# Patient Record
Sex: FEMALE | Race: WHITE | NOT HISPANIC OR LATINO | Employment: OTHER | URBAN - METROPOLITAN AREA
[De-identification: names, ages, dates, MRNs, and addresses within clinical notes are randomized per-mention and may not be internally consistent; named-entity substitution may affect disease eponyms.]

---

## 2023-02-16 PROBLEM — H35.30 MACULAR DEGENERATION: Status: ACTIVE | Noted: 2023-02-16

## 2023-02-16 PROBLEM — M54.16 LUMBAR RADICULOPATHY: Status: ACTIVE | Noted: 2023-02-16

## 2023-02-16 PROBLEM — M47.812 CERVICAL SPONDYLOSIS WITHOUT MYELOPATHY: Status: ACTIVE | Noted: 2023-02-16

## 2023-02-16 PROBLEM — M19.072 LOCALIZED OSTEOARTHROSIS OF LEFT ANKLE AND FOOT: Status: ACTIVE | Noted: 2023-02-16

## 2023-02-16 PROBLEM — G56.00 CARPAL TUNNEL SYNDROME: Status: ACTIVE | Noted: 2023-02-16

## 2023-02-16 PROBLEM — R68.83 CHILLS (WITHOUT FEVER): Status: ACTIVE | Noted: 2023-02-16

## 2023-02-16 PROBLEM — M79.671 FOOT PAIN, BILATERAL: Status: ACTIVE | Noted: 2023-02-16

## 2023-02-16 PROBLEM — M99.01 SEGMENTAL AND SOMATIC DYSFUNCTION OF CERVICAL REGION: Status: ACTIVE | Noted: 2023-02-16

## 2023-02-16 PROBLEM — R26.89 BALANCE PROBLEM: Status: ACTIVE | Noted: 2023-02-16

## 2023-02-16 PROBLEM — Z96.1 BILATERAL ARTIFICIAL LENS IMPLANT: Status: ACTIVE | Noted: 2023-02-16

## 2023-02-16 PROBLEM — K22.4 ESOPHAGEAL DYSMOTILITY: Status: ACTIVE | Noted: 2023-02-16

## 2023-02-16 PROBLEM — M25.512 LEFT SHOULDER PAIN: Status: ACTIVE | Noted: 2023-02-16

## 2023-02-16 PROBLEM — Q44.6 POLYCYSTIC LIVER DISEASE: Status: ACTIVE | Noted: 2023-02-16

## 2023-02-16 PROBLEM — H35.362 DRUSEN OF LEFT MACULA: Status: ACTIVE | Noted: 2023-02-16

## 2023-02-16 PROBLEM — E07.9 THYROID EYE DISEASE: Status: ACTIVE | Noted: 2023-02-16

## 2023-02-16 PROBLEM — H57.89 THYROID EYE DISEASE: Status: ACTIVE | Noted: 2023-02-16

## 2023-02-16 PROBLEM — M79.644 THUMB PAIN, RIGHT: Status: ACTIVE | Noted: 2023-02-16

## 2023-02-16 PROBLEM — M50.90 CERVICAL DISC DISEASE: Status: ACTIVE | Noted: 2023-02-16

## 2023-02-16 PROBLEM — M85.9 LOW BONE DENSITY: Status: ACTIVE | Noted: 2023-02-16

## 2023-02-16 PROBLEM — H40.1190 POAG (PRIMARY OPEN-ANGLE GLAUCOMA): Status: ACTIVE | Noted: 2023-02-16

## 2023-02-16 PROBLEM — M54.50 LOWER BACK PAIN: Status: ACTIVE | Noted: 2023-02-16

## 2023-02-16 PROBLEM — R55 SYNCOPE AND COLLAPSE: Status: ACTIVE | Noted: 2023-02-16

## 2023-02-16 PROBLEM — M17.0 DEGENERATIVE ARTHRITIS OF KNEE, BILATERAL: Status: ACTIVE | Noted: 2023-02-16

## 2023-02-16 PROBLEM — K76.9 LIVER LESION: Status: ACTIVE | Noted: 2023-02-16

## 2023-02-16 PROBLEM — J38.3 VOCAL CORD DYSFUNCTION: Status: ACTIVE | Noted: 2023-02-16

## 2023-02-16 PROBLEM — H35.361 DRUSEN OF RIGHT MACULA: Status: ACTIVE | Noted: 2023-02-16

## 2023-02-16 PROBLEM — M99.02 SEGMENTAL AND SOMATIC DYSFUNCTION OF THORACIC REGION: Status: ACTIVE | Noted: 2023-02-16

## 2023-02-16 PROBLEM — R10.84 GENERALIZED ABDOMINAL PAIN: Status: ACTIVE | Noted: 2023-02-16

## 2023-02-16 PROBLEM — R94.01 ABNORMAL EEG: Status: ACTIVE | Noted: 2023-02-16

## 2023-02-16 PROBLEM — H40.9 GLAUCOMA OF BOTH EYES: Status: ACTIVE | Noted: 2023-02-16

## 2023-02-16 PROBLEM — M25.539 PAIN, WRIST JOINT: Status: ACTIVE | Noted: 2023-02-16

## 2023-02-16 PROBLEM — G89.29 CHRONIC PAIN OF BOTH KNEES: Status: ACTIVE | Noted: 2023-02-16

## 2023-02-16 PROBLEM — M99.05 SEGMENTAL AND SOMATIC DYSFUNCTION OF PELVIC REGION: Status: ACTIVE | Noted: 2023-02-16

## 2023-02-16 PROBLEM — M25.561 CHRONIC PAIN OF BOTH KNEES: Status: ACTIVE | Noted: 2023-02-16

## 2023-02-16 PROBLEM — M20.21 HALLUX RIGIDUS OF RIGHT FOOT: Status: ACTIVE | Noted: 2023-02-16

## 2023-02-16 PROBLEM — M54.12 RADICULITIS, CERVICAL: Status: ACTIVE | Noted: 2023-02-16

## 2023-02-16 PROBLEM — M54.2 NECK PAIN: Status: ACTIVE | Noted: 2023-02-16

## 2023-02-16 PROBLEM — M25.339 SCAPHOLUNATE DISSOCIATION: Status: ACTIVE | Noted: 2023-02-16

## 2023-02-16 PROBLEM — R06.02 SHORTNESS OF BREATH AT REST: Status: ACTIVE | Noted: 2023-02-16

## 2023-02-16 PROBLEM — R13.10 DYSPHAGIA: Status: ACTIVE | Noted: 2023-02-16

## 2023-02-16 PROBLEM — M19.041 ARTHRITIS OF HAND, RIGHT, DEGENERATIVE: Status: ACTIVE | Noted: 2023-02-16

## 2023-02-16 PROBLEM — K74.60 HEPATIC CIRRHOSIS (MULTI): Status: ACTIVE | Noted: 2023-02-16

## 2023-02-16 PROBLEM — H51.8 SKEW DEVIATION: Status: ACTIVE | Noted: 2023-02-16

## 2023-02-16 PROBLEM — G44.86 HEADACHE, CERVICOGENIC: Status: ACTIVE | Noted: 2023-02-16

## 2023-02-16 PROBLEM — M79.646 THUMB PAIN: Status: ACTIVE | Noted: 2023-02-16

## 2023-02-16 PROBLEM — R10.2 PELVIC PAIN IN FEMALE: Status: ACTIVE | Noted: 2023-02-16

## 2023-02-16 PROBLEM — K21.9 ACID REFLUX: Status: ACTIVE | Noted: 2023-02-16

## 2023-02-16 PROBLEM — R10.9 ABDOMINAL PAIN: Status: ACTIVE | Noted: 2023-02-16

## 2023-02-16 PROBLEM — M79.18 MYALGIA, OTHER SITE: Status: ACTIVE | Noted: 2023-02-16

## 2023-02-16 PROBLEM — D21.10 BENIGN NEOPLASM OF SOFT TISSUE OF UPPER EXTREMITY: Status: ACTIVE | Noted: 2023-02-16

## 2023-02-16 PROBLEM — R93.2 ABNORMAL LIVER ULTRASOUND: Status: ACTIVE | Noted: 2023-02-16

## 2023-02-16 PROBLEM — R49.0 DYSPHONIA: Status: ACTIVE | Noted: 2023-02-16

## 2023-02-16 PROBLEM — R29.898 WEAKNESS OF BOTH LOWER EXTREMITIES: Status: ACTIVE | Noted: 2023-02-16

## 2023-02-16 PROBLEM — D36.10 SCHWANNOMA: Status: ACTIVE | Noted: 2023-02-16

## 2023-02-16 PROBLEM — N95.2 VAGINAL ATROPHY: Status: ACTIVE | Noted: 2023-02-16

## 2023-02-16 PROBLEM — T17.928A ASPIRATION OF FOOD: Status: ACTIVE | Noted: 2023-02-16

## 2023-02-16 PROBLEM — M19.049 ARTHRITIS, DEGENERATIVE, LOCALIZED, PRIMARY, HAND: Status: ACTIVE | Noted: 2023-02-16

## 2023-02-16 PROBLEM — M25.562 CHRONIC PAIN OF BOTH KNEES: Status: ACTIVE | Noted: 2023-02-16

## 2023-02-16 PROBLEM — H53.2 DIPLOPIA: Status: ACTIVE | Noted: 2023-02-16

## 2023-02-16 PROBLEM — M75.110 INCOMPLETE ROTATOR CUFF TEAR: Status: ACTIVE | Noted: 2023-02-16

## 2023-02-16 PROBLEM — H25.811 COMBINED FORM OF AGE-RELATED CATARACT, RIGHT EYE: Status: ACTIVE | Noted: 2023-02-16

## 2023-02-16 PROBLEM — M25.569 JOINT PAIN, KNEE: Status: ACTIVE | Noted: 2023-02-16

## 2023-02-16 PROBLEM — R06.02 SHORTNESS OF BREATH ON EXERTION: Status: ACTIVE | Noted: 2023-02-16

## 2023-02-16 PROBLEM — H04.123 BILATERAL DRY EYES: Status: ACTIVE | Noted: 2023-02-16

## 2023-02-16 PROBLEM — M17.10 ARTHRITIS OF KNEE: Status: ACTIVE | Noted: 2023-02-16

## 2023-02-16 PROBLEM — G47.33 OSA ON CPAP: Status: ACTIVE | Noted: 2023-02-16
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
## 2023-02-16 PROBLEM — M25.559 HIP PAIN: Status: ACTIVE | Noted: 2023-02-16

## 2023-02-16 PROBLEM — M99.03 SEGMENTAL AND SOMATIC DYSFUNCTION OF LUMBAR REGION: Status: ACTIVE | Noted: 2023-02-16

## 2023-02-16 PROBLEM — M19.071 OSTEOARTHRITIS OF RIGHT ANKLE AND FOOT: Status: ACTIVE | Noted: 2023-02-16

## 2023-02-16 PROBLEM — K76.89 LIVER CYST: Status: ACTIVE | Noted: 2023-02-16

## 2023-02-16 PROBLEM — R49.0 MUSCLE TENSION DYSPHONIA: Status: ACTIVE | Noted: 2023-02-16

## 2023-02-16 PROBLEM — M16.9 DEGENERATIVE ARTHRITIS OF HIP: Status: ACTIVE | Noted: 2023-02-16

## 2023-02-16 PROBLEM — M79.672 FOOT PAIN, BILATERAL: Status: ACTIVE | Noted: 2023-02-16

## 2023-02-16 RX ORDER — PNV NO.95/FERROUS FUM/FOLIC AC 28MG-0.8MG
1 TABLET ORAL AS NEEDED
COMMUNITY
End: 2023-06-12 | Stop reason: ALTCHOICE

## 2023-02-16 RX ORDER — PROPRANOLOL HYDROCHLORIDE 10 MG/1
1 TABLET ORAL DAILY
COMMUNITY
Start: 2021-09-09 | End: 2024-03-04 | Stop reason: SDUPTHER

## 2023-02-16 RX ORDER — MONTELUKAST SODIUM 10 MG/1
1 TABLET ORAL DAILY
COMMUNITY
Start: 2012-05-09

## 2023-02-16 RX ORDER — LATANOPROSTENE BUNOD 0.24 MG/ML
SOLUTION/ DROPS OPHTHALMIC
COMMUNITY
Start: 2022-03-22 | End: 2023-10-18

## 2023-02-16 RX ORDER — OMEPRAZOLE 40 MG/1
1 CAPSULE, DELAYED RELEASE ORAL DAILY
COMMUNITY
End: 2023-03-29 | Stop reason: ALTCHOICE

## 2023-02-16 RX ORDER — CYANOCOBALAMIN 1000 UG/ML
INJECTION, SOLUTION INTRAMUSCULAR; SUBCUTANEOUS
COMMUNITY
End: 2023-10-18 | Stop reason: ALTCHOICE

## 2023-02-16 RX ORDER — KETOCONAZOLE 20 MG/ML
SHAMPOO, SUSPENSION TOPICAL
COMMUNITY

## 2023-02-16 RX ORDER — ATORVASTATIN CALCIUM 20 MG/1
1 TABLET, FILM COATED ORAL 3 TIMES WEEKLY
COMMUNITY
Start: 2019-01-04

## 2023-02-16 RX ORDER — LEVOTHYROXINE SODIUM 112 UG/1
1 TABLET ORAL DAILY
COMMUNITY
Start: 2013-02-28 | End: 2023-06-01 | Stop reason: SDUPTHER

## 2023-02-16 RX ORDER — LISINOPRIL 20 MG/1
1 TABLET ORAL DAILY
COMMUNITY
Start: 2013-02-28 | End: 2023-03-29 | Stop reason: ALTCHOICE

## 2023-02-16 RX ORDER — IBANDRONATE SODIUM 150 MG/1
TABLET, FILM COATED ORAL
COMMUNITY
Start: 2020-09-21 | End: 2023-03-29

## 2023-02-16 RX ORDER — AZELASTINE 1 MG/ML
2 SPRAY, METERED NASAL 2 TIMES DAILY PRN
COMMUNITY

## 2023-02-16 RX ORDER — ACYCLOVIR 400 MG/1
1 TABLET ORAL DAILY
COMMUNITY
End: 2024-01-02 | Stop reason: SDUPTHER

## 2023-02-16 RX ORDER — BUDESONIDE AND FORMOTEROL FUMARATE DIHYDRATE 160; 4.5 UG/1; UG/1
2 AEROSOL RESPIRATORY (INHALATION) DAILY
COMMUNITY
Start: 2022-04-05 | End: 2024-04-17 | Stop reason: SDUPTHER

## 2023-02-16 RX ORDER — HYDROXYCHLOROQUINE SULFATE 200 MG/1
1 TABLET, FILM COATED ORAL DAILY
COMMUNITY
Start: 2017-12-29 | End: 2024-03-19

## 2023-03-29 ENCOUNTER — OFFICE VISIT (OUTPATIENT)
Dept: PRIMARY CARE | Facility: CLINIC | Age: 83
End: 2023-03-29
Payer: MEDICARE

## 2023-03-29 VITALS
WEIGHT: 170 LBS | HEIGHT: 63 IN | RESPIRATION RATE: 16 BRPM | DIASTOLIC BLOOD PRESSURE: 56 MMHG | OXYGEN SATURATION: 96 % | SYSTOLIC BLOOD PRESSURE: 138 MMHG | BODY MASS INDEX: 30.12 KG/M2 | HEART RATE: 66 BPM

## 2023-03-29 DIAGNOSIS — G47.33 OSA ON CPAP: ICD-10-CM

## 2023-03-29 DIAGNOSIS — E78.5 HYPERLIPIDEMIA, UNSPECIFIED HYPERLIPIDEMIA TYPE: ICD-10-CM

## 2023-03-29 DIAGNOSIS — R26.89 IMBALANCE: ICD-10-CM

## 2023-03-29 DIAGNOSIS — E11.9 DIET-CONTROLLED DIABETES MELLITUS (MULTI): ICD-10-CM

## 2023-03-29 DIAGNOSIS — I10 PRIMARY HYPERTENSION: Primary | ICD-10-CM

## 2023-03-29 DIAGNOSIS — E03.9 HYPOTHYROIDISM, UNSPECIFIED TYPE: ICD-10-CM

## 2023-03-29 DIAGNOSIS — N18.32 STAGE 3B CHRONIC KIDNEY DISEASE (MULTI): ICD-10-CM

## 2023-03-29 PROBLEM — E78.49 OTHER HYPERLIPIDEMIA: Status: ACTIVE | Noted: 2023-03-29

## 2023-03-29 PROCEDURE — 3078F DIAST BP <80 MM HG: CPT | Performed by: SPECIALIST

## 2023-03-29 PROCEDURE — 99203 OFFICE O/P NEW LOW 30 MIN: CPT | Performed by: SPECIALIST

## 2023-03-29 PROCEDURE — 1036F TOBACCO NON-USER: CPT | Performed by: SPECIALIST

## 2023-03-29 PROCEDURE — 1159F MED LIST DOCD IN RCRD: CPT | Performed by: SPECIALIST

## 2023-03-29 PROCEDURE — 1160F RVW MEDS BY RX/DR IN RCRD: CPT | Performed by: SPECIALIST

## 2023-03-29 PROCEDURE — 3075F SYST BP GE 130 - 139MM HG: CPT | Performed by: SPECIALIST

## 2023-03-29 RX ORDER — HYDROGEN PEROXIDE 3 %
20 SOLUTION, NON-ORAL MISCELLANEOUS DAILY PRN
COMMUNITY
End: 2024-03-19

## 2023-03-29 RX ORDER — AMLODIPINE BESYLATE 2.5 MG/1
2.5 TABLET ORAL DAILY
Qty: 30 TABLET | Refills: 5 | Status: SHIPPED | OUTPATIENT
Start: 2023-03-29 | End: 2023-10-31

## 2023-03-29 RX ORDER — LISINOPRIL 40 MG/1
20 TABLET ORAL DAILY
COMMUNITY
End: 2023-10-18 | Stop reason: ALTCHOICE

## 2023-03-29 ASSESSMENT — ENCOUNTER SYMPTOMS
OCCASIONAL FEELINGS OF UNSTEADINESS: 1
DEPRESSION: 0
LOSS OF SENSATION IN FEET: 0

## 2023-03-29 NOTE — ASSESSMENT & PLAN NOTE
Brought home cuff, home readings are in the 153/77 range  On lisinopril 40 mg.  Used diruetic in past but was discontinued.  Discussed addition of low dose amlodipine 2.5 mg once daily and continue home bp monitoring

## 2023-03-29 NOTE — ASSESSMENT & PLAN NOTE
Discussed, knows avoid Nsaids, labs ordered cmp, consider nephrology referral, ordered urine albumin random.  GFR in 2020 was 56, was down to 42 in 2021.

## 2023-03-29 NOTE — ASSESSMENT & PLAN NOTE
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

## 2023-03-29 NOTE — PROGRESS NOTES
"Subjective   Patient ID: Louisa Rodas is a 82 y.o. female who presents for New Patient Visit.  HPI    PMHx Htn, Hyperlipidemia, Hypothyroidism, Glaucoma,MENDY on CPAP, Polycystic Liver Disease, Sjogren syndrome (Dr Sparks), OA (ren, Hyperlipidemia, Asthma (sees Dr Ramos)  Tremors    \"A lot going on\"  Concerned about bp brought home cuff and home bp readings   153/77, 165/81, 161/97 169/87 and 13/80 155/89 154/89  Last doctor increased to 40 mg Lisinopril since 3 mos approx    Her blood pressure left arm seated 142/71 using her cuff.    Concerned about her sugar level, said diagnosed with DM and was on metformin but discontinued by diet so wants blood test.    C/o of feeling off balance, has not fallen, uses cane today because of knee, did PT here in past.  Balance off x 2 months and worsened.  Sometimes feels lightheaded, had EKG with pulm.  This morning no true vertigo but walking this morning felt like she was listing to the right    Not taking ibandronate too expensive got samples of some medication but doesn't know     Takes propanolol if HR is above 68 akes 2 tablets    Review of Systems  Constitutional  No fatigue, no fevers, no chills, no unintentional weight loss,   HEENT:  No headaches, no dizziness (had in past), a little hearing loss  Cardiovascular:  No chest pain, no palpitations, no shortness of breath with exertion (one flight of stairs)   Respiratory:  No cough,  no wheezing, No shortness of breath at rest  GI  No abdominal pain, no nausea, no vomiting, Positive flatulance, no changes in bowel habits, no bright red blood per rectum, no melena  :  Positive urinary frequency, no dysuria, no urine incontinence  MSK:  No falls,but feels unsteady, sometimes left buttock pain, no joint swelling    Objective   Physical Exam  General:    Well-appearing  F in no acute distress, well nourished, well hydrated  Head:  Normocephalic, atraumatic  Skin:          Warm dry,   Eyes:  Anicteric sclera, pupils " equal,   Ears:        TMs intact  Oral:      Mask in place, Not examined due to pandemic  Neck:   Supple, no thyromegaly or nodules appreciated on exam  Cor:      Regular rate, normal S1, S2, no murmurs appreciated, no S3, no S4   Lungs:   Clear to auscultation b/l, no wheezes, no rhonchi, no crackles, no accessory respiratory muscle use  Neuro:   CN2-12 grossly intact  (except CN 9-10, 12 not examined due to pandemic), No pronator drift, rapid alternating intact, finger-nose intact but positive tremor on right greater than left, heel-shin intact, Unsteady with Rhomberg's (leans right) and unable to perform heel-toe-gait    Assessment/Plan   Problem List Items Addressed This Visit          Nervous    MENDY on CPAP     Using CPAP mgmt per Pulm            Circulatory    Primary hypertension - Primary     Brought home cuff, home readings are in the 153/77 range  On lisinopril 40 mg.  Used diruetic in past but was discontinued.  Discussed addition of low dose amlodipine 2.5 mg once daily and continue home bp monitoring         Relevant Medications    amLODIPine (Norvasc) 2.5 mg tablet    Other Relevant Orders    Comprehensive Metabolic Panel    CBC       Genitourinary    Stage 3b chronic kidney disease     Discussed, knows avoid Nsaids, labs ordered cmp, consider nephrology referral, ordered urine albumin random.  GFR in 2020 was 56, was down to 42 in 2021.         Relevant Orders    Comprehensive Metabolic Panel    Albumin , Urine Random    CBC       Endocrine/Metabolic    Hypothyroidism, unspecified     On levothyroxine 112 mcgs 6 days per week, last TSH  normal in 2020         Relevant Orders    TSH    Diet-controlled diabetes mellitus (CMS/[facility])     Labs ordered hba1c  No longer on metformin for years         Relevant Orders    Hemoglobin A1C    Albumin , Urine Random    CBC       Other    Imbalance     Refer to NeuroDr Mock  MRI 6/28/21 with volume loss with disproportionate ventricular dilation,consider  NPH  Offered PT         Relevant Orders    Referral to Neurology    Hyperlipidemia     On atorvastatin ordered fx lipidis         Relevant Orders    Lipid Panel          Nadira Moctezuma DO

## 2023-03-30 ENCOUNTER — TELEPHONE (OUTPATIENT)
Dept: PRIMARY CARE | Facility: CLINIC | Age: 83
End: 2023-03-30
Payer: MEDICARE

## 2023-04-11 ENCOUNTER — LAB (OUTPATIENT)
Dept: LAB | Facility: LAB | Age: 83
End: 2023-04-11
Payer: MEDICARE

## 2023-04-11 DIAGNOSIS — I10 PRIMARY HYPERTENSION: ICD-10-CM

## 2023-04-11 DIAGNOSIS — N18.32 STAGE 3B CHRONIC KIDNEY DISEASE (MULTI): ICD-10-CM

## 2023-04-11 DIAGNOSIS — E11.9 DIET-CONTROLLED DIABETES MELLITUS (MULTI): ICD-10-CM

## 2023-04-11 DIAGNOSIS — E78.5 HYPERLIPIDEMIA, UNSPECIFIED HYPERLIPIDEMIA TYPE: ICD-10-CM

## 2023-04-11 DIAGNOSIS — E03.9 HYPOTHYROIDISM, UNSPECIFIED TYPE: ICD-10-CM

## 2023-04-11 LAB
ALANINE AMINOTRANSFERASE (SGPT) (U/L) IN SER/PLAS: 24 U/L (ref 7–45)
ALBUMIN (G/DL) IN SER/PLAS: 4.4 G/DL (ref 3.4–5)
ALBUMIN (MG/L) IN URINE: 11.8 MG/L
ALBUMIN/CREATININE (UG/MG) IN URINE: 6.1 UG/MG CRT (ref 0–30)
ALKALINE PHOSPHATASE (U/L) IN SER/PLAS: 83 U/L (ref 33–136)
ANION GAP IN SER/PLAS: 10 MMOL/L (ref 10–20)
ASPARTATE AMINOTRANSFERASE (SGOT) (U/L) IN SER/PLAS: 27 U/L (ref 9–39)
BILIRUBIN TOTAL (MG/DL) IN SER/PLAS: 0.5 MG/DL (ref 0–1.2)
CALCIUM (MG/DL) IN SER/PLAS: 9.3 MG/DL (ref 8.6–10.6)
CARBON DIOXIDE, TOTAL (MMOL/L) IN SER/PLAS: 28 MMOL/L (ref 21–32)
CHLORIDE (MMOL/L) IN SER/PLAS: 107 MMOL/L (ref 98–107)
CHOLESTEROL (MG/DL) IN SER/PLAS: 165 MG/DL (ref 0–199)
CHOLESTEROL IN HDL (MG/DL) IN SER/PLAS: 69.7 MG/DL
CHOLESTEROL/HDL RATIO: 2.4
CREATININE (MG/DL) IN SER/PLAS: 1.4 MG/DL (ref 0.5–1.05)
CREATININE (MG/DL) IN URINE: 192 MG/DL (ref 20–320)
ERYTHROCYTE DISTRIBUTION WIDTH (RATIO) BY AUTOMATED COUNT: 14.4 % (ref 11.5–14.5)
ERYTHROCYTE MEAN CORPUSCULAR HEMOGLOBIN CONCENTRATION (G/DL) BY AUTOMATED: 32.3 G/DL (ref 32–36)
ERYTHROCYTE MEAN CORPUSCULAR VOLUME (FL) BY AUTOMATED COUNT: 95 FL (ref 80–100)
ERYTHROCYTES (10*6/UL) IN BLOOD BY AUTOMATED COUNT: 4.12 X10E12/L (ref 4–5.2)
ESTIMATED AVERAGE GLUCOSE FOR HBA1C: 146 MG/DL
GFR FEMALE: 38 ML/MIN/1.73M2
GLUCOSE (MG/DL) IN SER/PLAS: 129 MG/DL (ref 74–99)
HEMATOCRIT (%) IN BLOOD BY AUTOMATED COUNT: 39.3 % (ref 36–46)
HEMOGLOBIN (G/DL) IN BLOOD: 12.7 G/DL (ref 12–16)
HEMOGLOBIN A1C/HEMOGLOBIN TOTAL IN BLOOD: 6.7 %
LDL: 76 MG/DL (ref 0–99)
LEUKOCYTES (10*3/UL) IN BLOOD BY AUTOMATED COUNT: 3.7 X10E9/L (ref 4.4–11.3)
NRBC (PER 100 WBCS) BY AUTOMATED COUNT: 0 /100 WBC (ref 0–0)
PLATELETS (10*3/UL) IN BLOOD AUTOMATED COUNT: 151 X10E9/L (ref 150–450)
POTASSIUM (MMOL/L) IN SER/PLAS: 4.1 MMOL/L (ref 3.5–5.3)
PROTEIN TOTAL: 6.4 G/DL (ref 6.4–8.2)
SODIUM (MMOL/L) IN SER/PLAS: 141 MMOL/L (ref 136–145)
THYROTROPIN (MIU/L) IN SER/PLAS BY DETECTION LIMIT <= 0.05 MIU/L: 21.43 MIU/L (ref 0.44–3.98)
TRIGLYCERIDE (MG/DL) IN SER/PLAS: 98 MG/DL (ref 0–149)
UREA NITROGEN (MG/DL) IN SER/PLAS: 17 MG/DL (ref 6–23)
VLDL: 20 MG/DL (ref 0–40)

## 2023-04-11 PROCEDURE — 83036 HEMOGLOBIN GLYCOSYLATED A1C: CPT

## 2023-04-11 PROCEDURE — 82043 UR ALBUMIN QUANTITATIVE: CPT

## 2023-04-11 PROCEDURE — 85027 COMPLETE CBC AUTOMATED: CPT

## 2023-04-11 PROCEDURE — 36415 COLL VENOUS BLD VENIPUNCTURE: CPT

## 2023-04-11 PROCEDURE — 82570 ASSAY OF URINE CREATININE: CPT

## 2023-04-11 PROCEDURE — 80061 LIPID PANEL: CPT

## 2023-04-11 PROCEDURE — 84443 ASSAY THYROID STIM HORMONE: CPT

## 2023-04-11 PROCEDURE — 80053 COMPREHEN METABOLIC PANEL: CPT

## 2023-04-17 RX ORDER — ASPIRIN 325 MG
50000 TABLET, DELAYED RELEASE (ENTERIC COATED) ORAL
COMMUNITY
End: 2023-06-12

## 2023-04-25 DIAGNOSIS — E03.9 HYPOTHYROIDISM, UNSPECIFIED TYPE: Primary | ICD-10-CM

## 2023-05-30 ENCOUNTER — PATIENT OUTREACH (OUTPATIENT)
Dept: PRIMARY CARE | Facility: CLINIC | Age: 83
End: 2023-05-30
Payer: MEDICARE

## 2023-05-30 DIAGNOSIS — N39.0 URINARY TRACT INFECTION WITH HEMATURIA, SITE UNSPECIFIED: ICD-10-CM

## 2023-05-30 DIAGNOSIS — R31.9 URINARY TRACT INFECTION WITH HEMATURIA, SITE UNSPECIFIED: ICD-10-CM

## 2023-05-30 RX ORDER — CEPHALEXIN 500 MG/1
500 CAPSULE ORAL 2 TIMES DAILY
COMMUNITY
Start: 2023-05-27 | End: 2023-05-30

## 2023-05-30 RX ORDER — CIPROFLOXACIN 500 MG/1
500 TABLET ORAL 2 TIMES DAILY
COMMUNITY
Start: 2023-05-30 | End: 2023-06-02

## 2023-05-30 RX ORDER — SULFAMETHOXAZOLE AND TRIMETHOPRIM 800; 160 MG/1; MG/1
1 TABLET ORAL DAILY
COMMUNITY
Start: 2011-02-16 | End: 2024-03-19 | Stop reason: ALTCHOICE

## 2023-05-30 RX ORDER — CARBOXYMETHYLCELLULOSE SODIUM 10 MG/ML
GEL OPHTHALMIC 4 TIMES DAILY
COMMUNITY

## 2023-05-30 NOTE — PROGRESS NOTES
Discharge Facility: [facility]  Discharge Diagnosis: Pre-syncope, UTI (urinary tract infection) , dehydration, acute kidney injury  Admission Date: 5/24/2023  Discharge Date: 5/27/2023  PCP Appointment Date: Message sent to [employer] to schedule hospital FU  Specialist Appointment Date: None noted  Hospital Encounter and Summary: Linked available documents  See discharge assessment below for further details    Engagement  Call Start Time: 1433 (5/30/2023  2:45 PM)    Medications  Medications reviewed with patient/caregiver?: Yes (Patient verbalized understanding of medication changes: discontinue amlodipine, decrease lisinopril to 20 mg daily. take Ciproflaxin twice daily until gone.) (5/30/2023  2:45 PM)  Is the patient having any side effects they believe may be caused by any medication additions or changes?: No (5/30/2023  2:45 PM)  Does the patient have all medications ordered at discharge?: Yes (5/30/2023  2:45 PM)  Care Management Interventions: No intervention needed (5/30/2023  2:45 PM)  Is the patient taking all medications as directed (includes completed medication regime)?: Yes (5/30/2023  2:45 PM)    Appointments  Does the patient have a primary care provider?: Yes (5/30/2023  2:45 PM)  Care Management Interventions: -- (No hospital FU appt available to schedule. Message sent to [employer] to schedule patient.) (5/30/2023  2:45 PM)  Has the patient kept scheduled appointments due by today?: Not applicable (5/30/2023  2:45 PM)  Follow Up Tasks: Appointments (5/30/2023  2:45 PM)    Self Management  What is the home health agency?:  [facility]/PT/OT (5/30/2023  2:45 PM)  Has home health visited the patient within 72 hours of discharge?: Call prior to 72 hours (Patient waiting for a scheduling phone call.) (5/30/2023  2:45 PM)  What Durable Medical Equipment (DME) was ordered?: N/A (5/30/2023  2:45 PM)    Patient Teaching  Does the patient have access to their discharge instructions?: Yes (5/30/2023  2:45  PM)  Care Management Interventions: Reviewed instructions with patient (5/30/2023  2:45 PM)  What is the patient's perception of their health status since discharge?: Same (Patient states she still does not feel well. She is very weak. She also had chest tightness/discomfort today and used her rescue inhaler with relief.) (5/30/2023  2:45 PM)  Is the patient/caregiver able to teach back the hierarchy of who to call/visit for symptoms/problems? PCP, Specialist, Home Health nurse, Urgent Care, ED, 911: Yes (5/30/2023  2:45 PM)    Wrap Up  Wrap Up Additional Comments: Patient had not been feeling well for several days. Had a syncopal episode with bowel incontinence after showering at home. She became very dizzy, had to sit down and then passed out. No head injury. Admitted to the hospital and diagnosed with a UTI, dehydration and CHING. Patient discharged to home with [facility]. Reviewed medication changes with patient. Patient verbalized understanding. Patient states she is not feeling better since hospitalization. Message sent to clinical Sensorist to schedule hospital FU. (5/30/2023  2:45 PM)

## 2023-06-01 ENCOUNTER — TELEPHONE (OUTPATIENT)
Dept: PRIMARY CARE | Facility: CLINIC | Age: 83
End: 2023-06-01

## 2023-06-01 DIAGNOSIS — E03.9 HYPOTHYROIDISM, UNSPECIFIED TYPE: ICD-10-CM

## 2023-06-01 RX ORDER — LEVOTHYROXINE SODIUM 112 UG/1
TABLET ORAL
Qty: 78 TABLET | Refills: 1 | Status: SHIPPED | OUTPATIENT
Start: 2023-06-01 | End: 2023-11-01 | Stop reason: SDUPTHER

## 2023-06-12 ENCOUNTER — LAB (OUTPATIENT)
Dept: LAB | Facility: LAB | Age: 83
End: 2023-06-12
Payer: MEDICARE

## 2023-06-12 ENCOUNTER — OFFICE VISIT (OUTPATIENT)
Dept: PRIMARY CARE | Facility: CLINIC | Age: 83
End: 2023-06-12
Payer: MEDICARE

## 2023-06-12 VITALS
DIASTOLIC BLOOD PRESSURE: 68 MMHG | HEIGHT: 63 IN | SYSTOLIC BLOOD PRESSURE: 140 MMHG | BODY MASS INDEX: 30.09 KG/M2 | RESPIRATION RATE: 17 BRPM | OXYGEN SATURATION: 94 % | WEIGHT: 169.8 LBS | HEART RATE: 73 BPM

## 2023-06-12 DIAGNOSIS — I10 PRIMARY HYPERTENSION: Primary | ICD-10-CM

## 2023-06-12 DIAGNOSIS — R35.0 URINE FREQUENCY: ICD-10-CM

## 2023-06-12 DIAGNOSIS — E03.9 HYPOTHYROIDISM, UNSPECIFIED TYPE: ICD-10-CM

## 2023-06-12 DIAGNOSIS — R55 NEAR SYNCOPE: ICD-10-CM

## 2023-06-12 LAB
ALANINE AMINOTRANSFERASE (SGPT) (U/L) IN SER/PLAS: 13 U/L (ref 7–45)
ALBUMIN (G/DL) IN SER/PLAS: 4.1 G/DL (ref 3.4–5)
ALKALINE PHOSPHATASE (U/L) IN SER/PLAS: 86 U/L (ref 33–136)
ANION GAP IN SER/PLAS: 9 MMOL/L (ref 10–20)
ASPARTATE AMINOTRANSFERASE (SGOT) (U/L) IN SER/PLAS: 19 U/L (ref 9–39)
BACTERIA, URINE: ABNORMAL /HPF
BASOPHILS (10*3/UL) IN BLOOD BY AUTOMATED COUNT: 0.04 X10E9/L (ref 0–0.1)
BASOPHILS/100 LEUKOCYTES IN BLOOD BY AUTOMATED COUNT: 1 % (ref 0–2)
BILIRUBIN TOTAL (MG/DL) IN SER/PLAS: 0.4 MG/DL (ref 0–1.2)
C REACTIVE PROTEIN (MG/L) IN SER/PLAS: 0.13 MG/DL
CALCIUM (MG/DL) IN SER/PLAS: 9.4 MG/DL (ref 8.6–10.6)
CALCIUM OXALATE CRYSTALS, URINE: ABNORMAL /HPF
CARBON DIOXIDE, TOTAL (MMOL/L) IN SER/PLAS: 29 MMOL/L (ref 21–32)
CHLORIDE (MMOL/L) IN SER/PLAS: 110 MMOL/L (ref 98–107)
CREATININE (MG/DL) IN SER/PLAS: 1.22 MG/DL (ref 0.5–1.05)
EOSINOPHILS (10*3/UL) IN BLOOD BY AUTOMATED COUNT: 0.23 X10E9/L (ref 0–0.4)
EOSINOPHILS/100 LEUKOCYTES IN BLOOD BY AUTOMATED COUNT: 5.7 % (ref 0–6)
ERYTHROCYTE DISTRIBUTION WIDTH (RATIO) BY AUTOMATED COUNT: 13.4 % (ref 11.5–14.5)
ERYTHROCYTE MEAN CORPUSCULAR HEMOGLOBIN CONCENTRATION (G/DL) BY AUTOMATED: 32.2 G/DL (ref 32–36)
ERYTHROCYTE MEAN CORPUSCULAR VOLUME (FL) BY AUTOMATED COUNT: 97 FL (ref 80–100)
ERYTHROCYTES (10*6/UL) IN BLOOD BY AUTOMATED COUNT: 3.82 X10E12/L (ref 4–5.2)
GFR FEMALE: 45 ML/MIN/1.73M2
GLUCOSE (MG/DL) IN SER/PLAS: 83 MG/DL (ref 74–99)
HEMATOCRIT (%) IN BLOOD BY AUTOMATED COUNT: 37 % (ref 36–46)
HEMOGLOBIN (G/DL) IN BLOOD: 11.9 G/DL (ref 12–16)
HYALINE CASTS, URINE: ABNORMAL /LPF
IMMATURE GRANULOCYTES/100 LEUKOCYTES IN BLOOD BY AUTOMATED COUNT: 0.2 % (ref 0–0.9)
LEUKOCYTES (10*3/UL) IN BLOOD BY AUTOMATED COUNT: 4 X10E9/L (ref 4.4–11.3)
LYMPHOCYTES (10*3/UL) IN BLOOD BY AUTOMATED COUNT: 0.69 X10E9/L (ref 0.8–3)
LYMPHOCYTES/100 LEUKOCYTES IN BLOOD BY AUTOMATED COUNT: 17.2 % (ref 13–44)
MONOCYTES (10*3/UL) IN BLOOD BY AUTOMATED COUNT: 0.36 X10E9/L (ref 0.05–0.8)
MONOCYTES/100 LEUKOCYTES IN BLOOD BY AUTOMATED COUNT: 9 % (ref 2–10)
MUCUS, URINE: ABNORMAL /LPF
NEUTROPHILS (10*3/UL) IN BLOOD BY AUTOMATED COUNT: 2.68 X10E9/L (ref 1.6–5.5)
NEUTROPHILS/100 LEUKOCYTES IN BLOOD BY AUTOMATED COUNT: 66.9 % (ref 40–80)
NRBC (PER 100 WBCS) BY AUTOMATED COUNT: 0 /100 WBC (ref 0–0)
PLATELETS (10*3/UL) IN BLOOD AUTOMATED COUNT: 172 X10E9/L (ref 150–450)
POTASSIUM (MMOL/L) IN SER/PLAS: 3.9 MMOL/L (ref 3.5–5.3)
PROTEIN TOTAL: 6.1 G/DL (ref 6.4–8.2)
RBC, URINE: 1 /HPF (ref 0–5)
SEDIMENTATION RATE, ERYTHROCYTE: 1 MM/H (ref 0–30)
SODIUM (MMOL/L) IN SER/PLAS: 144 MMOL/L (ref 136–145)
SQUAMOUS EPITHELIAL CELLS, URINE: 1 /HPF
THYROTROPIN (MIU/L) IN SER/PLAS BY DETECTION LIMIT <= 0.05 MIU/L: 0.81 MIU/L (ref 0.44–3.98)
UREA NITROGEN (MG/DL) IN SER/PLAS: 20 MG/DL (ref 6–23)
WBC, URINE: 3 /HPF (ref 0–5)

## 2023-06-12 PROCEDURE — 3077F SYST BP >= 140 MM HG: CPT | Performed by: SPECIALIST

## 2023-06-12 PROCEDURE — 84443 ASSAY THYROID STIM HORMONE: CPT

## 2023-06-12 PROCEDURE — 87086 URINE CULTURE/COLONY COUNT: CPT

## 2023-06-12 PROCEDURE — 99214 OFFICE O/P EST MOD 30 MIN: CPT | Performed by: SPECIALIST

## 2023-06-12 PROCEDURE — 1160F RVW MEDS BY RX/DR IN RCRD: CPT | Performed by: SPECIALIST

## 2023-06-12 PROCEDURE — 81001 URINALYSIS AUTO W/SCOPE: CPT

## 2023-06-12 PROCEDURE — 1159F MED LIST DOCD IN RCRD: CPT | Performed by: SPECIALIST

## 2023-06-12 PROCEDURE — 1036F TOBACCO NON-USER: CPT | Performed by: SPECIALIST

## 2023-06-12 PROCEDURE — 36415 COLL VENOUS BLD VENIPUNCTURE: CPT

## 2023-06-12 PROCEDURE — 3078F DIAST BP <80 MM HG: CPT | Performed by: SPECIALIST

## 2023-06-12 NOTE — PROGRESS NOTES
"Subjective   Patient ID: Louisa Rodas is a 82 y.o. female who presents for ER Follow-up.  HPI    81 yo female PMHx Htn, Hyperlipidemia, Hypothyroidism, Glaucoma,MENDY on CPAP, Polycystic Liver Disease, Sjogren syndrome (Dr Sparks), OA (ren, Hyperlipidemia, Asthma (sees Dr Ramos) Tremors   Presents for hospital discharge follow-up    Presented to ER with fevers chills and seconds of LOC she described as staring and with incontinence of bowel .  Said she was aware of her \"bowels coming down\"  She said she didn't actually lose consciousness and was mostly aware of what was going.  Said she lost control of bowels and this has happened to her once many years ago.  (Previously saw Dr. Chakraborty a Neurologist who put her on seizure medicines)  Had normal EEG 9/2021 with Dr. Posadas, sees him July 20    Head CT with no acute abnormality, cerebral volume loss and chronic left external capsule infarct new since 2016  discharge summary notes UA with bacterial and nitrates, cx grew E Coili, got 1 gram rocephin and keflex 500 mg bid x 3 days  presyncope attributed to volume contraction  Amlodipine was held    Said she is taking CBD Gummies Bears or Worms bites in half or a little bit and said it helps her stomach (not able to add to epic medication list)    She has not been intimate since UTI.  She uses a gel because of vaginal dryness which she uses with good results.  She tries to void before and after intercourse and washes.  This was first UTI this year.      Said she is drinking 2 twelve oz bottles of water daily    148/79 BP Here with MA  Brought home BP readings with her ranging from 138/90 to 109/45    Doing PT now and OT now    Saw neurology and has follow-up, has upcoming appt re NPH  Is not taking amlodipine or lisinopril  Only taking if bp is above 140/90 said she was told that by the doctor at the hospital    Is taking propranolol for tremors  Only taking atorvastatin 3 x week since gets muscle aches    Not " taking 50,000 units once monthly since too expensive -- plans to get D3 1000 units     Was going to get another nerve ablation but cancelled since pain is better with warmer weather (Prior 2 nerve blocks before ablation)      Allergies   Allergen Reactions    Penicillins Hives and Itching    Pregabalin Dizziness    Primidone Unknown    Tizanidine Unknown      Current Outpatient Medications   Medication Instructions    acyclovir (Zovirax) 400 mg tablet 1 tablet, oral, Daily    amLODIPine (NORVASC) 2.5 mg, oral, Daily    ASPERCREME, LIDOCAINE, TOP Prn use for cervical spine pain    atorvastatin (Lipitor) 20 mg tablet 1 tablet, oral, 3 times weekly    azelastine (Astelin) 137 mcg (0.1 %) nasal spray 2 sprays, Each Nostril, 2 times daily, Use in each nostril as directed    budesonide-formoteroL (Symbicort) 160-4.5 mcg/actuation inhaler 2 puffs, inhalation, 2 times daily, RINSE MOUTH AFTER USE.    CALCIUM ORAL 1 tablet, oral, Daily    carboxymethylcellulose (Refresh Celluvisc) 1 % ophthalmic solution dropperette ophthalmic (eye), 4 times daily    cyanocobalamin (Vitamin B-12) 1,000 mcg/mL injection Inject 1 ml intramuscularly once a month    esomeprazole (NEXIUM) 20 mg, oral, Daily PRN, Do not open capsule.    hydroxychloroquine (Plaquenil) 200 mg tablet 1 tablet, oral, Daily, WITH FOOD OR MILK    ketoconazole (NIZOral) 2 % shampoo No dose, route, or frequency recorded.    latanoprostene bunod (Vyzulta) 0.024 % drops 1 gtt ou qd    levothyroxine (Synthroid, Levoxyl) 112 mcg tablet Take 1 tablet by mouth 6 days a week    lisinopril 20 mg, oral, Daily    montelukast (Singulair) 10 mg tablet 1 tablet, oral, Daily    propranolol (Inderal) 10 mg tablet 1 tablet, oral, Daily    sulfamethoxazole-trimethoprim (Bactrim DS) 800-160 mg tablet 1 tablet, oral, Daily        Review of Systems  Constitutional  No fatigue, no fevers, no rigors, some times chills, no unintentional weight loss,   HEENT:  No headaches, Positive  "lightheadedness in morning when she gets up, no dizziness, no vision changes (only associated with Dry eyes and sees optho regularly)  Cardiovascular:  No chest pain, no palpitations, a little shortness of breath with exertion (one flight of stairs),   Respiratory:  No cough, no hemoptysis, no wheezing, No shortness of breath at rest, Hx of focal cord dysfunction  GI:  No dysphagia (hx of esophagram and barium swallow), no odynophagia, occasional reflux, no abdominal pain no changes in bowel habits , no brbpr,  no melena  :  Positive urinary frequency with nocturia x 3 since hospitalization, no dysuria, no urine incontinence now (did before hospitalization)  MSK:  No falls, ambulating with cane, right knee oint pain  Neuro:  Positive tremor, lower extremity weakness which is improving with PT    Physical Exam  /68   Pulse 73   Resp 17   Ht 1.6 m (5' 3\")   Wt 77 kg (169 lb 12.8 oz)   SpO2 94%   BMI 30.08 kg/m²   General:    Well-appearing  F in no acute distress, well nourished, well hydrated  Head:  Normocephalic, atraumatic  Skin:          Warm dry,   Eyes:  Anicteric sclera, pupils equal,   Ears:        TMs intact  Oral:      Not examined due to pandemic  Neck:   Supple,   Cor:      Regular rate, normal S1, S2, no murmurs appreciated, no S3, no S4   Lungs:   Clear to auscultation b/l, no wheezes, no rhonchi, no crackles, no accessory respiratory muscle use  Neuro:   CN2-12 grossly intact     Assessment/Plan   Problem List Items Addressed This Visit          Circulatory    Primary hypertension - Primary     Has been off of amlodipine 2.5 mg and lisinopril 40 mg daily  Said she took half tab of lisinopril 20 mg when it was above 140/90  Home BP readings ranging as low as 109/45 up to 138/90  BP today for me was 140/68  Suspect she may need to restart amlodipine but recommend home bp monitoring.  She will continue home bp monitoring and if home BP is consistently above 140/90, will let me know         " Near syncope     Was in ER for near-syncope or syncope, patient thinks she did not lose consciousness since shew as aware of what was happening.  She did have loss of bowel continence and we discussed that is not typically seen with syncope/near-syncope but is seen with seizure activity or spinal cord issues, she has not had further episodes.  Had normal EEG 9/2021, has upcoming appointment with neurology which she will discuss with neurology.  Discussed considering cardiology evaluation for monitor.            Other    Urine frequency     Treated with keflex for uti at er  Having nocturia and frequency, recheck urine          Relevant Orders    Urinalysis Microscopic Only    Urine Culture          Nadira Moctezuma DO

## 2023-06-12 NOTE — ASSESSMENT & PLAN NOTE
Has been off of amlodipine 2.5 mg and lisinopril 40 mg daily  Said she took half tab of lisinopril 20 mg when it was above 140/90  Home BP readings ranging as low as 109/45 up to 138/90  BP today for me was 140/68  Suspect she may need to restart amlodipine but recommend home bp monitoring.  She will continue home bp monitoring and if home BP is consistently above 140/90, will let me know

## 2023-06-12 NOTE — ASSESSMENT & PLAN NOTE
I discussed the findings, assessment and plan with the resident and agree with the resident's findings and plan as documented in the resident's note. Was in ER for near-syncope or syncope, patient thinks she did not lose consciousness since shew as aware of what was happening.  She did have loss of bowel continence and we discussed that is not typically seen with syncope/near-syncope but is seen with seizure activity or spinal cord issues, she has not had further episodes.  Had normal EEG 9/2021, has upcoming appointment with neurology which she will discuss with neurology.  Discussed considering cardiology evaluation for monitor.

## 2023-06-13 LAB
ANTI-NUCLEAR ANTIBODY (ANA): NEGATIVE
URINE CULTURE: NORMAL

## 2023-06-14 LAB
ALBUMIN ELP: 3.9 G/DL (ref 3.4–5)
ALPHA 1: 0.3 G/DL (ref 0.2–0.6)
ALPHA 2: 0.5 G/DL (ref 0.4–1.1)
BETA: 0.7 G/DL (ref 0.5–1.2)
GAMMA GLOBULIN: 0.7 G/DL (ref 0.5–1.4)
PATH REVIEW-SERUM PROTEIN ELECTROPHORESIS: NORMAL
PROTEIN ELECTROPHORESIS INTERPRETATION: NORMAL
PROTEIN TOTAL: 6.1 G/DL (ref 6.4–8.2)

## 2023-06-19 LAB
ALANINE AMINOTRANSFERASE (SGPT) (U/L) IN SER/PLAS: 17 U/L (ref 7–45)
ALBUMIN (G/DL) IN SER/PLAS: 4.3 G/DL (ref 3.4–5)
ALKALINE PHOSPHATASE (U/L) IN SER/PLAS: 87 U/L (ref 33–136)
ALPHA-1 FETOPROTEIN (NG/ML) IN SER/PLAS: 9 NG/ML (ref 0–9)
ANION GAP IN SER/PLAS: 10 MMOL/L (ref 10–20)
ASPARTATE AMINOTRANSFERASE (SGOT) (U/L) IN SER/PLAS: 24 U/L (ref 9–39)
BILIRUBIN DIRECT (MG/DL) IN SER/PLAS: 0.1 MG/DL (ref 0–0.3)
BILIRUBIN TOTAL (MG/DL) IN SER/PLAS: 0.5 MG/DL (ref 0–1.2)
CALCIUM (MG/DL) IN SER/PLAS: 9.5 MG/DL (ref 8.6–10.6)
CARBON DIOXIDE, TOTAL (MMOL/L) IN SER/PLAS: 27 MMOL/L (ref 21–32)
CHLORIDE (MMOL/L) IN SER/PLAS: 109 MMOL/L (ref 98–107)
CREATININE (MG/DL) IN SER/PLAS: 1.2 MG/DL (ref 0.5–1.05)
GFR FEMALE: 46 ML/MIN/1.73M2
GLUCOSE (MG/DL) IN SER/PLAS: 118 MG/DL (ref 74–99)
INR IN PPP BY COAGULATION ASSAY: 1.1 (ref 0.9–1.1)
POTASSIUM (MMOL/L) IN SER/PLAS: 4.1 MMOL/L (ref 3.5–5.3)
PROTEIN TOTAL: 6.4 G/DL (ref 6.4–8.2)
PROTHROMBIN TIME (PT) IN PPP BY COAGULATION ASSAY: 12.2 SEC (ref 9.8–13.4)
SODIUM (MMOL/L) IN SER/PLAS: 142 MMOL/L (ref 136–145)
UREA NITROGEN (MG/DL) IN SER/PLAS: 15 MG/DL (ref 6–23)

## 2023-06-20 NOTE — PROGRESS NOTES
Paged by patient for elevated bp.  Said bp 159/82, was 165/83 this morning so took 20 mg lisinopril.   Has 2.5 mg amlodipine at home, recommended she take it.  Continue home bp monitoring.

## 2023-06-21 ENCOUNTER — PATIENT OUTREACH (OUTPATIENT)
Dept: PRIMARY CARE | Facility: CLINIC | Age: 83
End: 2023-06-21
Payer: MEDICARE

## 2023-06-21 NOTE — PROGRESS NOTES
Call regarding appt. with Dr. Mckeon on 6/12/2023 after hospitalization for primary hypertension. At time of outreach call, the patient is still working to control her BP. The pt had an episode where her BP was elevated and she took a half of lisinopril as directed and her BP did not respond. She then took her amlodipine and her BP came down. Pt states she is going to resume her amlodipine to help control her BP. It was discontinued suring her hospital stay and Dr. Moctezuma said she may have to resume it. [facility] PT/OT still coming to the home and are assisting to monitor BP.

## 2023-06-22 LAB
MISCELLANEUOUS TEST RESULT: NORMAL
NAME OF SENDOUT TEST: NORMAL

## 2023-07-06 ENCOUNTER — TELEPHONE (OUTPATIENT)
Dept: PRIMARY CARE | Facility: CLINIC | Age: 83
End: 2023-07-06

## 2023-07-06 ENCOUNTER — LAB (OUTPATIENT)
Dept: LAB | Facility: LAB | Age: 83
End: 2023-07-06
Payer: MEDICARE

## 2023-07-06 DIAGNOSIS — R39.9 UTI SYMPTOMS: ICD-10-CM

## 2023-07-06 DIAGNOSIS — R39.9 UTI SYMPTOMS: Primary | ICD-10-CM

## 2023-07-06 LAB
APPEARANCE, URINE: ABNORMAL
BILIRUBIN, URINE: NEGATIVE
BLOOD, URINE: NEGATIVE
COLOR, URINE: ABNORMAL
GLUCOSE, URINE: NEGATIVE MG/DL
HYALINE CASTS, URINE: ABNORMAL /LPF
KETONES, URINE: NEGATIVE MG/DL
LEUKOCYTE ESTERASE, URINE: ABNORMAL
NITRITE, URINE: NEGATIVE
PH, URINE: 5 (ref 5–8)
PROTEIN, URINE: ABNORMAL MG/DL
RBC, URINE: 1 /HPF (ref 0–5)
SPECIFIC GRAVITY, URINE: 1.03 (ref 1–1.03)
SQUAMOUS EPITHELIAL CELLS, URINE: 2 /HPF
UROBILINOGEN, URINE: <2 MG/DL (ref 0–1.9)
WBC, URINE: 5 /HPF (ref 0–5)

## 2023-07-06 PROCEDURE — 87086 URINE CULTURE/COLONY COUNT: CPT

## 2023-07-06 PROCEDURE — 81001 URINALYSIS AUTO W/SCOPE: CPT

## 2023-07-07 LAB — URINE CULTURE: NORMAL

## 2023-07-27 ENCOUNTER — PATIENT OUTREACH (OUTPATIENT)
Dept: PRIMARY CARE | Facility: CLINIC | Age: 83
End: 2023-07-27
Payer: MEDICARE

## 2023-07-27 NOTE — PROGRESS NOTES
Unable to reach patient for monthly post discharge follow up. LVM with call back number for patient to call if needed.

## 2023-08-07 ENCOUNTER — TELEPHONE (OUTPATIENT)
Dept: PRIMARY CARE | Facility: CLINIC | Age: 83
End: 2023-08-07

## 2023-08-07 NOTE — TELEPHONE ENCOUNTER
"Patient left a voicemail stating she is starting to be incontinent and when she urinates she feels a \"pulling\" sensation.   "

## 2023-08-08 ENCOUNTER — LAB (OUTPATIENT)
Dept: LAB | Facility: LAB | Age: 83
End: 2023-08-08
Payer: MEDICARE

## 2023-08-08 DIAGNOSIS — N39.0 RECURRENT UTI (URINARY TRACT INFECTION): Primary | ICD-10-CM

## 2023-08-08 DIAGNOSIS — R32 URINARY INCONTINENCE, UNSPECIFIED TYPE: ICD-10-CM

## 2023-08-08 DIAGNOSIS — N39.0 RECURRENT UTI (URINARY TRACT INFECTION): ICD-10-CM

## 2023-08-08 LAB
APPEARANCE, URINE: NORMAL
BILIRUBIN, URINE: NEGATIVE
BLOOD, URINE: NEGATIVE
COLOR, URINE: YELLOW
GLUCOSE, URINE: NEGATIVE MG/DL
KETONES, URINE: NEGATIVE MG/DL
LEUKOCYTE ESTERASE, URINE: NEGATIVE
NITRITE, URINE: NEGATIVE
PH, URINE: 5 (ref 5–8)
PROTEIN, URINE: NEGATIVE MG/DL
SPECIFIC GRAVITY, URINE: 1.03 (ref 1–1.03)
UROBILINOGEN, URINE: <2 MG/DL (ref 0–1.9)

## 2023-08-08 PROCEDURE — 87086 URINE CULTURE/COLONY COUNT: CPT

## 2023-08-08 PROCEDURE — 81001 URINALYSIS AUTO W/SCOPE: CPT

## 2023-08-08 PROCEDURE — 87077 CULTURE AEROBIC IDENTIFY: CPT

## 2023-08-08 PROCEDURE — 87186 SC STD MICRODIL/AGAR DIL: CPT

## 2023-08-09 LAB
BACTERIA, URINE: ABNORMAL /HPF
CALCIUM OXALATE CRYSTALS, URINE: ABNORMAL /HPF
RBC, URINE: ABNORMAL /HPF (ref 0–5)
SQUAMOUS EPITHELIAL CELLS, URINE: ABNORMAL /HPF
WBC, URINE: ABNORMAL /HPF (ref 0–5)

## 2023-08-11 LAB — URINE CULTURE: ABNORMAL

## 2023-08-15 RX ORDER — CIPROFLOXACIN 250 MG/1
250 TABLET, FILM COATED ORAL 2 TIMES DAILY
Qty: 10 TABLET | Refills: 0 | Status: SHIPPED | OUTPATIENT
Start: 2023-08-15 | End: 2023-08-20

## 2023-08-17 LAB
BASOPHILS (10*3/UL) IN BLOOD BY AUTOMATED COUNT: 0.04 X10E9/L (ref 0–0.1)
BASOPHILS/100 LEUKOCYTES IN BLOOD BY AUTOMATED COUNT: 0.8 % (ref 0–2)
EOSINOPHILS (10*3/UL) IN BLOOD BY AUTOMATED COUNT: 0.21 X10E9/L (ref 0–0.4)
EOSINOPHILS/100 LEUKOCYTES IN BLOOD BY AUTOMATED COUNT: 4 % (ref 0–6)
ERYTHROCYTE DISTRIBUTION WIDTH (RATIO) BY AUTOMATED COUNT: 13 % (ref 11.5–14.5)
ERYTHROCYTE MEAN CORPUSCULAR HEMOGLOBIN CONCENTRATION (G/DL) BY AUTOMATED: 32.2 G/DL (ref 32–36)
ERYTHROCYTE MEAN CORPUSCULAR VOLUME (FL) BY AUTOMATED COUNT: 96 FL (ref 80–100)
ERYTHROCYTES (10*6/UL) IN BLOOD BY AUTOMATED COUNT: 4.31 X10E12/L (ref 4–5.2)
HEMATOCRIT (%) IN BLOOD BY AUTOMATED COUNT: 41.3 % (ref 36–46)
HEMOGLOBIN (G/DL) IN BLOOD: 13.3 G/DL (ref 12–16)
IGA (MG/DL) IN SER/PLAS: 150 MG/DL (ref 70–400)
IGG (MG/DL) IN SER/PLAS: 840 MG/DL (ref 700–1600)
IGG (MG/DL) IN SER/PLAS: 840 MG/DL (ref 700–1600)
IGG SUBCLASS 1 (MG/DL) IN SERUM: 640 MG/DL (ref 490–1140)
IGG SUBCLASS 2 (MG/DL) IN SERUM: 112 MG/DL (ref 150–640)
IGG SUBCLASS 3 (MG/DL) IN SERUM: 12 MG/DL (ref 11–85)
IGG SUBCLASS 4 (MG/DL) IN SERUM: 15 MG/DL (ref 3–200)
IGM (MG/DL) IN SER/PLAS: 17 MG/DL (ref 40–230)
IMMATURE GRANULOCYTES/100 LEUKOCYTES IN BLOOD BY AUTOMATED COUNT: 0.4 % (ref 0–0.9)
LEUKOCYTES (10*3/UL) IN BLOOD BY AUTOMATED COUNT: 5.3 X10E9/L (ref 4.4–11.3)
LYMPHOCYTES (10*3/UL) IN BLOOD BY AUTOMATED COUNT: 0.99 X10E9/L (ref 0.8–3)
LYMPHOCYTES/100 LEUKOCYTES IN BLOOD BY AUTOMATED COUNT: 18.7 % (ref 13–44)
MONOCYTES (10*3/UL) IN BLOOD BY AUTOMATED COUNT: 0.51 X10E9/L (ref 0.05–0.8)
MONOCYTES/100 LEUKOCYTES IN BLOOD BY AUTOMATED COUNT: 9.6 % (ref 2–10)
NEUTROPHILS (10*3/UL) IN BLOOD BY AUTOMATED COUNT: 3.53 X10E9/L (ref 1.6–5.5)
NEUTROPHILS/100 LEUKOCYTES IN BLOOD BY AUTOMATED COUNT: 66.5 % (ref 40–80)
NRBC (PER 100 WBCS) BY AUTOMATED COUNT: 0 /100 WBC (ref 0–0)
PLATELETS (10*3/UL) IN BLOOD AUTOMATED COUNT: 181 X10E9/L (ref 150–450)

## 2023-08-21 LAB
PNEUMO SEROTYPE INTERPRETATION: NORMAL
SEROTYPE 1, VIRC: 0.12 UG/ML
SEROTYPE 10A(34), VIRC: 0.46 UG/ML
SEROTYPE 11A(43), VIRC: 0.45 UG/ML
SEROTYPE 12F, VIRC: 0.15 UG/ML
SEROTYPE 14, VIRC: 0.21 UG/ML
SEROTYPE 15B(54), VIRC: 1.64 UG/ML
SEROTYPE 17F, VIRC: 0.4 UG/ML
SEROTYPE 18C(56), VIRC: 0.71 UG/ML
SEROTYPE 19A(57), VIRC: 3.84 UG/ML
SEROTYPE 19F, VIRC: 0.51 UG/ML
SEROTYPE 2, VIRC: 0.11 UG/ML
SEROTYPE 20, VIRC: 0.6 UG/ML
SEROTYPE 22F, VIRC: 0.39 UG/ML
SEROTYPE 23F, VIRC: 0.04 UG/ML
SEROTYPE 3, VIRC: 0.42 UG/ML
SEROTYPE 33F(70), VIRC: 2.91 UG/ML
SEROTYPE 4, VIRC: 0.13 UG/ML
SEROTYPE 5, VIRC: 0.4 UG/ML
SEROTYPE 6B(26), VIRC: 0.57 UG/ML
SEROTYPE 7F(51), VIRC: 0.33 UG/ML
SEROTYPE 8, VIRC: 0.46 UG/ML
SEROTYPE 9N, VIRC: 0.27 UG/ML
SEROTYPE 9V(68), VIRC: 0.09 UG/ML
TETANUS AB IGG: 0.5 IU/ML

## 2023-08-28 ENCOUNTER — PATIENT OUTREACH (OUTPATIENT)
Dept: PRIMARY CARE | Facility: CLINIC | Age: 83
End: 2023-08-28
Payer: MEDICARE

## 2023-08-28 NOTE — PROGRESS NOTES
Call placed regarding 90 day TCM wrap-up of services. At the time of call, the patient states she is still working towards recovery. She continues to work with PT for her balance issues. Also has an appt today with a uro-gynecologist for recurrent UTI's. No questions or concerns for primary care provider at this time.     The pt has met the 30 day and 90 day rehospitalization goals and is discharged from TCM services.

## 2023-09-06 PROBLEM — M79.671 RIGHT FOOT PAIN: Status: ACTIVE | Noted: 2023-09-06

## 2023-09-06 PROBLEM — N32.81 OAB (OVERACTIVE BLADDER): Status: ACTIVE | Noted: 2023-09-06

## 2023-09-06 PROBLEM — M25.50 ARTHRALGIA OF MULTIPLE SITES: Status: ACTIVE | Noted: 2023-09-06

## 2023-09-06 PROBLEM — G62.89 PERIPHERAL MOTOR NEUROPATHY: Status: ACTIVE | Noted: 2023-09-06

## 2023-09-06 PROBLEM — R53.83 OTHER FATIGUE: Status: ACTIVE | Noted: 2023-09-06

## 2023-09-06 PROBLEM — H52.223 REGULAR ASTIGMATISM OF BOTH EYES: Status: ACTIVE | Noted: 2023-09-06

## 2023-09-06 PROBLEM — H04.123 DRY EYE SYNDROME OF BOTH LACRIMAL GLANDS: Status: ACTIVE | Noted: 2023-09-06

## 2023-09-06 PROBLEM — G47.01 INSOMNIA DUE TO MEDICAL CONDITION: Status: ACTIVE | Noted: 2023-09-06

## 2023-09-06 PROBLEM — R90.89 ABNORMAL BRAIN MRI: Status: ACTIVE | Noted: 2023-09-06

## 2023-09-06 PROBLEM — M35.01 KERATOCONJUNCTIVITIS SICCA, IN SJOGREN'S SYNDROME (MULTI): Status: ACTIVE | Noted: 2023-09-06

## 2023-09-06 PROBLEM — L80 VITILIGO: Status: ACTIVE | Noted: 2023-09-06

## 2023-09-06 PROBLEM — E03.9 HYPOTHYROID: Status: ACTIVE | Noted: 2023-09-06

## 2023-09-06 PROBLEM — M77.9 ENTHESOPATHY: Status: ACTIVE | Noted: 2023-09-06

## 2023-09-06 PROBLEM — M06.4 INFLAMMATORY POLYARTHRITIS (MULTI): Status: ACTIVE | Noted: 2023-09-06

## 2023-09-06 PROBLEM — I10 HTN (HYPERTENSION): Status: ACTIVE | Noted: 2023-09-06

## 2023-09-06 PROBLEM — D72.810 LYMPHOPENIA: Status: ACTIVE | Noted: 2023-09-06

## 2023-09-06 PROBLEM — M54.59 OTHER LOW BACK PAIN: Status: ACTIVE | Noted: 2023-09-06

## 2023-09-06 PROBLEM — M19.249 LOCALIZED, SECONDARY OSTEOARTHRITIS OF THE HAND: Status: ACTIVE | Noted: 2023-09-06

## 2023-09-06 PROBLEM — N18.31 CHRONIC KIDNEY DISEASE, STAGE 3A (MULTI): Status: ACTIVE | Noted: 2023-09-06

## 2023-09-06 PROBLEM — M15.0 PRIMARY GENERALIZED (OSTEO)ARTHRITIS: Status: ACTIVE | Noted: 2023-09-06

## 2023-09-06 PROBLEM — H52.03 HYPEROPIA OF BOTH EYES: Status: ACTIVE | Noted: 2023-09-06

## 2023-09-06 PROBLEM — H40.9 GLAUCOMA: Status: ACTIVE | Noted: 2023-09-06

## 2023-09-06 PROBLEM — A60.09 HERPES GENITALIS IN WOMEN: Status: ACTIVE | Noted: 2023-09-06

## 2023-09-06 PROBLEM — E31.0: Status: ACTIVE | Noted: 2023-09-06

## 2023-09-06 PROBLEM — M79.7 FIBROMYALGIA: Status: ACTIVE | Noted: 2023-09-06

## 2023-09-06 PROBLEM — H52.4 BILATERAL PRESBYOPIA: Status: ACTIVE | Noted: 2023-09-06

## 2023-09-06 PROBLEM — R53.1 WEAKNESS: Status: ACTIVE | Noted: 2023-09-06

## 2023-09-06 PROBLEM — E11.9 DIABETES TYPE 2, CONTROLLED (MULTI): Status: ACTIVE | Noted: 2023-09-06

## 2023-09-06 PROBLEM — H26.9 CATARACT: Status: ACTIVE | Noted: 2023-09-06

## 2023-09-06 PROBLEM — I11.9 HYPERTENSIVE HEART DISEASE WITHOUT HEART FAILURE: Status: ACTIVE | Noted: 2023-09-06

## 2023-09-06 PROBLEM — C61 PROSTATE CA (MULTI): Status: ACTIVE | Noted: 2023-09-06

## 2023-09-06 PROBLEM — D80.1 HYPOGAMMAGLOBULINEMIA (MULTI): Status: ACTIVE | Noted: 2023-09-06

## 2023-09-06 PROBLEM — H11.32 SUBCONJUNCTIVAL HEMORRHAGE OF LEFT EYE: Status: ACTIVE | Noted: 2023-09-06

## 2023-09-06 PROBLEM — M19.90 ARTHRITIS: Status: ACTIVE | Noted: 2023-09-06

## 2023-09-06 PROBLEM — N18.30 CKD (CHRONIC KIDNEY DISEASE), STAGE III (MULTI): Status: ACTIVE | Noted: 2023-09-06

## 2023-09-06 PROBLEM — N39.0 RECURRENT UTI: Status: ACTIVE | Noted: 2023-09-06

## 2023-09-06 RX ORDER — LATANOPROST 50 UG/ML
1 SOLUTION/ DROPS OPHTHALMIC NIGHTLY
COMMUNITY
Start: 2023-04-18 | End: 2023-12-05

## 2023-09-06 RX ORDER — BLOOD-GLUCOSE METER
EACH MISCELLANEOUS
COMMUNITY
Start: 2023-02-18

## 2023-09-06 RX ORDER — CELECOXIB 200 MG/1
CAPSULE ORAL
COMMUNITY
Start: 2011-02-16 | End: 2023-10-18 | Stop reason: ALTCHOICE

## 2023-09-06 RX ORDER — MULTIVIT WITH MINERALS/HERBS
1 TABLET ORAL DAILY
COMMUNITY
End: 2023-12-01 | Stop reason: WASHOUT

## 2023-09-06 RX ORDER — PRAVASTATIN SODIUM 20 MG/1
20 TABLET ORAL
COMMUNITY
End: 2023-10-18 | Stop reason: ALTCHOICE

## 2023-09-06 RX ORDER — DIPHENHYDRAMINE HCL 25 MG
25 TABLET ORAL DAILY PRN
COMMUNITY
End: 2024-01-24 | Stop reason: WASHOUT

## 2023-09-06 RX ORDER — OLIVE OIL
OIL (ML) MISCELLANEOUS
COMMUNITY
Start: 2023-06-10 | End: 2024-03-19

## 2023-09-06 RX ORDER — VIT C/E/ZN/COPPR/LUTEIN/ZEAXAN 250MG-90MG
50 CAPSULE ORAL DAILY
COMMUNITY
Start: 2014-01-21 | End: 2023-10-18 | Stop reason: ALTCHOICE

## 2023-09-06 RX ORDER — ESTRADIOL 0.1 MG/G
CREAM VAGINAL
COMMUNITY
Start: 2023-08-28 | End: 2023-10-18

## 2023-09-06 RX ORDER — LIDOCAINE HCL 4 G/100ML
LIQUID TOPICAL AS NEEDED
COMMUNITY
End: 2023-10-18 | Stop reason: SDUPTHER

## 2023-09-06 RX ORDER — LISINOPRIL 20 MG/1
20 TABLET ORAL DAILY
COMMUNITY
Start: 2013-02-28 | End: 2024-03-05 | Stop reason: SDUPTHER

## 2023-09-06 RX ORDER — LATANOPROST/PF 0.005 %
1 DROPS OPHTHALMIC (EYE) NIGHTLY
COMMUNITY
Start: 2023-04-18 | End: 2023-12-05 | Stop reason: SDUPTHER

## 2023-09-06 RX ORDER — POLYETHYLENE GLYCOL 3350 17 G/17G
POWDER, FOR SOLUTION ORAL
COMMUNITY
Start: 2023-05-03 | End: 2023-10-18 | Stop reason: ALTCHOICE

## 2023-09-06 RX ORDER — ZINC GLUCONATE 50 MG
TABLET ORAL
COMMUNITY
End: 2023-10-18 | Stop reason: ALTCHOICE

## 2023-09-06 RX ORDER — DEXTROMETHORPHAN HYDROBROMIDE, GUAIFENESIN 5; 100 MG/5ML; MG/5ML
LIQUID ORAL
COMMUNITY
End: 2023-10-18 | Stop reason: ALTCHOICE

## 2023-09-06 RX ORDER — ASPIRIN 325 MG
50000 TABLET, DELAYED RELEASE (ENTERIC COATED) ORAL
COMMUNITY
End: 2023-10-18 | Stop reason: ALTCHOICE

## 2023-09-06 RX ORDER — ACETAMINOPHEN 325 MG/1
3 TABLET ORAL EVERY 8 HOURS PRN
COMMUNITY
Start: 2023-05-27 | End: 2023-10-18 | Stop reason: ALTCHOICE

## 2023-09-06 RX ORDER — OMEPRAZOLE 40 MG/1
1 CAPSULE, DELAYED RELEASE ORAL DAILY
COMMUNITY
Start: 2022-11-03 | End: 2023-10-18 | Stop reason: ALTCHOICE

## 2023-09-06 RX ORDER — TRAZODONE HYDROCHLORIDE 50 MG/1
50 TABLET ORAL NIGHTLY
COMMUNITY
Start: 2022-10-10 | End: 2023-10-18 | Stop reason: ALTCHOICE

## 2023-09-06 RX ORDER — POLYETHYLENE GLYCOL AND PROPYLENE GLYCOL 4; 3 MG/ML; MG/ML
SOLUTION/ DROPS OPHTHALMIC
COMMUNITY
End: 2023-10-18 | Stop reason: ALTCHOICE

## 2023-09-06 RX ORDER — LIDOCAINE HCL 4 G/100ML
LIQUID TOPICAL
COMMUNITY
End: 2024-03-19

## 2023-09-06 RX ORDER — IBANDRONATE SODIUM 150 MG/1
150 TABLET, FILM COATED ORAL
COMMUNITY
Start: 2020-09-21 | End: 2023-10-18

## 2023-09-06 RX ORDER — TRAVOPROST OPHTHALMIC SOLUTION 0.04 MG/ML
1 SOLUTION OPHTHALMIC DAILY
COMMUNITY
Start: 2011-02-16 | End: 2023-10-18 | Stop reason: ALTCHOICE

## 2023-09-06 RX ORDER — FLUOCINOLONE ACETONIDE 0.11 MG/ML
OIL TOPICAL
COMMUNITY
Start: 2023-07-17

## 2023-09-06 RX ORDER — FLUTICASONE PROPIONATE AND SALMETEROL 250; 50 UG/1; UG/1
POWDER RESPIRATORY (INHALATION)
COMMUNITY
Start: 2011-02-16 | End: 2023-10-18 | Stop reason: ALTCHOICE

## 2023-10-03 ENCOUNTER — TREATMENT (OUTPATIENT)
Dept: PHYSICAL THERAPY | Facility: CLINIC | Age: 83
End: 2023-10-03
Payer: MEDICARE

## 2023-10-03 DIAGNOSIS — M54.59 OTHER LOW BACK PAIN: ICD-10-CM

## 2023-10-03 DIAGNOSIS — R26.89 BALANCE PROBLEM: Primary | ICD-10-CM

## 2023-10-03 PROCEDURE — 97110 THERAPEUTIC EXERCISES: CPT | Mod: GP,CQ

## 2023-10-03 ASSESSMENT — ENCOUNTER SYMPTOMS
LOSS OF SENSATION IN FEET: 0
OCCASIONAL FEELINGS OF UNSTEADINESS: 1
DEPRESSION: 0

## 2023-10-03 NOTE — PROGRESS NOTES
"Physical Therapy    Physical Therapy Treatment    Patient Name: Louisa Rodas  MRN: [id number]  Today's Date: 10/9/2023  Time Calculation  Start Time: 0100  Stop Time: 0210  Time Calculation (min): 70 min      Assessment:  PT Assessment  Assessment Comment:  (R knee limiting factor w/ rx today.  Pt also having difficulty w/ core stability, reporting sciatic sx's throughout rx)    Plan:   Next visit 10th, likely D/C    Current Problem  1. Balance problem        2. Other low back pain            Subjective   L sciatic sx's in buttock, R knee bone on bone.  \"I got an injection in my knee.\"             Pain  Pain Assessment:  (4/10, R LBP)    Objective    Slow chadd w/ amb                     Treatments:     Therapeutic Exercise  Therapeutic Exercise Performed:  (HP to LB w/ stool:PPTx10/pain, [id number], supine march TA x 10, ball squeeze/black band Habd x 20 ea, L clam/black, SLR w/TA x 10 ea, PBall cw/ ccw x 20 ea, Lat amb x 3, Nu Step x 5 min, Biodex LoS/Lvl10 x 3 w/ UE support , x1 FTT)            Goals:  Encounter Problems       Encounter Problems (Active)       PT Problem       .j       Start:  10/03/23    Expected End:  01/07/24       We are continuing the therapy plan of care and goals that were documented in the legacy EMR.  Please refer to the PT (or OT) plan of care in the EMR for treatment plan and goals.                "

## 2023-10-10 ENCOUNTER — TREATMENT (OUTPATIENT)
Dept: PHYSICAL THERAPY | Facility: CLINIC | Age: 83
End: 2023-10-10
Payer: MEDICARE

## 2023-10-10 DIAGNOSIS — M54.59 OTHER LOW BACK PAIN: ICD-10-CM

## 2023-10-10 DIAGNOSIS — R26.89 BALANCE PROBLEM: Primary | ICD-10-CM

## 2023-10-10 NOTE — PROGRESS NOTES
"Physical Therapy    Physical Therapy Treatment    Patient Name: Louisa Rodas  MRN: [id number]  Today's Date: 10/10/2023  Time Calculation  Start Time: 1045  Stop Time: 1100  Time Calculation (min): 15 min      Assessment:  PT Assessment  Assessment Comment:  (No treatment perofrmed today 2` feeling unwell 2` flu/covid shots  yesterday.)  Pt feels she is ready to continue on her own with HEP. She will be discharged today to [facility]  Pt will be discharged to [facility]   Pt instructed to call, email, follow up with additional questions or concerns       Re-evaluation performed by Mason Mackay PT, DPT        Plan:   discharge to [facility]     Current Problem  1. Balance problem        2. Other low back pain            Subjective      \"I don't feel good.  I got my COVID booster and flu shot yesterday and my whole body is sore.\"    Objective     Re-eval    Treatments:   No treatment performed today.    Goals: adequate for discharge        "

## 2023-10-11 ENCOUNTER — OFFICE VISIT (OUTPATIENT)
Dept: GASTROENTEROLOGY | Facility: HOSPITAL | Age: 83
End: 2023-10-11
Payer: MEDICARE

## 2023-10-11 VITALS
HEART RATE: 96 BPM | WEIGHT: 168.2 LBS | SYSTOLIC BLOOD PRESSURE: 109 MMHG | RESPIRATION RATE: 24 BRPM | HEIGHT: 63 IN | TEMPERATURE: 97.8 F | DIASTOLIC BLOOD PRESSURE: 69 MMHG | OXYGEN SATURATION: 93 % | BODY MASS INDEX: 29.8 KG/M2

## 2023-10-11 DIAGNOSIS — R93.2 ABNORMAL LIVER ULTRASOUND: Chronic | ICD-10-CM

## 2023-10-11 DIAGNOSIS — K76.89 LIVER CYST: ICD-10-CM

## 2023-10-11 DIAGNOSIS — Q44.6 POLYCYSTIC LIVER DISEASE: Primary | ICD-10-CM

## 2023-10-11 PROCEDURE — 3078F DIAST BP <80 MM HG: CPT | Performed by: INTERNAL MEDICINE

## 2023-10-11 PROCEDURE — 1159F MED LIST DOCD IN RCRD: CPT | Performed by: INTERNAL MEDICINE

## 2023-10-11 PROCEDURE — 3074F SYST BP LT 130 MM HG: CPT | Performed by: INTERNAL MEDICINE

## 2023-10-11 PROCEDURE — 1036F TOBACCO NON-USER: CPT | Performed by: INTERNAL MEDICINE

## 2023-10-11 PROCEDURE — 1126F AMNT PAIN NOTED NONE PRSNT: CPT | Performed by: INTERNAL MEDICINE

## 2023-10-11 PROCEDURE — 99214 OFFICE O/P EST MOD 30 MIN: CPT | Performed by: INTERNAL MEDICINE

## 2023-10-11 PROCEDURE — 1160F RVW MEDS BY RX/DR IN RCRD: CPT | Performed by: INTERNAL MEDICINE

## 2023-10-11 SDOH — ECONOMIC STABILITY: FOOD INSECURITY: WITHIN THE PAST 12 MONTHS, YOU WORRIED THAT YOUR FOOD WOULD RUN OUT BEFORE YOU GOT MONEY TO BUY MORE.: NEVER TRUE

## 2023-10-11 SDOH — ECONOMIC STABILITY: FOOD INSECURITY: WITHIN THE PAST 12 MONTHS, THE FOOD YOU BOUGHT JUST DIDN'T LAST AND YOU DIDN'T HAVE MONEY TO GET MORE.: NEVER TRUE

## 2023-10-11 ASSESSMENT — LIFESTYLE VARIABLES
SKIP TO QUESTIONS 9-10: 1
HOW MANY STANDARD DRINKS CONTAINING ALCOHOL DO YOU HAVE ON A TYPICAL DAY: PATIENT DOES NOT DRINK
HOW OFTEN DO YOU HAVE SIX OR MORE DRINKS ON ONE OCCASION: NEVER
HOW OFTEN DO YOU HAVE A DRINK CONTAINING ALCOHOL: NEVER
AUDIT-C TOTAL SCORE: 0

## 2023-10-11 ASSESSMENT — ENCOUNTER SYMPTOMS
OCCASIONAL FEELINGS OF UNSTEADINESS: 1
LOSS OF SENSATION IN FEET: 1
DEPRESSION: 0

## 2023-10-11 ASSESSMENT — PATIENT HEALTH QUESTIONNAIRE - PHQ9
SUM OF ALL RESPONSES TO PHQ9 QUESTIONS 1 AND 2: 0
1. LITTLE INTEREST OR PLEASURE IN DOING THINGS: NOT AT ALL
2. FEELING DOWN, DEPRESSED OR HOPELESS: NOT AT ALL

## 2023-10-11 ASSESSMENT — PAIN SCALES - GENERAL: PAINLEVEL: 0-NO PAIN

## 2023-10-11 NOTE — PATIENT INSTRUCTIONS
If you have any questions or need assistance, please don't hesitate to contact us.   Dr. Man: Office [phone] Fax: [phone]  Hepatology Nurse Coordinator: Andreea SANTACRUZ [phone]     You should have regular screening for liver cancer every 6 months.  This includes an ultrasound of your liver and an AFP, a screening blood test to detect a liver tumor when it is small before people develop symptoms.  These tests are an important part of liver cancer screening.      LABS due in Dec and June  ULTRASOUND due in Dec and June  FOLLOW UP with Dr. Man in April

## 2023-10-11 NOTE — PROGRESS NOTES
Diagnoses/Problems  Assessed    · Abdominal pain, unspecified abdominal location (789.00) (R10.9)   · Dysphagia (787.20) (R13.10)   · Generalized abdominal pain (789.07) (R10.84)   · Liver cyst (573.8) (K76.89)   · Abnormal liver ultrasound (793.3) (R93.2)    Patient Discussion/Summary    Please follow up in 6 months with a liver ultrasound and lab work.     Provider Impressions    83 yo female with polycystic liver disease presenting for 6 mo follow-up. Cysts have been stable as of last ultrasound 12/2022. Most recent LFT's normal in 6/2023 and AFP 9. Pt appears to also have renal cysts that may be associated with PLD or 2/2 DM. Can continue to follow-up on a 6-month basis with liver US and MELD labs.      Chief Complaint  Chief Complaints    · Visit For: Other    81 y/o female here today for routine follow up.      Adult Risk ScreeningThere are no spiritual/cultural practices/values/needs that are important to know   Initial Fall Risk Screening:   IRIS has not fallen in the last 6 months. Her fall did not result in injury. IRIS does not have a fear of falling. She does not need assistance with sitting, standing or walking. Does not need assistance walking in her home. She does not need assistance in an unfamiliar setting. The patient is not using an assistive device.   Pain Scale: On a scale of 0 to 10, the patient rates the pain at 0.       Living Will.   Living Will: No living will on file.   Healthcare POA: No healthcare proxy on file.   Declaration of Mental Health Treatment: No mental health treatment on file.    Tobacco Screening: OSEI does not use tobacco. Has not used tobacco in the past 6 months. Has not tried to quit or thought about quitting tobacco.   Domestic Violence Screen: Does not feel threatened or abused physically, emotionally or sexually. Do you feel UNSAFE?   The patient feels safe in the home.   Depression/Suicide Screening:   During the past 2 weeks, the patient has not felt down, depressed  or hopeless.    During the past 2 weeks, the patient has not felt little interest or pleasure in doing things.    She does not have a risk of suicide.    She has not had thoughts of harming others.    Single alcohol screening question:   In the past year the patient has had 5 or more drinks (men) or 4 or more drinks (women)? 0 time(s).   Single substance abuse screening question:   In the past year the patient has used a recreational drug or used a prescription drug for non-medical reasons? 0 time(s).   Procedure or Sedation Areas: Not Applicable   Nutrition Screening:   In the past month, there was not a day when I or anyone in my family went hungry because there was not enough food.   Patient Education: The patient denies that they or the person with them has problems with hearing, speaking, seeing, moving around or learning   The patient is comfortable filling out medical forms.   Food Insecurity:   1. Within the past 12 months, you worried that your food would run out before you got money to buy more: No   2. Within the past 12 months, the food you bought just didn't last and you didn't have money to get more: No      History of Present Illness  83 yo female with polycystic liver disease, Sjogren's, type 2 diabetes mellitus, hypertension, possible seizure disorder, osteopenia, lumbar spinal canal stenosis, presenting today for regular follow-up.  Review of Systems    Constitutional: no fever, no chills and not feeling poorly.   Cardiovascular: no shortness of breath, no chest pain, no palpitations and no shortness of breath during exertion.   Genitourinary: no dysuria and no incontinence.   Musculoskeletal: no joint swelling.   Integumentary: no rashes and was no jaundiced.      Active Problems  Problems    · Abdominal pain, unspecified abdominal location (789.00) (R10.9)   · Abnormal brain MRI (793.0) (R90.89)   · Abnormal EEG (794.02) (R94.01)   · Abnormal liver ultrasound (793.3) (R93.2)   · Acid reflux  (530.81) (K21.9)   · Aftercare following left knee joint replacement surgery (V54.81,V43.65) (Z47.1,Z96.652)   · Arthritis of hand, right, degenerative (715.94) (M19.041)   · Arthritis of knee (716.96) (M17.10)   · Arthritis, degenerative, localized, primary, hand (715.14) (M19.049)   · Aspiration of food, initial encounter (933.1) (T17.928A)   · At risk for seizures (V49.89) (Z91.89)   · Balance problem (781.99) (R26.89)   · Benign neoplasm of connective and soft tissue of upper limb, including shoulder (215.2)  (D21.10)   · Bilateral artificial lens implant (V43.1) (Z96.1)   · Bilateral dry eyes (375.15) (H04.123)   · Breast cancer screening (V76.10) (Z12.39)   · Carpal tunnel syndrome (354.0) (G56.00)   · Cervical disc disease (722.91) (M50.90)   · Cervical spondylosis without myelopathy (721.0) (M47.812)   · Chills (without fever) (780.64) (R68.83)   · Chronic pain of both knees (719.46,338.29) (M25.561,M25.562,G89.29)   · Cirrhosis (571.5) (K74.60)   · Nodular contour of liver on ultrasound (2/16/19)   · Cirrhosis of liver (571.5) (K74.60)   · Colon cancer screening (V76.51) (Z12.11)   · Combined form of age-related cataract, right eye (366.19) (H25.811)   · Degenerative arthritis of hip (715.95) (M16.9)   · Degenerative arthritis of knee, bilateral (715.96) (M17.0)   · Diplopia (368.2) (H53.2)   · Drusen of left macula (362.57) (H35.362)   · Drusen of right macula (362.57) (H35.361)   · Dysphagia (787.20) (R13.10)   · Dysphonia (784.42) (R49.0)   · Esophageal dysmotility (530.5) (K22.4)   · Foot pain, bilateral (729.5) (M79.671,M79.672)   · Generalized abdominal pain (789.07) (R10.84)   · Glaucoma of both eyes (365.9) (H40.9)   · 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)   · Headache, cervicogenic (784.0) (G44.86)   · Hip pain (719.45) (M25.559)   · Incomplete rotator cuff tear (840.4) (M75.110)   · Joint pain, knee (719.46) (M25.569)   · Left shoulder pain (719.41) (M25.512)   · Liver cyst (573.8)  (K76.89)   · Liver lesion (573.8) (K76.9)   · Localized osteoarthrosis of left ankle and foot (715.37) (M19.072)   · Low bone density (733.90) (M85.9)   · Lower back pain (724.2) (M54.50)   · Lumbar radiculopathy (724.4) (M54.16)   · Macular degeneration of left eye (362.50) (H35.30)   · Macular degeneration of right eye (362.50) (H35.30)   · Muscle tension dysphonia (784.42) (R49.0)   · Myalgia, other site (729.1) (M79.18)   · Neck pain (723.1) (M54.2)   · MENDY on CPAP (327.23,V46.8) (G47.33,Z99.89)   · Osteoarthritis of right ankle and foot (715.97) (M19.071)   · Pain, wrist joint (719.43) (M25.539)   · Pelvic pain in female (625.9) (R10.2)   · POAG (primary open-angle glaucoma) (365.11,365.70) (H40.1190)   · Polycystic liver disease (751.62) (Q44.6)   · Prostate CA (185) (C61)   · Radiculitis, cervical (723.4) (M54.12)   · Right foot pain (729.5) (M79.671)   · Scapholunate dissociation (718.83) (M25.339)   · Schwannoma (215.9) (D36.10)   · Segmental and somatic dysfunction of cervical region (739.1) (M99.01)   · Segmental and somatic dysfunction of lumbar region (739.3) (M99.03)   · Segmental and somatic dysfunction of pelvic region (739.5) (M99.05)   · Segmental and somatic dysfunction of thoracic region (739.2) (M99.02)   · Shortness of breath at rest (786.05) (R06.02)   · Shortness of breath on exertion (786.05) (R06.02)   · Skew deviation (378.87) (H51.8)   · Subconjunctival hemorrhage of left eye (372.72) (H11.32)   · Syncope and collapse (780.2) (R55)   · Thumb pain (729.5) (M79.646)   · Thumb pain, right (729.5) (M79.644)   · 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,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) (H57.89,E07.9)   · Vaginal atrophy (627.3) (N95.2)   · Vertical diplopia (368.2) (H53.2)   · Visit for screening mammogram (V76.12) (Z12.31)   · Vocal cord dysfunction (478.5) (J38.3)   · Weakness of both lower extremities (729.89) (R29.898)    Past Medical History  Problems    · Abnormal liver ultrasound (793.3) (R93.2)   · History of Acquired  involutional ptosis of both eyelids (374.30) (H02.403)   · History of Aftercare following left knee joint replacement surgery (V54.81,V43.65)  (Z47.1,Z96.652)   · History of Arthritis of hand, right, degenerative (715.94) (M19.041)   · Cirrhosis (571.5) (K74.60)   · Nodular contour of liver on ultrasound (2/16/19)   · History of Colon cancer screening (V76.51) (Z12.11)   · History of Combined form of age-related cataract, left eye (366.19) (H25.812)   · History of Dermatochalasis of both upper eyelids (374.87) (H02.831,H02.834)   · History of Diabetes mellitus (250.00) (E11.9)   · History of Dry eye syndrome (375.15) (H04.129)   · History of Dysphonia (784.42) (R49.0)   · 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00) (H02.88A,H02.88B)   · History of Muscle tension dysphonia (784.42) (R49.0)   · History of NPDR (nonproliferative diabetic retinopathy) (250.50,362.03) (E11.3299)   · History of NPDR (nonproliferative diabetic retinopathy) (250.50,362.03) (E11.3299)   · History of Nuclear sclerosis (366.16) (H25.10)   · History of Nuclear sclerotic cataract of both eyes (366.16) (H25.13)   · History of MENDY on CPAP (327.23,V46.8) (G47.33,Z99.89)   · Personal history of asthma (V12.69) (Z87.09)   · History of Primary open angle glaucoma (365.11,365.70) (H40.1190)   · Added by Problem List Migration; 2013-7-22; Moved to  Suppressed Nov 25 2013  9:03PM   · History of Primary open angle glaucoma of both eyes, indeterminate stage  (365.11,365.74) (H40.1134)   · History of Senile nuclear cataract (366.16) (H25.10)   · Resolved Date: 20 May 2014   · Added by Problem List Migration; 2013-7-22; Moved to Suppressed Nov 25 2013  9:03PM  [facility]   · History of Shortness of breath at rest (786.05) (R06.02)   · History of Vocal cord dysfunction (478.5) (J38.3)    Surgical History  Problems    · History of Arthroscopy Knee Right   · History of Rotator Cuff Repair    Family History  Mother    · Family history of Alzheimer Disease   · Family history of Arthritis (V17.7)   · Family history of cataracts (V19.19) (Z83.518)   · Family history of glaucoma (V19.11) (Z83.511)   · Family history of Hypertension (V17.49)  Father    · Family history of Cancer   · Family history of Diabetes Mellitus (V18.0)   · Family history of diabetes mellitus (V18.0) (Z83.3)  Son    · Family history of Diabetes Mellitus (V18.0)   · Family history of diabetes mellitus (V18.0) (Z83.3)  Sibling    · Family history of diabetes mellitus (V18.0) (Z83.3)  Brother    · Family history of Cancer   · Family history of Diabetes Mellitus (V18.0)    Social History  Problems    · Alcohol   · wine rarely   · Always uses seat belt   · Former smoker (V15.82) (Z87.891)   · smoked for one year when he was in 30's smoked 1 pack in two weeks   · Denied: History of domestic violence   · No illicit drug use   · Occasional alcohol use   · Recently  (V61.03)    Allergies  Medication    · Penicillins  Hives; Itching; Updated By: Mariela Myles; 2/13/2020 1:24:38 PM   · pregabalin  Dizziness; Updated By: Mariela Myles; 2/13/2020 1:24:38 PM   · primidone  Recorded By: Mariela Myles; 11/12/2020 3:44:38 PM  Denied    · tizanidine  Updated By: Ayesha Negro; 7/25/2022 10:11:26 AM    Current Meds  Current Outpatient Medications on File Prior to Visit   Medication Sig Dispense Refill     acetaminophen (Tylenol 8 HOUR) 650 mg ER tablet        acetaminophen (Tylenol) 325 mg tablet Take 3 tablets (975 mg) by mouth every 8 hours if needed for mild pain (1 - 3).      acyclovir (Zovirax) 400 mg tablet Take 1 tablet (400 mg) by mouth 1 (one) time each day.      amLODIPine (Norvasc) 2.5 mg tablet Take 1 tablet (2.5 mg) by mouth once daily. 30 tablet 5    ASPERCREME, LIDOCAINE, TOP Prn use for cervical spine pain      atorvastatin (Lipitor) 20 mg tablet Take 1 tablet (20 mg) by mouth 3 (three) times a week.      azelastine (Astelin) 137 mcg (0.1 %) nasal spray Administer 2 sprays into each nostril in the morning and at bedtime. Use in each nostril as directed      budesonide-formoteroL (Symbicort) 160-4.5 mcg/actuation inhaler Inhale 2 puffs in the morning and at bedtime. RINSE MOUTH AFTER USE.      CALCIUM ORAL Take 600 mg by mouth once daily.      carboxymethylcellulose (Refresh Celluvisc) 1 % ophthalmic solution dropperette Administer into affected eye(s) 4 times a day.      celecoxib (CeleBREX) 200 mg capsule as necessary      cholecalciferol (Vitamin D-3) 1,250 mcg (50,000 unit) capsule Take 1 capsule (50,000 Units) by mouth. once a month during the second week      cholecalciferol (Vitamin D-3) 25 MCG (1000 UT) capsule Take 1 capsule (25 mcg) by mouth once daily.      cyanocobalamin (Vitamin B-12) 1,000 mcg/mL injection Inject 1 ml intramuscularly once a month      diclofenac sodium 1 % kit Apply  to affected area.      diphenhydrAMINE (Benadryl Allergy) 25 mg tablet Take 1 tablet (25 mg) by mouth once daily as needed.      Ear Drops, carbamide peroxide, 6.5 % otic solution follow instructions on package insert      esomeprazole (NexIUM) 20 mg DR capsule Take 1 capsule (20 mg) by mouth once daily as needed (gerd). Do not open capsule.      estradiol (Estrace) 0.01 % (0.1 mg/gram) vaginal cream apply 0.5 gm vaginally 2 nights weekely      estrogens, conjugated, (Premarin) vaginal cream INSERT 1 GM  VAGINALLY 2 NIGHTS PER WEEK      fluocinolone and shower cap 0.01 % oil Apply to scalp  leave on overnight and wash off in the morning. Use daily      fluticasone propion-salmeteroL (Advair Diskus) 250-50 mcg/dose diskus inhaler Take one(1) inhalation twice daily; rinse and gargle mouth with water after each use.      glucos sul 2KCl/msm/chond/C/Mn (GLUCOSAMINE CHONDROITIN ORAL) Take by mouth 4 times a day. glucosamine-chondroitin with Multiple Vitamins and Minerals oral capsule- take 1 capsule by mouth 4 times a day      hydroxychloroquine (Plaquenil) 200 mg tablet Take 1 tablet (200 mg) by mouth 1 (one) time each day. WITH FOOD OR MILK      ibandronate (Boniva) 150 mg tablet Take 1 tablet (150 mg) by mouth every 30 (thirty) days.      ketoconazole (NIZOral) 2 % shampoo       latanoprost (Xalatan) 0.005 % ophthalmic solution Administer 1 drop into both eyes once daily at bedtime.      latanoprost, PF, 0.005 % drops Administer 1 drop into both eyes once daily at bedtime.      latanoprostene bunod (Vyzulta) 0.024 % drops 1 gtt ou qd      levothyroxine (Synthroid, Levoxyl) 112 mcg tablet Take 1 tablet by mouth 6 days a week 78 tablet 1    lidocaine HCL (Aspercreme, lidocaine HCL,) 4 % liquid roll-on Apply topically to affected area once a day, As Needed      lidocaine HCL 4 % liquid roll-on if needed (cervical spine).      lisinopril 20 mg tablet Take 1 tablet (20 mg) by mouth once daily.      lisinopril 40 mg tablet Take 0.5 tablets (20 mg) by mouth once daily.      montelukast (Singulair) 10 mg tablet Take 1 tablet (10 mg) by mouth 1 (one) time each day.      NON FORMULARY Tumeric- take 1 capsule by mouth twice a day      omeprazole (PriLOSEC) 40 mg DR capsule Take 1 capsule (40 mg) by mouth once daily.      OneTouch Verio test strips strip TEST BLOOD SUGAR ONCE A DAY      peg 400-propylene glycol, PF, (Systane, PF,) 0.4-0.3 % dropperette as needed      polyethylene glycol (Glycolax, Miralax) 17 gram/dose powder MIX  1 CAPFUL (17GM) IN 8 OUNCES OF WATER, JUICE, OR TEA AND DRINK DAILY.      pravastatin (Pravachol) 20 mg tablet Take 1 tablet (20 mg) by mouth once daily.      propranolol (Inderal) 10 mg tablet Take 1 tablet (10 mg) by mouth 1 (one) time each day.      sulfamethoxazole-trimethoprim (Bactrim DS) 800-160 mg tablet Take 1 tablet by mouth once daily.      travoprost (Travatan Z) 0.004 % drops ophthalmic solution 1 drop once daily.      traZODone (Desyrel) 50 mg tablet Take 1 tablet (50 mg) by mouth once daily at bedtime.      vitamin B complex (B Complex-Vitamin B12) tablet Take 1 tablet by mouth once daily.      zinc gluconate 50 mg tablet 1 tablet Orally Once a day for 30 day(s)       No current facility-administered medications on file prior to visit.         Vitals  Vitals:    10/11/23 1147   BP: 109/69   Pulse: 96   Resp: 24   Temp: 36.6 °C (97.8 °F)   SpO2: 93%         Physical Exam  GEN: NAD, alert, answering questions appropriately.  HEENT: NCAT, no scleral icterus, MMM  CV: RRR, S1 and S2. No m/r/g  Resp: Regular work of breathing on RA. Lungs CTAB  Abd: Soft, nontender, nondistended. NABS. No palpable hepatomegaly  Ext: No edema or other gross deformities  Skin: No jaundice or visibile rashes.     Lab Results   Component Value Date    WBC 5.3 08/17/2023    HGB 13.3 08/17/2023    HCT 41.3 08/17/2023    MCV 96 08/17/2023     08/17/2023     Lab Results   Component Value Date    GLUCOSE 118 (H) 06/19/2023    CALCIUM 9.5 06/19/2023     06/19/2023    K 4.1 06/19/2023    CO2 27 06/19/2023     (H) 06/19/2023    BUN 15 06/19/2023    CREATININE 1.20 (H) 06/19/2023     Lab Results   Component Value Date    ALT 17 06/19/2023    AST 24 06/19/2023    ALKPHOS 87 06/19/2023    BILITOT 0.5 06/19/2023     MELD 3.0: 10 at 6/19/2023  1:07 PM  MELD-Na: 9 at 6/19/2023  1:07 PM  Calculated from:  Serum Creatinine: 1.20 mg/dL at 6/19/2023  1:07 PM  Serum Sodium: 142 mmol/L (Using max of 137 mmol/L) at 6/19/2023   1:07 PM  Total Bilirubin: 0.5 mg/dL (Using min of 1 mg/dL) at 6/19/2023  1:07 PM  Serum Albumin: 4.3 g/dL (Using max of 3.5 g/dL) at 6/19/2023  1:07 PM  INR(ratio): 1.1 at 6/19/2023  1:07 PM  Age at listing (hypothetical): 82 years  Sex: Female at 6/19/2023  1:07 PM        Attending Note       Trainee role: Resident           Comments/Additional Findings:   Will try mirlax for constipation. Liver tests stable. Follow up in 6 months.      Signatures    10/11/23 at 12:33 PM - Flor Sales MD

## 2023-10-18 ENCOUNTER — OFFICE VISIT (OUTPATIENT)
Dept: PRIMARY CARE | Facility: CLINIC | Age: 83
End: 2023-10-18
Payer: MEDICARE

## 2023-10-18 VITALS
DIASTOLIC BLOOD PRESSURE: 60 MMHG | HEART RATE: 81 BPM | WEIGHT: 167 LBS | SYSTOLIC BLOOD PRESSURE: 124 MMHG | RESPIRATION RATE: 20 BRPM | OXYGEN SATURATION: 95 % | BODY MASS INDEX: 29.59 KG/M2 | HEIGHT: 63 IN

## 2023-10-18 DIAGNOSIS — G91.2 NPH (NORMAL PRESSURE HYDROCEPHALUS) (MULTI): ICD-10-CM

## 2023-10-18 DIAGNOSIS — M19.90 OSTEOARTHRITIS, UNSPECIFIED OSTEOARTHRITIS TYPE, UNSPECIFIED SITE: ICD-10-CM

## 2023-10-18 DIAGNOSIS — E11.9 CONTROLLED TYPE 2 DIABETES MELLITUS WITHOUT COMPLICATION, WITHOUT LONG-TERM CURRENT USE OF INSULIN (MULTI): ICD-10-CM

## 2023-10-18 DIAGNOSIS — Z12.31 ENCOUNTER FOR SCREENING MAMMOGRAM FOR MALIGNANT NEOPLASM OF BREAST: ICD-10-CM

## 2023-10-18 DIAGNOSIS — G47.33 OSA ON CPAP: ICD-10-CM

## 2023-10-18 DIAGNOSIS — J45.40 MODERATE PERSISTENT ASTHMA WITHOUT COMPLICATION (HHS-HCC): ICD-10-CM

## 2023-10-18 DIAGNOSIS — G89.29 CHRONIC PAIN OF RIGHT KNEE: ICD-10-CM

## 2023-10-18 DIAGNOSIS — M35.00 SJOGREN SYNDROME, UNSPECIFIED (MULTI): ICD-10-CM

## 2023-10-18 DIAGNOSIS — M85.89 OSTEOPENIA OF MULTIPLE SITES: ICD-10-CM

## 2023-10-18 DIAGNOSIS — E78.5 HYPERLIPIDEMIA, UNSPECIFIED HYPERLIPIDEMIA TYPE: ICD-10-CM

## 2023-10-18 DIAGNOSIS — D80.1 HYPOGAMMAGLOBULINEMIA (MULTI): ICD-10-CM

## 2023-10-18 DIAGNOSIS — Z00.00 ENCOUNTER FOR MEDICARE ANNUAL WELLNESS EXAM: Primary | ICD-10-CM

## 2023-10-18 DIAGNOSIS — K74.60 CIRRHOSIS OF LIVER WITHOUT ASCITES, UNSPECIFIED HEPATIC CIRRHOSIS TYPE (MULTI): ICD-10-CM

## 2023-10-18 DIAGNOSIS — N18.31 CHRONIC KIDNEY DISEASE, STAGE 3A (MULTI): ICD-10-CM

## 2023-10-18 DIAGNOSIS — E03.9 HYPOTHYROIDISM, UNSPECIFIED TYPE: ICD-10-CM

## 2023-10-18 DIAGNOSIS — Z78.0 MENOPAUSE: ICD-10-CM

## 2023-10-18 DIAGNOSIS — M25.561 CHRONIC PAIN OF RIGHT KNEE: ICD-10-CM

## 2023-10-18 DIAGNOSIS — I10 HYPERTENSION, UNSPECIFIED TYPE: ICD-10-CM

## 2023-10-18 PROCEDURE — 1170F FXNL STATUS ASSESSED: CPT | Performed by: SPECIALIST

## 2023-10-18 PROCEDURE — 3078F DIAST BP <80 MM HG: CPT | Performed by: SPECIALIST

## 2023-10-18 PROCEDURE — 1159F MED LIST DOCD IN RCRD: CPT | Performed by: SPECIALIST

## 2023-10-18 PROCEDURE — 3074F SYST BP LT 130 MM HG: CPT | Performed by: SPECIALIST

## 2023-10-18 PROCEDURE — 1160F RVW MEDS BY RX/DR IN RCRD: CPT | Performed by: SPECIALIST

## 2023-10-18 PROCEDURE — 1036F TOBACCO NON-USER: CPT | Performed by: SPECIALIST

## 2023-10-18 PROCEDURE — G0439 PPPS, SUBSEQ VISIT: HCPCS | Performed by: SPECIALIST

## 2023-10-18 PROCEDURE — 1126F AMNT PAIN NOTED NONE PRSNT: CPT | Performed by: SPECIALIST

## 2023-10-18 RX ORDER — CHOLECALCIFEROL (VITAMIN D3) 50 MCG
50 TABLET ORAL DAILY
COMMUNITY

## 2023-10-18 ASSESSMENT — PATIENT HEALTH QUESTIONNAIRE - PHQ9
2. FEELING DOWN, DEPRESSED OR HOPELESS: NOT AT ALL
SUM OF ALL RESPONSES TO PHQ9 QUESTIONS 1 AND 2: 0
1. LITTLE INTEREST OR PLEASURE IN DOING THINGS: NOT AT ALL
2. FEELING DOWN, DEPRESSED OR HOPELESS: NOT AT ALL
SUM OF ALL RESPONSES TO PHQ9 QUESTIONS 1 AND 2: 0
1. LITTLE INTEREST OR PLEASURE IN DOING THINGS: NOT AT ALL

## 2023-10-18 ASSESSMENT — ENCOUNTER SYMPTOMS
DEPRESSION: 0
LOSS OF SENSATION IN FEET: 0
OCCASIONAL FEELINGS OF UNSTEADINESS: 0

## 2023-10-18 ASSESSMENT — ACTIVITIES OF DAILY LIVING (ADL)
TAKING_MEDICATION: INDEPENDENT
BATHING: INDEPENDENT
DRESSING: INDEPENDENT
MANAGING_FINANCES: INDEPENDENT
GROCERY_SHOPPING: INDEPENDENT
DOING_HOUSEWORK: INDEPENDENT

## 2023-10-18 NOTE — PROGRESS NOTES
Subjective   Patient ID: Louisa Rodas is a 82 y.o. female who presents for Medicare Annual Wellness Visit Initial.  HPI    82 Pmhx sig Htn, Hyperlipidemia, Hypothyroidism, Glaucoma, MENDY on CPAP, Polycystic Liver Disease,  Cirrhosis, Sjogrens Syndrome (Dr. Sparks), OA (Dr. Verduzco), Mod Persistent Asthma (Dr. Ramos), MENDY on CPAP, Nonfamilial hypogammaglobulinemia (sees Dr. Monique), Tremors, CKD3,  and Cervical Facet Arthropathy (C3-5) s/p facet medial branch radiofrequency ablation 1/2023 presents for annual MAW    Had covid shot   Had flu shot    Considering knee replacement right with ortho  Saw Urology, not taking vaginal cream since cannot afford.  Already met with Pharmacy and they could not help with cost.    Osteopenia on DXA 9/30/2020 but is not taking boniva due to cost for over the past year.  Originally prescribed by Dr. 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 (new from 6/28/21 MRI)    Allergies   Allergen Reactions    Penicillins Hives and Itching    Pregabalin Dizziness    Primidone Unknown    Tizanidine Unknown     Doesn't recall      Current Outpatient Medications   Medication Instructions    acyclovir (Zovirax) 400 mg tablet 1 tablet, oral, Daily    amLODIPine (NORVASC) 2.5 mg, oral, Daily    atorvastatin (Lipitor) 20 mg tablet 1 tablet, oral, 3 times weekly    azelastine (Astelin) 137 mcg (0.1 %) nasal spray 2 sprays, Each Nostril, 2 times daily PRN, Use in each nostril as directed    budesonide-formoteroL (Symbicort) 160-4.5 mcg/actuation inhaler 2 puffs, inhalation, Daily, RINSE MOUTH AFTER USE.    carboxymethylcellulose (Refresh Celluvisc) 1 % ophthalmic solution  dropperette ophthalmic (eye), 4 times daily    cholecalciferol (VITAMIN D-3) 50 mcg, oral, Daily    diclofenac sodium 1 % kit Apply  to affected area.    diphenhydrAMINE (BENADRYL ALLERGY) 25 mg, oral, Daily PRN    Ear Drops, carbamide peroxide, 6.5 % otic solution follow instructions on package insert    esomeprazole (NEXIUM) 20 mg, oral, Daily PRN, Do not open capsule.    fluocinolone and shower cap 0.01 % oil Apply to scalp  leave on overnight and wash off in the morning. Use daily    hydroxychloroquine (Plaquenil) 200 mg tablet 1 tablet, oral, Daily, WITH FOOD OR MILK    ketoconazole (NIZOral) 2 % shampoo No dose, route, or frequency recorded.    latanoprost (Xalatan) 0.005 % ophthalmic solution 1 drop, Both Eyes, Nightly    latanoprost, PF, 0.005 % drops 1 drop, Both Eyes, Nightly    levothyroxine (Synthroid, Levoxyl) 112 mcg tablet Take 1 tablet by mouth 6 days a week    lidocaine HCL (Aspercreme, lidocaine HCL,) 4 % liquid roll-on Apply topically to affected area once a day, As Needed    lisinopril 20 mg, oral, Daily    montelukast (Singulair) 10 mg tablet 1 tablet, oral, Daily    NON FORMULARY  Tumeric- take 1 capsule by mouth twice a day    OneTouch Verio test strips strip TEST BLOOD SUGAR ONCE A DAY    propranolol (Inderal) 10 mg tablet 1 tablet, oral, Daily    sulfamethoxazole-trimethoprim (Bactrim DS) 800-160 mg tablet 1 tablet, oral, Daily    UNABLE TO FIND Med Name:  salon pas topically prn    vitamin B complex (B Complex-Vitamin B12) tablet 1 tablet, oral, Daily        Review of Systems  Constitutional  No fatigue, no fevers, no chills, no unintentional weight loss,   HEENT:  No headaches, no dizziness, no double vision, no blurred vision, sees optho, dry eyes and some hearing loss  Cardiovascular:  No chest pain, no palpitations, a little shortness of breath with exertion (one flight of stairs),   Respiratory:  Occasional post nasal drip cough after putting drops in eyes, no wheezing, No shortness of  "breath at rest  GI:  No dysphagia, no odynophagia, no reflux, no abdominal pain, no nausea, no vomiting, no changes in bowel habits, no bright red blood per rectum, no melena  :  Some urinary frequency, no dysuria, no urine incontinence  MSK:  No falls, Knees joint pain, no joint swelling  Neuro:  On propranolol for tremors, LLE from sciatica extremity weakness, no changes in sensation  Breasts:  No abnormalities on self breast exam no nipple discharge no skin changes    Physical Exam  /60   Pulse 81   Resp 20   Ht 1.6 m (5' 3\")   Wt 75.8 kg (167 lb)   SpO2 95%   BMI 29.58 kg/m²   General:    Well-appearing  F in no acute distress, well nourished, well hydrated  Head:  Normocephalic, atraumatic  Skin:          Warm dry,   Eyes:  Anicteric sclera, pupils equal,   Ears:        TMs intact  Oral:      Not examined due to pandemic  Neck:   Supple, no cervical/supraclavicular adenopathy, no thyromegaly or nodules appreciated on exam  Cor:      Regular rate, normal S1, S2, no murmurs appreciated, no S3, no S4   Lungs:   Clear to auscultation b/l, no wheezes, no rhonchi, no crackles, no accessory respiratory muscle use  Abd:          Soft, nontender, no guarding, no rebound, no hepatosplenomegaly appreciated   Ext:            No lower extremity edema, no palpable cords  Pulses:      Pedal pulses intact  Neuro:   CN2-12 grossly intact   Breasts:     No Axillary adenopathy, no discrete palpable breast nodules, no overlying skin changes, no nipple discharge    Assessment/Plan   Problem List Items Addressed This Visit       Hepatic cirrhosis (CMS/HCC)     Management per hepatology (LOV 5/2023)  Follows every 6 months         MENDY on CPAP     Management per pulm, sees          Hypothyroidism, unspecified     On levothyroxine 112 mcgs daily  TSH 0.81 on 6/12/2023         Hyperlipidemia     On atorvastatin 20 mg daily  Labs ordered         Relevant Orders    Lipid Panel    Diabetes type 2, controlled " (CMS/HCC)     Diet-controlled   HBA1C 6.7 May 25 2023  Continue ophthalmology exams         Relevant Orders    Hemoglobin A1C    Albumin , Urine Random    Hypogammaglobulinemia (CMS/HCC)     Management per allergy/immunology Dr. Monique  On Bactrim          HTN (hypertension)     Well-controlled on current medication         Relevant Orders    CBC    Chronic kidney disease, stage 3a (CMS/HCC)     GFR 6/19/2023 was 46  Avoid Nsaids  Continue to monitor         Encounter for Medicare annual wellness exam - Primary     Previously received annual influenza vaccine 10/9/2023  Previously received 6 Covid vaccines and Covid booster 10/9/2023  Recommend RSV vaccine  Previously received PPSV23 10/15/19  Previously received PCV13 5/24/21 and 4/10/2020  Previously received Shingrix vaccines 5/21/19 and 3/9/19  DXA 9/20/2020 ordered Not taking Boniva  consider Fosamax  Mammogram 3/10/22 repeat 1 yr ordered  Had CMP in October  Labs ordered:  CBC fx Lipids HBA1C urine albumin          Relevant Orders    Lipid Panel    Hemoglobin A1C    CBC    NPH (normal pressure hydrocephalus) (CMS/HCC)     MRI 5/4/2023 with findings suggesting NPH, 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 Verduzco           Relevant Orders    Disability Placard    Menopause     DXA 9/20/2020  DXA ordered   Consider fosamax if indicated         Relevant Orders    XR DEXA bone density    Encounter for screening mammogram for malignant neoplasm of breast     Mammogram 3/10/2022  Ordered         Relevant Orders    BI mammo bilateral screening tomosynthesis    Chronic pain of right knee    Relevant Orders    Disability Placard    Moderate persistent asthma without complication     On Singulair and Symbicort  Said will need singulair refill  Management per  yuliana, sees          Sjogren syndrome, unspecified (CMS/HCC)     Management per Dr. Sparks Rheum at Clinic  On Plaquenil         Osteopenia     Noted on 9/30/2020 DXA  Gynecology prescribed Boniva but she has not taken for past year due to cost  Thinks she takes 5000 units vitamin D3, discussed typical recommended dose is 1000 to 2000 units (she will check her dose at home)               Nadira Moctezuma DO

## 2023-10-29 PROBLEM — M19.90 OSTEOARTHRITIS: Status: ACTIVE | Noted: 2023-10-29

## 2023-10-29 PROBLEM — J45.40 MODERATE PERSISTENT ASTHMA WITHOUT COMPLICATION (HHS-HCC): Status: ACTIVE | Noted: 2023-10-29

## 2023-10-29 PROBLEM — N18.32 STAGE 3B CHRONIC KIDNEY DISEASE (MULTI): Status: RESOLVED | Noted: 2023-03-29 | Resolved: 2023-10-29

## 2023-10-29 PROBLEM — G89.29 CHRONIC PAIN OF RIGHT KNEE: Status: ACTIVE | Noted: 2023-10-29

## 2023-10-29 PROBLEM — E78.49 OTHER HYPERLIPIDEMIA: Status: RESOLVED | Noted: 2023-03-29 | Resolved: 2023-10-29

## 2023-10-29 PROBLEM — J45.20 MILD INTERMITTENT ASTHMA WITHOUT COMPLICATION (HHS-HCC): Status: ACTIVE | Noted: 2023-10-29

## 2023-10-29 PROBLEM — M35.00 SJOGREN SYNDROME, UNSPECIFIED (MULTI): Status: ACTIVE | Noted: 2023-10-29

## 2023-10-29 PROBLEM — G91.2 NPH (NORMAL PRESSURE HYDROCEPHALUS) (MULTI): Status: ACTIVE | Noted: 2023-10-29

## 2023-10-29 PROBLEM — E11.9 DIET-CONTROLLED DIABETES MELLITUS (MULTI): Status: RESOLVED | Noted: 2023-03-29 | Resolved: 2023-10-29

## 2023-10-29 PROBLEM — H26.9 CATARACT: Status: RESOLVED | Noted: 2023-09-06 | Resolved: 2023-10-29

## 2023-10-29 PROBLEM — Z00.00 ENCOUNTER FOR MEDICARE ANNUAL WELLNESS EXAM: Status: ACTIVE | Noted: 2023-10-29

## 2023-10-29 PROBLEM — M25.561 CHRONIC PAIN OF RIGHT KNEE: Status: ACTIVE | Noted: 2023-10-29

## 2023-10-29 PROBLEM — Z12.31 ENCOUNTER FOR SCREENING MAMMOGRAM FOR MALIGNANT NEOPLASM OF BREAST: Status: ACTIVE | Noted: 2023-10-29

## 2023-10-29 PROBLEM — Z78.0 MENOPAUSE: Status: ACTIVE | Noted: 2023-10-29

## 2023-10-29 PROBLEM — E03.9 HYPOTHYROID: Status: RESOLVED | Noted: 2023-09-06 | Resolved: 2023-10-29

## 2023-10-29 PROBLEM — H25.811 COMBINED FORM OF AGE-RELATED CATARACT, RIGHT EYE: Status: RESOLVED | Noted: 2023-02-16 | Resolved: 2023-10-29

## 2023-10-29 PROBLEM — M85.80 OSTEOPENIA: Status: ACTIVE | Noted: 2023-10-29

## 2023-10-29 PROBLEM — M79.646 THUMB PAIN: Status: RESOLVED | Noted: 2023-02-16 | Resolved: 2023-10-29

## 2023-10-29 NOTE — ASSESSMENT & PLAN NOTE
Noted on 9/30/2020 DXA  Gynecology prescribed Boniva but she has not taken for past year due to cost  Thinks she takes 5000 units vitamin D3, discussed typical recommended dose is 1000 to 2000 units (she will check her dose at home)

## 2023-10-29 NOTE — ASSESSMENT & PLAN NOTE
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

## 2023-10-29 NOTE — ASSESSMENT & PLAN NOTE
Previously received annual influenza vaccine 10/9/2023  Previously received 6 Covid vaccines and Covid booster 10/9/2023  Recommend RSV vaccine  Previously received PPSV23 10/15/19  Previously received PCV13 5/24/21 and 4/10/2020  Previously received Shingrix vaccines 5/21/19 and 3/9/19  DXA 9/20/2020 ordered Not taking Boniva  consider Fosamax  Mammogram 3/10/22 repeat 1 yr ordered  Had CMP in October  Labs ordered:  CBC fx Lipids HBA1C urine albumin

## 2023-10-29 NOTE — ASSESSMENT & PLAN NOTE
Management per Allergy/Immunology  On Bactrim    Stable.  Reevaluated with CXR.  Encourage IS and ambulation.

## 2023-10-31 DIAGNOSIS — I10 PRIMARY HYPERTENSION: ICD-10-CM

## 2023-10-31 RX ORDER — AMLODIPINE BESYLATE 2.5 MG/1
2.5 TABLET ORAL DAILY
Qty: 30 TABLET | Refills: 5 | Status: SHIPPED | OUTPATIENT
Start: 2023-10-31

## 2023-11-01 ENCOUNTER — TELEPHONE (OUTPATIENT)
Dept: PRIMARY CARE | Facility: CLINIC | Age: 83
End: 2023-11-01
Payer: MEDICARE

## 2023-11-01 DIAGNOSIS — E03.9 HYPOTHYROIDISM, UNSPECIFIED TYPE: ICD-10-CM

## 2023-11-02 RX ORDER — LEVOTHYROXINE SODIUM 112 UG/1
TABLET ORAL
Qty: 78 TABLET | Refills: 1 | Status: SHIPPED | OUTPATIENT
Start: 2023-11-02 | End: 2024-01-02 | Stop reason: DRUGHIGH

## 2023-11-13 ENCOUNTER — ANCILLARY PROCEDURE (OUTPATIENT)
Dept: RADIOLOGY | Facility: CLINIC | Age: 83
End: 2023-11-13
Payer: MEDICARE

## 2023-11-13 ENCOUNTER — LAB (OUTPATIENT)
Dept: LAB | Facility: LAB | Age: 83
End: 2023-11-13
Payer: MEDICARE

## 2023-11-13 DIAGNOSIS — I10 HYPERTENSION, UNSPECIFIED TYPE: ICD-10-CM

## 2023-11-13 DIAGNOSIS — Q44.6 POLYCYSTIC LIVER DISEASE: ICD-10-CM

## 2023-11-13 DIAGNOSIS — E11.9 CONTROLLED TYPE 2 DIABETES MELLITUS WITHOUT COMPLICATION, WITHOUT LONG-TERM CURRENT USE OF INSULIN (MULTI): ICD-10-CM

## 2023-11-13 DIAGNOSIS — N18.31 CHRONIC KIDNEY DISEASE, STAGE 3A (MULTI): ICD-10-CM

## 2023-11-13 DIAGNOSIS — Z00.00 ENCOUNTER FOR MEDICARE ANNUAL WELLNESS EXAM: ICD-10-CM

## 2023-11-13 DIAGNOSIS — E78.5 HYPERLIPIDEMIA, UNSPECIFIED HYPERLIPIDEMIA TYPE: ICD-10-CM

## 2023-11-13 DIAGNOSIS — K74.60 CIRRHOSIS OF LIVER WITHOUT ASCITES, UNSPECIFIED HEPATIC CIRRHOSIS TYPE (MULTI): ICD-10-CM

## 2023-11-13 LAB
CHOLEST SERPL-MCNC: 139 MG/DL (ref 0–199)
CHOLESTEROL/HDL RATIO: 2.5
CREAT UR-MCNC: 239.6 MG/DL (ref 20–320)
ERYTHROCYTE [DISTWIDTH] IN BLOOD BY AUTOMATED COUNT: 13.7 % (ref 11.5–14.5)
EST. AVERAGE GLUCOSE BLD GHB EST-MCNC: 151 MG/DL
HBA1C MFR BLD: 6.9 %
HCT VFR BLD AUTO: 38.5 % (ref 36–46)
HDLC SERPL-MCNC: 55.2 MG/DL
HGB BLD-MCNC: 12.3 G/DL (ref 12–16)
LDLC SERPL CALC-MCNC: 66 MG/DL
MCH RBC QN AUTO: 30.3 PG (ref 26–34)
MCHC RBC AUTO-ENTMCNC: 31.9 G/DL (ref 32–36)
MCV RBC AUTO: 95 FL (ref 80–100)
MICROALBUMIN UR-MCNC: 20.3 MG/L
MICROALBUMIN/CREAT UR: 8.5 UG/MG CREAT
NON HDL CHOLESTEROL: 84 MG/DL (ref 0–149)
NRBC BLD-RTO: 0 /100 WBCS (ref 0–0)
PLATELET # BLD AUTO: 155 X10*3/UL (ref 150–450)
RBC # BLD AUTO: 4.06 X10*6/UL (ref 4–5.2)
TRIGL SERPL-MCNC: 91 MG/DL (ref 0–149)
VLDL: 18 MG/DL (ref 0–40)
WBC # BLD AUTO: 4.8 X10*3/UL (ref 4.4–11.3)

## 2023-11-13 PROCEDURE — 36415 COLL VENOUS BLD VENIPUNCTURE: CPT

## 2023-11-13 PROCEDURE — 76705 ECHO EXAM OF ABDOMEN: CPT | Performed by: RADIOLOGY

## 2023-11-13 PROCEDURE — 76705 ECHO EXAM OF ABDOMEN: CPT

## 2023-11-13 PROCEDURE — 82570 ASSAY OF URINE CREATININE: CPT

## 2023-11-13 PROCEDURE — 85027 COMPLETE CBC AUTOMATED: CPT

## 2023-11-13 PROCEDURE — 82043 UR ALBUMIN QUANTITATIVE: CPT

## 2023-11-13 PROCEDURE — 83036 HEMOGLOBIN GLYCOSYLATED A1C: CPT

## 2023-11-13 PROCEDURE — 80053 COMPREHEN METABOLIC PANEL: CPT

## 2023-11-13 PROCEDURE — 80061 LIPID PANEL: CPT

## 2023-11-14 DIAGNOSIS — E03.9 HYPOTHYROIDISM, UNSPECIFIED TYPE: Primary | ICD-10-CM

## 2023-11-14 DIAGNOSIS — N18.31 CHRONIC KIDNEY DISEASE, STAGE 3A (MULTI): ICD-10-CM

## 2023-11-14 DIAGNOSIS — K74.60 CIRRHOSIS OF LIVER WITHOUT ASCITES, UNSPECIFIED HEPATIC CIRRHOSIS TYPE (MULTI): ICD-10-CM

## 2023-11-14 LAB
ALBUMIN SERPL BCP-MCNC: 4.2 G/DL (ref 3.4–5)
ALP SERPL-CCNC: 92 U/L (ref 33–136)
ALT SERPL W P-5'-P-CCNC: 23 U/L (ref 7–45)
ANION GAP SERPL CALC-SCNC: 16 MMOL/L (ref 10–20)
AST SERPL W P-5'-P-CCNC: 25 U/L (ref 9–39)
BILIRUB SERPL-MCNC: 0.5 MG/DL (ref 0–1.2)
BUN SERPL-MCNC: 21 MG/DL (ref 6–23)
CALCIUM SERPL-MCNC: 9.5 MG/DL (ref 8.6–10.6)
CHLORIDE SERPL-SCNC: 108 MMOL/L (ref 98–107)
CO2 SERPL-SCNC: 25 MMOL/L (ref 21–32)
CREAT SERPL-MCNC: 1.18 MG/DL (ref 0.5–1.05)
GFR SERPL CREATININE-BSD FRML MDRD: 46 ML/MIN/1.73M*2
GLUCOSE SERPL-MCNC: 133 MG/DL (ref 74–99)
POTASSIUM SERPL-SCNC: 4.2 MMOL/L (ref 3.5–5.3)
PROT SERPL-MCNC: 6.3 G/DL (ref 6.4–8.2)
SODIUM SERPL-SCNC: 145 MMOL/L (ref 136–145)

## 2023-11-28 ENCOUNTER — APPOINTMENT (OUTPATIENT)
Dept: RADIOLOGY | Facility: CLINIC | Age: 83
End: 2023-11-28
Payer: MEDICARE

## 2023-11-29 ENCOUNTER — OFFICE VISIT (OUTPATIENT)
Dept: OBSTETRICS AND GYNECOLOGY | Facility: CLINIC | Age: 83
End: 2023-11-29
Payer: MEDICARE

## 2023-11-29 VITALS
BODY MASS INDEX: 30.65 KG/M2 | DIASTOLIC BLOOD PRESSURE: 67 MMHG | HEIGHT: 63 IN | WEIGHT: 173 LBS | SYSTOLIC BLOOD PRESSURE: 129 MMHG

## 2023-11-29 DIAGNOSIS — N94.9 UTERINE TENDERNESS: Primary | ICD-10-CM

## 2023-11-29 PROCEDURE — 3074F SYST BP LT 130 MM HG: CPT | Performed by: NURSE PRACTITIONER

## 2023-11-29 PROCEDURE — 99213 OFFICE O/P EST LOW 20 MIN: CPT | Mod: PO | Performed by: NURSE PRACTITIONER

## 2023-11-29 PROCEDURE — 3078F DIAST BP <80 MM HG: CPT | Performed by: NURSE PRACTITIONER

## 2023-11-29 PROCEDURE — 1126F AMNT PAIN NOTED NONE PRSNT: CPT | Performed by: NURSE PRACTITIONER

## 2023-11-29 PROCEDURE — 1036F TOBACCO NON-USER: CPT | Performed by: NURSE PRACTITIONER

## 2023-11-29 PROCEDURE — 99213 OFFICE O/P EST LOW 20 MIN: CPT | Performed by: NURSE PRACTITIONER

## 2023-11-29 PROCEDURE — 1159F MED LIST DOCD IN RCRD: CPT | Performed by: NURSE PRACTITIONER

## 2023-11-29 PROCEDURE — 1160F RVW MEDS BY RX/DR IN RCRD: CPT | Performed by: NURSE PRACTITIONER

## 2023-11-29 ASSESSMENT — PAIN SCALES - GENERAL: PAINLEVEL: 0-NO PAIN

## 2023-11-29 ASSESSMENT — PATIENT HEALTH QUESTIONNAIRE - PHQ9
1. LITTLE INTEREST OR PLEASURE IN DOING THINGS: NOT AT ALL
SUM OF ALL RESPONSES TO PHQ9 QUESTIONS 1 AND 2: 0
2. FEELING DOWN, DEPRESSED OR HOPELESS: NOT AT ALL

## 2023-11-30 NOTE — PROGRESS NOTES
Subjective   83 y.o. female who is here for a uterine tenderness    Vaginal Symptoms:  - She was unable to get estrogen cream due to expensive price.  - Staying hydrated with water and cranberry juice.    Sexual Activity:  - She is using antiseptic and baby shampoo after intercourse with her .  - Denies having had any infections.  - Uses Astroglide for lubrication and experiences uterine pain with penetration occasionally.    Umbilical Symptoms:  - Reports umbilical pain.     GYN:   - Reports vaginal deliveries.  - Believes she had a laceration during deliveries.     Surgical History:  - Tubal ligation done in 1971 and is unsure how it was done.  HPI       Review of Systems    Objective     General:   alert, appears stated age, and cooperative   Heart: Not assessed   Lungs: Not assessed   Abdomen: soft, non-tender, without masses or organomegaly and umbilicus very tender No hernia appreciated   Vulva: normal   Vagina: atrophy   Cervix: cervical motion tenderness   Uterus: normal size, tender on exam    Adnexa: normal adnexa     Assessment/Plan   83 y.o. female with Uterine tenderness    Diagnoses:   #1 Uterine tenderness    Plan:   Uterine tenderness  - Pelvic US ordered.  - Continue drinking cranberry juice to to prevent UTI. Encourage cranberry supplement.     Follow-up with MATT Navas.    Pelvic ultrasound..       Scribe Attestation  By signing my name below, IDiana Scribe   attest that this documentation has been prepared under the direction and in the presence of MATT Bazzi.     I, MATT Bazzi, personally performed the services described in this documentation which was scribed virtually and I confirm that it is both accurate and complete.

## 2023-12-01 ENCOUNTER — LAB (OUTPATIENT)
Dept: LAB | Facility: LAB | Age: 83
End: 2023-12-01
Payer: MEDICARE

## 2023-12-01 ENCOUNTER — ANCILLARY PROCEDURE (OUTPATIENT)
Dept: RADIOLOGY | Facility: CLINIC | Age: 83
End: 2023-12-01
Payer: MEDICARE

## 2023-12-01 DIAGNOSIS — R93.2 ABNORMAL LIVER ULTRASOUND: Chronic | ICD-10-CM

## 2023-12-01 DIAGNOSIS — Q44.6 POLYCYSTIC LIVER DISEASE: ICD-10-CM

## 2023-12-01 DIAGNOSIS — K76.89 LIVER CYST: ICD-10-CM

## 2023-12-01 DIAGNOSIS — N94.9 UTERINE TENDERNESS: ICD-10-CM

## 2023-12-01 DIAGNOSIS — E03.9 HYPOTHYROIDISM, UNSPECIFIED TYPE: ICD-10-CM

## 2023-12-01 LAB
AFP SERPL-MCNC: 7 NG/ML (ref 0–9)
ALBUMIN SERPL BCP-MCNC: 4.3 G/DL (ref 3.4–5)
ALP SERPL-CCNC: 96 U/L (ref 33–136)
ALT SERPL W P-5'-P-CCNC: 26 U/L (ref 7–45)
ANION GAP SERPL CALC-SCNC: 10 MMOL/L (ref 10–20)
AST SERPL W P-5'-P-CCNC: 25 U/L (ref 9–39)
BILIRUB DIRECT SERPL-MCNC: 0.1 MG/DL (ref 0–0.3)
BILIRUB SERPL-MCNC: 0.5 MG/DL (ref 0–1.2)
BUN SERPL-MCNC: 19 MG/DL (ref 6–23)
CALCIUM SERPL-MCNC: 9.5 MG/DL (ref 8.6–10.6)
CHLORIDE SERPL-SCNC: 108 MMOL/L (ref 98–107)
CO2 SERPL-SCNC: 28 MMOL/L (ref 21–32)
CREAT SERPL-MCNC: 1.3 MG/DL (ref 0.5–1.05)
GFR SERPL CREATININE-BSD FRML MDRD: 41 ML/MIN/1.73M*2
GLUCOSE SERPL-MCNC: 125 MG/DL (ref 74–99)
INR PPP: 1.1 (ref 0.9–1.1)
POTASSIUM SERPL-SCNC: 4.1 MMOL/L (ref 3.5–5.3)
PROT SERPL-MCNC: 6.3 G/DL (ref 6.4–8.2)
PROTHROMBIN TIME: 12.1 SECONDS (ref 9.8–12.8)
SODIUM SERPL-SCNC: 142 MMOL/L (ref 136–145)
TSH SERPL-ACNC: 0.36 MIU/L (ref 0.44–3.98)

## 2023-12-01 PROCEDURE — 76856 US EXAM PELVIC COMPLETE: CPT

## 2023-12-01 PROCEDURE — 76857 US EXAM PELVIC LIMITED: CPT | Performed by: RADIOLOGY

## 2023-12-01 PROCEDURE — 85610 PROTHROMBIN TIME: CPT

## 2023-12-01 PROCEDURE — 36415 COLL VENOUS BLD VENIPUNCTURE: CPT

## 2023-12-01 PROCEDURE — 80053 COMPREHEN METABOLIC PANEL: CPT

## 2023-12-01 PROCEDURE — 82248 BILIRUBIN DIRECT: CPT

## 2023-12-01 PROCEDURE — 82105 ALPHA-FETOPROTEIN SERUM: CPT

## 2023-12-01 PROCEDURE — 76830 TRANSVAGINAL US NON-OB: CPT | Performed by: RADIOLOGY

## 2023-12-01 PROCEDURE — 84443 ASSAY THYROID STIM HORMONE: CPT

## 2023-12-04 ENCOUNTER — TELEPHONE (OUTPATIENT)
Dept: OBSTETRICS AND GYNECOLOGY | Facility: CLINIC | Age: 83
End: 2023-12-04
Payer: MEDICARE

## 2023-12-04 NOTE — TELEPHONE ENCOUNTER
Calling about the results to her ultrasound    Patient has calld back again about her results for her ultrasound

## 2023-12-05 ENCOUNTER — OFFICE VISIT (OUTPATIENT)
Dept: OPHTHALMOLOGY | Facility: CLINIC | Age: 83
End: 2023-12-05
Payer: MEDICARE

## 2023-12-05 DIAGNOSIS — H04.129 DRY EYE: ICD-10-CM

## 2023-12-05 DIAGNOSIS — H40.1132 PRIMARY OPEN ANGLE GLAUCOMA (POAG) OF BOTH EYES, MODERATE STAGE: Primary | ICD-10-CM

## 2023-12-05 PROCEDURE — 92083 EXTENDED VISUAL FIELD XM: CPT | Performed by: OPHTHALMOLOGY

## 2023-12-05 PROCEDURE — 1036F TOBACCO NON-USER: CPT | Performed by: OPHTHALMOLOGY

## 2023-12-05 PROCEDURE — 99212 OFFICE O/P EST SF 10 MIN: CPT | Performed by: OPHTHALMOLOGY

## 2023-12-05 PROCEDURE — 1126F AMNT PAIN NOTED NONE PRSNT: CPT | Performed by: OPHTHALMOLOGY

## 2023-12-05 PROCEDURE — 1159F MED LIST DOCD IN RCRD: CPT | Performed by: OPHTHALMOLOGY

## 2023-12-05 PROCEDURE — 1160F RVW MEDS BY RX/DR IN RCRD: CPT | Performed by: OPHTHALMOLOGY

## 2023-12-05 RX ORDER — LATANOPROST/PF 0.005 %
1 DROPS OPHTHALMIC (EYE) NIGHTLY
Qty: 7.5 ML | Refills: 11 | Status: SHIPPED | OUTPATIENT
Start: 2023-12-05

## 2023-12-05 ASSESSMENT — SLIT LAMP EXAM - LIDS
COMMENTS: NORMAL
COMMENTS: NORMAL

## 2023-12-05 ASSESSMENT — VISUAL ACUITY
OS_PH_CC: 20/20-1
METHOD: SNELLEN - LINEAR
CORRECTION_TYPE: GLASSES
OD_CC: 20/25+1
OS_CC: 20/30

## 2023-12-05 ASSESSMENT — TONOMETRY
OS_IOP_MMHG: 12
IOP_METHOD: GOLDMANN APPLANATION
OD_IOP_MMHG: 12

## 2023-12-05 ASSESSMENT — GONIOSCOPY
OS_SUPERIOR: 3/360
OD_SUPERIOR: 3/360

## 2023-12-05 ASSESSMENT — CUP TO DISC RATIO
OD_RATIO: 0.4
OS_RATIO: 0.45

## 2023-12-05 ASSESSMENT — EXTERNAL EXAM - LEFT EYE: OS_EXAM: NORMAL

## 2023-12-05 ASSESSMENT — EXTERNAL EXAM - RIGHT EYE: OD_EXAM: NORMAL

## 2023-12-07 ENCOUNTER — OFFICE VISIT (OUTPATIENT)
Dept: OPHTHALMOLOGY | Facility: CLINIC | Age: 83
End: 2023-12-07
Payer: MEDICARE

## 2023-12-07 DIAGNOSIS — E07.9 THYROID EYE DISEASE: Primary | ICD-10-CM

## 2023-12-07 DIAGNOSIS — H57.89 THYROID EYE DISEASE: Primary | ICD-10-CM

## 2023-12-07 DIAGNOSIS — H50.22 HYPERTROPIA OF LEFT EYE: ICD-10-CM

## 2023-12-07 PROCEDURE — 99213 OFFICE O/P EST LOW 20 MIN: CPT | Performed by: PSYCHIATRY & NEUROLOGY

## 2023-12-07 PROCEDURE — 92060 SENSORIMOTOR EXAMINATION: CPT | Performed by: PSYCHIATRY & NEUROLOGY

## 2023-12-07 RX ORDER — GENTAMICIN SULFATE 1 MG/G
CREAM TOPICAL
COMMUNITY
Start: 2023-10-23 | End: 2024-01-24 | Stop reason: WASHOUT

## 2023-12-07 ASSESSMENT — ENCOUNTER SYMPTOMS
MUSCULOSKELETAL NEGATIVE: 0
GASTROINTESTINAL NEGATIVE: 0
CONSTITUTIONAL NEGATIVE: 0
EYES NEGATIVE: 0
PSYCHIATRIC NEGATIVE: 0
RESPIRATORY NEGATIVE: 0
ALLERGIC/IMMUNOLOGIC NEGATIVE: 0
HEMATOLOGIC/LYMPHATIC NEGATIVE: 0
CARDIOVASCULAR NEGATIVE: 0
ENDOCRINE NEGATIVE: 0
NEUROLOGICAL NEGATIVE: 0

## 2023-12-07 ASSESSMENT — CONF VISUAL FIELD
OD_SUPERIOR_NASAL_RESTRICTION: 0
OD_INFERIOR_TEMPORAL_RESTRICTION: 0
OS_SUPERIOR_TEMPORAL_RESTRICTION: 0
OS_INFERIOR_NASAL_RESTRICTION: 0
METHOD: COUNTING FINGERS
OS_NORMAL: 1
OD_NORMAL: 1
OS_INFERIOR_TEMPORAL_RESTRICTION: 0
OS_SUPERIOR_NASAL_RESTRICTION: 0
OD_INFERIOR_NASAL_RESTRICTION: 0
OD_SUPERIOR_TEMPORAL_RESTRICTION: 0

## 2023-12-07 ASSESSMENT — EXTERNAL EXAM - RIGHT EYE: OD_EXAM: NORMAL

## 2023-12-07 ASSESSMENT — VISUAL ACUITY
OS_CC: 20/25
OD_CC: 20/25
OD_CC+: +2
METHOD: SNELLEN - LINEAR
CORRECTION_TYPE: GLASSES

## 2023-12-07 ASSESSMENT — EXTERNAL EXAM - LEFT EYE: OS_EXAM: NORMAL

## 2023-12-07 ASSESSMENT — TONOMETRY
OD_IOP_MMHG: 12
IOP_METHOD: GOLDMANN APPLANATION
OS_IOP_MMHG: 12

## 2023-12-07 ASSESSMENT — PACHYMETRY
OD_CT(UM): 502
OS_CT(UM): 486

## 2023-12-07 ASSESSMENT — CUP TO DISC RATIO
OD_RATIO: 0.4
OS_RATIO: 0.45

## 2023-12-07 ASSESSMENT — SLIT LAMP EXAM - LIDS
COMMENTS: NORMAL
COMMENTS: NORMAL

## 2023-12-07 NOTE — PROGRESS NOTES
Assessment and Plan    12/7/2023 Vickie OD 21 & OS 22 at base 95.  11/28/2022 Vickie OD 23 & OS 24 at base 95.  06/20/2019 Vickie OD 22 & OS 23 at base 95.    10/16/2017 double [place] right excyclotorsion & left incyclotorsion    05/04/2023 MRI brain without contrast, which I personally reviewed, shows volume loss with enlarged lateral ventricles and primarily periventricular white matter FLAIR hyperintensities. 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    06/28/2021 MRI brain with contrast & MRA head & neck, which I personally reviewed previously, shows large ventricles with periventricular white matter FLAIR changes and partially empty sella.  Interval head imaging.  10/26/2017 MRI IAC with contrast, which I personally reviewed previously, shows no brainstem lesions. There is again prominence of the lateral ventricles.  07/17/2017 MRI brain without contrast, which I personally reviewed previously, shows enlargement of the lateral & 3rd ventricles along with along with bilateral primarily frontal periventricular white matter hyperintensity.    06/12/2023 ESR 1. CRP 0.13 mg/dL. TSH wnl (high one reading 2023)  06/24/2022 ESR 2. CRP 0.12 mg/dL.  04/11/2022 ESR 3. CRP <0.10 mg/dL.  12/14/2021 ESR 3. CRP 0.1 mg/dL.  01/13/2021 ESR 12. CRP 1.78 mg/dL.  12/04/2020 ESR 4. CRP 0.12 mg/dL.  07/09/2020 ESR 5. CRP 0.3 mg/dL.  05/28/2019 ESR 6. CRP 0.251 mg/dL.  05/22/2018 ESR 4. CRP 0.098 mg/dL. B12 1468.  07/11/2017 acetylcholine receptor binding, blocking & modulating antibodies negative. TSH 0.46 (low). ESR 2. CRP 0.119 mg/dL. B12 1141. REECE 1:80, anti-centromere negative & mitochondrial ab negative.    11/01/2022 OCT RNFL OD 86 & OS 88.  03/22/2022 OCT RNFL OD 86 & OS 87.  09/14/2020 OCT RNFL OD 90 & OS 87.  09/16/2020 OCT RNFL OD 90 & OS 88.  04/26/2016 OCT RNFL OD 91 & OS 88.  01/30/2015 OCT RNFL OD 93 & OS 88.  10/27/2015 [facility] 24-2 OD wnl MD -0.35 & OS wnl MD -0.72.    11/01/2022 [facility]  24-2 OD fovea 15, wnl MD +0.03 & OS fovea 38, wnl MD -0.44.  06/01/2021 [facility] 24-2 OD fovea 34, wnl MD -0.81 & OS fovea 31, scatter MD -2.39.  09/16/2019 [facility] 24-2 OD fovea 31, wnl MD -1.60 & OS fovea 35, scatter MD -2.36.    This 83 year-old woman with a history of thyroid eye disease, POAG, NPDR, type IIIC polyglandular autoimmune syndrome, floppy eyelid syndrome, DM, HLD, asthma, depression presents for follow up evaluation of diplopia with thyroid eye disease.    She has stable left hypertropia in left and superior gazes as she did last visit. These findings are consistent with residual misalignment from thyroid eye disease, but without progression. We discussed stability and options including teprotumumab, but with head turning and monitoring as the favored path now.    Dry eye remains quite symptomatic. The problem is multifactorial with thyroid eye disease as a contributor along with her other autoimmune disease. Continue with Dr. Bernabe.    Plan    Continue with Esperanza Bernabe and Pepper.  Continue observation & autoimmune disease treament.  Head turning and patching as needed.    Follow up in 1 year with stereo plates.

## 2023-12-20 NOTE — TELEPHONE ENCOUNTER
Pt received ultrasound results and wanted to report that sex still hurts sometimes. She has an appointment on 1/2/24 with Margarita.

## 2023-12-28 ENCOUNTER — LAB (OUTPATIENT)
Dept: LAB | Facility: LAB | Age: 83
End: 2023-12-28
Payer: MEDICARE

## 2023-12-28 ENCOUNTER — APPOINTMENT (OUTPATIENT)
Dept: RADIOLOGY | Facility: CLINIC | Age: 83
End: 2023-12-28
Payer: MEDICARE

## 2023-12-28 DIAGNOSIS — N18.31 CHRONIC KIDNEY DISEASE (CKD) STAGE G3A/A1, MODERATELY DECREASED GLOMERULAR FILTRATION RATE (GFR) BETWEEN 45-59 ML/MIN/1.73 SQUARE METER AND ALBUMINURIA CREATININE RATIO LESS THAN 30 MG/G (CMS* (MULTI): ICD-10-CM

## 2023-12-28 DIAGNOSIS — D72.810 LYMPHOPENIA: ICD-10-CM

## 2023-12-28 DIAGNOSIS — M15.0 PRIMARY GENERALIZED (OSTEO)ARTHRITIS: ICD-10-CM

## 2023-12-28 DIAGNOSIS — M35.01 KERATOCONJUNCTIVITIS SICCA, IN SJOGREN'S SYNDROME (MULTI): ICD-10-CM

## 2023-12-28 DIAGNOSIS — M79.7 FIBROMYALGIA: ICD-10-CM

## 2023-12-28 LAB
ALBUMIN SERPL BCP-MCNC: 4.1 G/DL (ref 3.4–5)
ALP SERPL-CCNC: 86 U/L (ref 33–136)
ALT SERPL W P-5'-P-CCNC: 23 U/L (ref 7–45)
ANION GAP SERPL CALC-SCNC: 13 MMOL/L (ref 10–20)
AST SERPL W P-5'-P-CCNC: 23 U/L (ref 9–39)
BASOPHILS # BLD AUTO: 0.04 X10*3/UL (ref 0–0.1)
BASOPHILS NFR BLD AUTO: 0.9 %
BILIRUB SERPL-MCNC: 0.4 MG/DL (ref 0–1.2)
BUN SERPL-MCNC: 26 MG/DL (ref 6–23)
C3 SERPL-MCNC: 140 MG/DL (ref 87–200)
C4 SERPL-MCNC: 35 MG/DL (ref 10–50)
CALCIUM SERPL-MCNC: 9.8 MG/DL (ref 8.6–10.6)
CHLORIDE SERPL-SCNC: 107 MMOL/L (ref 98–107)
CO2 SERPL-SCNC: 26 MMOL/L (ref 21–32)
CREAT SERPL-MCNC: 1.66 MG/DL (ref 0.5–1.05)
CRP SERPL-MCNC: 0.15 MG/DL
ENA SS-A AB SER IA-ACNC: 1.5 AI
ENA SS-B AB SER IA-ACNC: <0.2 AI
EOSINOPHIL # BLD AUTO: 0.3 X10*3/UL (ref 0–0.4)
EOSINOPHIL NFR BLD AUTO: 7 %
ERYTHROCYTE [DISTWIDTH] IN BLOOD BY AUTOMATED COUNT: 13 % (ref 11.5–14.5)
ERYTHROCYTE [SEDIMENTATION RATE] IN BLOOD BY WESTERGREN METHOD: 3 MM/H (ref 0–30)
GFR SERPL CREATININE-BSD FRML MDRD: 31 ML/MIN/1.73M*2
GLUCOSE SERPL-MCNC: 165 MG/DL (ref 74–99)
HCT VFR BLD AUTO: 37 % (ref 36–46)
HGB BLD-MCNC: 12.1 G/DL (ref 12–16)
IMM GRANULOCYTES # BLD AUTO: 0.01 X10*3/UL (ref 0–0.5)
IMM GRANULOCYTES NFR BLD AUTO: 0.2 % (ref 0–0.9)
LYMPHOCYTES # BLD AUTO: 0.79 X10*3/UL (ref 0.8–3)
LYMPHOCYTES NFR BLD AUTO: 18.3 %
MCH RBC QN AUTO: 30.7 PG (ref 26–34)
MCHC RBC AUTO-ENTMCNC: 32.7 G/DL (ref 32–36)
MCV RBC AUTO: 94 FL (ref 80–100)
MONOCYTES # BLD AUTO: 0.31 X10*3/UL (ref 0.05–0.8)
MONOCYTES NFR BLD AUTO: 7.2 %
NEUTROPHILS # BLD AUTO: 2.86 X10*3/UL (ref 1.6–5.5)
NEUTROPHILS NFR BLD AUTO: 66.4 %
NRBC BLD-RTO: 0 /100 WBCS (ref 0–0)
PLATELET # BLD AUTO: 156 X10*3/UL (ref 150–450)
POTASSIUM SERPL-SCNC: 4.1 MMOL/L (ref 3.5–5.3)
PROT SERPL-MCNC: 6.2 G/DL (ref 6.4–8.2)
RBC # BLD AUTO: 3.94 X10*6/UL (ref 4–5.2)
SODIUM SERPL-SCNC: 142 MMOL/L (ref 136–145)
WBC # BLD AUTO: 4.3 X10*3/UL (ref 4.4–11.3)

## 2023-12-28 PROCEDURE — 86235 NUCLEAR ANTIGEN ANTIBODY: CPT

## 2023-12-28 PROCEDURE — 85025 COMPLETE CBC W/AUTO DIFF WBC: CPT

## 2023-12-28 PROCEDURE — 86140 C-REACTIVE PROTEIN: CPT

## 2023-12-28 PROCEDURE — 80053 COMPREHEN METABOLIC PANEL: CPT

## 2023-12-28 PROCEDURE — 85652 RBC SED RATE AUTOMATED: CPT

## 2023-12-28 PROCEDURE — 36415 COLL VENOUS BLD VENIPUNCTURE: CPT

## 2023-12-28 PROCEDURE — 86160 COMPLEMENT ANTIGEN: CPT

## 2024-01-02 ENCOUNTER — OFFICE VISIT (OUTPATIENT)
Dept: OBSTETRICS AND GYNECOLOGY | Facility: CLINIC | Age: 84
End: 2024-01-02
Payer: MEDICARE

## 2024-01-02 VITALS
BODY MASS INDEX: 30.65 KG/M2 | HEART RATE: 69 BPM | HEIGHT: 63 IN | DIASTOLIC BLOOD PRESSURE: 63 MMHG | WEIGHT: 173 LBS | SYSTOLIC BLOOD PRESSURE: 122 MMHG

## 2024-01-02 DIAGNOSIS — B00.9 HSV (HERPES SIMPLEX VIRUS) INFECTION: ICD-10-CM

## 2024-01-02 DIAGNOSIS — E05.90 HYPERTHYROIDISM: Primary | ICD-10-CM

## 2024-01-02 DIAGNOSIS — N39.0 RECURRENT UTI: Primary | ICD-10-CM

## 2024-01-02 DIAGNOSIS — E03.9 HYPOTHYROIDISM, UNSPECIFIED TYPE: ICD-10-CM

## 2024-01-02 PROCEDURE — 1159F MED LIST DOCD IN RCRD: CPT | Performed by: NURSE PRACTITIONER

## 2024-01-02 PROCEDURE — 99212 OFFICE O/P EST SF 10 MIN: CPT | Performed by: NURSE PRACTITIONER

## 2024-01-02 PROCEDURE — 1125F AMNT PAIN NOTED PAIN PRSNT: CPT | Performed by: NURSE PRACTITIONER

## 2024-01-02 PROCEDURE — 3074F SYST BP LT 130 MM HG: CPT | Performed by: NURSE PRACTITIONER

## 2024-01-02 PROCEDURE — 1036F TOBACCO NON-USER: CPT | Performed by: NURSE PRACTITIONER

## 2024-01-02 PROCEDURE — 3078F DIAST BP <80 MM HG: CPT | Performed by: NURSE PRACTITIONER

## 2024-01-02 RX ORDER — ACYCLOVIR 400 MG/1
400 TABLET ORAL DAILY
Qty: 30 TABLET | Refills: 3 | Status: SHIPPED | OUTPATIENT
Start: 2024-01-02

## 2024-01-02 RX ORDER — LEVOTHYROXINE SODIUM 100 UG/1
100 TABLET ORAL DAILY
Qty: 90 TABLET | Refills: 0 | Status: SHIPPED | OUTPATIENT
Start: 2024-01-02 | End: 2024-03-05 | Stop reason: SDUPTHER

## 2024-01-02 ASSESSMENT — PAIN SCALES - GENERAL: PAINLEVEL: 6

## 2024-01-02 NOTE — PROGRESS NOTES
Subjective   Patient ID: Louisa Rodas is a 83 y.o. female who presents for Results.  Urinary Symptoms:   - Drinks cranberry juice to help decrease UTI's    Vaginal symptoms:  - Needing Acyclovir refilled     Abdominal symptoms:  - Reports continue abdominal pain   -  Last seen 11/29/23 for pain and Joe's, uterus tenderness on exam   - Pelvic US done 12/1/23 that showed a fibroid but was otherwise unremarkable   - PCP ordered labs done 12/28/23, creatinine was elevated and GFR was slow, CT with contrast is not an ideal option  - Abdomen US in 10/2023 that was WNL     PSH:  - Tubal ligation     PMH:  - Does have autoimmune disorder       Objective   Physical Exam  HENT:      Head: Normocephalic.   Pulmonary:      Effort: Pulmonary effort is normal.   Neurological:      Mental Status: She is alert.         Assessment/Plan   83 y.o. old female being assessed for HSV, Joe's and abdominal pain    Diagnoses:   #1 HSV  #2 Abdominal pain     Plan:   HSV  - Refilled Acyclovir 400MG PO every day    2. Abdominal pain  - Discussed results of Pelvic US and reassured patient that the pelvic US was not worrisome  - Discussed CT with contrast is not a good option due to elevated creatine and slow GFR  Could possibly be an umbilical hernia, but would need a further evaluation from PCP or general surgeon    3. Joe's  - Continue taking two cranberry supplements daily      Follow-up in May 2024     Scribe Attestation  By signing my name below, ICathy , Scrmelba   attest that this documentation has been prepared under the direction and in the presence of MATT Bazzi.      I, MATT Bazzi, personally performed the services described in this documentation which was scribed virtually and I confirm that it is both accurate and complete.       MATT Bazzi 01/02/24 1:35 PM

## 2024-01-04 ENCOUNTER — OFFICE VISIT (OUTPATIENT)
Dept: ORTHOPEDIC SURGERY | Facility: CLINIC | Age: 84
End: 2024-01-04
Payer: MEDICARE

## 2024-01-04 DIAGNOSIS — M17.11 PRIMARY OSTEOARTHRITIS OF RIGHT KNEE: Primary | ICD-10-CM

## 2024-01-04 PROCEDURE — 1159F MED LIST DOCD IN RCRD: CPT | Performed by: ORTHOPAEDIC SURGERY

## 2024-01-04 PROCEDURE — 1126F AMNT PAIN NOTED NONE PRSNT: CPT | Performed by: ORTHOPAEDIC SURGERY

## 2024-01-04 PROCEDURE — 1036F TOBACCO NON-USER: CPT | Performed by: ORTHOPAEDIC SURGERY

## 2024-01-04 PROCEDURE — 99213 OFFICE O/P EST LOW 20 MIN: CPT | Performed by: ORTHOPAEDIC SURGERY

## 2024-01-04 PROCEDURE — 1160F RVW MEDS BY RX/DR IN RCRD: CPT | Performed by: ORTHOPAEDIC SURGERY

## 2024-01-04 ASSESSMENT — PAIN SCALES - GENERAL: PAINLEVEL_OUTOF10: 3

## 2024-01-04 ASSESSMENT — PAIN - FUNCTIONAL ASSESSMENT: PAIN_FUNCTIONAL_ASSESSMENT: 0-10

## 2024-01-07 LAB — C2 SERPL-MCNC: 2.5 MG/DL (ref 1.6–4)

## 2024-01-09 LAB — C1Q SERPL-MCNC: 16 MG/DL (ref 10.3–20.5)

## 2024-01-16 ENCOUNTER — OFFICE VISIT (OUTPATIENT)
Dept: ORTHOPEDIC SURGERY | Facility: HOSPITAL | Age: 84
End: 2024-01-16
Payer: MEDICARE

## 2024-01-16 DIAGNOSIS — M17.11 PRIMARY OSTEOARTHRITIS OF RIGHT KNEE: ICD-10-CM

## 2024-01-16 PROCEDURE — 1160F RVW MEDS BY RX/DR IN RCRD: CPT | Performed by: FAMILY MEDICINE

## 2024-01-16 PROCEDURE — 99214 OFFICE O/P EST MOD 30 MIN: CPT | Performed by: FAMILY MEDICINE

## 2024-01-16 PROCEDURE — 20611 DRAIN/INJ JOINT/BURSA W/US: CPT | Performed by: FAMILY MEDICINE

## 2024-01-16 PROCEDURE — 1159F MED LIST DOCD IN RCRD: CPT | Performed by: FAMILY MEDICINE

## 2024-01-16 PROCEDURE — 2500000004 HC RX 250 GENERAL PHARMACY W/ HCPCS (ALT 636 FOR OP/ED): Performed by: FAMILY MEDICINE

## 2024-01-16 PROCEDURE — 2500000005 HC RX 250 GENERAL PHARMACY W/O HCPCS: Performed by: FAMILY MEDICINE

## 2024-01-16 PROCEDURE — 1036F TOBACCO NON-USER: CPT | Performed by: FAMILY MEDICINE

## 2024-01-16 PROCEDURE — 1125F AMNT PAIN NOTED PAIN PRSNT: CPT | Performed by: FAMILY MEDICINE

## 2024-01-16 RX ORDER — METHYLPREDNISOLONE ACETATE 40 MG/ML
80 INJECTION, SUSPENSION INTRA-ARTICULAR; INTRALESIONAL; INTRAMUSCULAR; SOFT TISSUE
Status: COMPLETED | OUTPATIENT
Start: 2024-01-16 | End: 2024-01-16

## 2024-01-16 RX ORDER — LIDOCAINE HYDROCHLORIDE 10 MG/ML
4 INJECTION, SOLUTION EPIDURAL; INFILTRATION; INTRACAUDAL; PERINEURAL
Status: COMPLETED | OUTPATIENT
Start: 2024-01-16 | End: 2024-01-16

## 2024-01-16 RX ADMIN — LIDOCAINE HYDROCHLORIDE 4 ML: 10 INJECTION, SOLUTION EPIDURAL; INFILTRATION; INTRACAUDAL; PERINEURAL at 17:16

## 2024-01-16 RX ADMIN — METHYLPREDNISOLONE ACETATE 80 MG: 40 INJECTION, SUSPENSION INTRA-ARTICULAR; INTRALESIONAL; INTRAMUSCULAR; INTRASYNOVIAL; SOFT TISSUE at 17:16

## 2024-01-16 NOTE — PROGRESS NOTES
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    On 9/29/2023, she presents with complaint of right knee pain that has been bothering her for many years. She has seen several specialists regarding this, but most consistently through Dr. Verduzco's office. Her most recent injection was on 7/3/2023 with Eileen Holly. She states that this injection provided her 2 to 3 weeks of relief before return of her pain. She was recommended to follow-up with us to discuss possibility of hyaluronic acid injections. Denies any interval injury or trauma to the knees. She underwent previous left knee total arthroplasty with Dr. Tang in 2016. She has not been taking routine over-the-counter oral analgesics. Has been using a joint supplement she found online and intermittent Aspercreme/Salonpas. She has no other acute complaints today. We agreed to a cortisone injection into the right knee while we work on approval for viscosupplementation of the right knee.. She tolerated this well. Activity modifications were reviewed. I will see her back for viscosupplementation injection once approved.     Today 1/16/2024, she had significant improvement in her right knee pain for just over 3 months with last corticosteroid injection.  She is interested in a repeat injection in her right knee.  Since the injection she has not had a further injury or trauma to the knee.  She denies numbness, tingling, weakness    She has no other complaints.    Physical Examination:   The RIGHT knee has trace joint effusion. Patella crepitus and grind are positive. There is moderate tenderness to the medial and lateral joint lines. Flexion and extension are without mechanical blocking. There is no instability with stress testing.     The LEFT knee is nontender stable. There is intact incision.     Skin - no rashes, sores, or open  lesions. Strength, sensory and vascular exams are otherwise normal. There is no clubbing, cyanosis or edema.     Gait is antalgic and cane assisted     Procedure:  After consent was obtained, right knee was prepped in a sterile fashion. Ultrasound guidance was used to help insure proper needle placement into the right knee, decrease patient discomfort, and decrease collateral damage. The joint was visualized and Depo-Medrol 80 mg with lidocaine 4 mL & ropivacaine 4 mL were injected without any complications. Ultrasound images were saved on an internal file for later reference. The patient tolerated the procedure well and the area was cleaned and bandaged.    Procedure Note Attestation   Procedure performed by my sports medicine fellow.    I, Dr. Larry Meadows MD, supervised the entire procedure .    Problem List Items Addressed This Visit             ICD-10-CM    Osteoarthritis M19.90    Relevant Orders    Point of Care Ultrasound (Completed)       Assessment and Plan:     We reviewed the exam and x-ray findings and discussed the conservative and surgical treatment options. We agreed to repeat corticosteroid injection of the right knee.  Activity modifications were reviewed.  She tolerated procedure well.  She has a total knee arthroplasty scheduled for May 28, 2024.  We did discuss that she cannot have any further injections prior to this.  Follow-up as needed.    Portillo Yanes DO  Primary Care Sports Medicine Fellow    **This note was dictated using Dragon speech recognition software and was not corrected for spelling or grammatical errors**.

## 2024-01-16 NOTE — PROGRESS NOTES
Patient ID: Louisa Rodas is a 83 y.o. female.    L Inj/Asp: R knee on 1/16/2024 5:16 PM  Indications: pain  Details: 21 G needle, ultrasound-guided superolateral approach  Medications: 80 mg methylPREDNISolone acetate 40 mg/mL; 4 mL lidocaine PF 10 mg/mL (1 %)  Procedure, treatment alternatives, risks and benefits explained, specific risks discussed. Consent was given by the patient. Immediately prior to procedure a time out was called to verify the correct patient, procedure, equipment, support staff and site/side marked as required. Patient was prepped and draped in the usual sterile fashion.

## 2024-01-17 ENCOUNTER — OFFICE VISIT (OUTPATIENT)
Dept: OPHTHALMOLOGY | Facility: CLINIC | Age: 84
End: 2024-01-17
Payer: MEDICARE

## 2024-01-17 ENCOUNTER — TELEPHONE (OUTPATIENT)
Dept: PRIMARY CARE | Facility: CLINIC | Age: 84
End: 2024-01-17

## 2024-01-17 ENCOUNTER — OFFICE VISIT (OUTPATIENT)
Dept: RHEUMATOLOGY | Facility: CLINIC | Age: 84
End: 2024-01-17
Payer: MEDICARE

## 2024-01-17 VITALS
HEART RATE: 68 BPM | BODY MASS INDEX: 30.65 KG/M2 | OXYGEN SATURATION: 98 % | SYSTOLIC BLOOD PRESSURE: 140 MMHG | WEIGHT: 173 LBS | HEIGHT: 63 IN | DIASTOLIC BLOOD PRESSURE: 72 MMHG

## 2024-01-17 DIAGNOSIS — M35.01 KERATOCONJUNCTIVITIS SICCA, IN SJOGREN'S SYNDROME (MULTI): ICD-10-CM

## 2024-01-17 DIAGNOSIS — E07.9 THYROID EYE DISEASE: ICD-10-CM

## 2024-01-17 DIAGNOSIS — H57.89 THYROID EYE DISEASE: ICD-10-CM

## 2024-01-17 DIAGNOSIS — M35.01 SICCA SYNDROME WITH KERATOCONJUNCTIVITIS (MULTI): Primary | ICD-10-CM

## 2024-01-17 DIAGNOSIS — H04.123 DRY EYE SYNDROME OF BOTH LACRIMAL GLANDS: Primary | ICD-10-CM

## 2024-01-17 PROCEDURE — 1159F MED LIST DOCD IN RCRD: CPT | Performed by: INTERNAL MEDICINE

## 2024-01-17 PROCEDURE — 1036F TOBACCO NON-USER: CPT | Performed by: INTERNAL MEDICINE

## 2024-01-17 PROCEDURE — 3077F SYST BP >= 140 MM HG: CPT | Performed by: INTERNAL MEDICINE

## 2024-01-17 PROCEDURE — 1126F AMNT PAIN NOTED NONE PRSNT: CPT | Performed by: INTERNAL MEDICINE

## 2024-01-17 PROCEDURE — 99214 OFFICE O/P EST MOD 30 MIN: CPT | Performed by: INTERNAL MEDICINE

## 2024-01-17 PROCEDURE — 68761 CLOSE TEAR DUCT OPENING: CPT | Performed by: STUDENT IN AN ORGANIZED HEALTH CARE EDUCATION/TRAINING PROGRAM

## 2024-01-17 PROCEDURE — 3078F DIAST BP <80 MM HG: CPT | Performed by: INTERNAL MEDICINE

## 2024-01-17 PROCEDURE — 1160F RVW MEDS BY RX/DR IN RCRD: CPT | Performed by: INTERNAL MEDICINE

## 2024-01-17 RX ORDER — PILOCARPINE HYDROCHLORIDE 5 MG/1
5 TABLET, FILM COATED ORAL 3 TIMES DAILY
Qty: 90 TABLET | Refills: 11 | Status: SHIPPED | OUTPATIENT
Start: 2024-01-17 | End: 2024-01-17 | Stop reason: DRUGHIGH

## 2024-01-17 RX ORDER — PILOCARPINE HYDROCHLORIDE 5 MG/1
5 TABLET, FILM COATED ORAL
Qty: 30 TABLET | Refills: 1 | Status: SHIPPED | OUTPATIENT
Start: 2024-01-17 | End: 2024-02-16

## 2024-01-17 ASSESSMENT — ENCOUNTER SYMPTOMS
LOSS OF SENSATION IN FEET: 0
EYES NEGATIVE: 1
CONSTITUTIONAL NEGATIVE: 0
RESPIRATORY NEGATIVE: 0
HEMATOLOGIC/LYMPHATIC NEGATIVE: 0
DEPRESSION: 0
MUSCULOSKELETAL NEGATIVE: 0
OCCASIONAL FEELINGS OF UNSTEADINESS: 0
CARDIOVASCULAR NEGATIVE: 0
ENDOCRINE NEGATIVE: 0
NEUROLOGICAL NEGATIVE: 0
ALLERGIC/IMMUNOLOGIC NEGATIVE: 0
GASTROINTESTINAL NEGATIVE: 0
PSYCHIATRIC NEGATIVE: 0

## 2024-01-17 ASSESSMENT — PACHYMETRY
OD_CT(UM): 502
OS_CT(UM): 486

## 2024-01-17 ASSESSMENT — ROUTINE ASSESSMENT OF PATIENT INDEX DATA (RAPID3)
FN_SCORE: 2.7
SEVERITY_SCORE: 0
DRESS_YOURSELF: WITHOUT ANY DIFFICULTY
FEELINGS_DEPRESSION: WITHOUT ANY DIFFICULTY
FEELINGS_ANXIETY_NERVOUS: WITHOUT ANY DIFFICULTY
WASH_DRY_BODY: WITHOUT ANY DIFFICULTY
TURN_FAUCETS_OFF: WITHOUT ANY DIFFICULTY
IN_OUT_TRANSPORT: WITH MUCH DIFFICULTY
ON A SCALE OF ONE TO TEN, CONSIDERING ALL THE WAYS IN WHICH ILLNESS AND HEALTH CONDITIONS MAY AFFECT YOU AT THIS TIME, PLEASE INDICATE BELOW HOW YOU ARE DOING:: 7.5
WALK_FLAT_GROUND: WITHOUT ANY DIFFICULTY
TOTAL RAPID3 SCORE: 17.7
ON A SCALE OF ONE TO TEN, CONSIDERING ALL THE WAYS IN WHICH ILLNESS AND HEALTH CONDITIONS MAY AFFECT YOU AT THIS TIME, PLEASE INDICATE BELOW HOW YOU ARE DOING:: 7.5
ON A SCALE OF ONE TO TEN, HOW MUCH PAIN HAVE YOU HAD BECAUSE OF YOUR CONDITION OVER THE PAST WEEK?: 7.5
WEIGHTED_TOTAL_SCORE: 5.9
SUM OF QUESTIONS A TO J: 8
WALK_KILOMETERS: UNABLE TO DO
IN_OUT_BED: WITHOUT ANY DIFFICULTY
ON A SCALE OF ONE TO TEN, HOW MUCH PAIN HAVE YOU HAD BECAUSE OF YOUR CONDITION OVER THE PAST WEEK?: 7.5
PICK_CLOTHES_OFF_FLOOR: WITHOUT ANY DIFFICULTY
PARTIPATE_RECREATIONAL_ACTIVITIES: UNABLE TO DO
LIFT_CUP_TO_MOUTH: WITHOUT ANY DIFFICULTY
GOOD_NIGHTS_SLEEP: WITH MUCH DIFFICULTY

## 2024-01-17 ASSESSMENT — CONF VISUAL FIELD
OD_INFERIOR_NASAL_RESTRICTION: 0
METHOD: COUNTING FINGERS
OS_SUPERIOR_NASAL_RESTRICTION: 0
OD_SUPERIOR_NASAL_RESTRICTION: 0
OS_INFERIOR_NASAL_RESTRICTION: 0
OD_NORMAL: 1
OS_NORMAL: 1
OD_INFERIOR_TEMPORAL_RESTRICTION: 0
OD_SUPERIOR_TEMPORAL_RESTRICTION: 0
OS_SUPERIOR_TEMPORAL_RESTRICTION: 0
OS_INFERIOR_TEMPORAL_RESTRICTION: 0

## 2024-01-17 ASSESSMENT — PAIN SCALES - GENERAL: PAINLEVEL: 0-NO PAIN

## 2024-01-17 ASSESSMENT — VISUAL ACUITY
OS_CC: 20/20
OD_CC: 20/20
CORRECTION_TYPE: GLASSES
METHOD: SNELLEN - LINEAR
OD_CC+: -2

## 2024-01-17 ASSESSMENT — PATIENT HEALTH QUESTIONNAIRE - PHQ9
1. LITTLE INTEREST OR PLEASURE IN DOING THINGS: NOT AT ALL
2. FEELING DOWN, DEPRESSED OR HOPELESS: NOT AT ALL
SUM OF ALL RESPONSES TO PHQ9 QUESTIONS 1 AND 2: 0

## 2024-01-17 ASSESSMENT — JOINT PAIN
TOTAL NUMBER OF TENDER JOINTS: 1
TOTAL NUMBER OF TENDER JOINTS: 0
TOTAL NUMBER OF SWOLLEN JOINTS: 0

## 2024-01-17 ASSESSMENT — CUP TO DISC RATIO
OS_RATIO: .6
OD_RATIO: .6

## 2024-01-17 ASSESSMENT — TEAR BREAK UP TIME (TBUT)
OS_TBUT: 5
OD_TBUT: 5

## 2024-01-17 ASSESSMENT — EXTERNAL EXAM - RIGHT EYE: OD_EXAM: NORMAL

## 2024-01-17 ASSESSMENT — TONOMETRY
OD_IOP_MMHG: DEFER
OS_IOP_MMHG: DEFER

## 2024-01-17 ASSESSMENT — EXTERNAL EXAM - LEFT EYE: OS_EXAM: NORMAL

## 2024-01-17 NOTE — PROGRESS NOTES
"Chief Complaint:  This patient is an 83 y.o. female who presents with the chief complaint of Sjogren disease (SD).     Subjective:   HPI   Problem:  1: SD       The patient returns for ongoing evaluation and management of SD.       The patient's condition has not changed since her previous visit on 7/12/2023. She has constant and very significant ocular dryness requiring use of lubricant drops (SYSTANE). The patient is unable to afford XIIDRA, a LFA-1 antagonist, due to cost. However, she is now reconsidering PILOCARPINE, an agent we talked about last visit.          The patient remains concerned about how to control the SD symptoms best.     Review of Systems   All other systems reviewed and are negative.    Objective:   /72 (BP Location: Left arm, Patient Position: Sitting)   Pulse 68   Ht 1.6 m (5' 3\")   Wt 78.5 kg (173 lb)   SpO2 98%   BMI 30.65 kg/m²      Physical Exam  Constitutional:       General: She is not in acute distress.     Appearance: She is obese. She is not ill-appearing.   HENT:      Head: Normocephalic.      Mouth/Throat:      Mouth: Mucous membranes are dry.   Eyes:      Extraocular Movements: Extraocular movements intact.      Conjunctiva/sclera: Conjunctivae normal.      Pupils: Pupils are equal, round, and reactive to light.   Neck:      Vascular: No carotid bruit.   Cardiovascular:      Rate and Rhythm: Normal rate and regular rhythm.      Pulses: Normal pulses.      Heart sounds: Normal heart sounds. No murmur heard.     No friction rub.   Pulmonary:      Effort: Pulmonary effort is normal. No respiratory distress.      Breath sounds: Normal breath sounds. No wheezing, rhonchi or rales.   Abdominal:      General: Bowel sounds are normal. There is no distension.      Palpations: There is no mass.      Tenderness: There is no abdominal tenderness. There is no guarding.   Musculoskeletal:         General: No swelling, tenderness or deformity.      Cervical back: Neck supple. No " rigidity or tenderness.   Lymphadenopathy:      Cervical: No cervical adenopathy.   Neurological:      Mental Status: She is alert.           No active synovitis; left AC joint OA  Assessment:   Sjogren syndrome w/KCS- M35.01  Primary generalized osteoarthritis - M15.0  Hypogammaglobulinemia - D80.1  Fibromyalgia syndrome - M79.7  Diabetes type 2 -   Renal insufficiency, stage 3b -N18.4    Plan:    Sjogren: Patient has changed her mind and is agreeable to a trial of PILOCARPINE 5 mg every day. I discussed the rationale, risk/reward, and potential adverse effects. I will give her a one month trial. I recommend continuation of  mg every day. A HCQ level will be checked at next visit.  CKD: Patient's creatinine has risen to 1.66 mg/dL, and I believe she is entering stage 4. I have urged her to see her family physician and to have a referral to nephrology. This is likely diabetic kidney disease.  T2DM: The patient's laboratory studies are consistent with T2DM, and I have also urged her to see her family doctor for treatment or referral to endocrinology. Patient has agreed to do so.       Rakesh Verma MD

## 2024-01-17 NOTE — PROGRESS NOTES
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 Garcia for Thyroid eye disease   -current TXT for ocular surface: Systane PF AT's QID  -(+)CPAP  -patient having a flare up of signs and symptoms on exam today  -Hx of being prescribed Xiidra but medication was too expensive to obtain  -Patient is also taking Glaucoma medications and with concurrent glaucoma would like to try and stay away from a steroid pulse    -TXT plan: Continue with Systane PF AT's QID, add abel at bedtime and warm compresses    -Eagle Punctal Plugs inserted today bilateral lower lids .4mm LOT [id number] EXP 6/28/28    Discussed that we could add an amniotic membrane in the future if there is continued irritation.     RTC 2 months for dry eye check

## 2024-01-17 NOTE — TELEPHONE ENCOUNTER
Patient called and said that her rheumatologist Dr. Verma suggested she get an emergency appointment with you because her glucose was 165 and her creatine was 166.   
Cardiac

## 2024-01-24 ENCOUNTER — APPOINTMENT (OUTPATIENT)
Dept: RADIOLOGY | Facility: CLINIC | Age: 84
End: 2024-01-24
Payer: MEDICARE

## 2024-01-24 ENCOUNTER — LAB (OUTPATIENT)
Dept: LAB | Facility: LAB | Age: 84
End: 2024-01-24
Payer: MEDICARE

## 2024-01-24 ENCOUNTER — OFFICE VISIT (OUTPATIENT)
Dept: PRIMARY CARE | Facility: CLINIC | Age: 84
End: 2024-01-24
Payer: MEDICARE

## 2024-01-24 VITALS
DIASTOLIC BLOOD PRESSURE: 58 MMHG | WEIGHT: 173 LBS | OXYGEN SATURATION: 97 % | BODY MASS INDEX: 30.65 KG/M2 | HEART RATE: 84 BPM | SYSTOLIC BLOOD PRESSURE: 122 MMHG | RESPIRATION RATE: 16 BRPM

## 2024-01-24 DIAGNOSIS — M17.11 PRIMARY OSTEOARTHRITIS OF RIGHT KNEE: ICD-10-CM

## 2024-01-24 DIAGNOSIS — E11.22 TYPE 2 DIABETES MELLITUS WITH STAGE 3B CHRONIC KIDNEY DISEASE, WITHOUT LONG-TERM CURRENT USE OF INSULIN (MULTI): Primary | ICD-10-CM

## 2024-01-24 DIAGNOSIS — R26.89 IMBALANCE: ICD-10-CM

## 2024-01-24 DIAGNOSIS — E03.9 HYPOTHYROIDISM, UNSPECIFIED TYPE: ICD-10-CM

## 2024-01-24 DIAGNOSIS — N18.32 TYPE 2 DIABETES MELLITUS WITH STAGE 3B CHRONIC KIDNEY DISEASE, WITHOUT LONG-TERM CURRENT USE OF INSULIN (MULTI): Primary | ICD-10-CM

## 2024-01-24 DIAGNOSIS — M35.00 SJOGREN SYNDROME, UNSPECIFIED (MULTI): ICD-10-CM

## 2024-01-24 DIAGNOSIS — N18.32 STAGE 3B CHRONIC KIDNEY DISEASE (MULTI): ICD-10-CM

## 2024-01-24 DIAGNOSIS — E66.9 OBESITY, CLASS I, BMI 30-34.9: ICD-10-CM

## 2024-01-24 DIAGNOSIS — D80.1 HYPOGAMMAGLOBULINEMIA (MULTI): ICD-10-CM

## 2024-01-24 DIAGNOSIS — Q44.6 POLYCYSTIC LIVER DISEASE: ICD-10-CM

## 2024-01-24 DIAGNOSIS — I10 PRIMARY HYPERTENSION: ICD-10-CM

## 2024-01-24 DIAGNOSIS — E11.9 CONTROLLED TYPE 2 DIABETES MELLITUS WITHOUT COMPLICATION, WITHOUT LONG-TERM CURRENT USE OF INSULIN (MULTI): ICD-10-CM

## 2024-01-24 LAB
ANION GAP SERPL CALC-SCNC: 12 MMOL/L (ref 10–20)
BUN SERPL-MCNC: 28 MG/DL (ref 6–23)
CALCIUM SERPL-MCNC: 10.1 MG/DL (ref 8.6–10.6)
CHLORIDE SERPL-SCNC: 106 MMOL/L (ref 98–107)
CO2 SERPL-SCNC: 27 MMOL/L (ref 21–32)
CREAT SERPL-MCNC: 1.4 MG/DL (ref 0.5–1.05)
CREAT UR-MCNC: 149.2 MG/DL (ref 20–320)
EGFRCR SERPLBLD CKD-EPI 2021: 38 ML/MIN/1.73M*2
GLUCOSE SERPL-MCNC: 110 MG/DL (ref 74–99)
MICROALBUMIN UR-MCNC: 7.1 MG/L
MICROALBUMIN/CREAT UR: 4.8 UG/MG CREAT
POTASSIUM SERPL-SCNC: 4.3 MMOL/L (ref 3.5–5.3)
SODIUM SERPL-SCNC: 141 MMOL/L (ref 136–145)
TSH SERPL-ACNC: 1.11 MIU/L (ref 0.44–3.98)

## 2024-01-24 PROCEDURE — 82570 ASSAY OF URINE CREATININE: CPT

## 2024-01-24 PROCEDURE — 1160F RVW MEDS BY RX/DR IN RCRD: CPT | Performed by: SPECIALIST

## 2024-01-24 PROCEDURE — 3078F DIAST BP <80 MM HG: CPT | Performed by: SPECIALIST

## 2024-01-24 PROCEDURE — 84443 ASSAY THYROID STIM HORMONE: CPT

## 2024-01-24 PROCEDURE — 1036F TOBACCO NON-USER: CPT | Performed by: SPECIALIST

## 2024-01-24 PROCEDURE — 99214 OFFICE O/P EST MOD 30 MIN: CPT | Performed by: SPECIALIST

## 2024-01-24 PROCEDURE — 1126F AMNT PAIN NOTED NONE PRSNT: CPT | Performed by: SPECIALIST

## 2024-01-24 PROCEDURE — 36415 COLL VENOUS BLD VENIPUNCTURE: CPT

## 2024-01-24 PROCEDURE — 1159F MED LIST DOCD IN RCRD: CPT | Performed by: SPECIALIST

## 2024-01-24 PROCEDURE — 3074F SYST BP LT 130 MM HG: CPT | Performed by: SPECIALIST

## 2024-01-24 PROCEDURE — 82043 UR ALBUMIN QUANTITATIVE: CPT

## 2024-01-24 PROCEDURE — 80048 BASIC METABOLIC PNL TOTAL CA: CPT
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
## 2024-01-28 PROBLEM — N18.31 CHRONIC KIDNEY DISEASE, STAGE 3A (MULTI): Status: RESOLVED | Noted: 2023-09-06 | Resolved: 2024-01-28

## 2024-01-28 PROBLEM — E66.9 OBESITY, CLASS I, BMI 30-34.9: Status: ACTIVE | Noted: 2024-01-28

## 2024-01-28 PROBLEM — R26.89 BALANCE PROBLEM: Status: RESOLVED | Noted: 2023-02-16 | Resolved: 2024-01-28

## 2024-01-28 PROBLEM — E11.22 TYPE 2 DIABETES MELLITUS WITH STAGE 3B CHRONIC KIDNEY DISEASE, WITHOUT LONG-TERM CURRENT USE OF INSULIN (MULTI): Status: ACTIVE | Noted: 2023-09-06

## 2024-01-28 PROBLEM — G91.2 NPH (NORMAL PRESSURE HYDROCEPHALUS) (MULTI): Status: RESOLVED | Noted: 2023-10-29 | Resolved: 2024-01-28

## 2024-01-28 PROBLEM — E66.811 OBESITY, CLASS I, BMI 30-34.9: Status: ACTIVE | Noted: 2024-01-28

## 2024-01-28 PROBLEM — M19.90 ARTHRITIS: Status: RESOLVED | Noted: 2023-09-06 | Resolved: 2024-01-28

## 2024-01-28 PROBLEM — N18.32 TYPE 2 DIABETES MELLITUS WITH STAGE 3B CHRONIC KIDNEY DISEASE, WITHOUT LONG-TERM CURRENT USE OF INSULIN (MULTI): Status: ACTIVE | Noted: 2023-09-06

## 2024-01-28 PROBLEM — R55 SYNCOPE AND COLLAPSE: Status: RESOLVED | Noted: 2023-02-16 | Resolved: 2024-01-28

## 2024-01-28 NOTE — ASSESSMENT & PLAN NOTE
HBA1C was 6.9 on November 13 2023  Glucose was 165 when she had labs with Rheumatology, admits to eating chocolate cake and Jello Parfaits over holidays  Said she is eating healthier now  Has been diet controlled historically  Will plan to recheck HBA1C in February  Consider addition of oral agent (Consider SGLT2 given renal function)

## 2024-01-28 NOTE — ASSESSMENT & PLAN NOTE
TSH 0.36 (12/2023) had been taking 112 mcgs 6 days per week  Dose decreased to 100 mcgs daily 6 days per week but has been taking daily  Labs ordered TSH

## 2024-01-28 NOTE — ASSESSMENT & PLAN NOTE
GFR typically in the 44 to 50 range (6/2022 through 6/2023) but has previously dropped to 38-39  GFR with Rheum dropped to 31 on 12/28/2023 (was 41 on 12/1/2023)  Has not been taking NSAIDs  Is taking daily Bactrim DS   For hypogammaglobulinemia  Recheck renal function today, labs ordered and urine albumin  Discussed, if renal function remains below baseline, will plan refer to Nephrology, consider renal US

## 2024-01-28 NOTE — ASSESSMENT & PLAN NOTE
Controlled on current medication  Said pharmacy sent 40 mg lisinopril although we ordered 20 mg (is cutting pill in half and taking 20 mg)

## 2024-01-28 NOTE — ASSESSMENT & PLAN NOTE
Management per Neurology  Did PT  MRI Brain 5/4/2023 (mild enlargement of ventricles, mild diffuse cerebral atrophy) (Possible NPH in 2021)

## 2024-01-28 NOTE — ASSESSMENT & PLAN NOTE
Management per hepatology  Follows every 6 months  Per 11/2022Hepatology office visit note, no evidence of cirrhosis seen in recent MRIs, but US in the past has shown mild nodularity and shrunken appearance, so it is unclear if she truly has cirrhosis or the cysts give a radiologic appearance of cirrhosis

## 2024-01-29 ENCOUNTER — DOCUMENTATION (OUTPATIENT)
Dept: PHYSICAL THERAPY | Facility: CLINIC | Age: 84
End: 2024-01-29
Payer: MEDICARE

## 2024-01-29 NOTE — PROGRESS NOTES
Physical Therapy    Discharge Summary    Name: Louisa Rodas  MRN: [id number]  : 1940  Date: 24    Discharge Summary: PT    Discharge Information: Date of discharge 24    Therapy Summary: progressing well with therapy, felt ready to continue on her own     Discharge Status: discharge to HEP     Rehab Discharge Reason: goals met

## 2024-02-04 NOTE — PROGRESS NOTES
Patient is an 83-year-old female presents today for evaluation for right total knee arthroplasty.  She has had previous injections.  She takes Tylenol.  She has difficulty walking certain distance.  She underwent left total knee arthroplasty previously.    Right knee:  AAOx3, NAD, walks with a moderate antalgic gait  Varus allignment  Range of motion lacks 10 degrees of full extension and flexes to 110 degrees  Stable to varus/valgus/anterior/posterior stress through out the range of motion  Slight laxity with varus stress  Diffuse medial  joint line tenderness to palpation  Moderate effusion  SILT in a meredith/saph/per/tib distribution  5/5 knee extension/df/pf/ehl  ½ dorsalis pedis and posterior tibial pulse  no popliteal lymphadenopathy  no other overlying lesions  mood: euthymic  Respirations non labored    Plain films of right knee reviewed by myself in clinic today.  She has moderate to advanced osteoarthritis of her right knee with significant medial joint space narrowing, underlying sclerosis and osteophytic change.    We discussed further injections versus surgery with her today in clinic.  She will let us know how she would like to proceed.  She may benefit from total knee arthroplasty in the future.    This note was created using voice recognition software and was not corrected for typographical or grammatical errors.

## 2024-02-05 ENCOUNTER — LAB (OUTPATIENT)
Dept: LAB | Facility: LAB | Age: 84
End: 2024-02-05
Payer: MEDICARE

## 2024-02-05 ENCOUNTER — HOSPITAL ENCOUNTER (OUTPATIENT)
Dept: RADIOLOGY | Facility: CLINIC | Age: 84
Discharge: HOME | End: 2024-02-05
Payer: MEDICARE

## 2024-02-05 DIAGNOSIS — Z78.0 MENOPAUSE: ICD-10-CM

## 2024-02-05 DIAGNOSIS — R93.2 ABNORMAL LIVER ULTRASOUND: Chronic | ICD-10-CM

## 2024-02-05 DIAGNOSIS — M35.01 SICCA SYNDROME WITH KERATOCONJUNCTIVITIS (MULTI): ICD-10-CM

## 2024-02-05 DIAGNOSIS — Z12.31 ENCOUNTER FOR SCREENING MAMMOGRAM FOR MALIGNANT NEOPLASM OF BREAST: ICD-10-CM

## 2024-02-05 DIAGNOSIS — K76.89 LIVER CYST: ICD-10-CM

## 2024-02-05 DIAGNOSIS — Q44.6 POLYCYSTIC LIVER DISEASE: ICD-10-CM

## 2024-02-05 LAB
AFP SERPL-MCNC: 7 NG/ML (ref 0–9)
ALBUMIN SERPL BCP-MCNC: 4 G/DL (ref 3.4–5)
ALP SERPL-CCNC: 85 U/L (ref 33–136)
ALT SERPL W P-5'-P-CCNC: 20 U/L (ref 7–45)
ANION GAP SERPL CALC-SCNC: 11 MMOL/L (ref 10–20)
AST SERPL W P-5'-P-CCNC: 23 U/L (ref 9–39)
BILIRUB DIRECT SERPL-MCNC: 0.1 MG/DL (ref 0–0.3)
BILIRUB SERPL-MCNC: 0.3 MG/DL (ref 0–1.2)
BUN SERPL-MCNC: 21 MG/DL (ref 6–23)
CALCIUM SERPL-MCNC: 9.4 MG/DL (ref 8.6–10.6)
CHLORIDE SERPL-SCNC: 108 MMOL/L (ref 98–107)
CO2 SERPL-SCNC: 26 MMOL/L (ref 21–32)
CREAT SERPL-MCNC: 1.16 MG/DL (ref 0.5–1.05)
EGFRCR SERPLBLD CKD-EPI 2021: 47 ML/MIN/1.73M*2
GLUCOSE SERPL-MCNC: 187 MG/DL (ref 74–99)
INR PPP: 1 (ref 0.9–1.1)
POTASSIUM SERPL-SCNC: 4.1 MMOL/L (ref 3.5–5.3)
PROT SERPL-MCNC: 5.9 G/DL (ref 6.4–8.2)
PROTHROMBIN TIME: 11.3 SECONDS (ref 9.8–12.8)
SODIUM SERPL-SCNC: 141 MMOL/L (ref 136–145)

## 2024-02-05 PROCEDURE — 77063 BREAST TOMOSYNTHESIS BI: CPT | Performed by: RADIOLOGY

## 2024-02-05 PROCEDURE — 36415 COLL VENOUS BLD VENIPUNCTURE: CPT

## 2024-02-05 PROCEDURE — 85610 PROTHROMBIN TIME: CPT

## 2024-02-05 PROCEDURE — 80053 COMPREHEN METABOLIC PANEL: CPT

## 2024-02-05 PROCEDURE — 77067 SCR MAMMO BI INCL CAD: CPT

## 2024-02-05 PROCEDURE — 82105 ALPHA-FETOPROTEIN SERUM: CPT

## 2024-02-05 PROCEDURE — 77080 DXA BONE DENSITY AXIAL: CPT

## 2024-02-05 PROCEDURE — 77067 SCR MAMMO BI INCL CAD: CPT | Performed by: RADIOLOGY

## 2024-02-05 PROCEDURE — 82248 BILIRUBIN DIRECT: CPT

## 2024-02-05 PROCEDURE — 77080 DXA BONE DENSITY AXIAL: CPT | Performed by: RADIOLOGY

## 2024-02-07 DIAGNOSIS — R92.8 ABNORMAL MAMMOGRAM OF LEFT BREAST: Primary | ICD-10-CM

## 2024-02-08 ENCOUNTER — APPOINTMENT (OUTPATIENT)
Dept: RADIOLOGY | Facility: CLINIC | Age: 84
End: 2024-02-08
Payer: MEDICARE

## 2024-02-09 ENCOUNTER — TELEPHONE (OUTPATIENT)
Dept: PRIMARY CARE | Facility: CLINIC | Age: 84
End: 2024-02-09

## 2024-02-12 ENCOUNTER — HOSPITAL ENCOUNTER (OUTPATIENT)
Dept: RADIOLOGY | Facility: HOSPITAL | Age: 84
Discharge: HOME | End: 2024-02-12
Payer: MEDICARE

## 2024-02-12 DIAGNOSIS — R92.8 ABNORMAL MAMMOGRAM OF LEFT BREAST: ICD-10-CM

## 2024-02-12 LAB — SCAN RESULT: NORMAL

## 2024-02-12 PROCEDURE — 77065 DX MAMMO INCL CAD UNI: CPT | Mod: LT

## 2024-02-12 PROCEDURE — 77065 DX MAMMO INCL CAD UNI: CPT | Mod: LEFT SIDE | Performed by: STUDENT IN AN ORGANIZED HEALTH CARE EDUCATION/TRAINING PROGRAM

## 2024-02-17 LAB — SCAN RESULT: NORMAL

## 2024-02-19 DIAGNOSIS — R92.8 ABNORMAL FINDING ON BREAST IMAGING: ICD-10-CM

## 2024-02-20 ENCOUNTER — LAB (OUTPATIENT)
Dept: LAB | Facility: LAB | Age: 84
End: 2024-02-20
Payer: MEDICARE

## 2024-02-20 DIAGNOSIS — E05.90 HYPERTHYROIDISM: ICD-10-CM

## 2024-02-20 DIAGNOSIS — E11.22 TYPE 2 DIABETES MELLITUS WITH STAGE 3B CHRONIC KIDNEY DISEASE, WITHOUT LONG-TERM CURRENT USE OF INSULIN (MULTI): ICD-10-CM

## 2024-02-20 DIAGNOSIS — N18.32 TYPE 2 DIABETES MELLITUS WITH STAGE 3B CHRONIC KIDNEY DISEASE, WITHOUT LONG-TERM CURRENT USE OF INSULIN (MULTI): ICD-10-CM

## 2024-02-20 LAB
EST. AVERAGE GLUCOSE BLD GHB EST-MCNC: 160 MG/DL
HBA1C MFR BLD: 7.2 %
TSH SERPL-ACNC: 6.79 MIU/L (ref 0.44–3.98)

## 2024-02-20 PROCEDURE — 36415 COLL VENOUS BLD VENIPUNCTURE: CPT

## 2024-02-20 PROCEDURE — 83036 HEMOGLOBIN GLYCOSYLATED A1C: CPT

## 2024-02-20 PROCEDURE — 84443 ASSAY THYROID STIM HORMONE: CPT

## 2024-02-21 ENCOUNTER — OFFICE VISIT (OUTPATIENT)
Dept: GASTROENTEROLOGY | Facility: HOSPITAL | Age: 84
End: 2024-02-21
Payer: MEDICARE

## 2024-02-21 VITALS
RESPIRATION RATE: 18 BRPM | BODY MASS INDEX: 33.96 KG/M2 | OXYGEN SATURATION: 99 % | SYSTOLIC BLOOD PRESSURE: 123 MMHG | TEMPERATURE: 97.8 F | WEIGHT: 173 LBS | HEART RATE: 88 BPM | DIASTOLIC BLOOD PRESSURE: 73 MMHG | HEIGHT: 60 IN

## 2024-02-21 DIAGNOSIS — K76.9 CHRONIC LIVER DISEASE: ICD-10-CM

## 2024-02-21 DIAGNOSIS — Q44.6 POLYCYSTIC LIVER DISEASE: ICD-10-CM

## 2024-02-21 PROCEDURE — 3074F SYST BP LT 130 MM HG: CPT | Performed by: INTERNAL MEDICINE

## 2024-02-21 PROCEDURE — 3078F DIAST BP <80 MM HG: CPT | Performed by: INTERNAL MEDICINE

## 2024-02-21 PROCEDURE — 1160F RVW MEDS BY RX/DR IN RCRD: CPT | Performed by: INTERNAL MEDICINE

## 2024-02-21 PROCEDURE — 1159F MED LIST DOCD IN RCRD: CPT | Performed by: INTERNAL MEDICINE

## 2024-02-21 PROCEDURE — 99213 OFFICE O/P EST LOW 20 MIN: CPT | Performed by: INTERNAL MEDICINE

## 2024-02-21 PROCEDURE — 1036F TOBACCO NON-USER: CPT | Performed by: INTERNAL MEDICINE

## 2024-02-21 PROCEDURE — 1126F AMNT PAIN NOTED NONE PRSNT: CPT | Performed by: INTERNAL MEDICINE

## 2024-02-21 ASSESSMENT — COLUMBIA-SUICIDE SEVERITY RATING SCALE - C-SSRS
2. HAVE YOU ACTUALLY HAD ANY THOUGHTS OF KILLING YOURSELF?: NO
6. HAVE YOU EVER DONE ANYTHING, STARTED TO DO ANYTHING, OR PREPARED TO DO ANYTHING TO END YOUR LIFE?: NO
1. IN THE PAST MONTH, HAVE YOU WISHED YOU WERE DEAD OR WISHED YOU COULD GO TO SLEEP AND NOT WAKE UP?: NO

## 2024-02-21 ASSESSMENT — PAIN SCALES - GENERAL: PAINLEVEL: 0-NO PAIN

## 2024-02-21 ASSESSMENT — ENCOUNTER SYMPTOMS
LOSS OF SENSATION IN FEET: 0
DEPRESSION: 0
OCCASIONAL FEELINGS OF UNSTEADINESS: 0

## 2024-02-21 NOTE — PATIENT INSTRUCTIONS
[phone]    (Please see below for department name when scheduling)    Please have the following done before your next appointment:    [x]   LABS due date: Yearly  [x]   ULTRASOUND due date: Yearly  (Department Radiology)  [x]   FOLLOW UP with Dr. Man due date: Yearly  (Department Gastro)    If you have any questions or need assistance, please don't hesitate to contact us.    Post: Office [phone] Fax: [phone]  Hepatology Nurse Coordinator: Andreea SANTACRUZ [phone]

## 2024-02-21 NOTE — PROGRESS NOTES
Subjective   Patient ID: Louisa Rodas is a 83 y.o. female who presents for Follow-up (Liver Cirrhoss).  HPI Liver cysts stable Cr. 1.16 Stable ultrasound 11/23.   AFP 7.    Feels well.       Review of Systems  No reports diarrhea, nausea or vomiting.       Objective   Physical Exam  Physical Exam  Constitutional:       Appearance: Normal appearance.   Eyes:      General: No scleral icterus.  Cardiovascular:      Rate and Rhythm: Normal rate and regular rhythm.      Heart sounds: No murmur heard.     No gallop.   Pulmonary:      Effort: Pulmonary effort is normal.      Breath sounds: Normal breath sounds.   Abdominal:      General: Abdomen is flat. Bowel sounds are normal. There is no distension.      Palpations: Abdomen is soft. There is no fluid wave, hepatomegaly or splenomegaly.      Tenderness: There is no abdominal tenderness.   Musculoskeletal:      Cervical back: Normal range of motion. No tenderness.      Right lower leg: No edema.      Left lower leg: No edema.   Lymphadenopathy:      Cervical: No cervical adenopathy.   Skin:     Coloration: Skin is not jaundiced.   Neurological:      General: No focal deficit present.      Mental Status: She is alert.       Assessment/Plan     Doing well with liver cysts.   Follow up in 1 year with labs and ultrasound.            Arian Man MD 02/21/24 10:58 AM

## 2024-02-23 ENCOUNTER — TELEPHONE (OUTPATIENT)
Dept: RADIOLOGY | Facility: HOSPITAL | Age: 84
End: 2024-02-23
Payer: MEDICARE

## 2024-02-23 NOTE — TELEPHONE ENCOUNTER
Spoke with pt about date, time location and parking, aware it is okay to eat drink and drive as long as not taking sedating medications or having another appointment with different instructions, reviewed pre, post and discharge instructions, instructed to wear most supportive bra and tylenol for discomfort. Verbalizing understanding , no questions , allergies as listed, daughter to accompany, denies use of blood thinners, uses herbals metanx and cbd, okay with lidocaine, unable to take tylenol, has generalized arthritis, using lidocaine patches,

## 2024-02-26 ENCOUNTER — HOSPITAL ENCOUNTER (OUTPATIENT)
Dept: RADIOLOGY | Facility: HOSPITAL | Age: 84
Discharge: HOME | End: 2024-02-26
Payer: MEDICARE

## 2024-02-26 ENCOUNTER — PROCEDURE VISIT (OUTPATIENT)
Dept: SURGICAL ONCOLOGY | Facility: HOSPITAL | Age: 84
End: 2024-02-26
Payer: MEDICARE

## 2024-02-26 VITALS
HEART RATE: 76 BPM | SYSTOLIC BLOOD PRESSURE: 128 MMHG | TEMPERATURE: 99 F | DIASTOLIC BLOOD PRESSURE: 71 MMHG | BODY MASS INDEX: 34.55 KG/M2 | WEIGHT: 176.9 LBS

## 2024-02-26 DIAGNOSIS — R92.8 ABNORMAL FINDING ON BREAST IMAGING: ICD-10-CM

## 2024-02-26 DIAGNOSIS — R92.8 ABNORMAL FINDING ON BREAST IMAGING: Primary | ICD-10-CM

## 2024-02-26 DIAGNOSIS — R92.8 OTHER ABNORMAL AND INCONCLUSIVE FINDINGS ON DIAGNOSTIC IMAGING OF BREAST: ICD-10-CM

## 2024-02-26 PROCEDURE — 2780000003 HC OR 278 NO HCPCS

## 2024-02-26 PROCEDURE — 77065 DX MAMMO INCL CAD UNI: CPT | Mod: LEFT SIDE | Performed by: RADIOLOGY

## 2024-02-26 PROCEDURE — 99204 OFFICE O/P NEW MOD 45 MIN: CPT | Performed by: NURSE PRACTITIONER

## 2024-02-26 PROCEDURE — A4648 IMPLANTABLE TISSUE MARKER: HCPCS

## 2024-02-26 PROCEDURE — 19081 BX BREAST 1ST LESION STRTCTC: CPT | Mod: LT

## 2024-02-26 PROCEDURE — 77065 DX MAMMO INCL CAD UNI: CPT

## 2024-02-26 PROCEDURE — 88305 TISSUE EXAM BY PATHOLOGIST: CPT | Mod: TC,SUR | Performed by: NURSE PRACTITIONER

## 2024-02-26 PROCEDURE — 99214 OFFICE O/P EST MOD 30 MIN: CPT | Mod: 25 | Performed by: NURSE PRACTITIONER

## 2024-02-26 PROCEDURE — 2720000007 HC OR 272 NO HCPCS

## 2024-02-26 PROCEDURE — 88305 TISSUE EXAM BY PATHOLOGIST: CPT | Performed by: PATHOLOGY

## 2024-02-26 PROCEDURE — 88360 TUMOR IMMUNOHISTOCHEM/MANUAL: CPT | Performed by: PATHOLOGY

## 2024-02-26 PROCEDURE — 19081 BX BREAST 1ST LESION STRTCTC: CPT | Mod: LEFT SIDE | Performed by: RADIOLOGY

## 2024-02-26 PROCEDURE — 2500000005 HC RX 250 GENERAL PHARMACY W/O HCPCS: Performed by: RADIOLOGY

## 2024-02-26 RX ADMIN — Medication 21 ML: at 10:46

## 2024-02-26 ASSESSMENT — PAIN - FUNCTIONAL ASSESSMENT: PAIN_FUNCTIONAL_ASSESSMENT: 0-10

## 2024-02-26 ASSESSMENT — PAIN SCALES - GENERAL: PAINLEVEL_OUTOF10: 0 - NO PAIN

## 2024-02-26 NOTE — Clinical Note
Patient tolerated her procedure well:  some pain during procedure #4 on a scale, additional Lidocaine given and patie

## 2024-02-26 NOTE — PROGRESS NOTES
[facility]  Louisa Rodas female   1940 83 y.o.  [id number]      Chief Complaint  New patient, biopsy consultation.    History Of Present Illness  Louisa Rodas is a pleasant 83 y.o. female seen in the breast center for biopsy consultation. She denies breast surgery or biopsy. She has no family history of breast cancer.     BREAST IMAGIN2024 Left diagnostic mammogram, indicates BI-RADS Category 4. Suspicious left breast calcifications. Surgical consultation and stereotactic core needle biopsy are recommended.    REPRODUCTIVE HISTORY:  menarche age 15, , first birth age 23, did not breastfeed, OCP's > 20 years, natural menopause age 56, HRT vaginally for 6 months, entirely fatty tissue    FAMILY CANCER HISTORY:   Father: Prostate Cancer  Brother x 3: Prostate Cancer    Review of Systems  Constitutional:  Negative for appetite change, fatigue, fever and unexpected weight change.   HENT:  Negative for ear pain, hearing loss, nosebleeds, sore throat and trouble swallowing.    Eyes:  Negative for discharge, itching and visual disturbance.   Breast: As stated in HPI.  Respiratory:  Negative for cough, chest tightness and shortness of breath.    Cardiovascular:  Negative for chest pain, palpitations and leg swelling.   Gastrointestinal:  Negative for abdominal pain, constipation, diarrhea and nausea.   Endocrine: Negative for cold intolerance and heat intolerance.   Genitourinary:  Negative for dysuria, frequency, hematuria, pelvic pain and vaginal bleeding.   Musculoskeletal:  Negative for arthralgias, back pain, gait problem, joint swelling and myalgias.   Skin:  Negative for color change and rash.   Allergic/Immunologic: Negative for environmental allergies and food allergies.   Neurological:  Negative for dizziness, tremors, speech difficulty, weakness, numbness and headaches.   Hematological:  Does not bruise/bleed easily.   Psychiatric/Behavioral:  Negative for agitation, dysphoric mood and  sleep disturbance. The patient is not nervous/anxious.       Past Medical History  She has a past medical history of Abnormal findings on diagnostic imaging of liver and biliary tract (11/02/2022), Aftercare following joint replacement surgery (05/17/2018), Age-related nuclear cataract, bilateral (02/03/2016), Age-related nuclear cataract, unspecified eye (07/22/2013), Age-related nuclear cataract, unspecified eye (02/03/2016), Balance problem (02/16/2023), Chronic kidney disease, stage 3a (CMS/HCC) (09/06/2023), Combined form of age-related cataract, right eye (02/16/2023), Combined forms of age-related cataract, left eye (11/28/2022), Dermatochalasis of right upper eyelid (01/18/2017), Dry eye syndrome of unspecified lacrimal gland (11/28/2022), Dysphonia (05/17/2018), Dysphonia (05/17/2018), Encounter for screening for malignant neoplasm of colon (05/17/2018), Essential tremor, History of hand surgery, Hypermetropia, bilateral (11/28/2022), Hypermetropia, unspecified eye (11/28/2022), Ischemic optic neuropathy, bilateral (05/17/2018), Liver disease, unspecified, Meibomian gland dysfunction right eye, upper and lower eyelids (11/28/2022), Near syncope, NPH (normal pressure hydrocephalus) (CMS/[facility]) (10/29/2023), Obstructive sleep apnea (adult) (pediatric) (05/17/2018), Other diseases of vocal cords (05/17/2018), Other disorders affecting eyelid function (11/28/2022), Other specified diseases of liver (05/17/2018), Personal history of other diseases of the respiratory system, Personal history of other endocrine, nutritional and metabolic disease, Personal history of other endocrine, nutritional and metabolic disease, Personal history of other mental and behavioral disorders, Primary open-angle glaucoma, bilateral, indeterminate stage (11/16/2017), Primary open-angle glaucoma, unspecified eye, stage unspecified (07/22/2013), Primary osteoarthritis, right hand (10/29/2020), Shortness of breath (05/17/2018), Syncope  and collapse (02/16/2023), Type 2 diabetes mellitus with mild nonproliferative diabetic retinopathy without macular edema, unspecified eye (CMS/HCC) (11/16/2017), Type 2 diabetes mellitus with mild nonproliferative diabetic retinopathy without macular edema, unspecified eye (CMS/HCC) (05/20/2014), Type 2 diabetes mellitus without complications (CMS/HCC) (11/28/2022), Unspecified cirrhosis of liver (CMS/HCC) (11/02/2022), and Unspecified ptosis of bilateral eyelids (11/28/2022).    Surgical History  She has a past surgical history that includes Rotator cuff repair (03/07/2014); Knee arthroscopy w/ debridement (03/07/2014); MR angio head wo IV contrast (06/28/2021); MR angio neck wo IV contrast (06/28/2021); Cataract extraction, bilateral; IR ablation nerve; Esophagogastroduodenoscopy; Colonoscopy (04/05/2018); Tonsillectomy; Total knee arthroplasty; and Hand surgery.    Family History  Cancer-related family history includes Cancer in her brother and father.     Social History  She reports that she quit smoking about 57 years ago. Her smoking use included cigarettes. She has a 0.25 pack-year smoking history. She has been exposed to tobacco smoke. She has never used smokeless tobacco. She reports that she does not currently use alcohol. She reports that she does not use drugs.    Allergies  Penicillins, Pregabalin, Primidone, and Tizanidine    Medications  Current Outpatient Medications   Medication Instructions    acyclovir (ZOVIRAX) 400 mg, oral, Daily    amLODIPine (NORVASC) 2.5 mg, oral, Daily    atorvastatin (Lipitor) 20 mg tablet 1 tablet, oral, 3 times weekly    azelastine (Astelin) 137 mcg (0.1 %) nasal spray 2 sprays, Each Nostril, 2 times daily PRN, Use in each nostril as directed    budesonide-formoteroL (Symbicort) 160-4.5 mcg/actuation inhaler 2 puffs, inhalation, Daily, RINSE MOUTH AFTER USE.    carboxymethylcellulose (Refresh Celluvisc) 1 % ophthalmic solution dropperette ophthalmic (eye), 4 times daily     cholecalciferol (VITAMIN D-3) 50 mcg, oral, Daily    diclofenac sodium 1 % kit Apply  to affected area.    Ear Drops, carbamide peroxide, 6.5 % otic solution follow instructions on package insert    esomeprazole (NEXIUM) 20 mg, oral, Daily PRN, Do not open capsule.    fluocinolone and shower cap 0.01 % oil Apply to scalp  leave on overnight and wash off in the morning. Use daily    hydroxychloroquine (Plaquenil) 200 mg tablet 1 tablet, oral, Daily, WITH FOOD OR MILK    ketoconazole (NIZOral) 2 % shampoo No dose, route, or frequency recorded.    latanoprost, PF, 0.005 % drops 1 drop, Both Eyes, Nightly    levothyroxine (SYNTHROID, LEVOXYL) 100 mcg, oral, Daily, Six days per week    lidocaine HCL (Aspercreme, lidocaine HCL,) 4 % liquid roll-on Apply topically to affected area once a day, As Needed    lisinopril 20 mg, oral, Daily    loteprednol etabonate 0.25 % drops,suspension 1 drop, ophthalmic (eye), 2 times daily    mecobal-levomefolat Ca-B6 phos (Folast) 3-35-2 mg tablet 1 tablet, Daily    montelukast (Singulair) 10 mg tablet 1 tablet, oral, Daily    OneTouch Verio test strips strip TEST BLOOD SUGAR ONCE A DAY    propranolol (Inderal) 10 mg tablet 1 tablet, oral, Daily    sulfamethoxazole-trimethoprim (Bactrim DS) 800-160 mg tablet 1 tablet, oral, Daily    UNABLE TO FIND Med Name:  salon pas topically prn       Last Recorded Vitals  Vitals:    02/26/24 0903   BP: 128/71   Pulse: 76   Temp: 37.2 °C (99 °F)       Physical Exam  Patient is alert and oriented x3 and in a relaxed and appropriate mood. Her gait is steady and hand grasps are equal. Sclera is clear. The breasts are nearly symmetrical. The tissue is soft without palpable abnormalities, discrete nodules or masses. The skin and nipples appear normal. There is no cervical, supraclavicular or axillary lymphadenopathy. Heart rate and rhythm normal, S1 and S2 appreciated. The lungs are clear to auscultation bilaterally. Abdomen is soft and non-tender.       Relevant Results and Imaging  Study Result    Narrative & Impression   Interpreted By:  Caitlin Lazaro,   STUDY:  BI MAMMO LEFT DIAGNOSTIC;  2/12/2024 10:38 am      ACCESSION NUMBER(S):  [id number]      ORDERING CLINICIAN:  DESI MAGANA      INDICATION:  The patient was recalled from screening mammogram dated 02/05/2024  for left breast calcifications.      COMPARISON:  02/05/2024.      FINDINGS:  Density:  The breast tissue is almost entirely fatty.      On the spot magnification views, pleomorphic calcifications are seen  in a linear distribution in the upper outer breast at mid depth  measuring up to 2.1 cm. These are suspicious.      IMPRESSION:  Suspicious left breast calcifications.  Surgical consultation and  stereotactic core needle biopsy are recommended. Dr. Lazaro  discussed the findings and recommendations with the patient at the  time of this dictation. A pre-procedure form was completed. A message  was sent to the referring practioner at the time of this dictation  regarding these critical findings using the Kayo technology system.      BI-RADS CATEGORY:      BI-RADS Category:  4 Suspicious.  Recommendation:  Surgical Consultation and Biopsy.  Recommended Date:  Immediate.  Laterality:  Left.      For any future breast imaging appointments, please call [phone]).      Method of Detection: Category Sdbt - 3D Screening      MACRO:  Critical Finding:  See findings. Notification was initiated on  2/12/2024 at 11:54 am by Dr. Caitlin Lazaro.  (**-YCF-**)  Instructions:  See Impression for specific recommendations.      Signed by: Caitlin Lazaro 2/12/2024 11:55 AM     Time was spent viewing digital images. I explained the results in depth, along with suggested explanation for follow up recommendations based on the testing results. BI-RADS Category 4    Visit Diagnosis  1. Abnormal finding on breast imaging  BI stereotactic guided breast left localization and biopsy         Assessment/Plan  Abnormal mammogram, left breast calcifications, no breast surgery or biopsy, no family history of breast cancer, entirely fatty tissue    Plan:  Left breast calcifications.    Patient Discussion/Summary  Proceed to biopsy. A breast radiology physician will perform the biopsy. Results are usually available in about 7 business days. I will call patient with results and instruct on next steps and plan.     IMPORTANT INFORMATION REGARDING YOUR RESULTS    If you receive medical information from My [facility] Personal Health Record (online chart) your results will be released into your chart. This means you may view or see results of your biopsy or procedure before I contact you directly. If this occurs, please call the office and we will discuss your results over the phone.    You can see your health information, review clinical summaries from office visits & test results online when you follow your health with MY  Chart, a personal health record. To sign up go to [email or web address]. If you need assistance with signing up or trouble getting into your account call [facility] Patient Line 24/7 at [phone].    My office phone number is [phone]  if you need to get in touch with me or have additional questions or concerns. Thank you for choosing [facility] and trusting me as your healthcare provider. I look forward to seeing you again at your next office visit. I am honored to be a provider on your health care team and I remain dedicated to helping you achieve your health goals.       Ammy Boles, JEAN MARIE-CNP

## 2024-02-26 NOTE — Clinical Note
Patient tolerated her procedure well:  some pain #4 on a scale during her procedure treated with additional lidocaine  :  11 ml at 1046am in breast:   10 ml at 1050am through suros:  patient was able to tolerate the procedure with verbal support:  debrief at 1059 am: pressure held x 10 minutes: post clip films:  reviewed wound care:

## 2024-02-29 ENCOUNTER — TELEPHONE (OUTPATIENT)
Dept: SURGICAL ONCOLOGY | Facility: HOSPITAL | Age: 84
End: 2024-02-29
Payer: MEDICARE

## 2024-02-29 LAB
LAB AP ASR DISCLAIMER: NORMAL
LABORATORY COMMENT REPORT: NORMAL
PATH REPORT.ADDENDUM SPEC: NORMAL
PATH REPORT.FINAL DX SPEC: NORMAL
PATH REPORT.GROSS SPEC: NORMAL
PATH REPORT.RELEVANT HX SPEC: NORMAL
PATH REPORT.TOTAL CANCER: NORMAL
RESIDENT REVIEW: NORMAL

## 2024-02-29 NOTE — TELEPHONE ENCOUNTER
Result Communication    Spoke with Louisa Rodas regarding breast biopsy results showing cancer. Office will call patient to schedule surgical consultation.      Resulted Orders   Surgical Pathology Exam   Result Value Ref Range    Case Report       Surgical Pathology                                Case: [id number]                                  Authorizing Provider:  MATT Rueda    Collected:           02/26/2024 1055              Ordering Location:     [facility]       Received:            02/26/2024 1433                                     Center                                                                       Pathologist:           Christiane Juarez MD                                                       Specimen:    BREAST CORE BIOPSY LEFT, left breast calcifications                                        FINAL DIAGNOSIS       A. Left breast calcifications, stereotactic guided core needle biopsy:   -- Ductal carcinoma in situ, intermediate nuclear grade, solid and cribriform pattern with necrosis and calcifications, see note.    Note:  ER is pending and will be reported in an addendum. Multiple deeper levels were reviewed in the evaluation of this specimen.     : Dr Indra Warren                   By the signature on this report, the individual or group listed as making the Final Interpretation/Diagnosis certifies that they have reviewed this case.       Resident Review       Manuela Jarquin MD      Addendum       Surgical/Block Number: [id number]  Specimen Site: Left breast calcifications   Specimen Type: stereotactic guided core needle biopsy    Estrogen Receptor (clone SP1):  Positive        Percentage with nuclear staining: > 95%        Intensity of staining: Strong     Comment:   Internal positive controls were identified in this specimen.   ASCO/CAP guidelines for ischemic times are met for this sample.   Analysis was performed on Ductal Carcinoma in  "Situ.    For ER: Ranges for interpretation: Invasive carcinoma cells exhibiting greater than or equal to 10% nuclear staining are considered POSITIVE. Invasive carcinoma cells exhibiting less than 10%, but greater than or equal to 1% are considered LOW POSITIVE. Invasive carcinoma cells exhibiting less than 1% staining are considered NEGATIVE. (Reference: Arch Pathol Lab Med. doi:10.5858/arpa. [id number]) The stated steroid receptor activity for ER was derived from rabbit monoclonal antibody staining (clone SP1) on formalin fixed, paraffin embedded specimens, unless otherwise noted.  Each assay is performed using appropriate positive and negative internal controls.       Clinical History       Left breast stereotactic, vacuum assisted core biopsy - calcifications      Gross Description       Received in formalin, labeled with the patient´s name and hospital number and \"left breast calcifications\", are multiple irregular/cylindrical segments of yellow-white fatty soft tissue aggregating to 3.1 x 2.4 x 0.8 cm.  The specimen is submitted in toto in two cassettes.  RCC    NOTE:  Ischemia time: Not provided.  This specimen was placed into formalin at: 2/26/2024 at 11 AM.      Disclaimer       One or more of the reagents used to perform assays on this specimen MAY have contained components considered to be analyte specific reagents (ASR's).  ASR's have not been cleared or approved by the U.S. Food and Drug Administration.  These assays were developed and their performance characteristics determined by the Department of Pathology at [facility]. The FDA does not require this test to go through premarket FDA review. This test is used for clinical purposes. It should not be regarded as investigational or for research. This laboratory is certified under the Clinical Laboratory Improvement Amendments (CLIA) as qualified to perform high complexity clinical laboratory testing.  The assays were " performed with appropriate positive and negative controls which stained appropriately.         3:58 PM

## 2024-03-04 ENCOUNTER — TELEPHONE (OUTPATIENT)
Dept: PRIMARY CARE | Facility: CLINIC | Age: 84
End: 2024-03-04

## 2024-03-04 DIAGNOSIS — K74.60 CIRRHOSIS OF LIVER WITHOUT ASCITES, UNSPECIFIED HEPATIC CIRRHOSIS TYPE (MULTI): Primary | ICD-10-CM

## 2024-03-05 DIAGNOSIS — I10 PRIMARY HYPERTENSION: Primary | ICD-10-CM

## 2024-03-05 DIAGNOSIS — E03.9 HYPOTHYROIDISM, UNSPECIFIED TYPE: ICD-10-CM

## 2024-03-05 RX ORDER — LEVOTHYROXINE SODIUM 100 UG/1
100 TABLET ORAL DAILY
Qty: 90 TABLET | Refills: 0 | Status: SHIPPED | OUTPATIENT
Start: 2024-03-05 | End: 2025-03-05

## 2024-03-05 RX ORDER — PROPRANOLOL HYDROCHLORIDE 10 MG/1
10 TABLET ORAL DAILY
Qty: 90 TABLET | Refills: 0 | Status: SHIPPED | OUTPATIENT
Start: 2024-03-05

## 2024-03-05 RX ORDER — LISINOPRIL 20 MG/1
20 TABLET ORAL DAILY
Qty: 90 TABLET | Refills: 1 | Status: SHIPPED | OUTPATIENT
Start: 2024-03-05 | End: 2024-09-01

## 2024-03-06 NOTE — PROGRESS NOTES
Chief Complaint  New left breast DCIS, ER positive    History of Present Illness    Referring Provider: CESAR Boles  PCP ALEX Moctezuma    82 yo  female with left breast DCIS ER +  Here with     History:  1) No abnormal mammograms, breast biopsies, or breast surgeries.  2) 2/5/2024 bilateral screening mammogram.  Breast tissue is almost entirely fatty.  Right breast negative.  Left breast calcifications, BI-RADS 0.  3) 2/12/2024.  Left breast diagnostic imaging.  Indeterminate pleomorphic, linear calcifications are confirmed measuring up to 2.1 cm.  BI-RADS 4.  4) 2/26/2024.  Left breast stereotactic biopsy of calcifications.  Final pathology reveals intermediate grade DCIS, Er + there is 1.5 cm medial migration of the open coil HydroMARK from the biopsy site and residual calcifications.      She presents today for further management and surgical discussion.    Ob/gyn history:  Menarche 15  Menopause 56  G 5 P 3, age of first delivery 23  Over 20 years OCPs, no fertility treatments, +vaginal HRT x 6 months    Father with prostate cancer  3 brothers with prostate cancer    Review of systems  A comprehensive ROS was taken on the patient intake form and reviewed with the patient. This form is scanned into the electronic medical record.    Constitutional symptoms: Denies generalized fatigue. Denies weight change, fevers/chills, difficulty sleeping   Eyes: Denies double vision, glaucoma, cataracts.  Ear/nose/throat/mouth: Denies hearing changes, sore throat, sinus problems.  Cardiovascular: No chest pain. Denies irregular heartbeat. Denies ankle swelling.  Respiratory: No wheezing, cough, or shortness of breath.  Gastrointestinal: No abdominal pain, No nausea/vomiting. No indigestion/heartburn. No change in bowel habits. No constipation or diarrhea.   Genitourinary: No urinary incontinence. No urinary frequency. No painful urination.  Musculoskeletal: No bone pain, no muscle pain, no joint pain.   Integumentary:  No rash. No masses. No changes in moles. No easy bruising.  Neurological: No headaches. No tremors. No numbness/tingling.  Psychiatric: No anxiety. No depression.  Endocrine: No excessive thirst. Not too hot or too cold. Not tired or fatigued.   Hematological/lymphatic: No swollen glands or blood clotting problems. No bruising.     Physical exam  A chaperone was offered for all portions of the physical exam. The patient declined.     General appearance: appears stated age, alert and oriented x 3  Head: Normocephalic, atraumatic  Eyes: conjunctivae/corneas clear.  Ears: External ears are normal, hearing is grossly intact.  Lungs: normal breathing  Heart: regular   Abdomen: Soft, nontender, nondistended.  Neurologic: grossly normal  Lymph nodes: No cervical, supraclavicular or axillary lymphadenopathy bilaterally    Breast: A comprehensive breast exam was performed in the seated and supine positions. Breasts are symmetrical. Bilateral nipples are everted. There are no skin changes on arm maneuvers. Bra size: 42D   Right: no masses   Left: no masses. Biopsy site clean. Ecchymosis.     Results  Imaging  All breast imaging personally reviewed by me.  See HPI    Pathology  2/26/2024.  Left breast stereotactic biopsy of calcifications.  Final pathology reveals intermediate grade DCIS, Er + there is 1.5 cm medial migration of the open coil HydroMARK from the biopsy site and residual calcifications.    Impression:   1) 84 yo  female with left breast DCIS ER +  2) Co-morbidities include DM, HTN, sicca . Polycystic liver disease. Chronic kidney disease. Non-smoker  3) No family history of breast or ovarian cancer. FH of prostate cancer       Plan:   I had a long discussion with Ms. roman and her   today. We reviewed her imaging and work-up to date and the results.  She has a new  left breast DCIS.  We reviewed the multidisciplinary treatment of breast cancer.     Her cancer can be effectively treated with  lumpectomy.  We reviewed that lumpectomy with radiation treatment was equivalent to mastectomy in terms of overall survival and distant recurrence, with more than 20 years of follow-up data.  We discussed that lumpectomy with radiation has a slightly higher local recurrence rate, at about 5% in 10 years, versus mastectomy at 1% in 10 years.  The risks of lumpectomy including bleeding, infection, and need for reexcision of margins (approximately 20%), was discussed.  She was told that lumpectomy is usually a day surgery, and the postoperative recovery was discussed. She was informed that decisions regarding lumpectomy versus mastectomy would not impact need for chemotherapy.  We reviewed the need for preoperative mag seed placement as this is a nonpalpable lesion.    She is clinically node negative.  We discussed management of the axilla.  There is no role for axillary staging in the setting of DCIS treated with primary lumpectomy.  We did discuss that there is a small chance that we can find invasive cancer on final pathology.    She is an excellent candidate for lumpectomy and this is her preference.  She will be scheduled for surgery at [facility] per her preference.  She will need a preoperative Magseed to be placed.  She will follow-up about 1 week after surgery to review her pathology report.  She should call the office with any sooner concerns.

## 2024-03-06 NOTE — H&P (VIEW-ONLY)
Chief Complaint  New left breast DCIS, ER positive    History of Present Illness    Referring Provider: CESAR Boles  PCP ALEX Moctezuma    84 yo  female with left breast DCIS ER +  Here with     History:  1) No abnormal mammograms, breast biopsies, or breast surgeries.  2) 2/5/2024 bilateral screening mammogram.  Breast tissue is almost entirely fatty.  Right breast negative.  Left breast calcifications, BI-RADS 0.  3) 2/12/2024.  Left breast diagnostic imaging.  Indeterminate pleomorphic, linear calcifications are confirmed measuring up to 2.1 cm.  BI-RADS 4.  4) 2/26/2024.  Left breast stereotactic biopsy of calcifications.  Final pathology reveals intermediate grade DCIS, Er + there is 1.5 cm medial migration of the open coil HydroMARK from the biopsy site and residual calcifications.      She presents today for further management and surgical discussion.    Ob/gyn history:  Menarche 15  Menopause 56  G 5 P 3, age of first delivery 23  Over 20 years OCPs, no fertility treatments, +vaginal HRT x 6 months    Father with prostate cancer  3 brothers with prostate cancer    Review of systems  A comprehensive ROS was taken on the patient intake form and reviewed with the patient. This form is scanned into the electronic medical record.    Constitutional symptoms: Denies generalized fatigue. Denies weight change, fevers/chills, difficulty sleeping   Eyes: Denies double vision, glaucoma, cataracts.  Ear/nose/throat/mouth: Denies hearing changes, sore throat, sinus problems.  Cardiovascular: No chest pain. Denies irregular heartbeat. Denies ankle swelling.  Respiratory: No wheezing, cough, or shortness of breath.  Gastrointestinal: No abdominal pain, No nausea/vomiting. No indigestion/heartburn. No change in bowel habits. No constipation or diarrhea.   Genitourinary: No urinary incontinence. No urinary frequency. No painful urination.  Musculoskeletal: No bone pain, no muscle pain, no joint pain.   Integumentary:  No rash. No masses. No changes in moles. No easy bruising.  Neurological: No headaches. No tremors. No numbness/tingling.  Psychiatric: No anxiety. No depression.  Endocrine: No excessive thirst. Not too hot or too cold. Not tired or fatigued.   Hematological/lymphatic: No swollen glands or blood clotting problems. No bruising.     Physical exam  A chaperone was offered for all portions of the physical exam. The patient declined.     General appearance: appears stated age, alert and oriented x 3  Head: Normocephalic, atraumatic  Eyes: conjunctivae/corneas clear.  Ears: External ears are normal, hearing is grossly intact.  Lungs: normal breathing  Heart: regular   Abdomen: Soft, nontender, nondistended.  Neurologic: grossly normal  Lymph nodes: No cervical, supraclavicular or axillary lymphadenopathy bilaterally    Breast: A comprehensive breast exam was performed in the seated and supine positions. Breasts are symmetrical. Bilateral nipples are everted. There are no skin changes on arm maneuvers. Bra size: 42D   Right: no masses   Left: no masses. Biopsy site clean. Ecchymosis.     Results  Imaging  All breast imaging personally reviewed by me.  See HPI    Pathology  2/26/2024.  Left breast stereotactic biopsy of calcifications.  Final pathology reveals intermediate grade DCIS, Er + there is 1.5 cm medial migration of the open coil HydroMARK from the biopsy site and residual calcifications.    Impression:   1) 82 yo  female with left breast DCIS ER +  2) Co-morbidities include DM, HTN, sicca . Polycystic liver disease. Chronic kidney disease. Non-smoker  3) No family history of breast or ovarian cancer. FH of prostate cancer       Plan:   I had a long discussion with Ms. roman and her   today. We reviewed her imaging and work-up to date and the results.  She has a new  left breast DCIS.  We reviewed the multidisciplinary treatment of breast cancer.     Her cancer can be effectively treated with  lumpectomy.  We reviewed that lumpectomy with radiation treatment was equivalent to mastectomy in terms of overall survival and distant recurrence, with more than 20 years of follow-up data.  We discussed that lumpectomy with radiation has a slightly higher local recurrence rate, at about 5% in 10 years, versus mastectomy at 1% in 10 years.  The risks of lumpectomy including bleeding, infection, and need for reexcision of margins (approximately 20%), was discussed.  She was told that lumpectomy is usually a day surgery, and the postoperative recovery was discussed. She was informed that decisions regarding lumpectomy versus mastectomy would not impact need for chemotherapy.  We reviewed the need for preoperative mag seed placement as this is a nonpalpable lesion.    She is clinically node negative.  We discussed management of the axilla.  There is no role for axillary staging in the setting of DCIS treated with primary lumpectomy.  We did discuss that there is a small chance that we can find invasive cancer on final pathology.    She is an excellent candidate for lumpectomy and this is her preference.  She will be scheduled for surgery at [facility] per her preference.  She will need a preoperative Magseed to be placed.  She will follow-up about 1 week after surgery to review her pathology report.  She should call the office with any sooner concerns.

## 2024-03-08 ENCOUNTER — OFFICE VISIT (OUTPATIENT)
Dept: SURGICAL ONCOLOGY | Facility: HOSPITAL | Age: 84
End: 2024-03-08
Payer: MEDICARE

## 2024-03-08 VITALS
BODY MASS INDEX: 34.49 KG/M2 | WEIGHT: 176.59 LBS | SYSTOLIC BLOOD PRESSURE: 136 MMHG | HEART RATE: 84 BPM | DIASTOLIC BLOOD PRESSURE: 72 MMHG | TEMPERATURE: 97.5 F

## 2024-03-08 DIAGNOSIS — D05.12 DUCTAL CARCINOMA IN SITU (DCIS) OF LEFT BREAST: ICD-10-CM

## 2024-03-08 DIAGNOSIS — D05.12 DUCTAL CARCINOMA IN SITU (DCIS) OF LEFT BREAST: Primary | ICD-10-CM

## 2024-03-08 PROCEDURE — 3075F SYST BP GE 130 - 139MM HG: CPT | Performed by: SURGERY

## 2024-03-08 PROCEDURE — 1160F RVW MEDS BY RX/DR IN RCRD: CPT | Performed by: SURGERY

## 2024-03-08 PROCEDURE — 1126F AMNT PAIN NOTED NONE PRSNT: CPT | Performed by: SURGERY

## 2024-03-08 PROCEDURE — 1036F TOBACCO NON-USER: CPT | Performed by: SURGERY

## 2024-03-08 PROCEDURE — 99215 OFFICE O/P EST HI 40 MIN: CPT | Performed by: SURGERY

## 2024-03-08 PROCEDURE — 3078F DIAST BP <80 MM HG: CPT | Performed by: SURGERY

## 2024-03-08 PROCEDURE — 1159F MED LIST DOCD IN RCRD: CPT | Performed by: SURGERY

## 2024-03-08 RX ORDER — SODIUM CHLORIDE 9 MG/ML
100 INJECTION, SOLUTION INTRAVENOUS CONTINUOUS
Status: CANCELLED | OUTPATIENT
Start: 2024-03-08

## 2024-03-08 ASSESSMENT — PAIN SCALES - GENERAL: PAINLEVEL: 0-NO PAIN

## 2024-03-13 ENCOUNTER — HOSPITAL ENCOUNTER (OUTPATIENT)
Dept: RADIOLOGY | Facility: CLINIC | Age: 84
Discharge: HOME | End: 2024-03-13
Payer: MEDICARE

## 2024-03-13 DIAGNOSIS — D05.12 DUCTAL CARCINOMA IN SITU (DCIS) OF LEFT BREAST: ICD-10-CM

## 2024-03-13 DIAGNOSIS — D05.12 INTRADUCTAL CARCINOMA IN SITU OF LEFT BREAST: ICD-10-CM

## 2024-03-13 PROCEDURE — A4648 IMPLANTABLE TISSUE MARKER: HCPCS

## 2024-03-13 PROCEDURE — 2780000003 HC OR 278 NO HCPCS

## 2024-03-13 PROCEDURE — 19281 PERQ DEVICE BREAST 1ST IMAG: CPT | Mod: LT

## 2024-03-13 PROCEDURE — 2500000005 HC RX 250 GENERAL PHARMACY W/O HCPCS: Performed by: RADIOLOGY

## 2024-03-13 PROCEDURE — 19281 PERQ DEVICE BREAST 1ST IMAG: CPT | Mod: LEFT SIDE | Performed by: RADIOLOGY

## 2024-03-13 RX ADMIN — Medication 10 ML: at 12:01

## 2024-03-13 ASSESSMENT — PAIN SCALES - GENERAL
PAINLEVEL_OUTOF10: 2
PAINLEVEL_OUTOF10: 0 - NO PAIN
PAINLEVEL_OUTOF10: 0 - NO PAIN

## 2024-03-13 ASSESSMENT — PAIN - FUNCTIONAL ASSESSMENT: PAIN_FUNCTIONAL_ASSESSMENT: 0-10

## 2024-03-13 NOTE — DISCHARGE INSTRUCTIONS
11:55  Procedural steps explained and patient given opportunity to verbalize concerns and seek clarification.  Post procedure self-care and potential for bruising , hematoma, and pain reviewed.  Patient verbalizes understanding.      11:55  Patient offered aromatherapy, warm blankets and music.   Guided imagery, touch and relaxation breathing to be used throughout the procedure.         12:05  Post procedure care reviewed with patient, ok to resume normal activity with no restrictions. Patient verbalized understanding and will follow up surgery as planned.

## 2024-03-15 ENCOUNTER — APPOINTMENT (OUTPATIENT)
Dept: RHEUMATOLOGY | Facility: CLINIC | Age: 84
End: 2024-03-15
Payer: MEDICARE

## 2024-03-16 DIAGNOSIS — M32.9 SYSTEMIC LUPUS ERYTHEMATOSUS, UNSPECIFIED SLE TYPE, UNSPECIFIED ORGAN INVOLVEMENT STATUS (MULTI): Primary | ICD-10-CM

## 2024-03-19 ENCOUNTER — TELEMEDICINE CLINICAL SUPPORT (OUTPATIENT)
Dept: PREADMISSION TESTING | Facility: HOSPITAL | Age: 84
End: 2024-03-19
Payer: MEDICARE

## 2024-03-19 DIAGNOSIS — D05.12 DUCTAL CARCINOMA IN SITU (DCIS) OF LEFT BREAST: ICD-10-CM

## 2024-03-19 RX ORDER — HYDROXYCHLOROQUINE SULFATE 200 MG/1
TABLET, FILM COATED ORAL
Qty: 90 TABLET | Refills: 1 | Status: SHIPPED | OUTPATIENT
Start: 2024-03-19

## 2024-03-21 ENCOUNTER — LAB (OUTPATIENT)
Dept: LAB | Facility: LAB | Age: 84
End: 2024-03-21
Payer: MEDICARE

## 2024-03-21 ENCOUNTER — PRE-ADMISSION TESTING (OUTPATIENT)
Dept: PREADMISSION TESTING | Facility: HOSPITAL | Age: 84
End: 2024-03-21
Payer: MEDICARE

## 2024-03-21 VITALS
BODY MASS INDEX: 29.21 KG/M2 | WEIGHT: 171.08 LBS | HEART RATE: 74 BPM | OXYGEN SATURATION: 99 % | DIASTOLIC BLOOD PRESSURE: 51 MMHG | TEMPERATURE: 98.2 F | SYSTOLIC BLOOD PRESSURE: 132 MMHG | HEIGHT: 64 IN | RESPIRATION RATE: 18 BRPM

## 2024-03-21 DIAGNOSIS — K74.60 HEPATIC CIRRHOSIS, UNSPECIFIED HEPATIC CIRRHOSIS TYPE, UNSPECIFIED WHETHER ASCITES PRESENT (MULTI): ICD-10-CM

## 2024-03-21 DIAGNOSIS — N18.32 STAGE 3B CHRONIC KIDNEY DISEASE (MULTI): ICD-10-CM

## 2024-03-21 DIAGNOSIS — I15.9 SECONDARY HYPERTENSION: ICD-10-CM

## 2024-03-21 DIAGNOSIS — N18.32 STAGE 3B CHRONIC KIDNEY DISEASE (MULTI): Primary | ICD-10-CM

## 2024-03-21 LAB
ALBUMIN SERPL BCP-MCNC: 4.2 G/DL (ref 3.4–5)
ALP SERPL-CCNC: 98 U/L (ref 33–136)
ALT SERPL W P-5'-P-CCNC: 21 U/L (ref 7–45)
ANION GAP SERPL CALC-SCNC: 12 MMOL/L (ref 10–20)
AST SERPL W P-5'-P-CCNC: 26 U/L (ref 9–39)
BASOPHILS # BLD AUTO: 0.02 X10*3/UL (ref 0–0.1)
BASOPHILS NFR BLD AUTO: 0.4 %
BILIRUB SERPL-MCNC: 0.3 MG/DL (ref 0–1.2)
BUN SERPL-MCNC: 20 MG/DL (ref 6–23)
CALCIUM SERPL-MCNC: 9.4 MG/DL (ref 8.6–10.3)
CHLORIDE SERPL-SCNC: 106 MMOL/L (ref 98–107)
CO2 SERPL-SCNC: 27 MMOL/L (ref 21–32)
CREAT SERPL-MCNC: 1.43 MG/DL (ref 0.5–1.05)
EGFRCR SERPLBLD CKD-EPI 2021: 37 ML/MIN/1.73M*2
EOSINOPHIL # BLD AUTO: 0.29 X10*3/UL (ref 0–0.4)
EOSINOPHIL NFR BLD AUTO: 6.5 %
ERYTHROCYTE [DISTWIDTH] IN BLOOD BY AUTOMATED COUNT: 13.7 % (ref 11.5–14.5)
GLUCOSE SERPL-MCNC: 122 MG/DL (ref 74–99)
HCT VFR BLD AUTO: 37.9 % (ref 36–46)
HGB BLD-MCNC: 12.1 G/DL (ref 12–16)
IMM GRANULOCYTES # BLD AUTO: 0.01 X10*3/UL (ref 0–0.5)
IMM GRANULOCYTES NFR BLD AUTO: 0.2 % (ref 0–0.9)
INR PPP: 1 (ref 0.9–1.1)
LYMPHOCYTES # BLD AUTO: 0.78 X10*3/UL (ref 0.8–3)
LYMPHOCYTES NFR BLD AUTO: 17.4 %
MCH RBC QN AUTO: 30.3 PG (ref 26–34)
MCHC RBC AUTO-ENTMCNC: 31.9 G/DL (ref 32–36)
MCV RBC AUTO: 95 FL (ref 80–100)
MONOCYTES # BLD AUTO: 0.4 X10*3/UL (ref 0.05–0.8)
MONOCYTES NFR BLD AUTO: 8.9 %
NEUTROPHILS # BLD AUTO: 2.97 X10*3/UL (ref 1.6–5.5)
NEUTROPHILS NFR BLD AUTO: 66.6 %
NRBC BLD-RTO: 0 /100 WBCS (ref 0–0)
PLATELET # BLD AUTO: 162 X10*3/UL (ref 150–450)
POTASSIUM SERPL-SCNC: 4.2 MMOL/L (ref 3.5–5.3)
PROT SERPL-MCNC: 6 G/DL (ref 6.4–8.2)
PROTHROMBIN TIME: 11.7 SECONDS (ref 9.8–12.8)
RBC # BLD AUTO: 3.99 X10*6/UL (ref 4–5.2)
SODIUM SERPL-SCNC: 141 MMOL/L (ref 136–145)
WBC # BLD AUTO: 4.5 X10*3/UL (ref 4.4–11.3)

## 2024-03-21 PROCEDURE — 85025 COMPLETE CBC W/AUTO DIFF WBC: CPT

## 2024-03-21 PROCEDURE — 80053 COMPREHEN METABOLIC PANEL: CPT

## 2024-03-21 PROCEDURE — 99203 OFFICE O/P NEW LOW 30 MIN: CPT | Performed by: NURSE PRACTITIONER

## 2024-03-21 PROCEDURE — 85610 PROTHROMBIN TIME: CPT

## 2024-03-21 PROCEDURE — 36415 COLL VENOUS BLD VENIPUNCTURE: CPT

## 2024-03-21 RX ORDER — SULFAMETHOXAZOLE AND TRIMETHOPRIM 800; 160 MG/1; MG/1
1 TABLET ORAL DAILY
COMMUNITY

## 2024-03-21 RX ORDER — ALCOHOL 2.38 KG/3.79L
1 GEL TOPICAL AS NEEDED
COMMUNITY
End: 2024-05-03 | Stop reason: WASHOUT

## 2024-03-21 ASSESSMENT — ENCOUNTER SYMPTOMS
RESPIRATORY NEGATIVE: 1
GASTROINTESTINAL NEGATIVE: 1
ARTHRALGIAS: 1
RHINORRHEA: 1
BRUISES/BLEEDS EASILY: 1
CONSTITUTIONAL NEGATIVE: 1
CARDIOVASCULAR NEGATIVE: 1
NECK PAIN: 1

## 2024-03-21 NOTE — PREPROCEDURE INSTRUCTIONS
Medication List            Accurate as of March 21, 2024  9:57 AM. Always use your most recent med list.                acyclovir 400 mg tablet  Commonly known as: Zovirax  Take 1 tablet (400 mg) by mouth once daily.  Medication Adjustments for Surgery: Take morning of surgery with sip of water, no other fluids     allantoin gel  Medication Adjustments for Surgery: Continue until night before surgery     amLODIPine 2.5 mg tablet  Commonly known as: Norvasc  Take 1 tablet (2.5 mg) by mouth once daily.  Medication Adjustments for Surgery: Take morning of surgery with sip of water, no other fluids     atorvastatin 20 mg tablet  Commonly known as: Lipitor  Medication Adjustments for Surgery: Take morning of surgery with sip of water, no other fluids     azelastine 137 mcg (0.1 %) nasal spray  Commonly known as: Astelin  Medication Adjustments for Surgery: Take morning of surgery with sip of water, no other fluids     carboxymethylcellulose 1 % ophthalmic solution dropperette  Commonly known as: Refresh Celluvisc  Notes to patient: Use as needed      cholecalciferol 50 MCG (2000 UT) tablet  Commonly known as: Vitamin D-3  Medication Adjustments for Surgery: Continue until night before surgery     fluocinolone and shower cap 0.01 % oil  Medication Adjustments for Surgery: Continue until night before surgery     hydroxychloroquine 200 mg tablet  Commonly known as: Plaquenil  TAKE 1 TABLET BY MOUTH WITH FOOD OR MILK ONCE A DAY  Medication Adjustments for Surgery: Continue until night before surgery  Notes to patient: CAN TAKE ONLY W SIP OF WATER OR DO NOT TAKE UNTIL AFTER SURGERY W FOOD     ketoconazole 2 % shampoo  Commonly known as: NIZOral  Medication Adjustments for Surgery: Continue until night before surgery     latanoprost (PF) 0.005 % drops  Administer 1 drop into both eyes once daily at bedtime.  Notes to patient: USE as directed      levothyroxine 100 mcg tablet  Commonly known as: Synthroid, Levoxyl  Take 1  tablet (100 mcg) by mouth once daily. Five days per week (takes 112 mcgs 2 days per week)  Medication Adjustments for Surgery: Take morning of surgery with sip of water, no other fluids     lisinopril 20 mg tablet  Take 1 tablet (20 mg) by mouth once daily.  Medication Adjustments for Surgery: Continue until night before surgery     medical cannabis  Medication Adjustments for Surgery: Stop 7 days before surgery     Metanx (algal oil) 3 mg-35 mg-2 mg -90.314 mg capsule  Generic drug: [id number]-algal oil  Medication Adjustments for Surgery: Continue until night before surgery     montelukast 10 mg tablet  Commonly known as: Singulair  Medication Adjustments for Surgery: Take morning of surgery with sip of water, no other fluids     OneTouch Verio test strips strip  Generic drug: blood sugar diagnostic  Notes to patient: Use as directed      propranolol 10 mg tablet  Commonly known as: Inderal  Take 1 tablet (10 mg) by mouth once daily.  Medication Adjustments for Surgery: Take morning of surgery with sip of water, no other fluids     sulfamethoxazole-trimethoprim 800-160 mg tablet  Commonly known as: Bactrim DS  Notes to patient: NOT TAKING      Symbicort 160-4.5 mcg/actuation inhaler  Generic drug: budesonide-formoteroL  Medication Adjustments for Surgery: Take morning of surgery with sip of water, no other fluids                      CONTACT SURGEON'S OFFICE IF YOU DEVELOP:  * Fever = 100.4 F   * New respiratory symptoms (e.g. cough, shortness of breath, respiratory distress, sore throat)  * Recent loss of taste or smell  *Flu like symptoms such as headache, fatigue or gastrointestinal symptoms  * You develop any open sores, shingles, burning or painful urination   AND/OR:  * You no longer wish to have the surgery.  * Any other personal circumstances change that may lead to the need to cancel or defer this surgery.  *You were admitted to any hospital within one week of your planned  procedure.    SMOKING:  *Quitting smoking can make a huge difference to your health and recovery from surgery.    *If you need help with quitting, call 0-863-QUIT-NOW.    THE DAY BEFORE SURGERY:  *Do not eat any food after midnight the night before surgery.   *You are permitted to drink clear liquids (i.e. water, black coffee, tea, clear broth, apple juice) up to 2 hours before your surgery.  DIABETICS:  Please check fasting blood sugar  upon waking up.  If fasting sugar is <80 mg/dl, please drink 100ml/3oz of apple juice no later than 2 hours prior to surgery.      SURGICAL TIME  *You will be contacted between 2 p.m. and 6 p.m. the business day before your surgery with your arrival time.  *If you haven't received a call by 6pm, call [phone].  *Scheduled surgery times may change and you will be notified if this occurs-check your personal voicemail for any updates.    ON THE MORNING OF SURGERY:  *Wear comfortable, loose fitting clothing.   *Do not use moisturizers, creams, lotions or perfume.  *All jewelry and valuables should be left at home.  *Prosthetic devices such as contact lenses, hearing aids, dentures, eyelash extensions, hairpins and body piercing must be removed before surgery.    BRING WITH YOU:  *Photo ID and insurance card  *Current list of medicines and allergies  *Pacemaker/Defibrillator/Heart stent cards  *CPAP machine and mask  *Slings/splints/crutches  *Copy of your complete Advanced Directive/DHPOA-if applicable  *Neurostimulator implant remote    PARKING AND ARRIVAL:  *Check in at the Main Entrance desk and let them know you are here for surgery.  *You will be directed to the 2nd floor surgical waiting area.    AFTER OUTPATIENT SURGERY:  *A responsible adult MUST accompany you at the time of discharge and stay with you for 24 hours after your surgery.  *You may NOT drive yourself home after surgery.  *You may use a taxi or ride sharing service (Lyft, Uber) to return home ONLY if you are  accompanied by a friend or family member.  *Instructions for resuming your medications will be provided by your surgeon.      YOU HAVE REVIEWED THE MEDICATIONS ON THIS SHEET AND YOU VERIFY THESE ARE ALL THE MEDICATIONS AND OVER THE COUNTER MEDICATIONS THAT YOU TAKE .

## 2024-03-21 NOTE — CPM/PAT H&P
CPM/PAT Evaluation       Name: [facility] ([facility])  /Age: 1940/83 y.o.     SURGEON :DR SLY MINAYA    Surgery, Date, and Length:  Left Breast Magseed Partial Mastectomy 3/27/24    HPI:  This a 83y.o. fe-male who presents for presurgical evaluation for for above mentioned procedure.Pt had abnormal mammogram .Biopsy proved to be ductal carcinoma in situ. . After discussion of the risks and benefits with Dr. MINAYA the patient elects to proceed with the planned procedure.       Past Medical History:   Diagnosis Date    Acid reflux     Arthritis of hand, right, degenerative     Cervical spondylosis without myelopathy     Chronic kidney disease, stage 3a (CMS/Formerly [facility])     2024 Cr 1.16 GFR 47    DM (diabetes mellitus), type 2 (CMS/Formerly [facility])     2024 A1C 7.2%    Ductal carcinoma in situ (DCIS) of left breast     Essential tremor     Fibromyalgia     H/O echocardiogram 2023    CONCLUSIONS: 1. Left ventricular systolic function is hyperdynamic with a 70-75% estimated EF. 2. Spectral Doppler shows an impaired relaxation pattern of left ventricular diastolic filling. 3. Mild aortic valve regurgitation. 4. Left ventricular cavity size is decreased. 5. There is a mid cavity left ventricular obstruction noted w/ the Valsalva maneuver. The provoked gradient is 28 mmHg.    History of hand surgery     Hyperlipidemia     Hypermetropia, bilateral     Hyperopia of both eyes with astigmatism and presbyopia    Hypertension     Hypothyroidism     Ischemic optic neuropathy, bilateral     Ischemic optic neuropathy, bilateral    Meibomian gland dysfunction right eye, upper and lower eyelids     Meibomian gland dysfunction (MGD) of upper and lower lids of both eyes    Near syncope     NPH (normal pressure hydrocephalus) (CMS/Formerly [facility])     Obesity     Obstructive sleep apnea on CPAP     Osteoarthritis     Peripheral motor neuropathy     PONV (postoperative nausea and vomiting)     Primary open-angle glaucoma, bilateral, indeterminate  stage     Sjogren syndrome, unspecified (CMS/HCC)     Unspecified cirrhosis of liver (CMS/HCC)     Vocal cord dysfunction        Past Surgical History:   Procedure Laterality Date    CATARACT EXTRACTION, BILATERAL      COLONOSCOPY  2018    tubular adenoma    ESOPHAGOGASTRODUODENOSCOPY  2019    HAND SURGERY Bilateral     bone removed right wrist, cyst removed left wrist    IR ABLATION NERVE      KNEE ARTHROSCOPY W/ DEBRIDEMENT Right     MR HEAD ANGIO WO IV CONTRAST  2021    MR HEAD ANGIO WO IV CONTRAST 2021 [facility] [place] [facility]    MR NECK ANGIO WO IV CONTRAST  2021    MR NECK ANGIO WO IV CONTRAST 2021 [facility] [place] [facility]    ROTATOR CUFF REPAIR      TONSILLECTOMY      TOTAL KNEE ARTHROPLASTY Left      Anesthesia History    PONV      Pt denies any past history of anesthetic complications such as , awareness, prolonged sedation, dental damage, aspiration, cardiac arrest, difficult intubation, difficult I.V. access or unexpected hospital admissions.  NO malignant hyperthermia and or pseudo cholinesterase deficiency.    The patient is not  a Jehovah's witness and will accept blood and blood products if medically indicated.   No history of blood transfusions .Type and screen not sent.     Social History  Social History     Substance and Sexual Activity   Drug Use Never      Social History     Substance and Sexual Activity   Alcohol Use Not Currently      Social History     Tobacco Use   Smoking Status Former    Packs/day: 0.25    Years: 1.00    Additional pack years: 0.00    Total pack years: 0.25    Types: Cigarettes    Quit date: 1967    Years since quittin.2    Passive exposure: Past   Smokeless Tobacco Never          Family History   Problem Relation Name Age of Onset    Alzheimer's disease Mother      Arthritis Mother      Cataracts Mother      Glaucoma Mother      Hypertension Mother      Cancer Father          pancreatic    Other (diabetes mellitus) Father      Cancer Brother           pancreatic    Other (diabetes mellitus) Brother      Other (diabetes mellitus) Son      Other (diabetes mellitus) Sibling         Allergies   Allergen Reactions    Pregabalin Dizziness    Penicillins Hives and Itching    Primidone Other     Stuttering     Tizanidine Other     Stuttering        Prior to Admission medications    Medication Sig Start Date End Date Taking? Authorizing Provider   acyclovir (Zovirax) 400 mg tablet Take 1 tablet (400 mg) by mouth once daily. 1/2/24   Margarita Man, APRN-CNP   amLODIPine (Norvasc) 2.5 mg tablet Take 1 tablet (2.5 mg) by mouth once daily. 10/31/23   Nadira Moctezuma DO   atorvastatin (Lipitor) 20 mg tablet Take 1 tablet (20 mg) by mouth 3 (three) times a week. 1/4/19   Historical Provider, MD   azelastine (Astelin) 137 mcg (0.1 %) nasal spray Administer 2 sprays into each nostril 2 times a day as needed for allergies. Use in each nostril as directed    Historical Provider, MD   budesonide-formoteroL (Symbicort) 160-4.5 mcg/actuation inhaler Inhale 2 puffs once daily. RINSE MOUTH AFTER USE. 4/5/22   Historical Provider, MD   carboxymethylcellulose (Refresh Celluvisc) 1 % ophthalmic solution dropperette Administer into affected eye(s) 4 times a day.    Historical Provider, MD   cholecalciferol (Vitamin D-3) 50 MCG (2000 UT) tablet Take 1 tablet (50 mcg) by mouth once daily.    Historical Provider, MD   fluocinolone and shower cap 0.01 % oil Apply to scalp  leave on overnight and wash off in the morning. Use daily 7/17/23   Historical Provider, MD   hydroxychloroquine (Plaquenil) 200 mg tablet TAKE 1 TABLET BY MOUTH WITH FOOD OR MILK ONCE A DAY 3/19/24   Rakesh Verma MD   ketoconazole (NIZOral) 2 % shampoo     Historical Provider, MD   latanoprost, PF, 0.005 % drops Administer 1 drop into both eyes once daily at bedtime. 12/5/23   Aureliano Pabon MD   levothyroxine (Synthroid, Levoxyl) 100 mcg tablet Take 1 tablet (100 mcg) by mouth once daily. Five days per  week (takes 112 mcgs 2 days per week) 3/5/24 3/5/25  Nadira Moctezuma DO   lisinopril 20 mg tablet Take 1 tablet (20 mg) by mouth once daily. 3/5/24 9/1/24  Nadira Moctezuma DO   montelukast (Singulair) 10 mg tablet Take 1 tablet (10 mg) by mouth once daily. 5/9/12   Historical Provider, MD   OneTouch Verio test strips strip TEST BLOOD SUGAR ONCE A DAY 2/18/23   Historical Provider, MD   propranolol (Inderal) 10 mg tablet Take 1 tablet (10 mg) by mouth once daily. 3/5/24   Nadira Moctezuma DO   diclofenac sodium 1 % kit Apply  to affected area. 1/21/14 3/19/24  Historical Provider, MD   Ear Drops, carbamide peroxide, 6.5 % otic solution follow instructions on package insert 6/10/23 3/19/24  Historical Provider, MD   esomeprazole (NexIUM) 20 mg DR capsule Take 1 capsule (20 mg) by mouth once daily as needed (gerd). Do not open capsule.  3/19/24  Historical Provider, MD   hydroxychloroquine (Plaquenil) 200 mg tablet Take 1 tablet (200 mg) by mouth once daily. WITH FOOD OR MILK 12/29/17 3/19/24  Historical Provider, MD   lidocaine HCL (Aspercreme, lidocaine HCL,) 4 % liquid roll-on Apply topically to affected area once a day, As Needed  3/19/24  Historical Provider, MD   loteprednol etabonate 0.25 % drops,suspension Administer 1 drop into affected eye(s) 2 times a day. 12/5/23 3/19/24  Aureliano Pabon MD   mecobal-levomefolat Ca-B6 phos (Folast) 3-35-2 mg tablet 1 tablet once daily.  3/19/24  Historical Provider, MD   sulfamethoxazole-trimethoprim (Bactrim DS) 800-160 mg tablet Take 1 tablet by mouth once daily. 2/16/11 3/19/24  Historical Provider, MD   UNABLE TO FIND Med Name:  salon pas topically prn  3/19/24  Historical Provider, MD        PAT ROS:   Constitutional:   neg    Neuro/Psych:   Eyes:   Ears:   Nose:    nasal discharge  Mouth:   neg    Throat:   neg    Neck:    neck pain  Cardio:   neg    Respiratory:   neg    Endocrine:   GI:   neg    :   neg    Musculoskeletal:    arthralgias  Hematologic:     "bruises/bleeds easily  Skin:      Physical Exam  Vitals reviewed.   Constitutional:       Appearance: Normal appearance.   HENT:      Head: Normocephalic and atraumatic.      Mouth/Throat:      Mouth: Mucous membranes are dry.   Eyes:      Extraocular Movements: Extraocular movements intact.      Pupils: Pupils are equal, round, and reactive to light.   Cardiovascular:      Rate and Rhythm: Normal rate and regular rhythm.      Pulses: Normal pulses.      Heart sounds: Normal heart sounds.   Pulmonary:      Effort: Pulmonary effort is normal.      Breath sounds: Normal breath sounds.   Musculoskeletal:         General: Normal range of motion.      Cervical back: Normal range of motion.   Skin:     General: Skin is warm and dry.   Neurological:      Mental Status: She is alert and oriented to person, place, and time.   Psychiatric:         Mood and Affect: Mood normal.         Behavior: Behavior normal.          PAT AIRWAY:   Airway:     Mallampati::  II     /51   Pulse 74   Temp 36.8 °C (98.2 °F)   Resp 18   Ht 1.619 m (5' 3.75\")   Wt 77.6 kg (171 lb 1.2 oz)   SpO2 99%   BMI 29.60 kg/m²     ECHO 5/25/2023     CONCLUSIONS:  1. Left ventricular systolic function is hyperdynamic with a 70-75% estimated ejection fraction.  2. Spectral Doppler shows an impaired relaxation pattern of left ventricular diastolic filling.  3. Mild aortic valve regurgitation.  4. Left ventricular cavity size is decreased.  5. There is a mid cavity left ventricular obstruction noted with the Valsalva maneuver. The provoked gradient is 28 mmHg.    EKG : [facility]     Lab Results   Component Value Date    WBC 4.5 03/21/2024    HGB 12.1 03/21/2024    HCT 37.9 03/21/2024    MCV 95 03/21/2024     03/21/2024     Results from last 7 days   Lab Units 03/21/24  1034   SODIUM mmol/L 141   POTASSIUM mmol/L 4.2   CHLORIDE mmol/L 106   CO2 mmol/L 27   BUN mg/dL 20   CREATININE mg/dL 1.43*   CALCIUM mg/dL 9.4   PROTEIN TOTAL g/dL 6.0* "   BILIRUBIN TOTAL mg/dL 0.3   ALK PHOS U/L 98   ALT U/L 21   AST U/L 26   GLUCOSE mg/dL 122*     Lab Results   Component Value Date    INR 1.0 03/21/2024    INR 1.0 02/05/2024    INR 1.1 12/01/2023    PROTIME 11.7 03/21/2024    PROTIME 11.3 02/05/2024    PROTIME 12.1 12/01/2023          ASSESSMENT/PLAN    Patient is a 83 year-old  scheduled for Left Breast Magseed Partial Mastectomy  with Dr. MINAYA  on  3/27/24 .  CARDIOVASCULAR:  RCRI score / Risk: The patients score is 0 based on history . Per ACC/AHA guidelines this places her  at  3.9% risk for MACE undergoing a low  risk procedure . The patient has the following risk factors: denies   Functional Capacity: The patients exercise tolerance is  4  METS. This is based on the patients. Patient denies  active cardiac symptoms or anginal equivalents .      PULMONARY:  The patient has the following factors that place them at increased risk of perioperative pulmonary complications;age greater than 65/BMI greater than 27/MENDY /GERD /greater than 2.5 hour procedure.  Postoperatively the patient would benefit from early pulmonary toilet/incentive spirometry q 1-2 hours while awake/pulse oximetry/cautious use of respiratory depressant medications such as opioids/elevate the HOB/oral hygiene.    CKD:  The patient has had chronic kidney disease  is currently at [id number],with creatinine levels that (have been  been stable  .  Recommendations: Avoid intraoperative hypotension. Avoid nephrotoxic drugs and radicontrast dyes.  Recheck BMP periodically in the post op period.      DM2:  The patient has diet  controlled diabetes. Her  recent A1C was 7.2% on 2/20/24.         CIRRHOSIS:  Stable: Followed annually by Dr Don Post   FINDINGS US ABDOMEN 6/2023:   LIVER:   Normal size.  Normal echogenicity and contour.  Left hepatic lobe cyst measuring 2.3 cm today versus 1.6 cm previously. Another nearby in the same area measuring 2.4 cm today versus 2.3 cm previously. There are other  smaller cysts nearby in the left hepatic lobe. Right hepatic lobe cysts are also again noted, measuring up to 2.9 cm today anteriorly versus 2.6 cm previously.   MELD SCORE :10 points /6.0%      DVT:  CAPRINI SCORE=8  The patient has the following factors that increase her  Risk for thrombus formation ; Virchow's triad , , age>80, bmi>25, current malignancy Surgical procedure >2 hrs  procedure .    Recommendations: DVT prophylaxis  per Dr. Fields protocol . SCD's, AMOS's, and early ambulation are recommended. Heparin or LMWH is recommended for the very high risk .      Risk assessment complete.  Patient is scheduled for  low  surgical risk procedure.  Patient is considered an acceptable  risk to proceed with the planned procedure.      Preoperative medication instructions were provided and reviewed with the patient.  Any additional testing or evaluation was explained to the patient.  Nothing by mouth instructions were discussed and patient's questions were answered prior to conclusion to this encounter.  Patient verbalized understanding of preoperative instructions given in preadmission testing; discharge instructions available in EMR.

## 2024-03-26 ENCOUNTER — ANESTHESIA EVENT (OUTPATIENT)
Dept: OPERATING ROOM | Facility: HOSPITAL | Age: 84
End: 2024-03-26
Payer: MEDICARE

## 2024-03-27 ENCOUNTER — ANESTHESIA (OUTPATIENT)
Dept: OPERATING ROOM | Facility: HOSPITAL | Age: 84
End: 2024-03-27
Payer: MEDICARE

## 2024-03-27 ENCOUNTER — HOSPITAL ENCOUNTER (OUTPATIENT)
Dept: RADIOLOGY | Facility: CLINIC | Age: 84
Discharge: HOME | End: 2024-03-27
Payer: MEDICARE

## 2024-03-27 ENCOUNTER — HOSPITAL ENCOUNTER (OUTPATIENT)
Facility: HOSPITAL | Age: 84
Setting detail: OUTPATIENT SURGERY
Discharge: HOME | End: 2024-03-27
Attending: SURGERY | Admitting: SURGERY
Payer: MEDICARE

## 2024-03-27 VITALS
WEIGHT: 175.27 LBS | RESPIRATION RATE: 17 BRPM | DIASTOLIC BLOOD PRESSURE: 75 MMHG | HEIGHT: 64 IN | TEMPERATURE: 98.1 F | HEART RATE: 60 BPM | OXYGEN SATURATION: 96 % | SYSTOLIC BLOOD PRESSURE: 154 MMHG | BODY MASS INDEX: 29.92 KG/M2

## 2024-03-27 DIAGNOSIS — D05.12 DUCTAL CARCINOMA IN SITU (DCIS) OF LEFT BREAST: Primary | ICD-10-CM

## 2024-03-27 DIAGNOSIS — R92.8 OTHER ABNORMAL AND INCONCLUSIVE FINDINGS ON DIAGNOSTIC IMAGING OF BREAST: ICD-10-CM

## 2024-03-27 PROBLEM — Z98.890 PONV (POSTOPERATIVE NAUSEA AND VOMITING): Status: ACTIVE | Noted: 2024-03-27

## 2024-03-27 PROBLEM — R11.2 PONV (POSTOPERATIVE NAUSEA AND VOMITING): Status: ACTIVE | Noted: 2024-03-27

## 2024-03-27 PROBLEM — N18.9 CHRONIC RENAL INSUFFICIENCY: Status: ACTIVE | Noted: 2024-03-27

## 2024-03-27 LAB
GLUCOSE BLD MANUAL STRIP-MCNC: 129 MG/DL (ref 74–99)
GLUCOSE BLD MANUAL STRIP-MCNC: 99 MG/DL (ref 74–99)

## 2024-03-27 PROCEDURE — 2720000007 HC OR 272 NO HCPCS: Performed by: SURGERY

## 2024-03-27 PROCEDURE — 99100 ANES PT EXTEME AGE<1 YR&>70: CPT | Performed by: ANESTHESIOLOGY

## 2024-03-27 PROCEDURE — 2500000004 HC RX 250 GENERAL PHARMACY W/ HCPCS (ALT 636 FOR OP/ED): Performed by: SURGERY

## 2024-03-27 PROCEDURE — 88307 TISSUE EXAM BY PATHOLOGIST: CPT | Performed by: STUDENT IN AN ORGANIZED HEALTH CARE EDUCATION/TRAINING PROGRAM

## 2024-03-27 PROCEDURE — 88307 TISSUE EXAM BY PATHOLOGIST: CPT | Mod: TC,59,AHULAB | Performed by: SURGERY

## 2024-03-27 PROCEDURE — 3600000008 HC OR TIME - EACH INCREMENTAL 1 MINUTE - PROCEDURE LEVEL THREE: Performed by: SURGERY

## 2024-03-27 PROCEDURE — 2500000005 HC RX 250 GENERAL PHARMACY W/O HCPCS: Performed by: NURSE ANESTHETIST, CERTIFIED REGISTERED

## 2024-03-27 PROCEDURE — A19301 PR MASTECTOMY, PARTIAL: Performed by: ANESTHESIOLOGY

## 2024-03-27 PROCEDURE — 2500000004 HC RX 250 GENERAL PHARMACY W/ HCPCS (ALT 636 FOR OP/ED): Performed by: ANESTHESIOLOGY

## 2024-03-27 PROCEDURE — 76098 X-RAY EXAM SURGICAL SPECIMEN: CPT

## 2024-03-27 PROCEDURE — 7100000009 HC PHASE TWO TIME - INITIAL BASE CHARGE: Performed by: SURGERY

## 2024-03-27 PROCEDURE — 2500000005 HC RX 250 GENERAL PHARMACY W/O HCPCS: Performed by: SURGERY

## 2024-03-27 PROCEDURE — 7100000002 HC RECOVERY ROOM TIME - EACH INCREMENTAL 1 MINUTE: Performed by: SURGERY

## 2024-03-27 PROCEDURE — 82947 ASSAY GLUCOSE BLOOD QUANT: CPT

## 2024-03-27 PROCEDURE — 2500000005 HC RX 250 GENERAL PHARMACY W/O HCPCS: Performed by: ANESTHESIOLOGY

## 2024-03-27 PROCEDURE — 2500000004 HC RX 250 GENERAL PHARMACY W/ HCPCS (ALT 636 FOR OP/ED): Performed by: NURSE ANESTHETIST, CERTIFIED REGISTERED

## 2024-03-27 PROCEDURE — 7100000001 HC RECOVERY ROOM TIME - INITIAL BASE CHARGE: Performed by: SURGERY

## 2024-03-27 PROCEDURE — 19301 PARTIAL MASTECTOMY: CPT | Performed by: SURGERY

## 2024-03-27 PROCEDURE — 3600000003 HC OR TIME - INITIAL BASE CHARGE - PROCEDURE LEVEL THREE: Performed by: SURGERY

## 2024-03-27 PROCEDURE — 3700000002 HC GENERAL ANESTHESIA TIME - EACH INCREMENTAL 1 MINUTE: Performed by: SURGERY

## 2024-03-27 PROCEDURE — A19301 PR MASTECTOMY, PARTIAL: Performed by: NURSE ANESTHETIST, CERTIFIED REGISTERED

## 2024-03-27 PROCEDURE — 3700000001 HC GENERAL ANESTHESIA TIME - INITIAL BASE CHARGE: Performed by: SURGERY

## 2024-03-27 PROCEDURE — 76098 X-RAY EXAM SURGICAL SPECIMEN: CPT | Performed by: RADIOLOGY

## 2024-03-27 PROCEDURE — A4217 STERILE WATER/SALINE, 500 ML: HCPCS | Performed by: SURGERY

## 2024-03-27 PROCEDURE — 7100000010 HC PHASE TWO TIME - EACH INCREMENTAL 1 MINUTE: Performed by: SURGERY

## 2024-03-27 RX ORDER — OXYCODONE HYDROCHLORIDE 5 MG/1
5 TABLET ORAL EVERY 4 HOURS PRN
Status: DISCONTINUED | OUTPATIENT
Start: 2024-03-27 | End: 2024-03-27 | Stop reason: HOSPADM

## 2024-03-27 RX ORDER — FENTANYL CITRATE 50 UG/ML
INJECTION, SOLUTION INTRAMUSCULAR; INTRAVENOUS AS NEEDED
Status: DISCONTINUED | OUTPATIENT
Start: 2024-03-27 | End: 2024-03-27

## 2024-03-27 RX ORDER — GLYCOPYRROLATE 0.2 MG/ML
INJECTION INTRAMUSCULAR; INTRAVENOUS AS NEEDED
Status: DISCONTINUED | OUTPATIENT
Start: 2024-03-27 | End: 2024-03-27

## 2024-03-27 RX ORDER — PROMETHAZINE HYDROCHLORIDE 25 MG/ML
6.25 INJECTION, SOLUTION INTRAMUSCULAR; INTRAVENOUS ONCE AS NEEDED
Status: DISCONTINUED | OUTPATIENT
Start: 2024-03-27 | End: 2024-03-27 | Stop reason: HOSPADM

## 2024-03-27 RX ORDER — SODIUM CHLORIDE, SODIUM LACTATE, POTASSIUM CHLORIDE, CALCIUM CHLORIDE 600; 310; 30; 20 MG/100ML; MG/100ML; MG/100ML; MG/100ML
100 INJECTION, SOLUTION INTRAVENOUS CONTINUOUS
Status: DISCONTINUED | OUTPATIENT
Start: 2024-03-27 | End: 2024-03-27 | Stop reason: HOSPADM

## 2024-03-27 RX ORDER — SODIUM CHLORIDE 9 MG/ML
100 INJECTION, SOLUTION INTRAVENOUS CONTINUOUS
Status: DISCONTINUED | OUTPATIENT
Start: 2024-03-27 | End: 2024-03-27 | Stop reason: HOSPADM

## 2024-03-27 RX ORDER — SCOLOPAMINE TRANSDERMAL SYSTEM 1 MG/1
1 PATCH, EXTENDED RELEASE TRANSDERMAL ONCE
Status: DISCONTINUED | OUTPATIENT
Start: 2024-03-27 | End: 2024-03-27 | Stop reason: HOSPADM

## 2024-03-27 RX ORDER — PROPOFOL 10 MG/ML
INJECTION, EMULSION INTRAVENOUS AS NEEDED
Status: DISCONTINUED | OUTPATIENT
Start: 2024-03-27 | End: 2024-03-27

## 2024-03-27 RX ORDER — CEFAZOLIN 1 G/1
INJECTION, POWDER, FOR SOLUTION INTRAVENOUS AS NEEDED
Status: DISCONTINUED | OUTPATIENT
Start: 2024-03-27 | End: 2024-03-27

## 2024-03-27 RX ORDER — LIDOCAINE HYDROCHLORIDE 10 MG/ML
0.1 INJECTION, SOLUTION EPIDURAL; INFILTRATION; INTRACAUDAL; PERINEURAL ONCE
Status: DISCONTINUED | OUTPATIENT
Start: 2024-03-27 | End: 2024-03-27 | Stop reason: HOSPADM

## 2024-03-27 RX ORDER — SODIUM CHLORIDE 0.9 G/100ML
IRRIGANT IRRIGATION AS NEEDED
Status: DISCONTINUED | OUTPATIENT
Start: 2024-03-27 | End: 2024-03-27 | Stop reason: HOSPADM

## 2024-03-27 RX ORDER — ONDANSETRON HYDROCHLORIDE 2 MG/ML
INJECTION, SOLUTION INTRAVENOUS AS NEEDED
Status: DISCONTINUED | OUTPATIENT
Start: 2024-03-27 | End: 2024-03-27

## 2024-03-27 RX ORDER — MEPERIDINE HYDROCHLORIDE 25 MG/ML
12.5 INJECTION INTRAMUSCULAR; INTRAVENOUS; SUBCUTANEOUS EVERY 10 MIN PRN
Status: DISCONTINUED | OUTPATIENT
Start: 2024-03-27 | End: 2024-03-27 | Stop reason: HOSPADM

## 2024-03-27 RX ORDER — ONDANSETRON HYDROCHLORIDE 2 MG/ML
4 INJECTION, SOLUTION INTRAVENOUS ONCE AS NEEDED
Status: DISCONTINUED | OUTPATIENT
Start: 2024-03-27 | End: 2024-03-27 | Stop reason: HOSPADM

## 2024-03-27 RX ORDER — LIDOCAINE HYDROCHLORIDE 20 MG/ML
INJECTION, SOLUTION EPIDURAL; INFILTRATION; INTRACAUDAL; PERINEURAL AS NEEDED
Status: DISCONTINUED | OUTPATIENT
Start: 2024-03-27 | End: 2024-03-27

## 2024-03-27 RX ADMIN — CEFAZOLIN 2 G: 330 INJECTION, POWDER, FOR SOLUTION INTRAMUSCULAR; INTRAVENOUS at 10:35

## 2024-03-27 RX ADMIN — LIDOCAINE HYDROCHLORIDE 100 MG: 20 INJECTION, SOLUTION EPIDURAL; INFILTRATION; INTRACAUDAL; PERINEURAL at 10:40

## 2024-03-27 RX ADMIN — FENTANYL CITRATE 25 MCG: 50 INJECTION, SOLUTION INTRAMUSCULAR; INTRAVENOUS at 11:48

## 2024-03-27 RX ADMIN — FENTANYL CITRATE 25 MCG: 50 INJECTION, SOLUTION INTRAMUSCULAR; INTRAVENOUS at 11:35

## 2024-03-27 RX ADMIN — SCOPALAMINE 1 PATCH: 1 PATCH, EXTENDED RELEASE TRANSDERMAL at 09:25

## 2024-03-27 RX ADMIN — GLYCOPYRROLATE 0.2 MG: 0.2 INJECTION, SOLUTION INTRAMUSCULAR; INTRAVENOUS at 10:28

## 2024-03-27 RX ADMIN — DEXAMETHASONE SODIUM PHOSPHATE 4 MG: 4 INJECTION, SOLUTION INTRAMUSCULAR; INTRAVENOUS at 10:28

## 2024-03-27 RX ADMIN — FENTANYL CITRATE 25 MCG: 50 INJECTION, SOLUTION INTRAMUSCULAR; INTRAVENOUS at 10:28

## 2024-03-27 RX ADMIN — SODIUM CHLORIDE, POTASSIUM CHLORIDE, SODIUM LACTATE AND CALCIUM CHLORIDE 100 ML/HR: 600; 310; 30; 20 INJECTION, SOLUTION INTRAVENOUS at 09:06

## 2024-03-27 RX ADMIN — ONDANSETRON 4 MG: 2 INJECTION INTRAMUSCULAR; INTRAVENOUS at 10:28

## 2024-03-27 RX ADMIN — FENTANYL CITRATE 25 MCG: 50 INJECTION, SOLUTION INTRAMUSCULAR; INTRAVENOUS at 10:35

## 2024-03-27 RX ADMIN — PROPOFOL 200 MG: 10 INJECTION, EMULSION INTRAVENOUS at 10:40

## 2024-03-27 RX ADMIN — Medication 6 L/MIN: at 12:00

## 2024-03-27 RX ADMIN — SODIUM CHLORIDE, POTASSIUM CHLORIDE, SODIUM LACTATE AND CALCIUM CHLORIDE: 600; 310; 30; 20 INJECTION, SOLUTION INTRAVENOUS at 10:28

## 2024-03-27 ASSESSMENT — PAIN - FUNCTIONAL ASSESSMENT
PAIN_FUNCTIONAL_ASSESSMENT: 0-10
PAIN_FUNCTIONAL_ASSESSMENT: VAS (VISUAL ANALOG SCALE)

## 2024-03-27 ASSESSMENT — PAIN SCALES - GENERAL
PAINLEVEL_OUTOF10: 0 - NO PAIN
PAIN_LEVEL: 0
PAINLEVEL_OUTOF10: 3
PAINLEVEL_OUTOF10: 0 - NO PAIN
PAINLEVEL_OUTOF10: 3

## 2024-03-27 ASSESSMENT — COLUMBIA-SUICIDE SEVERITY RATING SCALE - C-SSRS
6. HAVE YOU EVER DONE ANYTHING, STARTED TO DO ANYTHING, OR PREPARED TO DO ANYTHING TO END YOUR LIFE?: NO
1. IN THE PAST MONTH, HAVE YOU WISHED YOU WERE DEAD OR WISHED YOU COULD GO TO SLEEP AND NOT WAKE UP?: NO
2. HAVE YOU ACTUALLY HAD ANY THOUGHTS OF KILLING YOURSELF?: NO

## 2024-03-27 NOTE — DISCHARGE INSTRUCTIONS
POST-OP INSTRUCTIONS FOR BREAST SURGERY PATIENTS: DR. SLY FIELDS    Activity:    Avoid direct impact to the surgical site.  No heavy lifting (greater than 10 lbs) or strenuous activity with the arm on the surgical side until evaluated at post-op appointment.    Wound care:    Ice pack, if desired, on and off in 15 minute intervals.  A supportive bra can be worn for comfort and support.  You may start showering normally tomorrow. Don't scrub and pat the surgical site dry.  No swimming or submerging the surgical site in a hot tub or bath for 2 weeks.  Outer dressing to be removed in 2 days.  Leave steri-strips intact for 1 week.  Observe the surgical site for signs of bleeding or infection. (Some swelling or bruising is anticipated.  A small amount of blood or an air bubble beneath the dressing is not a cause for concern.)    Call physician with abnormal findings: Progressive swelling, increasing pain, bright red skin discoloration, chills or fever (Temperature 101.5 or higher).    Follow-up:  post-op appointment with Dr. Fields was previously scheduled.  Pathology report will be discussed at that visit.    Pain:    No RX given  You may also use over-the-counter medication such as Tylenol (acetaminophen) or Advil (ibuprofen).  Remember that some prescription medications contain Tylenol (acetaminophen). If you are taking a prescription medication that contains Tylenol (acetaminophen), do not take additional over-the-counter Tylenol.  All pain medications can be constipating; remember to drink plenty of fluids and use stool softener and/or laxatives as needed. These are available over-the-counter.  Pain control is best when medication is taken before pain becomes severe.    Medication refills:    Medications cannot be refilled after business hours or on weekends.      Questions / Concerns:  Call Clinic [phone], option 2.

## 2024-03-27 NOTE — OP NOTE
Left Breast Magseed Partial Mastectomy (L) Operative Note     Date: 3/27/2024  OR Location: [facility] OR    Name: Louisa Rodas, : 1940, Age: 83 y.o., MRN: [id number], Sex: female    Diagnosis  Pre-op Diagnosis     * Ductal carcinoma in situ (DCIS) of left breast [D05.12] Post-op Diagnosis     * Ductal carcinoma in situ (DCIS) of left breast [D05.12]     Procedures  Left Breast Magseed Partial Mastectomy  85720 - SD MASTECTOMY PARTIAL      Surgeons      * Rhina Fields - Primary    Resident/Fellow/Other Assistant:  Surgeon(s) and Role:    Procedure Summary  Anesthesia: General  ASA: III  Anesthesia Staff: Anesthesiologist: Richi Loera MD  CRNA: JEAN MARIE Andres-EPIFANIO, DEAN  Estimated Blood Loss: 10 mL  Intra-op Medications:   Administrations occurring from 1030 to 1200 on 24:   Medication Name Total Dose   sodium chloride 0.9 % irrigation solution 2,000 mL   bupivacaine PF (Marcaine) 0.25 % (2.5 mg/mL) 20 mL, lidocaine (Xylocaine) 20 mL syringe 30 mL              Anesthesia Record               Intraprocedure I/O Totals          Output    Urine 0 mL    Est. Blood Loss 10 mL    NG/OG Tube Output 0 mL    Total Output 10 mL          Specimen:   ID Type Source Tests Collected by Time   1 : Left Magseed Localized Partial Mastectomy stitch marks Short Superior Long Lateral Tissue BREAST LUMPECTOMY LEFT SURGICAL PATHOLOGY EXAM Rhina Fields MD 3/27/2024 1112   2 : left breast anterior margin ink marks new margin Tissue BREAST MARGIN LEFT SURGICAL PATHOLOGY EXAM Rhina Fields MD 3/27/2024 1119   3 : left breast Inferior margin ink marks new margin Tissue BREAST MARGIN LEFT SURGICAL PATHOLOGY EXAM Rhina Fields MD 3/27/2024 1119   4 : left breast Lateral margin ink marks new margin Tissue BREAST MARGIN LEFT SURGICAL PATHOLOGY EXAM Rhina Fields MD 3/27/2024 1119   5 : left breast Medial margin ink marks new margin Tissue BREAST MARGIN LEFT SURGICAL PATHOLOGY EXAM Rhina Fields MD 3/27/2024 1119   6 : left breast  posterior margin ink marks new margin Tissue BREAST MARGIN LEFT SURGICAL PATHOLOGY EXAM Rhina Fields MD 3/27/2024 1119   7 : left breast superior  margin ink marks new margin Tissue BREAST MARGIN LEFT SURGICAL PATHOLOGY EXAM Rhina Fileds MD 3/27/2024 1119        Staff:   Circulator: Kassie Dickens V RN  Scrub Person: Mikie Wilcox RN; Yolette Mina         Drains and/or Catheters: * None in log *    Tourniquet Times:         Implants:     Findings: clip, mag seed and calcs in specimen xray      Indications: Louisa Rodas is an 83 y.o. female who is having surgery for Ductal carcinoma in situ (DCIS) of left breast [D05.12].     The patient was seen in the preoperative area. The risks, benefits, complications, treatment options, non-operative alternatives, expected recovery and outcomes were discussed with the patient. The possibilities of reaction to medication, pulmonary aspiration, injury to surrounding structures, bleeding, recurrent infection, the need for additional procedures, failure to diagnose a condition, and creating a complication requiring transfusion or operation were discussed with the patient. The patient concurred with the proposed plan, giving informed consent.  The site of surgery was properly noted/marked if necessary per policy. The patient has been actively warmed in preoperative area. Preoperative antibiotics have been ordered and given within 1 hours of incision. Venous thrombosis prophylaxis have been ordered including bilateral sequential compression devices    Procedure Details:   Informed consent was obtained. The surgical site marked and the Day of Surgery Update completed. The patient was taken to the operating room and positioned in a supine position on the operating room table. Anesthesia was induced without difficulty. SCDs were placed for DVT prophylaxis. Antibiotics were administered within 60 minutes prior to incision for surgical prophylaxis. Lower body Jacob Hugger was applied to  help maintain normothermia.     I reviewed my note and the appropriate films.  A surgical safety time-out was performed.     Using the sentimag probe, I approximated the location of the mag seed marking the lesion of concern.      The patient was sterilely prepped and draped in the usual fashion.       I then turned my attention to the left breast. The skin and surrounding tissue was anesthetized with local anesthetic. A  radial skin incision was made with a 15 blade scalpel in the lateral breast.  Subcutaneous tissues were sharply divided down to and into subcutaneous fat using Bovie cautery.  I dissected towards the mag seed. Mag seed location was confirmed throughout using the sentimag probe. I grasped the intended specimen with an Allis clamp.  The entire area of tissue surrounding the mag seed was excised en bloc with the mag seed intact. The specimen was oriented with a short stitch superiorly and a long stitch laterally, and labeled as left breast mag seed localized partial mastectomy. The specimen was inked per the vector kit.  Specimen radiograph was taken and reviewed by the radiologist. The clip was identified in the specimen and the mag seed was intact.  Targeted calcifications were contained within the specimen. Radiologically the margins appeared adequate.  The film was reviewed by radiology.     I then excised 6 cavity shave margins using the bovie and oriented the new margin in with black ink    I placed clips around the perimeter of the lumpectomy cavity and 2 clips in the posterior margin. The cavity was irrigated with sterile saline solution.  Hemostasis was achieved and confirmed with cautery.         Hemostasis was achieved with bovie.  The deep dermal layer was closed with interrupted 3-0 vicryl sutures.  The skin was closed with a running 4-0 monocryl subcuticular stitch.     A sterile dressing was applied.  A surgical bra was used for light compression.     Anesthesia was reversed and the  patient was brought to the recovery room in stable condition having tolerated the procedure well.  There were no complications.  30 cc of 1% lidocaine/0.25% marcaine mixed was used throughout the case.       Complications:  None; patient tolerated the procedure well.    Disposition: PACU - hemodynamically stable.  Condition: stable     This procedure was not performed to treat breast cancer through sentinel node biopsy      Additional Details: posterior margin above pec fascia    Attending Attestation: I was present and scrubbed for the key portions of the procedure.    Rhina Fields  Phone Number: [phone]

## 2024-03-27 NOTE — ANESTHESIA PREPROCEDURE EVALUATION
Patient: Louisa MCCAIN Rodas    Procedure Information       Date/Time: 03/27/24 1030    Procedure: Left Breast Magseed Partial Mastectomy (Left: Breast)    Location: [facility] OR 03 / [facility]    Surgeons: Rhina Fields MD            Relevant Problems   Anesthesia   (+) PONV (postoperative nausea and vomiting)      Cardiac   (+) HTN (hypertension)   (+) Hyperlipidemia      Pulmonary   (+) Moderate persistent asthma without complication   (+) MENDY on CPAP   (+) Shortness of breath on exertion      Neuro   (+) Carpal tunnel syndrome   (+) Lumbar radiculopathy   (+) Radiculitis, cervical      GI   (+) Acid reflux   (+) Dysphagia      /Renal   (+) Chronic renal insufficiency   (+) Recurrent UTI      Liver   (+) Hepatic cirrhosis (CMS/HCC)   (+) Liver cyst   (+) Liver lesion   (+) Polycystic liver disease      Endocrine   (+) Hypothyroidism, unspecified   (+) Type 2 diabetes mellitus with stage 3b chronic kidney disease, without long-term current use of insulin (CMS/HCC)      Hematology (within normal limits)      Musculoskeletal   (+) Arthritis of hand, right, degenerative   (+) Arthritis, degenerative, localized, primary, hand   (+) Carpal tunnel syndrome   (+) Cervical spondylosis without myelopathy   (+) Degenerative arthritis of hip   (+) Degenerative arthritis of knee, bilateral   (+) Fibromyalgia   (+) Localized osteoarthrosis of left ankle and foot   (+) Localized, secondary osteoarthritis of the hand   (+) Osteoarthritis   (+) Osteoarthritis of right ankle and foot      HEENT   (+) Glaucoma   (+) Glaucoma of both eyes   (+) POAG (primary open-angle glaucoma)      ID   (+) Herpes genitalis in women   (+) Recurrent UTI       Clinical information reviewed:   Tobacco  Allergies  Meds   Med Hx  Surg Hx   Fam Hx  Soc Hx        NPO Detail:  No data recorded     Physical Exam    Airway  Mallampati: III     Cardiovascular    Dental    Pulmonary    Abdominal            Anesthesia Plan    History of general anesthesia?:  yes  History of complications of general anesthesia?: no    ASA 3     general     intravenous induction   Anesthetic plan and risks discussed with patient.

## 2024-03-27 NOTE — ANESTHESIA PROCEDURE NOTES
Airway  Date/Time: 3/27/2024 10:41 AM  Urgency: elective      Staffing  Performed: SRNA   Authorized by: Richi Loera MD    Performed by: JEAN MARIE Andres-EPIFANIO, DNP  Patient location during procedure: OR    Indications and Patient Condition  Indications for airway management: anesthesia  Spontaneous ventilation: present  Sedation level: deep  Preoxygenated: yes  Patient position: sniffing  MILS maintained throughout  Mask difficulty assessment: 0 - not attempted    Final Airway Details  Final airway type: supraglottic airway      Successful airway: Supraglottic airway: I-Gel.  Size 5     Number of attempts at approach: 1  Ventilation between attempts: none  Number of other approaches attempted: 0

## 2024-03-27 NOTE — ANESTHESIA POSTPROCEDURE EVALUATION
Patient: Louisa MCCAIN Rodas    Procedure Summary       Date: 03/27/24 Room / Location: [facility] OR 03 / Virtual [facility]    Anesthesia Start: 1028 Anesthesia Stop: 1154    Procedure: Left Breast Magseed Partial Mastectomy (Left: Breast) Diagnosis:       Ductal carcinoma in situ (DCIS) of left breast      (Ductal carcinoma in situ (DCIS) of left breast [D05.12])    Surgeons: Rhina Fields MD Responsible Provider: Richi Loera MD    Anesthesia Type: general ASA Status: 3            Anesthesia Type: general    Vitals Value Taken Time   /89 03/27/24 1153   Temp 36.4 °C (97.5 °F) 03/27/24 1153   Pulse 64 03/27/24 1156   Resp 12 03/27/24 1153   SpO2 100 % 03/27/24 1156   Vitals shown include unvalidated device data.    Anesthesia Post Evaluation    Patient location during evaluation: bedside  Patient participation: complete - patient cannot participate  Level of consciousness: awake  Pain score: 0  Pain management: adequate  Airway patency: patent  Cardiovascular status: acceptable  Respiratory status: acceptable  Hydration status: acceptable  Postoperative Nausea and Vomiting: none        There were no known notable events for this encounter.

## 2024-03-28 ASSESSMENT — PAIN SCALES - GENERAL: PAINLEVEL_OUTOF10: 0 - NO PAIN

## 2024-04-08 ENCOUNTER — APPOINTMENT (OUTPATIENT)
Dept: NEPHROLOGY | Facility: CLINIC | Age: 84
End: 2024-04-08
Payer: MEDICARE

## 2024-04-08 LAB
LAB AP BLOCK FOR ADDITIONAL STUDIES: NORMAL
LABORATORY COMMENT REPORT: NORMAL
PATH REPORT.FINAL DX SPEC: NORMAL
PATH REPORT.GROSS SPEC: NORMAL
PATH REPORT.RELEVANT HX SPEC: NORMAL
PATH REPORT.TOTAL CANCER: NORMAL
PATHOLOGY SYNOPTIC REPORT: NORMAL

## 2024-04-08 NOTE — PROGRESS NOTES
Chief Complaint  Post op  S/p left PM3/27/2024    History of Present Illness    Referring Provider: CESAR Moctezuma    82 yo  female here for post op  S/p left partial mastectomy 3/27/2024    History:  1) No abnormal mammograms, breast biopsies, or breast surgeries.  2) 2/5/2024 bilateral screening mammogram.  Breast tissue is almost entirely fatty.  Right breast negative.  Left breast calcifications, BI-RADS 0.  3) 2/12/2024.  Left breast diagnostic imaging.  Indeterminate pleomorphic, linear calcifications are confirmed measuring up to 2.1 cm.  BI-RADS 4.  4) 2/26/2024.  Left breast stereotactic biopsy of calcifications.  Final pathology reveals intermediate grade DCIS, Er + there is 1.5 cm medial migration of the open coil HydroMARK from the biopsy site and residual calcifications.  5) 3/27/2024.  Left Magseed localized partial mastectomy.  1.7 cm intermediate grade DCIS.  Final margins negative pTis      She presents today for post op  No breast concerns    Ob/gyn history:  Menarche 15  Menopause 56  G 5 P 3, age of first delivery 23  Over 20 years OCPs, no fertility treatments, +vaginal HRT x 6 months    Father with prostate cancer  3 brothers with prostate cancer    Review of systems  A comprehensive ROS was taken on the patient intake form and reviewed with the patient. This form is scanned into the electronic medical record.    Constitutional symptoms: Denies generalized fatigue. Denies weight change, fevers/chills, difficulty sleeping   Eyes: Denies double vision, glaucoma, cataracts.  Ear/nose/throat/mouth: Denies hearing changes, sore throat, sinus problems.  Cardiovascular: No chest pain. Denies irregular heartbeat. Denies ankle swelling.  Respiratory: No wheezing, cough, or shortness of breath.  Gastrointestinal: No abdominal pain, No nausea/vomiting. No indigestion/heartburn. No change in bowel habits. No constipation or diarrhea.   Genitourinary: No urinary incontinence. No urinary  frequency. No painful urination.  Musculoskeletal: No bone pain, no muscle pain, no joint pain.   Integumentary: No rash. No masses. No changes in moles. No easy bruising.  Neurological: No headaches. No tremors. No numbness/tingling.  Psychiatric: No anxiety. No depression.  Endocrine: No excessive thirst. Not too hot or too cold. Not tired or fatigued.   Hematological/lymphatic: No swollen glands or blood clotting problems. No bruising.     Physical exam  A chaperone was offered for all portions of the physical exam. The patient declined.     General appearance: appears stated age, alert and oriented x 3  Head: Normocephalic, atraumatic  Eyes: conjunctivae/corneas clear.  Ears: External ears are normal, hearing is grossly intact.  Lungs: normal breathing  Heart: regular   Abdomen: Soft, nontender, nondistended.  Neurologic: grossly normal  Lymph nodes: No cervical, supraclavicular or axillary lymphadenopathy bilaterally    Breast: A comprehensive breast exam was performed in the seated and supine positions. Breasts are symmetrical. Bilateral nipples are everted. There are no skin changes on arm maneuvers. Bra size: 42D   Right: no masses   Left: no masses. Healing incision upper outer breast. No erythema    Results  Imaging  All breast imaging personally reviewed by me.  See HPI    Pathology  2/26/2024.  Left breast stereotactic biopsy of calcifications.  Final pathology reveals intermediate grade DCIS, Er + there is 1.5 cm medial migration of the open coil HydroMARK from the biopsy site and residual calcifications.    Impression:   1) 82 yo  female with left breast DCIS ER +  Now s/p left PM, pTis, margins neg.   2) Co-morbidities include DM, HTN, sicca . Polycystic liver disease. Chronic kidney disease. Non-smoker  3) No family history of breast or ovarian cancer. FH of prostate cancer       Plan:   She is here with her daughter.  She is recovering well from surgery.  She has no activity restrictions.   Reviewed her pathology report and she was given a copy.  She is happy to hear that only DCIS was noted.  Margins are negative.  Her surgical treatment is complete.  We discussed the role of adjuvant therapies.  She has been referred to radiation oncology.  She will follow-up with the surgical nurse practitioner regarding endocrine therapy.  She seems hesitant to pursue endocrine therapy.  I would like to see her in February 2025 after her bilateral mammogram.  She should call the office with any sooner concerns.

## 2024-04-11 ENCOUNTER — OFFICE VISIT (OUTPATIENT)
Dept: ORTHOPEDIC SURGERY | Facility: CLINIC | Age: 84
End: 2024-04-11
Payer: MEDICARE

## 2024-04-11 ENCOUNTER — HOSPITAL ENCOUNTER (OUTPATIENT)
Dept: RADIOLOGY | Facility: CLINIC | Age: 84
Discharge: HOME | End: 2024-04-11
Payer: MEDICARE

## 2024-04-11 VITALS — BODY MASS INDEX: 29.88 KG/M2 | WEIGHT: 175 LBS | HEIGHT: 64 IN

## 2024-04-11 DIAGNOSIS — M25.561 CHRONIC PAIN OF RIGHT KNEE: ICD-10-CM

## 2024-04-11 DIAGNOSIS — G89.29 CHRONIC PAIN OF RIGHT KNEE: ICD-10-CM

## 2024-04-11 DIAGNOSIS — M17.11 PRIMARY OSTEOARTHRITIS OF RIGHT KNEE: Primary | ICD-10-CM

## 2024-04-11 PROCEDURE — 1159F MED LIST DOCD IN RCRD: CPT | Performed by: ORTHOPAEDIC SURGERY

## 2024-04-11 PROCEDURE — 99214 OFFICE O/P EST MOD 30 MIN: CPT | Performed by: ORTHOPAEDIC SURGERY

## 2024-04-11 PROCEDURE — 73564 X-RAY EXAM KNEE 4 OR MORE: CPT | Mod: RIGHT SIDE | Performed by: RADIOLOGY

## 2024-04-11 PROCEDURE — 1160F RVW MEDS BY RX/DR IN RCRD: CPT | Performed by: ORTHOPAEDIC SURGERY

## 2024-04-11 PROCEDURE — 73564 X-RAY EXAM KNEE 4 OR MORE: CPT | Mod: RT

## 2024-04-11 ASSESSMENT — PAIN SCALES - GENERAL: PAINLEVEL_OUTOF10: 10 - WORST POSSIBLE PAIN

## 2024-04-11 ASSESSMENT — PAIN - FUNCTIONAL ASSESSMENT: PAIN_FUNCTIONAL_ASSESSMENT: 0-10

## 2024-04-11 NOTE — PROGRESS NOTES
Patient is an 83-year-old female presents today for discussion of upcoming right total knee arthroplasty.  She is failed a greater than 3-month trial reasonable conservative treatment including injections, a cane, bracing.  She has difficulty walking certain distance or going downstairs.  She previously underwent left total knee replacement and did well from that standpoint.    Right knee:  AAOx3, NAD, walks with a moderate antalgic gait  Varus allignment  Range of motion lacks 10 degrees of full extension and flexes to 110 degrees  Stable to varus/valgus/anterior/posterior stress through out the range of motion  Slight laxity with varus stress  Diffuse medial  joint line tenderness to palpation  Moderate effusion  SILT in a meredith/saph/per/tib distribution  5/5 knee extension/df/pf/ehl  ½ dorsalis pedis and posterior tibial pulse  no popliteal lymphadenopathy  no other overlying lesions  mood: euthymic  Respirations non labored    Plain films were reviewed by myself in clinic today.  They have advanced osteoarthritis of their knee with significant joint space narrowing, bone on bone changes, underlying sclerosis and tricompartmental osteophytic change.    Patient is now failed a greater than 3-month trial of reasonable conservative treatment and wishes proceed with surgery which is indicated at this time.  Discussed the risk benefits alternatives to surgery.  All of their questions were answered.    Procedure: right total knee  Location: [facility]  ICD10: M17.11  CPT: 00748  Stay: overnight  Equipment: [employer] [facility]    I talked with the patient at length about risks, limitations, benefits and alternatives to total knee replacement today. I reviewed concerns about implant wear, loosening, breakage, infection and infection prophylaxis, DVT, PE, death and other medical and anesthetic complications of surgery. We talked about the potential for persistent pain following surgery since there are many possible causes for  knee and leg pain. The patient was advised that knee replacement will only relieve pain that is coming from the knee. We talked about limited range of motion following knee replacement and the importance of physical therapy and their motivation. We talked about improvements in pain management post-operatively and our accelerated rehab program. We talked about wound healing issues and neurovascular complications of surgery. I reviewed functional and activity restrictions in detail. We discussed the possible need for a homologous blood transfusion. We discussed the fact that many of our patients are able to go home in 1 day or less depending on their health, mobility, pre-op preparation, individual home situation and personal preference.  The patient has identified their personal goals of their joint replacement surgery and recovery and we have discussed them. These are documented in our [employer] patient engagement platform. In addition, we have discussed the advantages and disadvantages of various implant and fixation options, as well as various surgical approaches.  The basic concepts of the joint replacement procedure has been reviewed with the patient and the patient has been provided the opportunity to see an actual implant either in the office or in our pre-op education class.  All of the patients questions were answered. The patient can call my office to schedule surgery and the pre-op teaching class. I told the patient that they should contact their primary care physician to discuss fitness for surgery.    This note was created using voice recognition software and was not corrected for typographical or grammatical errors.

## 2024-04-12 ENCOUNTER — OFFICE VISIT (OUTPATIENT)
Dept: SURGICAL ONCOLOGY | Facility: HOSPITAL | Age: 84
End: 2024-04-12
Payer: MEDICARE

## 2024-04-12 VITALS
WEIGHT: 174.6 LBS | TEMPERATURE: 97 F | DIASTOLIC BLOOD PRESSURE: 74 MMHG | BODY MASS INDEX: 29.81 KG/M2 | SYSTOLIC BLOOD PRESSURE: 138 MMHG | HEART RATE: 72 BPM | RESPIRATION RATE: 20 BRPM | HEIGHT: 64 IN

## 2024-04-12 DIAGNOSIS — D05.12 DUCTAL CARCINOMA IN SITU (DCIS) OF LEFT BREAST: ICD-10-CM

## 2024-04-12 DIAGNOSIS — Z85.3 PERSONAL HISTORY OF BREAST CANCER: ICD-10-CM

## 2024-04-12 PROCEDURE — 1159F MED LIST DOCD IN RCRD: CPT | Performed by: SURGERY

## 2024-04-12 PROCEDURE — 99024 POSTOP FOLLOW-UP VISIT: CPT | Performed by: SURGERY

## 2024-04-12 PROCEDURE — 3078F DIAST BP <80 MM HG: CPT | Performed by: SURGERY

## 2024-04-12 PROCEDURE — 1160F RVW MEDS BY RX/DR IN RCRD: CPT | Performed by: SURGERY

## 2024-04-12 PROCEDURE — 1126F AMNT PAIN NOTED NONE PRSNT: CPT | Performed by: SURGERY

## 2024-04-12 PROCEDURE — 3075F SYST BP GE 130 - 139MM HG: CPT | Performed by: SURGERY

## 2024-04-12 ASSESSMENT — PAIN SCALES - GENERAL: PAINLEVEL: 0-NO PAIN

## 2024-04-12 NOTE — PATIENT INSTRUCTIONS
1) schedule radiation oncology consult  2) see Ammy Boles NP to discuss endocrine therapy (next few weeks)  3) see Dr. Fields 2/2025 with same day MG

## 2024-04-15 PROBLEM — M17.11 UNILATERAL PRIMARY OSTEOARTHRITIS, RIGHT KNEE: Status: ACTIVE | Noted: 2024-04-14

## 2024-04-17 ENCOUNTER — OFFICE VISIT (OUTPATIENT)
Dept: PRIMARY CARE | Facility: CLINIC | Age: 84
End: 2024-04-17
Payer: MEDICARE

## 2024-04-17 ENCOUNTER — OFFICE VISIT (OUTPATIENT)
Dept: OPHTHALMOLOGY | Facility: CLINIC | Age: 84
End: 2024-04-17
Payer: MEDICARE

## 2024-04-17 VITALS
HEART RATE: 68 BPM | SYSTOLIC BLOOD PRESSURE: 145 MMHG | BODY MASS INDEX: 30.26 KG/M2 | WEIGHT: 174 LBS | DIASTOLIC BLOOD PRESSURE: 70 MMHG | OXYGEN SATURATION: 93 % | RESPIRATION RATE: 16 BRPM

## 2024-04-17 DIAGNOSIS — I10 PRIMARY HYPERTENSION: ICD-10-CM

## 2024-04-17 DIAGNOSIS — E11.22 TYPE 2 DIABETES MELLITUS WITH STAGE 3B CHRONIC KIDNEY DISEASE, WITHOUT LONG-TERM CURRENT USE OF INSULIN (MULTI): Primary | ICD-10-CM

## 2024-04-17 DIAGNOSIS — E07.9 THYROID EYE DISEASE: ICD-10-CM

## 2024-04-17 DIAGNOSIS — R53.83 OTHER FATIGUE: ICD-10-CM

## 2024-04-17 DIAGNOSIS — Z79.899 LONG-TERM USE OF PLAQUENIL: ICD-10-CM

## 2024-04-17 DIAGNOSIS — H57.89 THYROID EYE DISEASE: ICD-10-CM

## 2024-04-17 DIAGNOSIS — G47.33 OSA ON CPAP: ICD-10-CM

## 2024-04-17 DIAGNOSIS — H04.123 DRY EYE SYNDROME OF BOTH LACRIMAL GLANDS: ICD-10-CM

## 2024-04-17 DIAGNOSIS — M35.00 SJOGREN SYNDROME, UNSPECIFIED (MULTI): ICD-10-CM

## 2024-04-17 DIAGNOSIS — L98.9 SKIN LESION: ICD-10-CM

## 2024-04-17 DIAGNOSIS — H02.883 MEIBOMIAN GLAND DISEASE OF RIGHT EYE: ICD-10-CM

## 2024-04-17 DIAGNOSIS — J45.20 MILD INTERMITTENT ASTHMA WITHOUT COMPLICATION (HHS-HCC): ICD-10-CM

## 2024-04-17 DIAGNOSIS — N18.31 STAGE 3A CHRONIC KIDNEY DISEASE (MULTI): ICD-10-CM

## 2024-04-17 DIAGNOSIS — N18.32 TYPE 2 DIABETES MELLITUS WITH STAGE 3B CHRONIC KIDNEY DISEASE, WITHOUT LONG-TERM CURRENT USE OF INSULIN (MULTI): Primary | ICD-10-CM

## 2024-04-17 DIAGNOSIS — M35.01 KERATOCONJUNCTIVITIS SICCA, IN SJOGREN'S SYNDROME (MULTI): Primary | ICD-10-CM

## 2024-04-17 DIAGNOSIS — D05.12 DUCTAL CARCINOMA IN SITU (DCIS) OF LEFT BREAST: ICD-10-CM

## 2024-04-17 PROBLEM — H02.889 MEIBOMIAN GLAND DISEASE: Status: ACTIVE | Noted: 2024-04-17

## 2024-04-17 PROCEDURE — 1159F MED LIST DOCD IN RCRD: CPT | Performed by: SPECIALIST

## 2024-04-17 PROCEDURE — 99214 OFFICE O/P EST MOD 30 MIN: CPT | Performed by: SPECIALIST

## 2024-04-17 PROCEDURE — 1160F RVW MEDS BY RX/DR IN RCRD: CPT | Performed by: SPECIALIST

## 2024-04-17 PROCEDURE — 3077F SYST BP >= 140 MM HG: CPT | Performed by: SPECIALIST

## 2024-04-17 PROCEDURE — 1036F TOBACCO NON-USER: CPT | Performed by: SPECIALIST

## 2024-04-17 PROCEDURE — 3078F DIAST BP <80 MM HG: CPT | Performed by: SPECIALIST

## 2024-04-17 PROCEDURE — 68761 CLOSE TEAR DUCT OPENING: CPT | Performed by: STUDENT IN AN ORGANIZED HEALTH CARE EDUCATION/TRAINING PROGRAM

## 2024-04-17 RX ORDER — PERFLUOROHEXYLOCTANE 1 MG/MG
1 SOLUTION OPHTHALMIC 4 TIMES DAILY
Qty: 3 ML | Refills: 5 | Status: SHIPPED | OUTPATIENT
Start: 2024-04-17 | End: 2024-05-17

## 2024-04-17 RX ORDER — BUDESONIDE AND FORMOTEROL FUMARATE DIHYDRATE 160; 4.5 UG/1; UG/1
2 AEROSOL RESPIRATORY (INHALATION) DAILY
Qty: 10.2 G | Refills: 1 | Status: SHIPPED | OUTPATIENT
Start: 2024-04-17

## 2024-04-17 ASSESSMENT — CONF VISUAL FIELD
OS_INFERIOR_TEMPORAL_RESTRICTION: 0
METHOD: COUNTING FINGERS
OD_SUPERIOR_NASAL_RESTRICTION: 0
OS_SUPERIOR_NASAL_RESTRICTION: 0
OD_NORMAL: 1
OS_INFERIOR_NASAL_RESTRICTION: 0
OS_SUPERIOR_TEMPORAL_RESTRICTION: 0
OD_SUPERIOR_TEMPORAL_RESTRICTION: 0
OS_NORMAL: 1
OD_INFERIOR_TEMPORAL_RESTRICTION: 0
OD_INFERIOR_NASAL_RESTRICTION: 0

## 2024-04-17 ASSESSMENT — ENCOUNTER SYMPTOMS
CONSTITUTIONAL NEGATIVE: 0
MUSCULOSKELETAL NEGATIVE: 0
NEUROLOGICAL NEGATIVE: 0
RESPIRATORY NEGATIVE: 0
CARDIOVASCULAR NEGATIVE: 0
PSYCHIATRIC NEGATIVE: 0
ALLERGIC/IMMUNOLOGIC NEGATIVE: 0
GASTROINTESTINAL NEGATIVE: 0
ENDOCRINE NEGATIVE: 0
HEMATOLOGIC/LYMPHATIC NEGATIVE: 0
EYES NEGATIVE: 1

## 2024-04-17 ASSESSMENT — CUP TO DISC RATIO
OS_RATIO: .6
OD_RATIO: .6

## 2024-04-17 ASSESSMENT — VISUAL ACUITY
METHOD: SNELLEN - LINEAR
OD_CC: 20/20
OS_CC: 20/25
OD_CC+: -1

## 2024-04-17 ASSESSMENT — TONOMETRY
OD_IOP_MMHG: 17
IOP_METHOD: TONOPEN
OS_IOP_MMHG: 13

## 2024-04-17 ASSESSMENT — PACHYMETRY
OS_CT(UM): 486
OD_CT(UM): 502

## 2024-04-17 ASSESSMENT — EXTERNAL EXAM - LEFT EYE: OS_EXAM: NORMAL

## 2024-04-17 ASSESSMENT — EXTERNAL EXAM - RIGHT EYE: OD_EXAM: NORMAL

## 2024-04-17 NOTE — PROGRESS NOTES
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 Garcia for Thyroid eye disease and Dr. Pabon for Glaucoma  -current TXT for ocular surface: Systane PF AT's QID, Refresh pm abel at bedtime, and warm compresses  -(+)CPAP  -patient having a flare up of signs and symptoms on exam today  -Hx of being prescribed Xiidra but medication was too expensive to obtain  -Patient is also taking Glaucoma medications (latanoprost) and with concurrent glaucoma would like to try and stay away from a steroid pulse  -H/o of being given samples of steroid pulses to control symptoms  -Patient previously had Eagle punctal plugs placed but they have fallen out  -Eagle Plugs placed today with patient's consent OD .5mm LOT [id number] EXP 3/28/29 OS .6mm LOT [id number] EXP 2/28/29    -TXT plan: Continue with Systane PF AT's QID, Refresh PM abel at bedtime, and warm compresses; Eagle punctal plugs today. Rx'd Miebo QID OU    Recommend starting Miebo (perfluorohexyloctane) qid OU for evaporative dry eye. The patient has tried and failed alternative treatments of artificial tears (lipid based) and warm compresses. This prescription is being prescribed for an FDA approved reason.   Rx sent to [facility] ([facility]).     Discussed that we could add an amniotic membrane in the future if there is continued irritation.     *Wait until after dry eye is stable to consider repeat brow ptosis repair*    Long-term use of Plaquenil  -patient has been taking hydroxychloroquine but this is not being tested for with other providers-will order an OCT MAC and HVF 10-2 at next exam 2 months    RTC 2 months for dry eye check; also plaquenil testing with OCT MAC and HVF 10-2

## 2024-04-17 NOTE — PROGRESS NOTES
Subjective   Patient ID: Louisa Rodas is a 83 y.o. female who presents for Follow-up.  HPI    84 yo female Pmhx sig Htn, Hyperlipidemia, Hypothyroidism, MRI Brain 5/4/2023 (mild enlargement of ventricles, mild diffuse cerebral atrophy) (Possible NPH in 2021) Glaucoma, MENDY on CPAP, Polycystic Liver Disease, ?Cirrhosis, Sjogrens Syndrome (Dr. Sparks), OA (Dr. Verduzco), Mod Persistent Asthma (Dr. Ramos), MENDY on CPAP, Nonfamilial hypogammaglobulinemia (sees Dr. Monique), Tremors, CKD3,  and Cervical Facet Arthropathy (C3-5) s/p facet medial branch radiofrequency ablation 1/2023 here for f/up--DUE FOR MAW IN OCTOBER    Takes levo 6 days per week    Had to take phone call from ortho during visit    Had Left lumpectomy for DCIS ER+ for XRT and to see onc regarding oral meds.    Saw hepatology 2/21/24, has liver cysts, repeat 1 yr for US     To see ortho hoping for right TKR    Wonders if she has SLE since foot and finger joints and wrist and elbow and right ankle hurt and at times right big toe throbs  Is dragging, has pain in elbows  Walks and has joint pain    Said she was to see nephro    C/o of itching red spot some ttimes itches and when she scratches it she     Allergies   Allergen Reactions    Pregabalin Dizziness    Penicillins Hives and Itching    Primidone Other     Stuttering     Tizanidine Other     Stuttering       Current Outpatient Medications   Medication Instructions    acyclovir (ZOVIRAX) 400 mg, oral, Daily    allantoin gel Topical, As needed, Contains cannibus    amLODIPine (NORVASC) 2.5 mg, oral, Daily    atorvastatin (Lipitor) 20 mg tablet 1 tablet, oral, 3 times weekly    azelastine (Astelin) 137 mcg (0.1 %) nasal spray 2 sprays, Each Nostril, 2 times daily PRN, Use in each nostril as directed    budesonide-formoteroL (Symbicort) 160-4.5 mcg/actuation inhaler 2 puffs, inhalation, Daily, RINSE MOUTH AFTER USE.    carboxymethylcellulose (Refresh Celluvisc) 1 % ophthalmic solution dropperette  ophthalmic (eye), 4 times daily    cholecalciferol (VITAMIN D-3) 50 mcg, oral, Daily    fluocinolone and shower cap 0.01 % oil Apply to scalp  leave on overnight and wash off in the morning. Use daily    hydroxychloroquine (Plaquenil) 200 mg tablet TAKE 1 TABLET BY MOUTH WITH FOOD OR MILK ONCE A DAY    ketoconazole (NIZOral) 2 % shampoo No dose, route, or frequency recorded.    latanoprost, PF, 0.005 % drops 1 drop, Both Eyes, Nightly    aghfeesun-U8-jsA16-algal oil (Metanx, algal oil,) 3 mg-35 mg-2 mg -90.314 mg capsule 1 capsule, oral, As needed    levothyroxine (SYNTHROID, LEVOXYL) 100 mcg, oral, Daily, Five days per week (takes 112 mcgs 2 days per week)    lisinopril 20 mg, oral, Daily    medical cannabis 1 each, oral, As needed    Miebo 100 % drops 1 drop, ophthalmic (eye), 4 times daily    montelukast (Singulair) 10 mg tablet 1 tablet, oral, Daily    OneTouch Verio test strips strip TEST BLOOD SUGAR ONCE A DAY    propranolol (INDERAL) 10 mg, oral, Daily    sulfamethoxazole-trimethoprim (Bactrim DS) 800-160 mg tablet 1 tablet, oral, 2 times daily, Takes daily not bid        Review of Systems  Constitutional  No fatigue, no fevers, no chills, no unintentional weight loss, Weight 167 in 2023 October now 173  HEENT:  No headaches, no dizziness,current eye exams, has punctal plugs  Cardiovascular:  No chest pain, no palpitations,   Respiratory:  Positive (using antihistamine spray when needed) cough No shortness of breath at rest  GI: No dysphagia, no odynophagia, occasional (takes gummie bear)reflux, no abdominal pain,  no changes in bowel habits, no bright red blood per rectum, no melena  :  No urinary frequency nocturia at night, no dysuria, no urine incontinence  MSK:  No falls, multiple joint pain, ambulates with cane    Physical Exam  /70   Pulse 68   Resp 16   Wt 78.9 kg (174 lb)   SpO2 93%   BMI 30.26 kg/m²   148/68 right arm seated  General:    Well-appearing  F in no acute distress, well  nourished, well hydrated  Head:  Normocephalic, atraumatic  Skin:          Warm dry,   Eyes:  Anicteric sclera, pupils equal,   Oral:      Not examined due to pandemic  Neck:   Supple,  Cor:      Regular rate, normal S1, S2, no murmurs appreciated, no S3, no S4   Lungs:   Clear to auscultation b/l, no wheezes, no rhonchi, no crackles, no accessory respiratory muscle use  Ext:            Trace bilateral LE edema, Left lower extremity small size of a pencil eraser erythematous lesion    Assessment/Plan   Problem List Items Addressed This Visit       MENDY on CPAP     Management per Pulm, sees Dr. Ramos  Using Cpap         Type 2 diabetes mellitus with stage 3b chronic kidney disease, without long-term current use of insulin (Multi) - Primary     HBA1C is 7.2 one month ago (2/20/2024), goal 7  HBA1C was 6.9 in November 2023  Consider SGLT2         Other fatigue     Likely multifactorial  Using CPAP for MENDY  Taking Gummie bear for sleep  Discussed, Normal CMP CBC 3/2024 and TSH normal 1.1 end of January           HTN (hypertension)     Said BP is up today because her  has been irritating her  Recommend home BP monitoring  On Lisinopril 20 mg daily and amlodipine 2.5 mg   Recommended increasing either lisinopril or amlodipine dose  Has home BP monitor, she will monitor and let me know if home BP above 140/90 so I may adjust her medication           Ductal carcinoma in situ (DCIS) of left breast     S/p partial left mastectomy  To see Heme-Onc regarding oral medication         Stage 3a chronic kidney disease (Multi)     Said she thought she had an appt to see nephrology but does not  Will place referral         Relevant Orders    Referral to Nephrology    Mild intermittent asthma without complication ([facility])     Refilled symbicort refilled wants generic           Relevant Medications    budesonide-formoteroL (Symbicort) 160-4.5 mcg/actuation inhaler    Skin lesion     Plans to see dermatology  Using topical  "hydrocortisone and using roll on \"cannibis\"           Nadira Moctezuma, DO   "

## 2024-04-18 ENCOUNTER — DOCUMENTATION (OUTPATIENT)
Dept: PRIMARY CARE | Facility: CLINIC | Age: 84
End: 2024-04-18
Payer: MEDICARE

## 2024-04-18 ENCOUNTER — PATIENT OUTREACH (OUTPATIENT)
Dept: PRIMARY CARE | Facility: CLINIC | Age: 84
End: 2024-04-18
Payer: MEDICARE

## 2024-04-18 DIAGNOSIS — E11.22 TYPE 2 DIABETES MELLITUS WITH STAGE 3B CHRONIC KIDNEY DISEASE, WITHOUT LONG-TERM CURRENT USE OF INSULIN (MULTI): ICD-10-CM

## 2024-04-18 DIAGNOSIS — E78.5 HYPERLIPIDEMIA, UNSPECIFIED HYPERLIPIDEMIA TYPE: ICD-10-CM

## 2024-04-18 DIAGNOSIS — N18.32 TYPE 2 DIABETES MELLITUS WITH STAGE 3B CHRONIC KIDNEY DISEASE, WITHOUT LONG-TERM CURRENT USE OF INSULIN (MULTI): ICD-10-CM

## 2024-04-18 DIAGNOSIS — I10 HYPERTENSION, UNSPECIFIED TYPE: ICD-10-CM

## 2024-04-18 PROCEDURE — 99439 CHRNC CARE MGMT STAF EA ADDL: CPT | Performed by: SPECIALIST

## 2024-04-18 PROCEDURE — 99490 CHRNC CARE MGMT STAFF 1ST 20: CPT | Performed by: SPECIALIST

## 2024-04-18 ASSESSMENT — ENCOUNTER SYMPTOMS
LOSS OF SENSATION IN FEET: 0
OCCASIONAL FEELINGS OF UNSTEADINESS: 1

## 2024-04-18 ASSESSMENT — ACTIVITIES OF DAILY LIVING (ADL): BATHING: YES

## 2024-04-18 NOTE — PROGRESS NOTES
"Patient introduced to Chronic Care Management Services   Patient opted into services on 04/18/2024  Services will be performed under the direction of PCP: Dr. Nadira Moctezuma   I have discussed the nature and availability of the service with the patient, the patient's responsibility for potential cost sharing, only one practitioner furnishing services during a calendar month, and the right to stop services at any time.   Spoke with Pt. And she would like to be enrolled in the Chronic Care Management Program. CCM goals reviewed and Patient verbalizes understanding. Pt. lives in a single home with her  and states she does have support from her family. Denies any falls but states she had \"near falls\". She uses a cane to ambulate and does feel a little unsteady when she walks due to right knee discomfort. Pt. states she is schedule to have right knee surgery May 28 and is scheduled for preadmission testing 05/03/24. Appetite is good and Pt. states she has gained a little weight. Pt. States she doesn't check her blood sugar often but plan is to check once a week and keep a log. I will mail a blood pressure log and blood sugar log to help Pt. Keep track and bring to her next follow up appointment with her PCP. Patient checks her blood pressure once a day in the evening. Pt's blood pressure last night was 124/74 and heart rate 76 and this morning at 8:22 am blood pressure was 129/73 HR- 70. Pt. states her blood pressure goes up when she gets upset.  Pt. does minimal driving because it makes her anxious. Pt. Had surgery in March for left breast CA and states the wound has healed well. Pt. with some difficulty sleeping and wears her CPAP nightly and she also takes CBD gummies to help with sleep. Medications reviewed and Pt. takes her medications out out the bottle and doesn't like to use a pill box. Appointments reviewed and Pt has an appt. on 04/22/24 with surg/onc, 04/24/24 with rheumatology and 05/06/24 with " radiation/onc. for a consultation. Patient states she forgot to mention to her PCP she has off and on joint pain and yesterday she had some numbness to her right pinky finger when she woke up. The numbness subsided but it occurs on and off. Patient given my phone number to call with any questions or concerns.

## 2024-04-19 PROBLEM — Z08 ENCOUNTER FOR FOLLOW-UP SURVEILLANCE OF BREAST CANCER: Status: ACTIVE | Noted: 2024-04-19

## 2024-04-19 PROBLEM — Z85.3 ENCOUNTER FOR FOLLOW-UP SURVEILLANCE OF BREAST CANCER: Status: ACTIVE | Noted: 2024-04-19

## 2024-04-19 NOTE — PROGRESS NOTES
[facility]  Louisa Rodas female   1940 83 y.o.  [id number]      Chief Complaint  Follow up left breast cancer.    History Of Present Illness  Louisa Rodas is a pleasant 83 y.o. 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 On 3/27/2024 Dr. Rhina Fields performed a left magseed localized partial mastectomy, final pathology 1.7 cm intermediate grade DCIS with negative margins. She has been referred to radiation oncology. She has no family history of breast cancer. She presents today to discuss endocrine therapy.   pTis    REPRODUCTIVE HISTORY:  menarche age 15, , first birth age 23, did not breastfeed, OCP's > 20 years, natural menopause age 56, HRT vaginally for 6 months, entirely fatty tissue    FAMILY CANCER HISTORY:   Father: Prostate Cancer  Brother x 3: Prostate Cancer    Review of Systems  Constitutional:  Negative for appetite change, fatigue, fever and unexpected weight change.   HENT:  Negative for ear pain, hearing loss, nosebleeds, sore throat and trouble swallowing.    Eyes:  Negative for discharge, itching and visual disturbance.   Breast: As stated in HPI.  Respiratory:  Negative for cough, chest tightness and shortness of breath.    Cardiovascular:  Negative for chest pain, palpitations and leg swelling.   Gastrointestinal:  Negative for abdominal pain, constipation, diarrhea and nausea.   Endocrine: Negative for cold intolerance and heat intolerance.   Genitourinary:  Negative for dysuria, frequency, hematuria, pelvic pain and vaginal bleeding.   Musculoskeletal:  Negative for arthralgias, back pain, gait problem, joint swelling and myalgias.   Skin:  Negative for color change and rash.   Allergic/Immunologic: Negative for environmental allergies and food allergies.   Neurological:  Negative for dizziness, tremors, speech difficulty, weakness, numbness and headaches.   Hematological:  Does not bruise/bleed easily.   Psychiatric/Behavioral:  Negative  for agitation, dysphoric mood and sleep disturbance. The patient is not nervous/anxious.       Past Medical History  She has a past medical history of Acid reflux, Arthritis of hand, right, degenerative, Cervical spondylosis without myelopathy, Chronic kidney disease, stage 3a (Multi), DM (diabetes mellitus), type 2 (Multi), Ductal carcinoma in situ (DCIS) of left breast, Essential tremor, Fibromyalgia, H/O echocardiogram (05/25/2023), History of hand surgery, Hyperlipidemia, Hypermetropia, bilateral, Hypertension, Hypothyroidism, Ischemic optic neuropathy, bilateral, Meibomian gland dysfunction right eye, upper and lower eyelids, Near syncope, NPH (normal pressure hydrocephalus) (Multi), Obesity, Obstructive sleep apnea on CPAP, Osteoarthritis, Peripheral motor neuropathy, PONV (postoperative nausea and vomiting), Primary open-angle glaucoma, bilateral, indeterminate stage, Sjogren syndrome, unspecified (Multi), Unspecified cirrhosis of liver (Multi), and Vocal cord dysfunction.    Surgical History  She has a past surgical history that includes Rotator cuff repair; Knee arthroscopy w/ debridement (Right); MR angio head wo IV contrast (06/28/2021); MR angio neck wo IV contrast (06/28/2021); Cataract extraction, bilateral; IR ablation nerve; Esophagogastroduodenoscopy (06/2019); Colonoscopy (04/05/2018); Tonsillectomy; Total knee arthroplasty (Left); and Hand surgery (Bilateral).    Family History  Cancer-related family history includes Cancer in her brother and father.     Social History  She reports that she quit smoking about 57 years ago. Her smoking use included cigarettes. She started smoking about 58 years ago. She has a 0.3 pack-year smoking history. She has been exposed to tobacco smoke. She has never used smokeless tobacco. She reports that she does not currently use alcohol. She reports that she does not use drugs.    Allergies  Pregabalin, Penicillins, Primidone, and Tizanidine    Medications  Current  Outpatient Medications   Medication Instructions    acyclovir (ZOVIRAX) 400 mg, oral, Daily    allantoin gel Topical, As needed, Contains cannibus    amLODIPine (NORVASC) 2.5 mg, oral, Daily    atorvastatin (Lipitor) 20 mg tablet 1 tablet, oral, 3 times weekly    azelastine (Astelin) 137 mcg (0.1 %) nasal spray 2 sprays, Each Nostril, 2 times daily PRN, Use in each nostril as directed    budesonide-formoteroL (Symbicort) 160-4.5 mcg/actuation inhaler 2 puffs, inhalation, Daily, RINSE MOUTH AFTER USE.    carboxymethylcellulose (Refresh Celluvisc) 1 % ophthalmic solution dropperette ophthalmic (eye), 4 times daily    cholecalciferol (VITAMIN D-3) 50 mcg, oral, Daily    fluocinolone and shower cap 0.01 % oil Apply to scalp  leave on overnight and wash off in the morning. Use daily    hydroxychloroquine (Plaquenil) 200 mg tablet TAKE 1 TABLET BY MOUTH WITH FOOD OR MILK ONCE A DAY    ketoconazole (NIZOral) 2 % shampoo No dose, route, or frequency recorded.    latanoprost, PF, 0.005 % drops 1 drop, Both Eyes, Nightly    [id number]-algal oil (Metanx, algal oil,) 3 mg-35 mg-2 mg -90.314 mg capsule 1 capsule, oral, As needed    levothyroxine (SYNTHROID, LEVOXYL) 100 mcg, oral, Daily, Five days per week (takes 112 mcgs 2 days per week)    lisinopril 20 mg, oral, Daily    medical cannabis 1 each, oral, As needed    Miebo 100 % drops 1 drop, ophthalmic (eye), 4 times daily    montelukast (Singulair) 10 mg tablet 1 tablet, oral, Daily    OneTouch Verio test strips strip TEST BLOOD SUGAR ONCE A DAY    propranolol (INDERAL) 10 mg, oral, Daily    sulfamethoxazole-trimethoprim (Bactrim DS) 800-160 mg tablet 1 tablet, oral, 2 times daily, Takes daily not bid       Last Recorded Vitals  Vitals:    04/22/24 1408   BP: 122/67   Pulse: 77   Resp: 18   Temp: 36.2 °C (97.2 °F)   SpO2: 92%       Physical Exam  Chest:          Patient is alert and oriented x3 and in a relaxed and appropriate mood. Her gait is steady and hand grasps  are equal. Sclera is clear. The breasts are nearly symmetrical. Left breast has a well healing partial mastectomy incision. The tissue is soft without palpable abnormalities, discrete nodules or masses. The skin and nipples appear normal.      Visit Diagnosis  1. Encounter for follow-up surveillance of breast cancer          Assessment/Plan  Breast cancer surveillance, normal clinical exam, history left breast cancer, s/p left partial mastectomy, no family history of breast cancer, entirely fatty tissue    Plan:  Return February 2025 for bilateral diagnostic mammogram and office visit with Dr. Fields. Discussed and recommend endocrine therapy with Tamoxifen. She is aware of the risks and side effects of the medication and is unsure if she wants to take it. She has an appointment with rad/onc early May and states she will wait to decide until after that visit and call the office.    Patient Discussion/Summary  Your clinical examination is normal. Please return in February 2025 for mammogram and office visit or sooner if you have any problems or concerns.         IMPORTANT INFORMATION REGARDING YOUR RESULTS    If you receive medical information from My [facility] Personal Health Record (online chart) your results will be released into your chart. This means you may view or see results of your biopsy or procedure before I contact you directly. If this occurs, please call the office and we will discuss your results over the phone.    You can see your health information, review clinical summaries from office visits & test results online when you follow your health with MY  Chart, a personal health record. To sign up go to [email or web address]. If you need assistance with signing up or trouble getting into your account call [employer] Patient Line 24/7 at [phone].    My office phone number is [phone]  if you need to get in touch with me or have additional questions or concerns. Thank you for choosing [facility] and trusting me as your healthcare provider. I look forward to seeing you again at your next office visit. I am honored to be a provider on your health care team and I remain dedicated to helping you achieve your health goals.       Ammy Boles, JEAN MARIE-CNP

## 2024-04-22 ENCOUNTER — OFFICE VISIT (OUTPATIENT)
Dept: SURGICAL ONCOLOGY | Facility: HOSPITAL | Age: 84
End: 2024-04-22
Payer: MEDICARE

## 2024-04-22 VITALS
WEIGHT: 174.6 LBS | TEMPERATURE: 97.2 F | OXYGEN SATURATION: 92 % | BODY MASS INDEX: 30.37 KG/M2 | SYSTOLIC BLOOD PRESSURE: 122 MMHG | HEART RATE: 77 BPM | DIASTOLIC BLOOD PRESSURE: 67 MMHG | RESPIRATION RATE: 18 BRPM

## 2024-04-22 DIAGNOSIS — Z08 ENCOUNTER FOR FOLLOW-UP SURVEILLANCE OF BREAST CANCER: Primary | ICD-10-CM

## 2024-04-22 DIAGNOSIS — Z85.3 ENCOUNTER FOR FOLLOW-UP SURVEILLANCE OF BREAST CANCER: Primary | ICD-10-CM

## 2024-04-22 PROCEDURE — 3074F SYST BP LT 130 MM HG: CPT | Performed by: NURSE PRACTITIONER

## 2024-04-22 PROCEDURE — 1160F RVW MEDS BY RX/DR IN RCRD: CPT | Performed by: NURSE PRACTITIONER

## 2024-04-22 PROCEDURE — 1159F MED LIST DOCD IN RCRD: CPT | Performed by: NURSE PRACTITIONER

## 2024-04-22 PROCEDURE — 3078F DIAST BP <80 MM HG: CPT | Performed by: NURSE PRACTITIONER

## 2024-04-22 PROCEDURE — 99214 OFFICE O/P EST MOD 30 MIN: CPT | Performed by: NURSE PRACTITIONER

## 2024-04-22 PROCEDURE — 1126F AMNT PAIN NOTED NONE PRSNT: CPT | Performed by: NURSE PRACTITIONER

## 2024-04-22 ASSESSMENT — PAIN SCALES - GENERAL: PAINLEVEL: 0-NO PAIN

## 2024-04-24 ENCOUNTER — OFFICE VISIT (OUTPATIENT)
Dept: RHEUMATOLOGY | Facility: CLINIC | Age: 84
End: 2024-04-24
Payer: MEDICARE

## 2024-04-24 VITALS
WEIGHT: 173 LBS | BODY MASS INDEX: 30.65 KG/M2 | HEART RATE: 73 BPM | HEIGHT: 63 IN | DIASTOLIC BLOOD PRESSURE: 73 MMHG | OXYGEN SATURATION: 96 % | SYSTOLIC BLOOD PRESSURE: 126 MMHG

## 2024-04-24 DIAGNOSIS — M35.01 SICCA SYNDROME WITH KERATOCONJUNCTIVITIS (MULTI): Primary | ICD-10-CM

## 2024-04-24 PROCEDURE — 1159F MED LIST DOCD IN RCRD: CPT | Performed by: INTERNAL MEDICINE

## 2024-04-24 PROCEDURE — 3074F SYST BP LT 130 MM HG: CPT | Performed by: INTERNAL MEDICINE

## 2024-04-24 PROCEDURE — 1160F RVW MEDS BY RX/DR IN RCRD: CPT | Performed by: INTERNAL MEDICINE

## 2024-04-24 PROCEDURE — 99214 OFFICE O/P EST MOD 30 MIN: CPT | Performed by: INTERNAL MEDICINE

## 2024-04-24 PROCEDURE — 3078F DIAST BP <80 MM HG: CPT | Performed by: INTERNAL MEDICINE

## 2024-04-24 PROCEDURE — 1125F AMNT PAIN NOTED PAIN PRSNT: CPT | Performed by: INTERNAL MEDICINE

## 2024-04-24 PROCEDURE — 1036F TOBACCO NON-USER: CPT | Performed by: INTERNAL MEDICINE

## 2024-04-24 ASSESSMENT — ROUTINE ASSESSMENT OF PATIENT INDEX DATA (RAPID3)
ON A SCALE OF ONE TO TEN, HOW MUCH PAIN HAVE YOU HAD BECAUSE OF YOUR CONDITION OVER THE PAST WEEK?: 8
TOTAL RAPID3 SCORE: 20.7
WEIGHTED_TOTAL_SCORE: 6.9
WALK_FLAT_GROUND: WITH MUCH DIFFICULTY
WASH_DRY_BODY: WITH SOME DIFFICULTY
ON A SCALE OF ONE TO TEN, CONSIDERING ALL THE WAYS IN WHICH ILLNESS AND HEALTH CONDITIONS MAY AFFECT YOU AT THIS TIME, PLEASE INDICATE BELOW HOW YOU ARE DOING:: 8
LIFT_CUP_TO_MOUTH: WITHOUT ANY DIFFICULTY
SEVERITY_SCORE: 0
PARTIPATE_RECREATIONAL_ACTIVITIES: UNABLE TO DO
PICK_CLOTHES_OFF_FLOOR: WITH SOME DIFFICULTY
FEELINGS_ANXIETY_NERVOUS: WITHOUT ANY DIFFICULTY
TURN_FAUCETS_OFF: WITHOUT ANY DIFFICULTY
IN_OUT_TRANSPORT: WITH MUCH DIFFICULTY
SUM OF QUESTIONS A TO J: 14
WALK_KILOMETERS: UNABLE TO DO
ON A SCALE OF ONE TO TEN, CONSIDERING ALL THE WAYS IN WHICH ILLNESS AND HEALTH CONDITIONS MAY AFFECT YOU AT THIS TIME, PLEASE INDICATE BELOW HOW YOU ARE DOING:: 8
DRESS_YOURSELF: WITH SOME DIFFICULTY
IN_OUT_BED: WITH SOME DIFFICULTY
FEELINGS_DEPRESSION: WITHOUT ANY DIFFICULTY
GOOD_NIGHTS_SLEEP: WITH SOME DIFFICULTY
SEVERITY_SCORE: HIGH SEVERITY (HS)
ON A SCALE OF ONE TO TEN, HOW MUCH PAIN HAVE YOU HAD BECAUSE OF YOUR CONDITION OVER THE PAST WEEK?: 8
FN_SCORE: 4.7

## 2024-04-24 ASSESSMENT — PAIN - FUNCTIONAL ASSESSMENT: PAIN_FUNCTIONAL_ASSESSMENT: 0-10

## 2024-04-24 ASSESSMENT — PATIENT HEALTH QUESTIONNAIRE - PHQ9: 1. LITTLE INTEREST OR PLEASURE IN DOING THINGS: SEVERAL DAYS

## 2024-04-24 ASSESSMENT — ENCOUNTER SYMPTOMS
OCCASIONAL FEELINGS OF UNSTEADINESS: 1
LOSS OF SENSATION IN FEET: 0
DEPRESSION: 0

## 2024-04-24 ASSESSMENT — JOINT PAIN
TOTAL NUMBER OF SWOLLEN JOINTS: 0
TOTAL NUMBER OF TENDER JOINTS: 4
TOTAL NUMBER OF TENDER JOINTS: 8

## 2024-04-24 ASSESSMENT — PAIN SCALES - GENERAL: PAINLEVEL_OUTOF10: 7

## 2024-04-24 NOTE — PROGRESS NOTES
"Chief Complaint:    This 83 y.o. female presents with the chief complaint of Sjogren disease (SD).     Subjective:   HPI   Problem:  1: Sjogren disease      The patient returns for ongoing evaluation and management of SD.      The patient did not start PILOCARPINE 5 mg/day, although she did  the Rx. She states she is already on too many medications. However, she then states she will rethink the PILOCARPINE because of its beneficial effect in dry mucosal surface disorders.      The patient continues with her daily treatment with HYDROXYCHLOROQUINE 200 mg. She denies any adverse drug effects.      Recently, the patient began to experience pain in her feet. Pain is characterized as \"stabbing\", and involves the mid- and forefeet bilaterally. She denies symptoms of the distal legs, however. The patient visited a podiatrist, who informed the patient she has neuropathy. She was prescribed MENTANX, a polyvitamin she is using daily.        The patient also uses a THC product, RESTORE, which has 2000 mg that she rolls on her body \"wherever there is pain\".         Review of Systems   All other systems reviewed and are negative.    Objective:   /73   Pulse 73   Ht 1.6 m (5' 3\")   Wt 78.5 kg (173 lb)   SpO2 96%   BMI 30.65 kg/m²      Physical Exam  Vitals and nursing note reviewed.   Constitutional:       General: She is not in acute distress.     Appearance: Normal appearance. She is normal weight. She is not ill-appearing.   HENT:      Head: Normocephalic.   Eyes:      Extraocular Movements: Extraocular movements intact.      Conjunctiva/sclera: Conjunctivae normal.      Pupils: Pupils are equal, round, and reactive to light.      Comments: Mild dryness bilaterally to inspection.   Neck:      Vascular: No carotid bruit.   Cardiovascular:      Rate and Rhythm: Normal rate and regular rhythm.      Pulses: Normal pulses.      Heart sounds: Normal heart sounds. No murmur heard.  Pulmonary:      Effort: Pulmonary " effort is normal. No respiratory distress.      Breath sounds: Normal breath sounds. No wheezing, rhonchi or rales.   Abdominal:      General: Abdomen is flat. Bowel sounds are normal. There is no distension.      Palpations: There is no mass.   Musculoskeletal:      Cervical back: Normal range of motion and neck supple. No rigidity or tenderness.   Lymphadenopathy:      Cervical: No cervical adenopathy.   Neurological:      Mental Status: She is alert.           Right knee: mild synovitis w/o effusion. Left knee: increased mediolateral  Instability, neg ant drawer sign. Hands: mild PIP joint tenderness w/o synovitis;  Ankles: tenderness to palpation w/o synovitis.   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    Plan:    SD: I  have urged the patient to try PILOCARPINE, since she reports being very dry.         Once again, I reviewed the indications, risks/reward, and potential adverse effects. She         agrees to try its use. I recommend continuation of  mg/day. An HCQ level is         physiologic.  2.    Return to office in six months.           Rakesh Verma MD

## 2024-04-30 PROBLEM — L98.9 SKIN LESION: Status: ACTIVE | Noted: 2024-04-30

## 2024-04-30 PROBLEM — J45.20 MILD INTERMITTENT ASTHMA WITHOUT COMPLICATION (HHS-HCC): Status: ACTIVE | Noted: 2024-04-30

## 2024-04-30 PROBLEM — N18.31 STAGE 3A CHRONIC KIDNEY DISEASE (MULTI): Status: ACTIVE | Noted: 2024-04-30

## 2024-04-30 PROBLEM — R92.8 ABNORMAL FINDING ON BREAST IMAGING: Status: RESOLVED | Noted: 2024-02-26 | Resolved: 2024-04-30

## 2024-04-30 PROBLEM — N18.9 CHRONIC RENAL INSUFFICIENCY: Status: RESOLVED | Noted: 2024-03-27 | Resolved: 2024-04-30

## 2024-04-30 NOTE — ASSESSMENT & PLAN NOTE
Likely multifactorial  Using CPAP for EMNDY  Taking Gummie bear for sleep  Discussed, Normal CMP CBC 3/2024 and TSH normal 1.1 end of January

## 2024-04-30 NOTE — ASSESSMENT & PLAN NOTE
Said BP is up today because her  has been irritating her  Recommend home BP monitoring  On Lisinopril 20 mg daily and amlodipine 2.5 mg   Recommended increasing either lisinopril or amlodipine dose  Has home BP monitor, she will monitor and let me know if home BP above 140/90 so I may adjust her medication

## 2024-05-03 ENCOUNTER — TELEPHONE (OUTPATIENT)
Dept: RADIATION ONCOLOGY | Facility: HOSPITAL | Age: 84
End: 2024-05-03
Payer: MEDICARE

## 2024-05-03 ENCOUNTER — TELEPHONE (OUTPATIENT)
Dept: PRIMARY CARE | Facility: CLINIC | Age: 84
End: 2024-05-03
Payer: MEDICARE

## 2024-05-03 ENCOUNTER — TELEMEDICINE CLINICAL SUPPORT (OUTPATIENT)
Dept: PREADMISSION TESTING | Facility: HOSPITAL | Age: 84
End: 2024-05-03
Payer: MEDICARE

## 2024-05-03 DIAGNOSIS — G89.29 CHRONIC PAIN OF RIGHT KNEE: ICD-10-CM

## 2024-05-03 DIAGNOSIS — M25.561 CHRONIC PAIN OF RIGHT KNEE: ICD-10-CM

## 2024-05-03 DIAGNOSIS — M17.11 UNILATERAL PRIMARY OSTEOARTHRITIS, RIGHT KNEE: ICD-10-CM

## 2024-05-03 DIAGNOSIS — M19.90 OSTEOARTHRITIS, UNSPECIFIED OSTEOARTHRITIS TYPE, UNSPECIFIED SITE: ICD-10-CM

## 2024-05-03 DIAGNOSIS — G91.2 NPH (NORMAL PRESSURE HYDROCEPHALUS) (MULTI): ICD-10-CM

## 2024-05-03 RX ORDER — POVIDONE 50 MG/10ML
1 SOLUTION/ DROPS OPHTHALMIC 3 TIMES DAILY PRN
COMMUNITY

## 2024-05-03 RX ORDER — PILOCARPINE HYDROCHLORIDE 5 MG/1
5 TABLET, FILM COATED ORAL NIGHTLY
COMMUNITY

## 2024-05-03 RX ORDER — MULTIVITAMIN/IRON/FOLIC ACID 18MG-0.4MG
1 TABLET ORAL DAILY
COMMUNITY

## 2024-05-03 NOTE — PROGRESS NOTES
Patient called and states for about a week she is very tired and has difficulty doing her daily activities due to fatigue. States she has occasional shortness of breath with activities. Denies any chest pain or dizziness. Pt. Questioning if she could be anemic and states iron causes constipation. Patient also c/o lower back pain and is using a cannibis roll on which helps some and questioning if you have any other suggestions. Pt is scheduled for preadmission testing on May 09 for a knee replacement. Pt states she never turned in her handicap placard and states it was for a year and would like a new one.

## 2024-05-03 NOTE — PROGRESS NOTES
Staff Physician: Alon Llamas MD, PhD  Referring Physician: Rhina Fields MD  Date of Service: 5/6/2024    RADIATION ONCOLOGY CONSULT NOTE    IDENTIFYING DATA:   Cancer Staging   Ductal carcinoma in situ (DCIS) of left breast  Staging form: Breast, AJCC 8th Edition  - Clinical stage from 3/8/2024: Stage 0 (cTis (DCIS), cN0, cM0, G2, ER+, ME: Unknown, HER2: Unknown) - Signed by Rhina Fields MD on 3/8/2024  - Pathologic stage from 4/12/2024: Stage 0 (pTis (DCIS), pN0, cM0, G2, ER+, ME: Not Assessed, HER2: Not Assessed) - Signed by Rhina Fields MD on 4/12/2024    Problem List Items Addressed This Visit       Cervical spondylosis without myelopathy    Lumbar radiculopathy    MENDY on CPAP    Shortness of breath at rest    Polycystic liver disease    Hyperlipidemia    Hypertensive heart disease without heart failure    Type 2 diabetes mellitus with stage 3b chronic kidney disease, without long-term current use of insulin (Multi)    Peripheral motor neuropathy    HTN (hypertension)    Weakness    Menopause    Obesity, Class I, BMI 30-34.9    Ductal carcinoma in situ (DCIS) of left breast - Primary    Relevant Orders    Referral to Radiation Oncology    Rad Onc Intent to Treat    Unilateral primary osteoarthritis, right knee    Stage 3a chronic kidney disease (Multi)       HISTORY OF PRESENT ILLNESS:    Ms. Louisa Rodas is a 83 y.o.-year-old with multiple medical comorbidities who now presents with LEFT breast 1.7 cm of intermediate grade DCIS with comedonecrosis and negative surgical margins (>2 mm), ER >95%, pTis NX stage 0    Her brief oncologic history is as follows:  February 5, 2024: Bilateral screening mammogram showed fatty breast tissue, no suspicious abnormalities in the right breast.  She had left breast calcifications, BI-RADS Category 0    February 12, 2024: Left breast diagnostic imaging showed indeterminate pleomorphic linear calcifications measuring 2.1 cm, BI-RADS Category 4.  Biopsy was  recommended.    February 26, 2024: Left breast stereotactic biopsy of the calcifications showed intermediate grade DCIS, ER greater than 95% positive, residual calcifications.    She was seen by Dr. Анна Fields to discuss surgical options and elected to proceed with a lumpectomy without node sampling.    March 27, 2024: Patient underwent a lumpectomy of the left breast which showed 1.7 cm of intermediate grade DCIS with comedonecrosis and negative surgical margins (>2 mm), ER >95%, pTis NX stage 0    Today she presents to discuss the role of adjuvant radiation therapy.  She reports she is healing well from her surgery.  She denies fever, chills, infection, bone pain, shortness of breath.  She has an upcoming right total knee replacement on May 28.  She is otherwise without complaint at this time.    PAST MEDICAL HISTORY:  Past Medical History:   Diagnosis Date    Ast[facility] ([facility])     Cervical spondylosis without myelopathy     Chronic kidney disease, stage 3a (Multi)     3.21.24: creat 1.43, GFR 37    DM (diabetes mellitus), type 2 (Multi)     2/20/2024 A1C 7.2%    Ductal carcinoma in situ (DCIS) of left breast     s/p lumpectomy 3/27/24    Essential tremor     Fatigue     Fibromyalgia     H/O echocardiogram 05/25/2023    CONCLUSIONS: 1. Left ventricular systolic function is hyperdynamic with a 70-75% estimated EF. 2. Spectral Doppler shows an impaired relaxation pattern of left ventricular diastolic filling. 3. Mild aortic valve regurgitation. 4. Left ventricular cavity size is decreased. 5. There is a mid cavity left ventricular obstruction noted w/ the Valsalva maneuver. The provoked gradient is 28 mmHg.    Hyperlipidemia     Hypermetropia, bilateral     Hyperopia of both eyes with astigmatism and presbyopia    Hypertension     Hypogammaglobulinemia (Multi)     Hypothyroidism     Ischemic optic neuropathy, bilateral     Ischemic optic neuropathy, bilateral    Meibomian gland dysfunction right eye, upper and lower  eyelids     Meibomian gland dysfunction (MGD) of upper and lower lids of both eyes    Near syncope     NPH (normal pressure hydrocephalus) (Multi)     MILD TO MODERATE, brain MRI 5/4/23:mild enlargement of ventricles, mild diffuse cerebral atrophy    Obesity     Obstructive sleep apnea on CPAP     Osteoarthritis     Peripheral motor neuropathy     Polycystic liver disease     follows with hepatology yearly    PONV (postoperative nausea and vomiting)     Primary open-angle glaucoma, bilateral, indeterminate stage     Sciatica     Sjogren syndrome, unspecified (Multi)     Vocal cord dysfunction      PAST SURGICAL HISTORY:  Past Surgical History:   Procedure Laterality Date    BREAST LUMPECTOMY Left 03/27/2024    CATARACT EXTRACTION, BILATERAL      COLONOSCOPY  04/05/2018    tubular adenoma    ESOPHAGOGASTRODUODENOSCOPY  06/2019    HAND SURGERY Bilateral     bone removed right wrist, cyst removed left wrist    IR ABLATION NERVE      KNEE ARTHROSCOPY W/ DEBRIDEMENT Right     MR HEAD ANGIO WO IV CONTRAST  06/28/2021    MR HEAD ANGIO WO IV CONTRAST 6/28/2021 [facility]    MR NECK ANGIO WO IV CONTRAST  06/28/2021    MR NECK ANGIO WO IV CONTRAST 6/28/2021 [facility]    ROTATOR CUFF REPAIR      TONSILLECTOMY      TOTAL KNEE ARTHROPLASTY Left      PAST GYNECOLOGIC HISTORY: G5, P3, menarche at age 15, first live birth at age 23, she did not breast-feed, she took oral contraceptive therapy for over 20 years, she underwent menopause at age 56, she took vaginal hormone replacement therapy for approximately 6 months.    ALLERGIES:  Allergies   Allergen Reactions    Pregabalin Dizziness    Penicillins Hives and Itching    Primidone Other     Stuttering     Tizanidine Other     Stuttering      MEDICATIONS:    Current Outpatient Medications:     acyclovir (Zovirax) 400 mg tablet, Take 1 tablet (400 mg) by mouth once daily., Disp: 30 tablet, Rfl: 3    allantoin gel, Apply topically if needed for wound care.  Contains cannibus, Disp: , Rfl:     amLODIPine (Norvasc) 2.5 mg tablet, Take 1 tablet (2.5 mg) by mouth once daily., Disp: 30 tablet, Rfl: 5    atorvastatin (Lipitor) 20 mg tablet, Take 1 tablet (20 mg) by mouth 3 (three) times a week., Disp: , Rfl:     azelastine (Astelin) 137 mcg (0.1 %) nasal spray, Administer 2 sprays into each nostril 2 times a day as needed for allergies. Use in each nostril as directed, Disp: , Rfl:     b complex 0.4 mg tablet, Take 1 tablet by mouth once daily., Disp: , Rfl:     budesonide-formoteroL (Symbicort) 160-4.5 mcg/actuation inhaler, Inhale 2 puffs once daily. RINSE MOUTH AFTER USE. (Patient taking differently: Inhale 1 puff once daily. RINSE MOUTH AFTER USE.), Disp: 10.2 g, Rfl: 1    carboxymethylcellulose (Refresh Celluvisc) 1 % ophthalmic solution dropperette, Administer into affected eye(s) 4 times a day., Disp: , Rfl:     cholecalciferol (Vitamin D-3) 50 MCG (2000 UT) tablet, Take 1 tablet (50 mcg) by mouth once daily., Disp: , Rfl:     fluocinolone and shower cap 0.01 % oil, Apply to scalp  leave on overnight and wash off in the morning. Use daily, Disp: , Rfl:     hydroxychloroquine (Plaquenil) 200 mg tablet, TAKE 1 TABLET BY MOUTH WITH FOOD OR MILK ONCE A DAY, Disp: 90 tablet, Rfl: 1    ketoconazole (NIZOral) 2 % shampoo, , Disp: , Rfl:     latanoprost, PF, 0.005 % drops, Administer 1 drop into both eyes once daily at bedtime., Disp: 7.5 mL, Rfl: 11    levothyroxine (Synthroid, Levoxyl) 100 mcg tablet, Take 1 tablet (100 mcg) by mouth once daily. Five days per week (takes 112 mcgs 2 days per week) (Patient taking differently: Take 1 tablet (100 mcg) by mouth once daily. 6 days per week), Disp: 90 tablet, Rfl: 0    lisinopril 20 mg tablet, Take 1 tablet (20 mg) by mouth once daily., Disp: 90 tablet, Rfl: 1    medical cannabis, Take 1 each by mouth if needed., Disp: , Rfl:     Miebo 100 % drops, Administer 1 drop into affected eye(s) 4 times a day. (Patient not taking:  Reported on 2024), Disp: 3 mL, Rfl: 5    montelukast (Singulair) 10 mg tablet, Take 1 tablet (10 mg) by mouth once daily., Disp: , Rfl:     OneTouch Verio test strips strip, TEST BLOOD SUGAR ONCE A DAY, Disp: , Rfl:     pilocarpine (Salagen) 5 mg tablet, Take 1 tablet (5 mg) by mouth once daily at bedtime., Disp: , Rfl:     povidone, PF, (iVizia, PF,) 0.5 % drops, Administer 1 drop into affected eye(s) 3 times a day as needed., Disp: , Rfl:     propranolol (Inderal) 10 mg tablet, Take 1 tablet (10 mg) by mouth once daily., Disp: 90 tablet, Rfl: 0    sulfamethoxazole-trimethoprim (Bactrim DS) 800-160 mg tablet, Take 1 tablet by mouth once daily. Takes daily not bid, Disp: , Rfl:    SOCIAL HISTORY:  Social History     Tobacco Use    Smoking status: Former     Current packs/day: 0.00     Average packs/day: 0.3 packs/day for 1 year (0.3 ttl pk-yrs)     Types: Cigarettes     Start date: 1966     Quit date: 1967     Years since quittin.3     Passive exposure: Past    Smokeless tobacco: Never   Substance Use Topics    Alcohol use: Not Currently     FAMILY HISTORY:  Family History   Problem Relation Name Age of Onset    Alzheimer's disease Mother      Arthritis Mother      Cataracts Mother      Glaucoma Mother      Hypertension Mother      Cancer Father          pancreatic    Other (diabetes mellitus) Father      Cancer Brother          pancreatic    Other (diabetes mellitus) Brother      Other (diabetes mellitus) Son      Other (diabetes mellitus) Sibling     Per report the patient's father and 3 brothers have prostate cancer.    REVIEW OF SYSTEMS:  Review of systems and pain noted in the nursing note that accompanies this encounter.    RADIATION SCREENING QUESTIONS:  Prior radiation therapy: No  Pacemaker: No  Other implantable devices: No  Connective tissue disease: No    PERFORMANCE STATUS:  Karnofsky Performance Score/ECO, Fully active, able to carry on all pre-disease performed without  "restriction (ECOG equivalent 0)    PHYSICAL EXAMINATION:  /74   Temp 36.6 °C (97.9 °F) (Skin)   Resp 18   Ht 1.6 m (5' 3\")   Wt 79.9 kg (176 lb 4.1 oz)   SpO2 97%   BMI 31.22 kg/m²   Pain score: 0/10  General: no acute distress, engaged in conversation.   HEENT: Normocephalic, atraumatic. Extraocular movements are intact.   Neck: supple with trachea at midline, no palpable adenopathy.   Pulmonary: Breathing comfortably on room air, no respiratory distress  Cardiovascular: Regular rate, no cyanosis, well-perfused  Abdomen: Soft, nontender, nondistended.   Extremities: No lower extremity edema or cyanosis. Normal range of motion.  Skin: Without rash or obvious lesions.   Musculoskeletal: Normal range of motion. Able to raise both arms above head without issues  Neurologic: Alert and oriented x3. Cranial nerves grossly intact.   Breast: Bilateral breast examination reveals no suspicious masses or adenopathy.  Left breast with a well-healed incision with no seroma.  Mild bruising noted.    LABORATORY AND IMAGING DATA:  Imaging: All imaging was personally reviewed and interpreted in clinic. Findings as per HPI and EMR.    Laboratory/Pathology:  All pertinent labs and pathology were personally reviewed and interpreted in clinic. Findings as per HPI and EMR.  DCIS OF THE BREAST: RESECTION - A  SPECIMEN   Procedure  Excision (less than total mastectomy)   Specimen Laterality  Left   TUMOR   Tumor Site  Not specified   Histologic Type  Ductal carcinoma in situ   Size (Extent) of DCIS  Estimated size (extent) of DCIS is at least (Millimeters): 17 mm   Number of Blocks with DCIS  5   Number of Blocks Examined  21   Architectural Patterns  Cribriform   Nuclear Grade  Grade II (intermediate)   Necrosis  Present, central (expansive \"comedo\" necrosis)   Microcalcifications  Present in DCIS   MARGINS   Margin Status  All margins negative for DCIS   Distance from DCIS to Closest Margin  Greater than: 2 mm mm   REGIONAL " LYMPH NODES   Regional Lymph Node Status  Not applicable (no regional lymph nodes submitted or found)   PATHOLOGIC STAGE CLASSIFICATION (pTNM, AJCC 8th Edition)   Reporting of pT, pN, and (when applicable) pM categories is based on information available to the pathologist at the time the report is issued. As per the AJCC (Chapter 1, 8th Ed.) it is the managing physician’s responsibility to establish the final pathologic stage based upon all pertinent information, including but potentially not limited to this pathology report.   pT Category  pTis (DCIS)   pN Category  pN not assigned (no nodes submitted or found)   ADDITIONAL FINDINGS   Additional Findings  Prior biopsy site changes, microcysts with apocrine changes, fibroadenomatoid change   Breast Biomarker Testing Performed on Previous Biopsy     Estrogen Receptor (ER) Status  Positive   Percentage of Cells with Nuclear Positivity  >95 %     IMPRESSION:  Ms. Louisa Rodas is a 83 y.o.-year-old with multiple medical comorbidities who now presents with LEFT breast 1.7 cm of intermediate grade DCIS with comedonecrosis and negative surgical margins (>2 mm), ER >95%, pTis NX stage 0    The patient is doing well >6 weeks out from her lumpectomy, with well-healing incision, no significant seroma or lumpectomy retraction, and no other suspicious findings in the breast.     The diagnosis, natural history, treatment options, and prognosis were discussed. The relevant imaging and pathology were reviewed in detail. We discussed the data for adjuvant radiation in DCIS. We discussed with the patient that even after a lumpectomy with negative margins, there is a risk of recurrent DCIS or invasive carcinoma within the  breast. We discussed that her risk of recurrence at twelve years is approximately 12-14% OR 1%/year for low/int, 25% OR 2%/year for high grade based on a single arm ECOG observational study, with approximately half of these recurrences being invasive. Radiation can  decrease her overall risk of recurrence by approximately 50%, although with no proven overall survival benefit. We discussed that the risks/benefits of treatment should be weighed with the risk of recurrence, and that patient preference plays a role in the decision-making process.    We discussed the possibility of conventional fractionation versus hypofractionated radiation. We discussed the randomized trials available in the invasive setting, and discussed the recent TROG 07.01/BIG 3-07 DCIS randomized trial showing clear benefit for hypofractionated RT.    We also discussed the role of accelerated partial breast irradiation (APBI).  We discussed the recently published CRYSTAL consensus guidelines for APBI.  In women with DCIS, APBI is considered an option (though without randomized trial evidence) for patients with low to intermediate grade DCIS, age over the age of 40, and size less than 2 cm.  Is conditionally recommended for patients with high-grade DCIS or size between 2 and 3 cm, and again is not recommended for patients with positive surgical margins, known germline BRCA1/2 mutations, or age less than 40 years old.  We discussed that when APBI is used the recommendation is for 30 Gray in 5 fractions using VMAT delivered every other day as per the Nina trial in [place].  We explained that there are other acceptable treatment alternatives including multi catheter brachytherapy and once daily radiation for 3 weeks but our preferred treatment for accelerated partial breast irradiation is a 5 fraction regimen delivered every other day, which optimally balances efficacy, toxicity, feasibility, practicality, and convenience.    Finally, we discussed the side effects and risk of WBRT, including the need for CT simulation, tattoo placement, and daily radiation (M-F) x 5 days. We discussed the side effects and risks of radiation, including fatigue, radiation dermatitis, and breast pain in the short-term; skin  fibrosis, pigmentation, chest wall pain, damage to underlying lung, rib, or heart, and secondary malignancies in the long-term. All questions were answered to the best of our ability and informed consent was obtained.    PLAN:  - CT simulation without ABC, supine, LEFT sided for APBI  - plan 30 Gy in 5 fraction every other day APBI  -Needs to have treatment completed before her total knee replacement on the right side on May 28th.    NCCN Guidelines were applicable to guide this patients treatment plan.     Alon Llamas MD PhD   Professor, Radiation Oncology  5/6/2024    Over 60 minutes was spent in evaluating, counseling, and examining the patient. More than 50% of that time was spent in face to face discussion.

## 2024-05-06 ENCOUNTER — HOSPITAL ENCOUNTER (OUTPATIENT)
Dept: RADIATION ONCOLOGY | Facility: HOSPITAL | Age: 84
Setting detail: RADIATION/ONCOLOGY SERIES
Discharge: HOME | End: 2024-05-06
Payer: MEDICARE

## 2024-05-06 ENCOUNTER — HOSPITAL ENCOUNTER (OUTPATIENT)
Dept: RADIOLOGY | Facility: EXTERNAL LOCATION | Age: 84
Discharge: HOME | End: 2024-05-06

## 2024-05-06 ENCOUNTER — APPOINTMENT (OUTPATIENT)
Dept: RADIATION ONCOLOGY | Facility: HOSPITAL | Age: 84
End: 2024-05-06
Payer: MEDICARE

## 2024-05-06 VITALS
WEIGHT: 176.26 LBS | BODY MASS INDEX: 31.23 KG/M2 | RESPIRATION RATE: 18 BRPM | DIASTOLIC BLOOD PRESSURE: 74 MMHG | SYSTOLIC BLOOD PRESSURE: 131 MMHG | TEMPERATURE: 97.9 F | OXYGEN SATURATION: 97 % | HEIGHT: 63 IN

## 2024-05-06 DIAGNOSIS — I11.9 HYPERTENSIVE HEART DISEASE WITHOUT HEART FAILURE: ICD-10-CM

## 2024-05-06 DIAGNOSIS — M54.16 LUMBAR RADICULOPATHY: ICD-10-CM

## 2024-05-06 DIAGNOSIS — I10 HYPERTENSION, UNSPECIFIED TYPE: ICD-10-CM

## 2024-05-06 DIAGNOSIS — G62.89 PERIPHERAL MOTOR NEUROPATHY: ICD-10-CM

## 2024-05-06 DIAGNOSIS — C50.412 MALIGNANT NEOPLASM OF UPPER-OUTER QUADRANT OF LEFT FEMALE BREAST, UNSPECIFIED ESTROGEN RECEPTOR STATUS (MULTI): Primary | ICD-10-CM

## 2024-05-06 DIAGNOSIS — R06.02 SHORTNESS OF BREATH AT REST: ICD-10-CM

## 2024-05-06 DIAGNOSIS — G47.33 OSA ON CPAP: ICD-10-CM

## 2024-05-06 DIAGNOSIS — C50.412 MALIGNANT NEOPLASM OF UPPER-OUTER QUADRANT OF LEFT FEMALE BREAST, UNSPECIFIED ESTROGEN RECEPTOR STATUS (MULTI): ICD-10-CM

## 2024-05-06 DIAGNOSIS — E78.5 HYPERLIPIDEMIA, UNSPECIFIED HYPERLIPIDEMIA TYPE: ICD-10-CM

## 2024-05-06 DIAGNOSIS — N18.31 STAGE 3A CHRONIC KIDNEY DISEASE (MULTI): ICD-10-CM

## 2024-05-06 DIAGNOSIS — R53.1 WEAKNESS: ICD-10-CM

## 2024-05-06 DIAGNOSIS — Z78.0 MENOPAUSE: ICD-10-CM

## 2024-05-06 DIAGNOSIS — M47.812 CERVICAL SPONDYLOSIS WITHOUT MYELOPATHY: ICD-10-CM

## 2024-05-06 DIAGNOSIS — E11.22 TYPE 2 DIABETES MELLITUS WITH STAGE 3B CHRONIC KIDNEY DISEASE, WITHOUT LONG-TERM CURRENT USE OF INSULIN (MULTI): ICD-10-CM

## 2024-05-06 DIAGNOSIS — E66.9 OBESITY, CLASS I, BMI 30-34.9: ICD-10-CM

## 2024-05-06 DIAGNOSIS — N18.32 TYPE 2 DIABETES MELLITUS WITH STAGE 3B CHRONIC KIDNEY DISEASE, WITHOUT LONG-TERM CURRENT USE OF INSULIN (MULTI): ICD-10-CM

## 2024-05-06 DIAGNOSIS — M17.11 UNILATERAL PRIMARY OSTEOARTHRITIS, RIGHT KNEE: ICD-10-CM

## 2024-05-06 DIAGNOSIS — D05.12 DUCTAL CARCINOMA IN SITU (DCIS) OF LEFT BREAST: Primary | ICD-10-CM

## 2024-05-06 DIAGNOSIS — Q44.6 POLYCYSTIC LIVER DISEASE: ICD-10-CM

## 2024-05-06 PROCEDURE — 77263 THER RADIOLOGY TX PLNG CPLX: CPT | Performed by: RADIOLOGY

## 2024-05-06 PROCEDURE — 77332 RADIATION TREATMENT AID(S): CPT | Performed by: RADIOLOGY

## 2024-05-06 PROCEDURE — 99215 OFFICE O/P EST HI 40 MIN: CPT | Performed by: RADIOLOGY

## 2024-05-06 PROCEDURE — 99205 OFFICE O/P NEW HI 60 MIN: CPT | Performed by: RADIOLOGY

## 2024-05-06 SDOH — ECONOMIC STABILITY: HOUSING INSECURITY
IN THE LAST 12 MONTHS, WAS THERE A TIME WHEN YOU DID NOT HAVE A STEADY PLACE TO SLEEP OR SLEPT IN A SHELTER (INCLUDING NOW)?: NO

## 2024-05-06 SDOH — HEALTH STABILITY: PHYSICAL HEALTH: ON AVERAGE, HOW MANY DAYS PER WEEK DO YOU ENGAGE IN MODERATE TO STRENUOUS EXERCISE (LIKE A BRISK WALK)?: 3 DAYS

## 2024-05-06 SDOH — ECONOMIC STABILITY: TRANSPORTATION INSECURITY
IN THE PAST 12 MONTHS, HAS THE LACK OF TRANSPORTATION KEPT YOU FROM MEDICAL APPOINTMENTS OR FROM GETTING MEDICATIONS?: NO

## 2024-05-06 SDOH — ECONOMIC STABILITY: INCOME INSECURITY: IN THE LAST 12 MONTHS, WAS THERE A TIME WHEN YOU WERE NOT ABLE TO PAY THE MORTGAGE OR RENT ON TIME?: NO

## 2024-05-06 SDOH — ECONOMIC STABILITY: GENERAL
WHICH OF THE FOLLOWING WOULD YOU LIKE TO GET CONNECTED TO IN ORDER TO RECEIVE A DISCOUNT OR FOR FREE? (CHOOSE ALL THAT APPLY): PATIENT UNABLE TO ANSWER

## 2024-05-06 SDOH — HEALTH STABILITY: PHYSICAL HEALTH: ON AVERAGE, HOW MANY MINUTES DO YOU ENGAGE IN EXERCISE AT THIS LEVEL?: 60 MIN

## 2024-05-06 SDOH — ECONOMIC STABILITY: TRANSPORTATION INSECURITY
IN THE PAST 12 MONTHS, HAS LACK OF TRANSPORTATION KEPT YOU FROM MEETINGS, WORK, OR FROM GETTING THINGS NEEDED FOR DAILY LIVING?: NO

## 2024-05-06 SDOH — ECONOMIC STABILITY: GENERAL
WHICH OF THE FOLLOWING DO YOU KNOW HOW TO USE AND HAVE ACCESS TO EVERY DAY? (CHOOSE ALL THAT APPLY): PATIENT UNABLE TO ANSWER

## 2024-05-06 ASSESSMENT — SOCIAL DETERMINANTS OF HEALTH (SDOH)
IN THE PAST 12 MONTHS, HAS THE ELECTRIC, GAS, OIL, OR WATER COMPANY THREATENED TO SHUT OFF SERVICE IN YOUR HOME?: NO
HOW OFTEN DO YOU ATTEND CHURCH OR RELIGIOUS SERVICES?: PATIENT UNABLE TO ANSWER
WITHIN THE LAST YEAR, HAVE YOU BEEN AFRAID OF YOUR PARTNER OR EX-PARTNER?: NO
HOW HARD IS IT FOR YOU TO PAY FOR THE VERY BASICS LIKE FOOD, HOUSING, MEDICAL CARE, AND HEATING?: NOT HARD AT ALL
HOW OFTEN DO YOU ATTENT MEETINGS OF THE CLUB OR ORGANIZATION YOU BELONG TO?: MORE THAN 4 TIMES PER YEAR
DO YOU BELONG TO ANY CLUBS OR ORGANIZATIONS SUCH AS CHURCH GROUPS UNIONS, FRATERNAL OR ATHLETIC GROUPS, OR SCHOOL GROUPS?: PATIENT UNABLE TO ANSWER
HOW OFTEN DO YOU GET TOGETHER WITH FRIENDS OR RELATIVES?: MORE THAN THREE TIMES A WEEK
IN A TYPICAL WEEK, HOW MANY TIMES DO YOU TALK ON THE PHONE WITH FAMILY, FRIENDS, OR NEIGHBORS?: MORE THAN THREE TIMES A WEEK
WITHIN THE LAST YEAR, HAVE YOU BEEN KICKED, HIT, SLAPPED, OR OTHERWISE PHYSICALLY HURT BY YOUR PARTNER OR EX-PARTNER?: NO
WITHIN THE LAST YEAR, HAVE TO BEEN RAPED OR FORCED TO HAVE ANY KIND OF SEXUAL ACTIVITY BY YOUR PARTNER OR EX-PARTNER?: NO
WITHIN THE LAST YEAR, HAVE YOU BEEN HUMILIATED OR EMOTIONALLY ABUSED IN OTHER WAYS BY YOUR PARTNER OR EX-PARTNER?: NO

## 2024-05-06 ASSESSMENT — ENCOUNTER SYMPTOMS
DEPRESSION: 0
OCCASIONAL FEELINGS OF UNSTEADINESS: 0
EYES NEGATIVE: 1
HEMATOLOGIC/LYMPHATIC NEGATIVE: 1
RESPIRATORY NEGATIVE: 1
ENDOCRINE NEGATIVE: 1
LOSS OF SENSATION IN FEET: 0
CARDIOVASCULAR NEGATIVE: 1
CONSTITUTIONAL NEGATIVE: 1
NEUROLOGICAL NEGATIVE: 1
PSYCHIATRIC NEGATIVE: 1
GASTROINTESTINAL NEGATIVE: 1
MUSCULOSKELETAL NEGATIVE: 1

## 2024-05-06 ASSESSMENT — ACTIVITIES OF DAILY LIVING (ADL)
TOILETING: INDEPENDENT
HEARING - LEFT EAR: FUNCTIONAL
FEEDING YOURSELF: INDEPENDENT
PATIENT'S MEMORY ADEQUATE TO SAFELY COMPLETE DAILY ACTIVITIES?: YES
HEARING - RIGHT EAR: FUNCTIONAL
DRESSING YOURSELF: INDEPENDENT
BATHING: INDEPENDENT
GROOMING: INDEPENDENT
JUDGMENT_ADEQUATE_SAFELY_COMPLETE_DAILY_ACTIVITIES: YES
WALKS IN HOME: INDEPENDENT
ADEQUATE_TO_COMPLETE_ADL: YES

## 2024-05-06 ASSESSMENT — PATIENT HEALTH QUESTIONNAIRE - PHQ9
2. FEELING DOWN, DEPRESSED OR HOPELESS: NOT AT ALL
1. LITTLE INTEREST OR PLEASURE IN DOING THINGS: NOT AT ALL
SUM OF ALL RESPONSES TO PHQ9 QUESTIONS 1 AND 2: 0

## 2024-05-06 NOTE — PROGRESS NOTES
Radiation Oncology Outpatient Consult    Patient Name:  Louisa Rodas  MRN:  [id number]  :  1940    Referring Provider: Rhina Fields MD  Primary Care Provider: Nadira Moctezuma DO  Care Team: Patient Care Team:  Nadira Moctezuma DO as PCP - General (Internal Medicine)    Date of Service: 2024     SUBJECTIVE  History of Present Illness:  Louisa Rodas is a 83 y.o. female who was referred by Rhina Fields MD, for a consultation to the [facility] Department of Radiation Oncology.  She is presenting for evaluation and management of .     Prior Radiotherapy:  No treatments are associated with the selected episodes (no episode in context).       Past Medical History:    Past Medical History:   Diagnosis Date    Asthma ([facility])     Cervical spondylosis without myelopathy     Chronic kidney disease, stage 3a (Multi)     3.21.24: creat 1.43, GFR 37    DM (diabetes mellitus), type 2 (Multi)     2024 A1C 7.2%    Ductal carcinoma in situ (DCIS) of left breast     s/p lumpectomy 3/27/24    Essential tremor     Fatigue     Fibromyalgia     H/O echocardiogram 2023    CONCLUSIONS: 1. Left ventricular systolic function is hyperdynamic with a 70-75% estimated EF. 2. Spectral Doppler shows an impaired relaxation pattern of left ventricular diastolic filling. 3. Mild aortic valve regurgitation. 4. Left ventricular cavity size is decreased. 5. There is a mid cavity left ventricular obstruction noted w/ the Valsalva maneuver. The provoked gradient is 28 mmHg.    Hyperlipidemia     Hypermetropia, bilateral     Hyperopia of both eyes with astigmatism and presbyopia    Hypertension     Hypogammaglobulinemia (Multi)     Hypothyroidism     Ischemic optic neuropathy, bilateral     Ischemic optic neuropathy, bilateral    Meibomian gland dysfunction right eye, upper and lower eyelids     Meibomian gland dysfunction (MGD) of upper and lower lids of both eyes    Near syncope     NPH (normal  pressure hydrocephalus) (Multi)     MILD TO MODERATE, brain MRI 23:mild enlargement of ventricles, mild diffuse cerebral atrophy    Obesity     Obstructive sleep apnea on CPAP     Osteoarthritis     Peripheral motor neuropathy     Polycystic liver disease     follows with hepatology yearly    PONV (postoperative nausea and vomiting)     Primary open-angle glaucoma, bilateral, indeterminate stage     Sciatica     Sjogren syndrome, unspecified (Multi)     Vocal cord dysfunction         Past Surgical History:    Past Surgical History:   Procedure Laterality Date    BREAST LUMPECTOMY Left 2024    CATARACT EXTRACTION, BILATERAL      COLONOSCOPY  2018    tubular adenoma    ESOPHAGOGASTRODUODENOSCOPY  2019    HAND SURGERY Bilateral     bone removed right wrist, cyst removed left wrist    IR ABLATION NERVE      KNEE ARTHROSCOPY W/ DEBRIDEMENT Right     MR HEAD ANGIO WO IV CONTRAST  2021    MR HEAD ANGIO WO IV CONTRAST 2021 [facility] [place] [facility]    MR NECK ANGIO WO IV CONTRAST  2021    MR NECK ANGIO WO IV CONTRAST 2021 [facility] [place] [facility]    ROTATOR CUFF REPAIR      TONSILLECTOMY      TOTAL KNEE ARTHROPLASTY Left         Family History:  Cancer-related family history includes Cancer in her brother and father.    Social History:    Social History     Tobacco Use    Smoking status: Former     Current packs/day: 0.00     Average packs/day: 0.3 packs/day for 1 year (0.3 ttl pk-yrs)     Types: Cigarettes     Start date: 1966     Quit date: 1967     Years since quittin.3     Passive exposure: Past    Smokeless tobacco: Never   Vaping Use    Vaping status: Never Used   Substance Use Topics    Alcohol use: Not Currently    Drug use: Never       Allergies:    Allergies   Allergen Reactions    Pregabalin Dizziness    Penicillins Hives and Itching    Primidone Other     Stuttering     Tizanidine Other     Stuttering         Medications:    Current Outpatient Medications:      acyclovir (Zovirax) 400 mg tablet, Take 1 tablet (400 mg) by mouth once daily., Disp: 30 tablet, Rfl: 3    allantoin gel, Apply topically if needed for wound care. Contains cannibus, Disp: , Rfl:     amLODIPine (Norvasc) 2.5 mg tablet, Take 1 tablet (2.5 mg) by mouth once daily., Disp: 30 tablet, Rfl: 5    atorvastatin (Lipitor) 20 mg tablet, Take 1 tablet (20 mg) by mouth 3 (three) times a week., Disp: , Rfl:     azelastine (Astelin) 137 mcg (0.1 %) nasal spray, Administer 2 sprays into each nostril 2 times a day as needed for allergies. Use in each nostril as directed, Disp: , Rfl:     b complex 0.4 mg tablet, Take 1 tablet by mouth once daily., Disp: , Rfl:     budesonide-formoteroL (Symbicort) 160-4.5 mcg/actuation inhaler, Inhale 2 puffs once daily. RINSE MOUTH AFTER USE. (Patient taking differently: Inhale 1 puff once daily. RINSE MOUTH AFTER USE.), Disp: 10.2 g, Rfl: 1    carboxymethylcellulose (Refresh Celluvisc) 1 % ophthalmic solution dropperette, Administer into affected eye(s) 4 times a day., Disp: , Rfl:     cholecalciferol (Vitamin D-3) 50 MCG (2000 UT) tablet, Take 1 tablet (50 mcg) by mouth once daily., Disp: , Rfl:     fluocinolone and shower cap 0.01 % oil, Apply to scalp  leave on overnight and wash off in the morning. Use daily, Disp: , Rfl:     hydroxychloroquine (Plaquenil) 200 mg tablet, TAKE 1 TABLET BY MOUTH WITH FOOD OR MILK ONCE A DAY, Disp: 90 tablet, Rfl: 1    ketoconazole (NIZOral) 2 % shampoo, , Disp: , Rfl:     latanoprost, PF, 0.005 % drops, Administer 1 drop into both eyes once daily at bedtime., Disp: 7.5 mL, Rfl: 11    levothyroxine (Synthroid, Levoxyl) 100 mcg tablet, Take 1 tablet (100 mcg) by mouth once daily. Five days per week (takes 112 mcgs 2 days per week) (Patient taking differently: Take 1 tablet (100 mcg) by mouth once daily. 6 days per week), Disp: 90 tablet, Rfl: 0    lisinopril 20 mg tablet, Take 1 tablet (20 mg) by mouth once daily., Disp: 90 tablet, Rfl: 1     "medical cannabis, Take 1 each by mouth if needed., Disp: , Rfl:     Miebo 100 % drops, Administer 1 drop into affected eye(s) 4 times a day., Disp: 3 mL, Rfl: 5    montelukast (Singulair) 10 mg tablet, Take 1 tablet (10 mg) by mouth once daily., Disp: , Rfl:     OneTouch Verio test strips strip, TEST BLOOD SUGAR ONCE A DAY, Disp: , Rfl:     pilocarpine (Salagen) 5 mg tablet, Take 1 tablet (5 mg) by mouth once daily at bedtime., Disp: , Rfl:     povidone, PF, (iVizia, PF,) 0.5 % drops, Administer 1 drop into affected eye(s) 3 times a day as needed., Disp: , Rfl:     propranolol (Inderal) 10 mg tablet, Take 1 tablet (10 mg) by mouth once daily., Disp: 90 tablet, Rfl: 0    sulfamethoxazole-trimethoprim (Bactrim DS) 800-160 mg tablet, Take 1 tablet by mouth once daily. Takes daily not bid, Disp: , Rfl:       Review of Systems:  Review of Systems   Constitutional: Negative.    HENT:  Negative.     Eyes: Negative.    Respiratory: Negative.     Cardiovascular: Negative.    Gastrointestinal: Negative.    Endocrine: Negative.    Genitourinary: Negative.     Musculoskeletal: Negative.    Skin: Negative.    Neurological: Negative.    Hematological: Negative.    Psychiatric/Behavioral: Negative.         Performance Status:  The Karnofsky performance scale today is 90, Able to carry on normal activity; minor signs or symptoms of disease (ECOG equivalent 0).        OBJECTIVE  /74   Temp 36.6 °C (97.9 °F) (Skin)   Resp 18   Ht 1.6 m (5' 3\")   Wt 79.9 kg (176 lb 4.1 oz)   SpO2 97%   BMI 31.22 kg/m²    Physical Exam     Laboratory Review:  There are no laboratory contraindications to radiation therapy.    The pertinent lab results were reviewed and discussed with the patient.  Notably,       Pathology Review:  The pertinent pathology results were reviewed and discussed with the patient.  Notably,      Imaging:  The pertinent imaging results were reviewed and discussed with the patient.  Notably,        ASSESSMENT:   Iris " D Clark is a 83 y.o. female with Ductal carcinoma in situ (DCIS) of left breast, Clinical: Stage 0 (cTis (DCIS), cN0, cM0, G2, ER+, ME: Unknown, HER2: Unknown)  Ductal carcinoma in situ (DCIS) of left breast, Pathologic: Stage 0 (pTis (DCIS), pN0, cM0, G2, ER+, ME: Not Assessed, HER2: Not Assessed).  .     PLAN:   .  NCCN Guidelines were applicable to guide this patients treatment plan.   Diaz Chester RN

## 2024-05-08 ENCOUNTER — OFFICE VISIT (OUTPATIENT)
Dept: OBSTETRICS AND GYNECOLOGY | Facility: CLINIC | Age: 84
End: 2024-05-08
Payer: MEDICARE

## 2024-05-08 VITALS
SYSTOLIC BLOOD PRESSURE: 136 MMHG | WEIGHT: 173 LBS | BODY MASS INDEX: 30.65 KG/M2 | HEIGHT: 63 IN | HEART RATE: 76 BPM | DIASTOLIC BLOOD PRESSURE: 70 MMHG

## 2024-05-08 DIAGNOSIS — N39.0 RECURRENT UTI: Primary | ICD-10-CM

## 2024-05-08 LAB
POC APPEARANCE, URINE: CLEAR
POC BILIRUBIN, URINE: NEGATIVE
POC BLOOD, URINE: NEGATIVE
POC COLOR, URINE: YELLOW
POC GLUCOSE, URINE: NEGATIVE MG/DL
POC KETONES, URINE: NEGATIVE MG/DL
POC LEUKOCYTES, URINE: ABNORMAL
POC NITRITE,URINE: NEGATIVE
POC PH, URINE: 5.5 PH
POC PROTEIN, URINE: NEGATIVE MG/DL
POC SPECIFIC GRAVITY, URINE: >=1.03
POC UROBILINOGEN, URINE: 0.2 EU/DL

## 2024-05-08 PROCEDURE — 3078F DIAST BP <80 MM HG: CPT | Performed by: NURSE PRACTITIONER

## 2024-05-08 PROCEDURE — 99212 OFFICE O/P EST SF 10 MIN: CPT | Performed by: NURSE PRACTITIONER

## 2024-05-08 PROCEDURE — 1160F RVW MEDS BY RX/DR IN RCRD: CPT | Performed by: NURSE PRACTITIONER

## 2024-05-08 PROCEDURE — 81003 URINALYSIS AUTO W/O SCOPE: CPT | Mod: QW | Performed by: NURSE PRACTITIONER

## 2024-05-08 PROCEDURE — 1159F MED LIST DOCD IN RCRD: CPT | Performed by: NURSE PRACTITIONER

## 2024-05-08 PROCEDURE — 3075F SYST BP GE 130 - 139MM HG: CPT | Performed by: NURSE PRACTITIONER

## 2024-05-08 PROCEDURE — 1126F AMNT PAIN NOTED NONE PRSNT: CPT | Performed by: NURSE PRACTITIONER

## 2024-05-08 ASSESSMENT — PAIN SCALES - GENERAL: PAINLEVEL: 0-NO PAIN

## 2024-05-08 NOTE — PROGRESS NOTES
"Gisel MCCAIN Rodas is a 83 y.o. female following up on UTIs.    HPI:    Interval History:  - Last appt was in January for rUTIs. She was taking cranberry supplements.  - Last positive urine culture was August 8, 2023.  - She has been diagnosed with stage 0 breast cancer, with ductal carcinoma in situ of the left breast already surgically removed.   > She is scheduled for radiation therapy 5x weekly.  - She has another surgery planned for her knee.  - She also mentioned using a cannabis gel for knee and back pain, which helps with pain mgmt but causes tiredness and sleepiness.      Review of Systems   Constitutional: Negative.    HENT: Negative.     Eyes: Negative.    Respiratory: Negative.     Cardiovascular: Negative.    Gastrointestinal: Negative.    Endocrine: Negative.    Musculoskeletal: Negative.    Neurological: Negative.    Psychiatric/Behavioral: Negative.         Objective   /70   Pulse 76   Ht 1.6 m (5' 3\")   Wt 78.5 kg (173 lb)   BMI 30.65 kg/m²    General: alert and oriented, in no acute distress   Abdomen: soft, non-tender, without masses or organomegaly   Back: CVA tenderness absent     Laboratory:   Urine dipstick shows +leukocytes.      Assessment/Plan   Diagnoses and all orders for this visit:  Recurrent UTI  -     Follow Up In Urogynecology  -     POCT UA Automated manually resulted    Diagnoses:  #1 rUTIs    Plan:  rUTIs  - She has been managing rUTIs with cranberry supplements, hygiene practices, and immediate urination after activities, which appears to be effective and no recent infections have been reported.   - She was told to continue mgmt with cranberry supplements. Since no recent infections have been noted, current mgmt appears to be effective.      Return in 6 months for a FUV to monitor bladder health post-knee surgery and post-radiation therapy, ensuring no UTIs have occurred and overall health is maintained.     Scribe Attestation  By signing my name below, Jose SEALS " Page, Lisette, attest that this documentation has been prepared under the direction and in the presence of MATT Bazzi on 05/08/2024 at 8:33 AM.  I, MATT Bazzi, personally performed the services described in this documentation which was scribed virtually and I confirm that it is both accurate and complete.

## 2024-05-09 ENCOUNTER — HOSPITAL ENCOUNTER (OUTPATIENT)
Dept: RADIATION ONCOLOGY | Facility: HOSPITAL | Age: 84
Setting detail: RADIATION/ONCOLOGY SERIES
Discharge: HOME | End: 2024-05-09
Payer: MEDICARE

## 2024-05-09 ENCOUNTER — PRE-ADMISSION TESTING (OUTPATIENT)
Dept: PREADMISSION TESTING | Facility: HOSPITAL | Age: 84
End: 2024-05-09
Payer: MEDICARE

## 2024-05-09 ENCOUNTER — HOSPITAL ENCOUNTER (OUTPATIENT)
Dept: RADIOLOGY | Facility: CLINIC | Age: 84
Discharge: HOME | End: 2024-05-09
Payer: MEDICARE

## 2024-05-09 ENCOUNTER — TELEPHONE (OUTPATIENT)
Dept: RADIATION ONCOLOGY | Facility: HOSPITAL | Age: 84
End: 2024-05-09

## 2024-05-09 ENCOUNTER — LAB (OUTPATIENT)
Dept: LAB | Facility: LAB | Age: 84
End: 2024-05-09
Payer: MEDICARE

## 2024-05-09 VITALS
HEIGHT: 63 IN | SYSTOLIC BLOOD PRESSURE: 115 MMHG | WEIGHT: 169.75 LBS | RESPIRATION RATE: 18 BRPM | HEART RATE: 77 BPM | BODY MASS INDEX: 30.08 KG/M2 | TEMPERATURE: 97.9 F | DIASTOLIC BLOOD PRESSURE: 64 MMHG | OXYGEN SATURATION: 95 %

## 2024-05-09 DIAGNOSIS — R06.02 SOB (SHORTNESS OF BREATH) ON EXERTION: ICD-10-CM

## 2024-05-09 DIAGNOSIS — E11.9 TYPE 2 DIABETES MELLITUS WITHOUT COMPLICATION, WITHOUT LONG-TERM CURRENT USE OF INSULIN (MULTI): ICD-10-CM

## 2024-05-09 DIAGNOSIS — Z01.818 PREOP TESTING: Primary | ICD-10-CM

## 2024-05-09 DIAGNOSIS — M17.11 UNILATERAL PRIMARY OSTEOARTHRITIS, RIGHT KNEE: ICD-10-CM

## 2024-05-09 DIAGNOSIS — Z01.818 PREOP TESTING: ICD-10-CM

## 2024-05-09 LAB
ALBUMIN SERPL BCP-MCNC: 4.6 G/DL (ref 3.4–5)
ALP SERPL-CCNC: 89 U/L (ref 33–136)
ALT SERPL W P-5'-P-CCNC: 20 U/L (ref 7–45)
ANION GAP SERPL CALC-SCNC: 12 MMOL/L (ref 10–20)
APTT PPP: 36 SECONDS (ref 27–38)
AST SERPL W P-5'-P-CCNC: 24 U/L (ref 9–39)
BASOPHILS # BLD AUTO: 0.03 X10*3/UL (ref 0–0.1)
BASOPHILS NFR BLD AUTO: 0.7 %
BILIRUB SERPL-MCNC: 0.4 MG/DL (ref 0–1.2)
BUN SERPL-MCNC: 23 MG/DL (ref 6–23)
CALCIUM SERPL-MCNC: 9.3 MG/DL (ref 8.6–10.3)
CHLORIDE SERPL-SCNC: 108 MMOL/L (ref 98–107)
CO2 SERPL-SCNC: 25 MMOL/L (ref 21–32)
CREAT SERPL-MCNC: 1.3 MG/DL (ref 0.5–1.05)
EGFRCR SERPLBLD CKD-EPI 2021: 41 ML/MIN/1.73M*2
EOSINOPHIL # BLD AUTO: 0.31 X10*3/UL (ref 0–0.4)
EOSINOPHIL NFR BLD AUTO: 7.4 %
ERYTHROCYTE [DISTWIDTH] IN BLOOD BY AUTOMATED COUNT: 13.4 % (ref 11.5–14.5)
EST. AVERAGE GLUCOSE BLD GHB EST-MCNC: 154 MG/DL
GLUCOSE SERPL-MCNC: 105 MG/DL (ref 74–99)
HBA1C MFR BLD: 7 %
HCT VFR BLD AUTO: 38.9 % (ref 36–46)
HGB BLD-MCNC: 12.6 G/DL (ref 12–16)
IMM GRANULOCYTES # BLD AUTO: 0.01 X10*3/UL (ref 0–0.5)
IMM GRANULOCYTES NFR BLD AUTO: 0.2 % (ref 0–0.9)
INR PPP: 1 (ref 0.9–1.1)
LYMPHOCYTES # BLD AUTO: 1.02 X10*3/UL (ref 0.8–3)
LYMPHOCYTES NFR BLD AUTO: 24.5 %
MCH RBC QN AUTO: 30.7 PG (ref 26–34)
MCHC RBC AUTO-ENTMCNC: 32.4 G/DL (ref 32–36)
MCV RBC AUTO: 95 FL (ref 80–100)
MONOCYTES # BLD AUTO: 0.39 X10*3/UL (ref 0.05–0.8)
MONOCYTES NFR BLD AUTO: 9.4 %
NEUTROPHILS # BLD AUTO: 2.41 X10*3/UL (ref 1.6–5.5)
NEUTROPHILS NFR BLD AUTO: 57.8 %
NRBC BLD-RTO: 0 /100 WBCS (ref 0–0)
PLATELET # BLD AUTO: 162 X10*3/UL (ref 150–450)
POTASSIUM SERPL-SCNC: 4.4 MMOL/L (ref 3.5–5.3)
PROT SERPL-MCNC: 6.1 G/DL (ref 6.4–8.2)
PROTHROMBIN TIME: 11.6 SECONDS (ref 9.8–12.8)
RBC # BLD AUTO: 4.1 X10*6/UL (ref 4–5.2)
SODIUM SERPL-SCNC: 141 MMOL/L (ref 136–145)
WBC # BLD AUTO: 4.2 X10*3/UL (ref 4.4–11.3)

## 2024-05-09 PROCEDURE — 73562 X-RAY EXAM OF KNEE 3: CPT | Mod: RIGHT SIDE | Performed by: RADIOLOGY

## 2024-05-09 PROCEDURE — 77301 RADIOTHERAPY DOSE PLAN IMRT: CPT | Performed by: RADIOLOGY

## 2024-05-09 PROCEDURE — 77300 RADIATION THERAPY DOSE PLAN: CPT | Performed by: RADIOLOGY

## 2024-05-09 PROCEDURE — 73562 X-RAY EXAM OF KNEE 3: CPT | Mod: RT

## 2024-05-09 PROCEDURE — 93005 ELECTROCARDIOGRAM TRACING: CPT | Performed by: PHYSICIAN ASSISTANT

## 2024-05-09 PROCEDURE — 87081 CULTURE SCREEN ONLY: CPT | Mod: AHULAB | Performed by: ORTHOPAEDIC SURGERY

## 2024-05-09 PROCEDURE — 77338 DESIGN MLC DEVICE FOR IMRT: CPT | Performed by: RADIOLOGY

## 2024-05-09 PROCEDURE — 83036 HEMOGLOBIN GLYCOSYLATED A1C: CPT

## 2024-05-09 PROCEDURE — 99214 OFFICE O/P EST MOD 30 MIN: CPT | Performed by: PHYSICIAN ASSISTANT

## 2024-05-09 PROCEDURE — 77073 BONE LENGTH STUDIES: CPT | Performed by: RADIOLOGY

## 2024-05-09 PROCEDURE — 80053 COMPREHEN METABOLIC PANEL: CPT

## 2024-05-09 PROCEDURE — 77073 BONE LENGTH STUDIES: CPT

## 2024-05-09 PROCEDURE — 85730 THROMBOPLASTIN TIME PARTIAL: CPT

## 2024-05-09 PROCEDURE — 36415 COLL VENOUS BLD VENIPUNCTURE: CPT

## 2024-05-09 PROCEDURE — 85025 COMPLETE CBC W/AUTO DIFF WBC: CPT

## 2024-05-09 PROCEDURE — 85610 PROTHROMBIN TIME: CPT

## 2024-05-09 RX ORDER — CHLORHEXIDINE GLUCONATE ORAL RINSE 1.2 MG/ML
SOLUTION DENTAL
Qty: 475 ML | Refills: 0 | Status: SHIPPED | OUTPATIENT
Start: 2024-05-09 | End: 2024-06-02 | Stop reason: HOSPADM

## 2024-05-09 ASSESSMENT — ENCOUNTER SYMPTOMS
RHINORRHEA: 0
EYE PAIN: 0
ARTHRALGIAS: 1
DYSPNEA WITH EXERTION: 0
BRUISES/BLEEDS EASILY: 1
CHILLS: 0
PSYCHIATRIC NEGATIVE: 1
MYALGIAS: 0
SHORTNESS OF BREATH: 0
NECK STIFFNESS: 0
LIGHT-HEADEDNESS: 0
BLOOD IN STOOL: 0
DIFFICULTY URINATING: 0
WOUND: 0
DYSPNEA AT REST: 0
VISUAL CHANGE: 0
NECK PAIN: 0
FEVER: 0
VOMITING: 0
ABDOMINAL PAIN: 0
DIARRHEA: 0
SKIN CHANGES: 0
NEUROLOGICAL NEGATIVE: 1
MUSCULOSKELETAL NEGATIVE: 1
UNEXPECTED WEIGHT CHANGE: 0
TROUBLE SWALLOWING: 0
ENDOCRINE NEGATIVE: 1
EYES NEGATIVE: 1
EXCESSIVE BLEEDING: 0
GASTROINTESTINAL NEGATIVE: 1
CONSTIPATION: 0
NAUSEA: 0
WEAKNESS: 0
RESPIRATORY NEGATIVE: 1
HEMOPTYSIS: 0
ABDOMINAL DISTENTION: 0
DYSURIA: 0
EYE DISCHARGE: 0
WHEEZING: 0
LIMITED RANGE OF MOTION: 0
CARDIOVASCULAR NEGATIVE: 1
SINUS CONGESTION: 0
CONSTITUTIONAL NEGATIVE: 1
COUGH: 0
CONFUSION: 0
NUMBNESS: 0
PALPITATIONS: 0
DOUBLE VISION: 0

## 2024-05-09 NOTE — PREPROCEDURE INSTRUCTIONS
Medication List            Accurate as of May 9, 2024 10:35 AM. Always use your most recent med list.                acyclovir 400 mg tablet  Commonly known as: Zovirax  Take 1 tablet (400 mg) by mouth once daily.  Medication Adjustments for Surgery: Take morning of surgery with sip of water, no other fluids     allantoin gel  Medication Adjustments for Surgery: Stop 1 day before surgery     amLODIPine 2.5 mg tablet  Commonly known as: Norvasc  Take 1 tablet (2.5 mg) by mouth once daily.  Medication Adjustments for Surgery: Take morning of surgery with sip of water, no other fluids     atorvastatin 20 mg tablet  Commonly known as: Lipitor  Medication Adjustments for Surgery: Take morning of surgery with sip of water, no other fluids     azelastine 137 mcg (0.1 %) nasal spray  Commonly known as: Astelin  Notes to patient: Ok to take morning of surgery if needed     b complex 0.4 mg tablet  Medication Adjustments for Surgery: Stop 7 days before surgery     budesonide-formoteroL 160-4.5 mcg/actuation inhaler  Commonly known as: Symbicort  Inhale 2 puffs once daily. RINSE MOUTH AFTER USE.  Notes to patient: Ok to take morning of surgery if needed     carboxymethylcellulose 1 % ophthalmic solution dropperette  Commonly known as: Refresh Celluvisc  Notes to patient: Ok to take morning of surgery if needed     chlorhexidine 0.12 % solution  Commonly known as: Peridex  Swish for 30 seconds and spit 15mL of solution the night before and morning of surgery     cholecalciferol 50 MCG (2000 UT) tablet  Commonly known as: Vitamin D-3  Medication Adjustments for Surgery: Continue until night before surgery     fluocinolone and shower cap 0.01 % oil  Medication Adjustments for Surgery: Stop 1 day before surgery     hydroxychloroquine 200 mg tablet  Commonly known as: Plaquenil  TAKE 1 TABLET BY MOUTH WITH FOOD OR MILK ONCE A DAY  Medication Adjustments for Surgery: Continue until night before surgery     iVizia (PF) 0.5 %  drops  Generic drug: povidone (PF)  Notes to patient: Ok to take morning of surgery if needed     ketoconazole 2 % shampoo  Commonly known as: NIZOral  Medication Adjustments for Surgery: Continue until night before surgery     latanoprost (PF) 0.005 % drops  Administer 1 drop into both eyes once daily at bedtime.  Notes to patient: Ok to take morning of surgery if needed     levothyroxine 100 mcg tablet  Commonly known as: Synthroid, Levoxyl  Take 1 tablet (100 mcg) by mouth once daily. Five days per week (takes 112 mcgs 2 days per week)  Medication Adjustments for Surgery: Take morning of surgery with sip of water, no other fluids     lisinopril 20 mg tablet  Take 1 tablet (20 mg) by mouth once daily.  Medication Adjustments for Surgery: Stop 1 day before surgery     medical cannabis  Medication Adjustments for Surgery: Stop 1 day before surgery     MENTAX TOP  Medication Adjustments for Surgery: Stop 1 day before surgery     Miebo 100 % drops  Generic drug: perfluorohexyloctane (PF)  Administer 1 drop into affected eye(s) 4 times a day.  Notes to patient: Ok to take morning of surgery if needed     montelukast 10 mg tablet  Commonly known as: Singulair  Medication Adjustments for Surgery: Take morning of surgery with sip of water, no other fluids     OneTouch Verio test strips strip  Generic drug: blood sugar diagnostic     pilocarpine 5 mg tablet  Commonly known as: Salagen  Medication Adjustments for Surgery: Take morning of surgery with sip of water, no other fluids     propranolol 10 mg tablet  Commonly known as: Inderal  Take 1 tablet (10 mg) by mouth once daily.  Medication Adjustments for Surgery: Take morning of surgery with sip of water, no other fluids     sulfamethoxazole-trimethoprim 800-160 mg tablet  Commonly known as: Bactrim DS  Medication Adjustments for Surgery: Take morning of surgery with sip of water, no other fluids                            **Concerning above medication instructions, if  medication is normally taken at night, continue normal schedule.**  **DO NOT TAKE NIGHT PRIOR AND MORNING OF SURGERY**    CONTACT SURGEON'S OFFICE IF YOU DEVELOP:  * Fever = 100.4 F   * New respiratory symptoms (e.g. cough, shortness of breath, respiratory distress, sore throat)  * Recent loss of taste or smell  *Flu like symptoms such as headache, fatigue or gastrointestinal symptoms  * You develop any open sores, shingles, burning or painful urination   AND/OR:  * You no longer wish to have the surgery.  * Any other personal circumstances change that may lead to the need to cancel or defer this surgery.  *You were admitted to any hospital within one week of your planned procedure.    SMOKING:  *Quitting smoking can make a huge difference to your health and recovery from surgery.    *If you need help with quitting, call [phone]-NOW.    THE DAY BEFORE SURGERY:   Nothing by mouth (no solids or liquids) after midnight.   If diabetic, please check fasting blood sugar upon waking up.    If fasting sugar is <80 mg/dl, please drink 100ml/3oz of apple juice no later than 2 hours prior to arrival time.      SURGICAL TIME  *You will be contacted between 2 p.m. and 6 p.m. the business day before your surgery with your arrival time.  *If you haven't received a call by 6pm, call [phone].  *Scheduled surgery times may change and you will be notified if this occurs-check your personal voicemail for any updates.    ON THE MORNING OF SURGERY:  *Wear comfortable, loose fitting clothing.   *Do not use moisturizers, creams, lotions or perfume.  *All jewelry and valuables should be left at home.  *Prosthetic devices such as contact lenses, hearing aids, dentures, eyelash extensions, hairpins and body piercing must be removed before surgery.    BRING WITH YOU:  *Photo ID and insurance card  *Current list of medicines and allergies  *Pacemaker/Defibrillator/Heart stent cards  *CPAP machine and  mask  *Slings/splints/crutches  *Copy of your complete Advanced Directive/DHPOA-if applicable  *Neurostimulator implant remote    PARKING AND ARRIVAL:  *Check in at the Main Entrance desk and let them know you are here for surgery.  *You will be directed to the 2nd floor surgical waiting area.    AFTER OUTPATIENT SURGERY:  *A responsible adult MUST accompany you at the time of discharge and stay with you for 24 hours after your surgery.  *You may NOT drive yourself home after surgery.  *You may use a taxi or ride sharing service ([employer], Uber) to return home ONLY if you are accompanied by a friend or family member.  *Instructions for resuming your medications will be provided by your surgeon.

## 2024-05-09 NOTE — TELEPHONE ENCOUNTER
Confirmed radiation start date of Mon 4/20. Patient said  Said he would treat every day, not every other. Msg sent to Dr. Llamas.

## 2024-05-09 NOTE — H&P (VIEW-ONLY)
[facility]/PAT Evaluation       Name: Louisa Rodas (Louisa Rodas)  /Age: 1940/83 y.o.         Date of Consult: 24    Referring Provider: Dr. Bhagat    Surgery, Date, and Length: Right Knee Total Arthroplasty - Right , 24, 150MIN    Louisa Rodas is a 83 year-old female who presents to the [facility] for perioperative risk assessment prior to surgery.    Patient presents with a primary diagnosis of right knee osteoarthritis. Pt notes pain in right knee x 12 months.  Weight bearing and walking makes pain worse.  She utilizes right knee brace which helps for support. She uses topical cannabis which is also helpful for pain relief.  She is ready to proceed with TJR as planned.     This note was created in part upon personal review of patient's medical records.      Patient is scheduled to have Right Knee Total Arthroplasty - Right     Pt admits to PONV with anesthesia in the past and does well with antiemetics in preop  Pt denies any past history of anesthetic complications such as decreased awareness, prolonged sedation, dental damage, aspiration, cardiac arrest, difficult intubation, difficult I.V. access or unexpected hospital admissions.  NO malignant hyperthermia and or pseudocholinesterase deficiency.  No history of blood transfusions     The patient is not a Religious and will accept blood and blood products if medically indicated.   Type and screen NOT sent.     Past Medical History:   Diagnosis Date    Asthma ([facility]-HCC)     Cervical spondylosis without myelopathy     Chronic kidney disease, stage 3a (Multi)     3.21.24: creat 1.43, GFR 37    DM (diabetes mellitus), type 2 (Multi)     2024 A1C 7.2%    Ductal carcinoma in situ (DCIS) of left breast     s/p lumpectomy 3/27/24    Essential tremor     Fatigue     Fibromyalgia     H/O echocardiogram 2023    CONCLUSIONS: 1. Left ventricular systolic function is hyperdynamic with a 70-75% estimated EF. 2. Spectral Doppler shows an impaired  relaxation pattern of left ventricular diastolic filling. 3. Mild aortic valve regurgitation. 4. Left ventricular cavity size is decreased. 5. There is a mid cavity left ventricular obstruction noted w/ the Valsalva maneuver. The provoked gradient is 28 mmHg.    Hyperlipidemia     Hypermetropia, bilateral     Hyperopia of both eyes with astigmatism and presbyopia    Hypertension     Hypogammaglobulinemia (Multi)     Hypothyroidism     Ischemic optic neuropathy, bilateral     Ischemic optic neuropathy, bilateral    Meibomian gland dysfunction right eye, upper and lower eyelids     Meibomian gland dysfunction (MGD) of upper and lower lids of both eyes    Near syncope     NPH (normal pressure hydrocephalus) (Multi)     MILD TO MODERATE, brain MRI 5/4/23:mild enlargement of ventricles, mild diffuse cerebral atrophy    Obesity     Obstructive sleep apnea on CPAP     Osteoarthritis     Peripheral motor neuropathy     Polycystic liver disease     follows with hepatology yearly    PONV (postoperative nausea and vomiting)     Primary open-angle glaucoma, bilateral, indeterminate stage     Sciatica     Sjogren syndrome, unspecified (Multi)     Vocal cord dysfunction        Past Surgical History:   Procedure Laterality Date    BREAST LUMPECTOMY Left 03/27/2024    CATARACT EXTRACTION, BILATERAL      COLONOSCOPY  04/05/2018    tubular adenoma    ESOPHAGOGASTRODUODENOSCOPY  06/2019    HAND SURGERY Bilateral     bone removed right wrist, cyst removed left wrist    IR ABLATION NERVE      KNEE ARTHROSCOPY W/ DEBRIDEMENT Right     MR HEAD ANGIO WO IV CONTRAST  06/28/2021    MR HEAD ANGIO WO IV CONTRAST 6/28/2021 [facility]    MR NECK ANGIO WO IV CONTRAST  06/28/2021    MR NECK ANGIO WO IV CONTRAST 6/28/2021 [facility] EM[facility]GENCY [facility]GAC[facility]    ROTATOR CUFF REPAIR      TONSILLECTOMY      TOTAL KNEE ARTHROPLASTY Left        Patient Sexual activity questions deferred to the physician.    Family History   Problem Relation Name Age of Onset     Alzheimer's disease Mother      Arthritis Mother      Cataracts Mother      Glaucoma Mother      Hypertension Mother      Cancer Father          pancreatic    Other (diabetes mellitus) Father      Cancer Brother          pancreatic    Other (diabetes mellitus) Brother      Other (diabetes mellitus) Son      Other (diabetes mellitus) Sibling       Social History     Socioeconomic History    Marital status:      Spouse name: Emerson    Number of children: 3    Years of education: Not on file    Highest education level: Some college, no degree   Occupational History    Occupation: retired   Tobacco Use    Smoking status: Former     Current packs/day: 0.00     Average packs/day: 0.3 packs/day for 1 year (0.3 ttl pk-yrs)     Types: Cigarettes     Start date: 1966     Quit date: 1967     Years since quittin.3     Passive exposure: Past    Smokeless tobacco: Never   Vaping Use    Vaping status: Never Used   Substance and Sexual Activity    Alcohol use: Not Currently    Drug use: Never    Sexual activity: Defer   Other Topics Concern    Not on file   Social History Narrative    Not on file     Social Determinants of Health     Financial Resource Strain: Low Risk  (2024)    Overall Financial Resource Strain (CARDIA)     Difficulty of Paying Living Expenses: Not hard at all   Food Insecurity: No Food Insecurity (10/11/2023)    Hunger Vital Sign     Worried About Running Out of Food in the Last Year: Never true     Ran Out of Food in the Last Year: Never true   Transportation Needs: No Transportation Needs (2024)    PRAPARE - Transportation     Lack of Transportation (Medical): No     Lack of Transportation (Non-Medical): No   Physical Activity: Sufficiently Active (2024)    Exercise Vital Sign     Days of Exercise per Week: 3 days     Minutes of Exercise per Session: 60 min   Stress: No Stress Concern Present (2024)    [employer] - Occupational Stress  Questionnaire     Feeling of Stress : Not at all   Social Connections: Unknown (5/6/2024)    Social Connection and Isolation Panel [NHANES]     Frequency of Communication with Friends and Family: More than three times a week     Frequency of Social Gatherings with Friends and Family: More than three times a week     Attends Buddhism Services: Patient unable to answer     Active Member of Clubs or Organizations: Patient unable to answer     Attends Club or Organization Meetings: More than 4 times per year     Marital Status:    Intimate Partner Violence: Not At Risk (5/6/2024)    Humiliation, Afraid, Rape, and Kick questionnaire     Fear of Current or Ex-Partner: No     Emotionally Abused: No     Physically Abused: No     Sexually Abused: No   Housing Stability: Unknown (5/6/2024)    Housing Stability Vital Sign     Unable to Pay for Housing in the Last Year: No     Number of Places Lived in the Last Year: Not on file     Unstable Housing in the Last Year: No        Allergies   Allergen Reactions    Pregabalin Dizziness    Penicillins Hives and Itching    Primidone Other     Stuttering     Tizanidine Other     Stuttering        Current Outpatient Medications:     acyclovir (Zovirax) 400 mg tablet, Take 1 tablet (400 mg) by mouth once daily., Disp: 30 tablet, Rfl: 3    allantoin gel, Apply topically if needed for wound care. Contains cannibus, Disp: , Rfl:     amLODIPine (Norvasc) 2.5 mg tablet, Take 1 tablet (2.5 mg) by mouth once daily., Disp: 30 tablet, Rfl: 5    atorvastatin (Lipitor) 20 mg tablet, Take 1 tablet (20 mg) by mouth 3 (three) times a week., Disp: , Rfl:     b complex 0.4 mg tablet, Take 1 tablet by mouth once daily., Disp: , Rfl:     budesonide-formoteroL (Symbicort) 160-4.5 mcg/actuation inhaler, Inhale 2 puffs once daily. RINSE MOUTH AFTER USE. (Patient taking differently: Inhale 1 puff once daily. RINSE MOUTH AFTER USE.), Disp: 10.2 g, Rfl: 1    butenafine HCl (MENTAX TOP), Apply  topically., Disp: , Rfl:     carboxymethylcellulose (Refresh Celluvisc) 1 % ophthalmic solution dropperette, Administer into affected eye(s) 4 times a day., Disp: , Rfl:     cholecalciferol (Vitamin D-3) 50 MCG (2000 UT) tablet, Take 1 tablet (50 mcg) by mouth once daily., Disp: , Rfl:     fluocinolone and shower cap 0.01 % oil, Apply to scalp  leave on overnight and wash off in the morning. Use daily, Disp: , Rfl:     hydroxychloroquine (Plaquenil) 200 mg tablet, TAKE 1 TABLET BY MOUTH WITH FOOD OR MILK ONCE A DAY, Disp: 90 tablet, Rfl: 1    ketoconazole (NIZOral) 2 % shampoo, , Disp: , Rfl:     latanoprost, PF, 0.005 % drops, Administer 1 drop into both eyes once daily at bedtime., Disp: 7.5 mL, Rfl: 11    levothyroxine (Synthroid, Levoxyl) 100 mcg tablet, Take 1 tablet (100 mcg) by mouth once daily. Five days per week (takes 112 mcgs 2 days per week) (Patient taking differently: Take 1 tablet (100 mcg) by mouth once daily. 6 days per week), Disp: 90 tablet, Rfl: 0    lisinopril 20 mg tablet, Take 1 tablet (20 mg) by mouth once daily., Disp: 90 tablet, Rfl: 1    montelukast (Singulair) 10 mg tablet, Take 1 tablet (10 mg) by mouth once daily., Disp: , Rfl:     OneTouch Verio test strips strip, TEST BLOOD SUGAR ONCE A DAY, Disp: , Rfl:     pilocarpine (Salagen) 5 mg tablet, Take 1 tablet (5 mg) by mouth once daily at bedtime., Disp: , Rfl:     propranolol (Inderal) 10 mg tablet, Take 1 tablet (10 mg) by mouth once daily., Disp: 90 tablet, Rfl: 0    sulfamethoxazole-trimethoprim (Bactrim DS) 800-160 mg tablet, Take 1 tablet by mouth once daily. Takes daily not bid, Disp: , Rfl:     azelastine (Astelin) 137 mcg (0.1 %) nasal spray, Administer 2 sprays into each nostril 2 times a day as needed for allergies. Use in each nostril as directed, Disp: , Rfl:     chlorhexidine (Peridex) 0.12 % solution, Swish for 30 seconds and spit 15mL of solution the night before and morning of surgery, Disp: 475 mL, Rfl: 0    medical  cannabis, Take 1 each by mouth if needed., Disp: , Rfl:     Miebo 100 % drops, Administer 1 drop into affected eye(s) 4 times a day. (Patient not taking: Reported on 5/8/2024), Disp: 3 mL, Rfl: 5    povidone, PF, (iVizia, PF,) 0.5 % drops, Administer 1 drop into affected eye(s) 3 times a day as needed., Disp: , Rfl:          PAT ROS:   Constitutional:    no fever   no chills   no unexpected weight change  Neuro/Psych:    no numbness   no weakness   no light-headedness   no confusion  Eyes:    no discharge   no pain   no vision loss   no diplopia   no visual disturbance   use of corrective lenses  Ears:    no ear pain   no hearing loss   no tinnitus  Nose:    no nasal discharge   no sinus congestion   no epistaxis  Mouth:    no dental issues   no mouth pain   no oral bleeding   no mouth lesions  Throat:    no throat pain   no dysphagia  Neck:    no neck pain   no neck stiffness  Cardio:    Functional 4 Mets. Patient denies SOB walking up 2 flights of stairs   Limited by right knee; cooking, cleaning, grocery shopping   no chest pain   no palpitations   no peripheral edema   no dyspnea   no COTTO  Respiratory:    no cough   no wheezing   no hemoptysis   no shortness of breath  Endocrine:    no cold intolerance   no heat intolerance  GI:    no abdominal distention   no abdominal pain   no constipation   no diarrhea   no nausea   no vomiting   no blood in stool  :    no difficulty urinating   no dysuria   no oliguria  Musculoskeletal:    arthralgias (right knee)   no myalgias   no decreased ROM  Hematologic:    bruises/bleeds easily   no excessive bleeding   no history of blood transfusion   no blood clots  Skin:   no skin changes   no sores/wound   no rash      Physical Exam  Constitutional:       General: She is not in acute distress.     Appearance: Normal appearance. She is not ill-appearing, toxic-appearing or diaphoretic.   HENT:      Head: Normocephalic and atraumatic.      Nose: Nose normal. No rhinorrhea.       "Mouth/Throat:      Pharynx: No oropharyngeal exudate.   Eyes:      Extraocular Movements: Extraocular movements intact.      Conjunctiva/sclera: Conjunctivae normal.   Cardiovascular:      Rate and Rhythm: Normal rate and regular rhythm.      Heart sounds: No murmur heard.     No friction rub. No gallop.      Comments: Functional 4 Mets. Patient denies SOB walking up 2 flights of stairs     Pulmonary:      Effort: Pulmonary effort is normal. No respiratory distress.      Breath sounds: Normal breath sounds. No stridor. No wheezing or rhonchi.   Abdominal:      General: Bowel sounds are normal. There is no distension.      Palpations: Abdomen is soft. There is no mass.      Tenderness: There is no abdominal tenderness. There is no guarding or rebound.      Hernia: No hernia is present.   Musculoskeletal:         General: Tenderness (right knee ; decreased ROM with flex/ ext due to pain) present. No swelling, deformity or signs of injury. Normal range of motion.      Cervical back: Normal range of motion and neck supple. No rigidity or tenderness.   Skin:     General: Skin is warm and dry.      Coloration: Skin is not jaundiced or pale.      Findings: No bruising, erythema, lesion or rash.   Neurological:      General: No focal deficit present.      Mental Status: She is alert and oriented to person, place, and time.      Cranial Nerves: No cranial nerve deficit.      Sensory: No sensory deficit.      Motor: No weakness.      Coordination: Coordination normal.   Psychiatric:         Mood and Affect: Mood normal.         Behavior: Behavior normal.          PAT AIRWAY:   Airway:     Mallampati::  II    Neck ROM::  Full   No broken teeth, no dentures and no missing teeth          Visit Vitals  /64   Pulse 77   Temp 36.6 °C (97.9 °F)   Resp 18   Ht 1.6 m (5' 2.99\")   Wt 77 kg (169 lb 12.1 oz)   SpO2 95%   BMI 30.08 kg/m²   OB Status Postmenopausal   Smoking Status Former   BSA 1.85 m²      LABS:  Lab Results "   Component Value Date    WBC 4.2 (L) 05/09/2024    HGB 12.6 05/09/2024    HCT 38.9 05/09/2024    MCV 95 05/09/2024     05/09/2024      Lab Results   Component Value Date    GLUCOSE 105 (H) 05/09/2024    CALCIUM 9.3 05/09/2024     05/09/2024    K 4.4 05/09/2024    CO2 25 05/09/2024     (H) 05/09/2024    BUN 23 05/09/2024    CREATININE 1.30 (H) 05/09/2024      Lab Results   Component Value Date    ALT 20 05/09/2024    AST 24 05/09/2024    ALKPHOS 89 05/09/2024    BILITOT 0.4 05/09/2024    Coagulation Screen  Order: [id number]   Status: Final result       Visible to patient: Yes (not seen)       Dx: Preop testing; SOB (shortness of jesus...    0 Result Notes            Component  Ref Range & Units 11:10  (5/9/24) 1 mo ago  (3/21/24) 3 mo ago  (2/5/24) 5 mo ago  (12/1/23) 10 mo ago  (6/19/23) 1 yr ago  (12/13/22) 1 yr ago  (6/21/22)   Protime  9.8 - 12.8 seconds 11.6 11.7 11.3 12.1 12.2 R 12.6 R 11.4 R   INR  0.9 - 1.1 1.0 1.0 1.0 1.1 1.1 1.1 1.0   aPTT  27 - 38 seconds 36         Resulting Agency [facility]              Narrative  Performed by: [facility]  The APTT is no longer used for monitoring Unfractionated Heparin Therapy. For monitoring Heparin Therapy, use the Heparin Assay.      Specimen Collected: 05/09/24 11:10           Lab Results   Component Value Date    HGBA1C 7.0 (H) 05/09/2024          EKG 5/9/24  Sinus rhythm with PSVC  Borderline EKG  Vent rate = 73 bpm    ECHO 5/25/23  CONCLUSIONS:  1. Left ventricular systolic function is hyperdynamic with a 70-75% estimated ejection fraction.  2. Spectral Doppler shows an impaired relaxation pattern of left ventricular diastolic filling.  3. Mild aortic valve regurgitation.  4. Left ventricular cavity size is decreased.  5. There is a mid cavity left ventricular obstruction noted with the Valsalva maneuver. The provoked gradient is 28 mmHg.    US abdomen 11/13/23  IMPRESSION:  Stable sonographic appearance of the liver. There  is mildly increased  echogenicity/coarsened echotexture with slightly nodular hepatic  surface contour. Unchanged appearance of several hepatic cysts with  no new or suspicious mass lesion identified.      Carotid US 12/16/20  IMPRESSION:  Carotid Doppler ultrasound shows interval development of minimal  plaque at the origin of the internal carotid arteries on both sides,  with no evidence of hemodynamically significant stenosis.       Probable element of adenomyomatosis within the gallbladder fundus.    Assessment and Plan:     Patient is a 83-year-old female scheduled for a Right Knee Total Arthroplasty - Right  with Dr. Bhagat on 5/28/24.    Patient has no active cardiac symptoms.   Patient denies any chest pain, tightness, heaviness, pressure, radiating pain, palpitations, irregular heartbeats, lightheadedness, cough, congestion, shortness of breath, COTTO, PND, near syncope, weight loss or gain.     RCRI  0  , 3.9 % Risk of MACE    Cardiac:  HTN - cont amlodipine and propranolol on dos   Encouraged lifestyle modifications, low-sodium diet, and increase activity as tolerated.  Monitor BP and follow up with managing physician for readings sustaining >140/90.    HLD - cont statin on dos     Pulmonary:  Asthma - cont inhalers as prescribed, including dos if needed     MENDY - Known and treated  MENDY- Patient was instructed to bring CPAP machine the morning of surgery. Recommend prioritizing  nonopioid analgesic techniques (regional and local anesthesia, nonsteroidal medications, etc) before the administration of opioids and close monitoring for hypoventilation after surgery due to MENDY. Increased vigilance is recommended with the use of narcotics due to an increased risk for opioid induced respiratory depression     GI:  Polycystic liver disease - LFT wnl 3/21/24; will repeat    Rheum:  Sjogren's - cont plaquenil until day before surgery (since this needs to be taken with food)    Endocrine:  DMII - Pt not currently  on any meds for this issue  Hgba1c 2/20/24 = 7.2%                 5/9/24 = 7.0%    Hypothyroidism - cont levothyroxine on dos     Renal:  CKDIIIB  3/21/24 Crt 1.43; GFR 37  5/9/24 Crt = 1.3; GFR 41    Recommendations to avoid nephrotoxic drugs and carefully monitor fluid status to maintain euvolemia. Use dose adjusted medications as needed for the underlying level of renal function.     Oncology:  Breast cancer - s/p left partial mastectomy 3/27/24; Oncology Dr. Alon Llamas (last seen in office 5/6/24) is aware of upcoming R TKA and mentions no contraindications in progress note.    Neruo:  NPH - monitored by neurology; Dr. Felix Mock - last seen 8/2023; no current H/A, no urinary incontinence, no dizziness  or near syncope    Hematology:  Patient instructed to ambulate as soon as possible postoperatively to decrease thromboembolic risk.   Initiate mechanical DVT prophylaxis as soon as possible and initiate chemical prophylaxis when deemed safe from a bleeding standpoint post surgery.     LABS: CBC, CMP, COAG, MRSA, A1c, EKG ordered. Lab results reviewed and unremarkable with exception of ongoing CKD.    Followup: MRSA and A1c pending    Addendum 5/10/24:  A1c results reviewed and meet [facility] criteria to proceed with TJR as planned.     STOP BANG: +MENDY    Caprini: 11    Risk assessment complete.  Patient is scheduled for a intermediate surgical risk procedure.        Preoperative medication instructions were provided and reviewed with the patient.  Any additional testing or evaluation was explained to the patient.  Nothing by mouth instructions were discussed and patient's questions were answered prior to conclusion to this encounter.  Patient verbalized understanding of preoperative instructions given in preadmission testing; discharge instructions available in EMR.    This note was dictated by a speech recognition.  Minor errors may have been detected in a speech recognition.

## 2024-05-09 NOTE — CPM/PAT H&P
[facility]/PAT Evaluation       Name: Louisa Rodas (Louisa Rodas)  /Age: 1940/83 y.o.         Date of Consult: 24    Referring Provider: Dr. Bhagat    Surgery, Date, and Length: Right Knee Total Arthroplasty - Right , 24, 150MIN    Louisa Rodas is a 83 year-old female who presents to the [facility] for perioperative risk assessment prior to surgery.    Patient presents with a primary diagnosis of right knee osteoarthritis. Pt notes pain in right knee x 12 months.  Weight bearing and walking makes pain worse.  She utilizes right knee brace which helps for support. She uses topical cannabis which is also helpful for pain relief.  She is ready to proceed with TJR as planned.     This note was created in part upon personal review of patient's medical records.      Patient is scheduled to have Right Knee Total Arthroplasty - Right     Pt admits to PONV with anesthesia in the past and does well with antiemetics in preop  Pt denies any past history of anesthetic complications such as decreased awareness, prolonged sedation, dental damage, aspiration, cardiac arrest, difficult intubation, difficult I.V. access or unexpected hospital admissions.  NO malignant hyperthermia and or pseudocholinesterase deficiency.  No history of blood transfusions     The patient is not a [employer] and will accept blood and blood products if medically indicated.   Type and screen NOT sent.     Past Medical History:   Diagnosis Date    Asthma ([facility])     Cervical spondylosis without myelopathy     Chronic kidney disease, stage 3a (Multi)     3.21.24: creat 1.43, GFR 37    DM (diabetes mellitus), type 2 (Multi)     2024 A1C 7.2%    Ductal carcinoma in situ (DCIS) of left breast     s/p lumpectomy 3/27/24    Essential tremor     Fatigue     Fibromyalgia     H/O echocardiogram 2023    CONCLUSIONS: 1. Left ventricular systolic function is hyperdynamic with a 70-75% estimated EF. 2. Spectral Doppler shows an impaired  relaxation pattern of left ventricular diastolic filling. 3. Mild aortic valve regurgitation. 4. Left ventricular cavity size is decreased. 5. There is a mid cavity left ventricular obstruction noted w/ the Valsalva maneuver. The provoked gradient is 28 mmHg.    Hyperlipidemia     Hypermetropia, bilateral     Hyperopia of both eyes with astigmatism and presbyopia    Hypertension     Hypogammaglobulinemia (Multi)     Hypothyroidism     Ischemic optic neuropathy, bilateral     Ischemic optic neuropathy, bilateral    Meibomian gland dysfunction right eye, upper and lower eyelids     Meibomian gland dysfunction (MGD) of upper and lower lids of both eyes    Near syncope     NPH (normal pressure hydrocephalus) (Multi)     MILD TO MODERATE, brain MRI 5/4/23:mild enlargement of ventricles, mild diffuse cerebral atrophy    Obesity     Obstructive sleep apnea on CPAP     Osteoarthritis     Peripheral motor neuropathy     Polycystic liver disease     follows with hepatology yearly    PONV (postoperative nausea and vomiting)     Primary open-angle glaucoma, bilateral, indeterminate stage     Sciatica     Sjogren syndrome, unspecified (Multi)     Vocal cord dysfunction        Past Surgical History:   Procedure Laterality Date    BREAST LUMPECTOMY Left 03/27/2024    CATARACT EXTRACTION, BILATERAL      COLONOSCOPY  04/05/2018    tubular adenoma    ESOPHAGOGASTRODUODENOSCOPY  06/2019    HAND SURGERY Bilateral     bone removed right wrist, cyst removed left wrist    IR ABLATION NERVE      KNEE ARTHROSCOPY W/ DEBRIDEMENT Right     MR HEAD ANGIO WO IV CONTRAST  06/28/2021    MR HEAD ANGIO WO IV CONTRAST 6/28/2021 [facility]    MR NECK ANGIO WO IV CONTRAST  06/28/2021    MR NECK ANGIO WO IV CONTRAST 6/28/2021 [facility] EM[facility]GENCY LEGAC[facility]    ROTATOR CUFF REPAIR      TONSILLECTOMY      TOTAL KNEE ARTHROPLASTY Left        Patient Sexual activity questions deferred to the physician.    Family History   Problem Relation Name Age of Onset     Alzheimer's disease Mother      Arthritis Mother      Cataracts Mother      Glaucoma Mother      Hypertension Mother      Cancer Father          pancreatic    Other (diabetes mellitus) Father      Cancer Brother          pancreatic    Other (diabetes mellitus) Brother      Other (diabetes mellitus) Son      Other (diabetes mellitus) Sibling       Social History     Socioeconomic History    Marital status:      Spouse name: Emerson    Number of children: 3    Years of education: Not on file    Highest education level: Some college, no degree   Occupational History    Occupation: retired   Tobacco Use    Smoking status: Former     Current packs/day: 0.00     Average packs/day: 0.3 packs/day for 1 year (0.3 ttl pk-yrs)     Types: Cigarettes     Start date: 1966     Quit date: 1967     Years since quittin.3     Passive exposure: Past    Smokeless tobacco: Never   Vaping Use    Vaping status: Never Used   Substance and Sexual Activity    Alcohol use: Not Currently    Drug use: Never    Sexual activity: Defer   Other Topics Concern    Not on file   Social History Narrative    Not on file     Social Determinants of Health     Financial Resource Strain: Low Risk  (2024)    Overall Financial Resource Strain (CARDIA)     Difficulty of Paying Living Expenses: Not hard at all   Food Insecurity: No Food Insecurity (10/11/2023)    Hunger Vital Sign     Worried About Running Out of Food in the Last Year: Never true     Ran Out of Food in the Last Year: Never true   Transportation Needs: No Transportation Needs (2024)    PRAPARE - Transportation     Lack of Transportation (Medical): No     Lack of Transportation (Non-Medical): No   Physical Activity: Sufficiently Active (2024)    Exercise Vital Sign     Days of Exercise per Week: 3 days     Minutes of Exercise per Session: 60 min   Stress: No Stress Concern Present (2024)    [employer] [place] [employer] - Occupational Stress  Questionnaire     Feeling of Stress : Not at all   Social Connections: Unknown (5/6/2024)    Social Connection and Isolation Panel [NHANES]     Frequency of Communication with Friends and Family: More than three times a week     Frequency of Social Gatherings with Friends and Family: More than three times a week     Attends Baptism Services: Patient unable to answer     Active Member of Clubs or Organizations: Patient unable to answer     Attends Club or Organization Meetings: More than 4 times per year     Marital Status:    Intimate Partner Violence: Not At Risk (5/6/2024)    Humiliation, Afraid, Rape, and Kick questionnaire     Fear of Current or Ex-Partner: No     Emotionally Abused: No     Physically Abused: No     Sexually Abused: No   Housing Stability: Unknown (5/6/2024)    Housing Stability Vital Sign     Unable to Pay for Housing in the Last Year: No     Number of Places Lived in the Last Year: Not on file     Unstable Housing in the Last Year: No        Allergies   Allergen Reactions    Pregabalin Dizziness    Penicillins Hives and Itching    Primidone Other     Stuttering     Tizanidine Other     Stuttering        Current Outpatient Medications:     acyclovir (Zovirax) 400 mg tablet, Take 1 tablet (400 mg) by mouth once daily., Disp: 30 tablet, Rfl: 3    allantoin gel, Apply topically if needed for wound care. Contains cannibus, Disp: , Rfl:     amLODIPine (Norvasc) 2.5 mg tablet, Take 1 tablet (2.5 mg) by mouth once daily., Disp: 30 tablet, Rfl: 5    atorvastatin (Lipitor) 20 mg tablet, Take 1 tablet (20 mg) by mouth 3 (three) times a week., Disp: , Rfl:     b complex 0.4 mg tablet, Take 1 tablet by mouth once daily., Disp: , Rfl:     budesonide-formoteroL (Symbicort) 160-4.5 mcg/actuation inhaler, Inhale 2 puffs once daily. RINSE MOUTH AFTER USE. (Patient taking differently: Inhale 1 puff once daily. RINSE MOUTH AFTER USE.), Disp: 10.2 g, Rfl: 1    butenafine HCl (MENTAX TOP), Apply  topically., Disp: , Rfl:     carboxymethylcellulose (Refresh Celluvisc) 1 % ophthalmic solution dropperette, Administer into affected eye(s) 4 times a day., Disp: , Rfl:     cholecalciferol (Vitamin D-3) 50 MCG (2000 UT) tablet, Take 1 tablet (50 mcg) by mouth once daily., Disp: , Rfl:     fluocinolone and shower cap 0.01 % oil, Apply to scalp  leave on overnight and wash off in the morning. Use daily, Disp: , Rfl:     hydroxychloroquine (Plaquenil) 200 mg tablet, TAKE 1 TABLET BY MOUTH WITH FOOD OR MILK ONCE A DAY, Disp: 90 tablet, Rfl: 1    ketoconazole (NIZOral) 2 % shampoo, , Disp: , Rfl:     latanoprost, PF, 0.005 % drops, Administer 1 drop into both eyes once daily at bedtime., Disp: 7.5 mL, Rfl: 11    levothyroxine (Synthroid, Levoxyl) 100 mcg tablet, Take 1 tablet (100 mcg) by mouth once daily. Five days per week (takes 112 mcgs 2 days per week) (Patient taking differently: Take 1 tablet (100 mcg) by mouth once daily. 6 days per week), Disp: 90 tablet, Rfl: 0    lisinopril 20 mg tablet, Take 1 tablet (20 mg) by mouth once daily., Disp: 90 tablet, Rfl: 1    montelukast (Singulair) 10 mg tablet, Take 1 tablet (10 mg) by mouth once daily., Disp: , Rfl:     OneTouch Verio test strips strip, TEST BLOOD SUGAR ONCE A DAY, Disp: , Rfl:     pilocarpine (Salagen) 5 mg tablet, Take 1 tablet (5 mg) by mouth once daily at bedtime., Disp: , Rfl:     propranolol (Inderal) 10 mg tablet, Take 1 tablet (10 mg) by mouth once daily., Disp: 90 tablet, Rfl: 0    sulfamethoxazole-trimethoprim (Bactrim DS) 800-160 mg tablet, Take 1 tablet by mouth once daily. Takes daily not bid, Disp: , Rfl:     azelastine (Astelin) 137 mcg (0.1 %) nasal spray, Administer 2 sprays into each nostril 2 times a day as needed for allergies. Use in each nostril as directed, Disp: , Rfl:     chlorhexidine (Peridex) 0.12 % solution, Swish for 30 seconds and spit 15mL of solution the night before and morning of surgery, Disp: 475 mL, Rfl: 0    medical  cannabis, Take 1 each by mouth if needed., Disp: , Rfl:     Miebo 100 % drops, Administer 1 drop into affected eye(s) 4 times a day. (Patient not taking: Reported on 5/8/2024), Disp: 3 mL, Rfl: 5    povidone, PF, (iVizia, PF,) 0.5 % drops, Administer 1 drop into affected eye(s) 3 times a day as needed., Disp: , Rfl:          PAT ROS:   Constitutional:    no fever   no chills   no unexpected weight change  Neuro/Psych:    no numbness   no weakness   no light-headedness   no confusion  Eyes:    no discharge   no pain   no vision loss   no diplopia   no visual disturbance   use of corrective lenses  Ears:    no ear pain   no hearing loss   no tinnitus  Nose:    no nasal discharge   no sinus congestion   no epistaxis  Mouth:    no dental issues   no mouth pain   no oral bleeding   no mouth lesions  Throat:    no throat pain   no dysphagia  Neck:    no neck pain   no neck stiffness  Cardio:    Functional 4 Mets. Patient denies SOB walking up 2 flights of stairs   Limited by right knee; cooking, cleaning, grocery shopping   no chest pain   no palpitations   no peripheral edema   no dyspnea   no COTTO  Respiratory:    no cough   no wheezing   no hemoptysis   no shortness of breath  Endocrine:    no cold intolerance   no heat intolerance  GI:    no abdominal distention   no abdominal pain   no constipation   no diarrhea   no nausea   no vomiting   no blood in stool  :    no difficulty urinating   no dysuria   no oliguria  Musculoskeletal:    arthralgias (right knee)   no myalgias   no decreased ROM  Hematologic:    bruises/bleeds easily   no excessive bleeding   no history of blood transfusion   no blood clots  Skin:   no skin changes   no sores/wound   no rash      Physical Exam  Constitutional:       General: She is not in acute distress.     Appearance: Normal appearance. She is not ill-appearing, toxic-appearing or diaphoretic.   HENT:      Head: Normocephalic and atraumatic.      Nose: Nose normal. No rhinorrhea.       "Mouth/Throat:      Pharynx: No oropharyngeal exudate.   Eyes:      Extraocular Movements: Extraocular movements intact.      Conjunctiva/sclera: Conjunctivae normal.   Cardiovascular:      Rate and Rhythm: Normal rate and regular rhythm.      Heart sounds: No murmur heard.     No friction rub. No gallop.      Comments: Functional 4 Mets. Patient denies SOB walking up 2 flights of stairs     Pulmonary:      Effort: Pulmonary effort is normal. No respiratory distress.      Breath sounds: Normal breath sounds. No stridor. No wheezing or rhonchi.   Abdominal:      General: Bowel sounds are normal. There is no distension.      Palpations: Abdomen is soft. There is no mass.      Tenderness: There is no abdominal tenderness. There is no guarding or rebound.      Hernia: No hernia is present.   Musculoskeletal:         General: Tenderness (right knee ; decreased ROM with flex/ ext due to pain) present. No swelling, deformity or signs of injury. Normal range of motion.      Cervical back: Normal range of motion and neck supple. No rigidity or tenderness.   Skin:     General: Skin is warm and dry.      Coloration: Skin is not jaundiced or pale.      Findings: No bruising, erythema, lesion or rash.   Neurological:      General: No focal deficit present.      Mental Status: She is alert and oriented to person, place, and time.      Cranial Nerves: No cranial nerve deficit.      Sensory: No sensory deficit.      Motor: No weakness.      Coordination: Coordination normal.   Psychiatric:         Mood and Affect: Mood normal.         Behavior: Behavior normal.          PAT AIRWAY:   Airway:     Mallampati::  II    Neck ROM::  Full   No broken teeth, no dentures and no missing teeth          Visit Vitals  /64   Pulse 77   Temp 36.6 °C (97.9 °F)   Resp 18   Ht 1.6 m (5' 2.99\")   Wt 77 kg (169 lb 12.1 oz)   SpO2 95%   BMI 30.08 kg/m²   OB Status Postmenopausal   Smoking Status Former   BSA 1.85 m²      LABS:  Lab Results "   Component Value Date    WBC 4.2 (L) 05/09/2024    HGB 12.6 05/09/2024    HCT 38.9 05/09/2024    MCV 95 05/09/2024     05/09/2024      Lab Results   Component Value Date    GLUCOSE 105 (H) 05/09/2024    CALCIUM 9.3 05/09/2024     05/09/2024    K 4.4 05/09/2024    CO2 25 05/09/2024     (H) 05/09/2024    BUN 23 05/09/2024    CREATININE 1.30 (H) 05/09/2024      Lab Results   Component Value Date    ALT 20 05/09/2024    AST 24 05/09/2024    ALKPHOS 89 05/09/2024    BILITOT 0.4 05/09/2024    Coagulation Screen  Order: [id number]   Status: Final result       Visible to patient: Yes (not seen)       Dx: Preop testing; SOB (shortness of jesus...    0 Result Notes            Component  Ref Range & Units 11:10  (5/9/24) 1 mo ago  (3/21/24) 3 mo ago  (2/5/24) 5 mo ago  (12/1/23) 10 mo ago  (6/19/23) 1 yr ago  (12/13/22) 1 yr ago  (6/21/22)   Protime  9.8 - 12.8 seconds 11.6 11.7 11.3 12.1 12.2 R 12.6 R 11.4 R   INR  0.9 - 1.1 1.0 1.0 1.0 1.1 1.1 1.1 1.0   aPTT  27 - 38 seconds 36         Resulting Agency [facility]              Narrative  Performed by: [facility]  The APTT is no longer used for monitoring Unfractionated Heparin Therapy. For monitoring Heparin Therapy, use the Heparin Assay.      Specimen Collected: 05/09/24 11:10           Lab Results   Component Value Date    HGBA1C 7.0 (H) 05/09/2024          EKG 5/9/24  Sinus rhythm with PSVC  Borderline EKG  Vent rate = 73 bpm    ECHO 5/25/23  CONCLUSIONS:  1. Left ventricular systolic function is hyperdynamic with a 70-75% estimated ejection fraction.  2. Spectral Doppler shows an impaired relaxation pattern of left ventricular diastolic filling.  3. Mild aortic valve regurgitation.  4. Left ventricular cavity size is decreased.  5. There is a mid cavity left ventricular obstruction noted with the Valsalva maneuver. The provoked gradient is 28 mmHg.    US abdomen 11/13/23  IMPRESSION:  Stable sonographic appearance of the liver. There  is mildly increased  echogenicity/coarsened echotexture with slightly nodular hepatic  surface contour. Unchanged appearance of several hepatic cysts with  no new or suspicious mass lesion identified.      Carotid US 12/16/20  IMPRESSION:  Carotid Doppler ultrasound shows interval development of minimal  plaque at the origin of the internal carotid arteries on both sides,  with no evidence of hemodynamically significant stenosis.       Probable element of adenomyomatosis within the gallbladder fundus.    Assessment and Plan:     Patient is a 83-year-old female scheduled for a Right Knee Total Arthroplasty - Right  with Dr. Bhagat on 5/28/24.    Patient has no active cardiac symptoms.   Patient denies any chest pain, tightness, heaviness, pressure, radiating pain, palpitations, irregular heartbeats, lightheadedness, cough, congestion, shortness of breath, COTTO, PND, near syncope, weight loss or gain.     RCRI  0  , 3.9 % Risk of MACE    Cardiac:  HTN - cont amlodipine and propranolol on dos   Encouraged lifestyle modifications, low-sodium diet, and increase activity as tolerated.  Monitor BP and follow up with managing physician for readings sustaining >140/90.    HLD - cont statin on dos     Pulmonary:  Asthma - cont inhalers as prescribed, including dos if needed     MENDY - Known and treated  MENDY- Patient was instructed to bring CPAP machine the morning of surgery. Recommend prioritizing  nonopioid analgesic techniques (regional and local anesthesia, nonsteroidal medications, etc) before the administration of opioids and close monitoring for hypoventilation after surgery due to MENDY. Increased vigilance is recommended with the use of narcotics due to an increased risk for opioid induced respiratory depression     GI:  Polycystic liver disease - LFT wnl 3/21/24; will repeat    Rheum:  Sjogren's - cont plaquenil until day before surgery (since this needs to be taken with food)    Endocrine:  DMII - Pt not currently  on any meds for this issue  Hgba1c 2/20/24 = 7.2%                 5/9/24 = 7.0%    Hypothyroidism - cont levothyroxine on dos     Renal:  CKDIIIB  3/21/24 Crt 1.43; GFR 37  5/9/24 Crt = 1.3; GFR 41    Recommendations to avoid nephrotoxic drugs and carefully monitor fluid status to maintain euvolemia. Use dose adjusted medications as needed for the underlying level of renal function.     Oncology:  Breast cancer - s/p left partial mastectomy 3/27/24; Oncology Dr. Alon Llamas (last seen in office 5/6/24) is aware of upcoming R TKA and mentions no contraindications in progress note.    Neruo:  NPH - monitored by neurology; Dr. Felix Mock - last seen 8/2023; no current H/A, no urinary incontinence, no dizziness  or near syncope    Hematology:  Patient instructed to ambulate as soon as possible postoperatively to decrease thromboembolic risk.   Initiate mechanical DVT prophylaxis as soon as possible and initiate chemical prophylaxis when deemed safe from a bleeding standpoint post surgery.     LABS: CBC, CMP, COAG, MRSA, A1c, EKG ordered. Lab results reviewed and unremarkable with exception of ongoing CKD.    Followup: MRSA and A1c pending    Addendum 5/10/24:  A1c results reviewed and meet [facility] criteria to proceed with TJR as planned.     STOP BANG: +MENDY    Caprini: 11    Risk assessment complete.  Patient is scheduled for a intermediate surgical risk procedure.        Preoperative medication instructions were provided and reviewed with the patient.  Any additional testing or evaluation was explained to the patient.  Nothing by mouth instructions were discussed and patient's questions were answered prior to conclusion to this encounter.  Patient verbalized understanding of preoperative instructions given in preadmission testing; discharge instructions available in EMR.    This note was dictated by a speech recognition.  Minor errors may have been detected in a speech recognition.

## 2024-05-11 LAB — STAPHYLOCOCCUS SPEC CULT: NORMAL

## 2024-05-14 ENCOUNTER — TELEPHONE (OUTPATIENT)
Dept: PAIN MEDICINE | Facility: CLINIC | Age: 84
End: 2024-05-14
Payer: MEDICARE

## 2024-05-14 NOTE — TELEPHONE ENCOUNTER
Pt called LM c/o pain starting to return since her last RFA of the cervical and would like to have a follow up. I reviewed couldn't find dos of RFA. Last seen in May of last year recommending RFA. Scheduled her virtual follow up to discuss plan of care.

## 2024-05-15 ENCOUNTER — PATIENT OUTREACH (OUTPATIENT)
Dept: PRIMARY CARE | Facility: CLINIC | Age: 84
End: 2024-05-15
Payer: MEDICARE

## 2024-05-15 DIAGNOSIS — I10 HYPERTENSION, UNSPECIFIED TYPE: ICD-10-CM

## 2024-05-15 DIAGNOSIS — E11.22 TYPE 2 DIABETES MELLITUS WITH STAGE 3B CHRONIC KIDNEY DISEASE, WITHOUT LONG-TERM CURRENT USE OF INSULIN (MULTI): ICD-10-CM

## 2024-05-15 DIAGNOSIS — N18.32 TYPE 2 DIABETES MELLITUS WITH STAGE 3B CHRONIC KIDNEY DISEASE, WITHOUT LONG-TERM CURRENT USE OF INSULIN (MULTI): ICD-10-CM

## 2024-05-15 LAB
ATRIAL RATE: 73 BPM
P AXIS: 44 DEGREES
P OFFSET: 212 MS
P ONSET: 155 MS
PR INTERVAL: 130 MS
Q ONSET: 220 MS
QRS COUNT: 12 BEATS
QRS DURATION: 82 MS
QT INTERVAL: 396 MS
QTC CALCULATION(BAZETT): 436 MS
QTC FREDERICIA: 423 MS
R AXIS: 54 DEGREES
T AXIS: 60 DEGREES
T OFFSET: 418 MS
VENTRICULAR RATE: 73 BPM

## 2024-05-15 PROCEDURE — 99490 CHRNC CARE MGMT STAFF 1ST 20: CPT | Performed by: SPECIALIST

## 2024-05-15 RX ORDER — VITAMIN B COMPLEX
1 TABLET ORAL
COMMUNITY

## 2024-05-15 NOTE — PROGRESS NOTES
Called Patient and reviewed CCM and Patient states she is doing okay today. Patient states yesterday she was having some neck pain which has returned. Pt. saw pain management last year for the neck pain and states she had an ablation which helped. Pt. States she applied salonpas patch to her left shoulder and cannibis rollon for her pain which helped some. Pt. has a virtual appointment with pain management on 06/10/2024. Denies any falls and states she is unsteady and will sometimes hold on to the wall when she walks. Pt. states she gets dizzy when she gets up to fast. I suggested for Pt to sit at the bedside then get up slowly. Falls risk reviewed and she verbalizes understanding. Appetite is good and states she has gained weight. Pt. States she checks her blood pressure and blood sugar daily and twice if needed. Encourage to keep a log of her blood pressure and blood sugars. Pt. did not check her b/p or blood sugar yesterday or this morning. States both have been in a good range. Pt. sleeps well and uses a CPAP nightly. Pt with occasional difficulty falling asleep due to the CPAP and gets up 2 - 3 times a night to urinate. Pt. states she is scheduled for right knee replacement May 28. Medications reviewed and Pt states she is taking vitamin b12 daily and feels like she has a little more energy. Appointemnets reviewed and she has several rad/onc visits,( 05/20 - 05/24) and an ophthalmology visit on 06/18/24 with Dr. Pabon. Pt to call with any questions or concerns.

## 2024-05-16 ENCOUNTER — TELEPHONE (OUTPATIENT)
Dept: ORTHOPEDIC SURGERY | Facility: HOSPITAL | Age: 84
End: 2024-05-16
Payer: MEDICARE

## 2024-05-16 ASSESSMENT — KOOS JR
TWISING OR PIVOTING ON KNEE: SEVERE
GOING UP OR DOWN STAIRS: EXTREME
STANDING UPRIGHT: MODERATE
RISING FROM SITTING: SEVERE
BENDING TO THE FLOOR TO PICK UP OBJECT: SEVERE
KOOS JR SCORING: 36.93
STRAIGHTENING KNEE FULLY: SEVERE
HOW SEVERE IS YOUR KNEE STIFFNESS AFTER FIRST WAKING IN MORNING: MODERATE

## 2024-05-16 NOTE — TELEPHONE ENCOUNTER
Louisa Rodas  attended joint class on 5/16/2024 and Brought a care partner to the class. The preop survey was completed and the patient provided attestation that they understand the content reviewed and that they do have a care partner identified to assist with recovery. Patient was provided with a folder of materials and instructed to call orthopedic navigator with questions.

## 2024-05-20 ENCOUNTER — HOSPITAL ENCOUNTER (OUTPATIENT)
Dept: RADIATION ONCOLOGY | Facility: HOSPITAL | Age: 84
Setting detail: RADIATION/ONCOLOGY SERIES
Discharge: HOME | End: 2024-05-20
Payer: MEDICARE

## 2024-05-20 ENCOUNTER — RADIATION ONCOLOGY OTV (OUTPATIENT)
Dept: RADIATION ONCOLOGY | Facility: HOSPITAL | Age: 84
End: 2024-05-20
Payer: MEDICARE

## 2024-05-20 VITALS
TEMPERATURE: 97.3 F | BODY MASS INDEX: 31.64 KG/M2 | RESPIRATION RATE: 18 BRPM | DIASTOLIC BLOOD PRESSURE: 69 MMHG | HEART RATE: 72 BPM | WEIGHT: 171.96 LBS | HEIGHT: 62 IN | SYSTOLIC BLOOD PRESSURE: 125 MMHG | OXYGEN SATURATION: 98 %

## 2024-05-20 DIAGNOSIS — D05.12 INTRADUCTAL CARCINOMA IN SITU OF LEFT BREAST: ICD-10-CM

## 2024-05-20 LAB
RAD ONC MSQ ACTUAL FRACTIONS DELIVERED: 1
RAD ONC MSQ ACTUAL SESSION DELIVERED DOSE: 600 CGRAY
RAD ONC MSQ ACTUAL TOTAL DOSE: 600 CGRAY
RAD ONC MSQ ELAPSED DAYS: 0
RAD ONC MSQ LAST DATE: NORMAL
RAD ONC MSQ PRESCRIBED FRACTIONAL DOSE: 600 CGRAY
RAD ONC MSQ PRESCRIBED NUMBER OF FRACTIONS: 5
RAD ONC MSQ PRESCRIBED TECHNIQUE: NORMAL
RAD ONC MSQ PRESCRIBED TOTAL DOSE: 3000 CGRAY
RAD ONC MSQ PRESCRIPTION PATTERN COMMENT: NORMAL
RAD ONC MSQ START DATE: NORMAL
RAD ONC MSQ TREATMENT COURSE NUMBER: 1
RAD ONC MSQ TREATMENT SITE: NORMAL

## 2024-05-20 PROCEDURE — 77386 HC INTENSITY-MODULATED RADIATION THERAPY (IMRT), COMPLEX: CPT | Performed by: RADIOLOGY

## 2024-05-20 PROCEDURE — 77427 RADIATION TX MANAGEMENT X5: CPT | Performed by: RADIOLOGY

## 2024-05-20 PROCEDURE — 77385 HC INTENSITY-MODULATED RADIATION THERAPY (IMRT), SIMPLE: CPT | Performed by: RADIOLOGY

## 2024-05-20 PROCEDURE — 77014 CHG CT GUIDANCE RADIATION THERAPY FLDS PLACEMENT: CPT | Performed by: RADIOLOGY

## 2024-05-20 NOTE — PROGRESS NOTES
"Radiation Oncology On Treatment Visit    Patient Name:  Louisa Rodas  MRN:  [id number]  :  1940    Referring Provider: No ref. provider found  Primary Care Provider: Nadira Moctezuma DO  Care Team: Patient Care Team:  Nadira Moctezuma DO as PCP - General (Internal Medicine)    Date of Service: 2024     Diagnosis:   Specialty Problems    None    Treatment Summary:  IMRT: Left Breast    Treatment Period Technique Fraction Dose Fractions Total Dose   Course 1 2024-2024  (days elapsed: 0)         BREAST_APBI_L 2024-2024 VMAT 600 / 600 cGy  600 / 3,000 cGy     SUBJECTIVE: PT is in good spirits. No skin irritation or itching.       OBJECTIVE:   Vital Signs:  /69   Pulse 72   Temp 36.3 °C (97.3 °F) (Temporal)   Resp 18   Ht 1.575 m (5' 2\")   Wt 78 kg (171 lb 15.3 oz)   SpO2 98%   BMI 31.45 kg/m²    Pain Scale: The patient's current pain level was assessed.  They report currently having a pain of 0 out of 10.    Other Pertinent Findings:   L breast incision site CDI, no skin changes from RT  Toxicity Assessment          2024    13:21   Toxicity Assessment   Adverse Events Reviewed (WDL) Yes (Within Defined Limits)   Treatment Site Breast   Anorexia Grade 0   Anxiety Grade 0   Dehydration Grade 0   Depression Grade 0   Dermatitis Radiation Grade 0   Diarrhea Grade 0   Fatigue Grade 0   Fibrosis Deep Connective Tissue Grade 0   Fracture Grade 0   Nausea Grade 0   Pain Grade 0   Treatment Related Secondary Malignancy Grade 0   Tumor Pain Grade 0   Vomiting Grade 0   Abdominal Pain Grade 0   Dysphagia Grade 0   Esophagitis Grade 0   Gastric Hemorrhage Grade 0   Mucositis Oral Grade 0   Dry Mouth Grade 0   Breast Infection Grade 0   Seroma Grade 0   Joint Range of Motion Decreased Grade 0   Joint Range of Motion Decreased Lumbar Spine Grade 0   Brachial Plexopathy Grade 0   Breast Atrophy Grade 0   Breast Pain Grade 0   Nipple Deformity Grade 0   Pneumonitis Grade 0   Edema " Limbs Grade 0   Lymphedema Grade 0   Thromboembolic Event Grade 0   Hot Flashes Grade 0        Assessment / Plan:  The patient is tolerating radiation therapy as anticipated.  Continue per current treatment plan.

## 2024-05-21 ENCOUNTER — APPOINTMENT (OUTPATIENT)
Dept: RADIATION ONCOLOGY | Facility: HOSPITAL | Age: 84
End: 2024-05-21
Payer: MEDICARE

## 2024-05-21 ENCOUNTER — HOSPITAL ENCOUNTER (OUTPATIENT)
Dept: RADIATION ONCOLOGY | Facility: HOSPITAL | Age: 84
Setting detail: RADIATION/ONCOLOGY SERIES
Discharge: HOME | End: 2024-05-21
Payer: MEDICARE

## 2024-05-21 DIAGNOSIS — D05.12 INTRADUCTAL CARCINOMA IN SITU OF LEFT BREAST: ICD-10-CM

## 2024-05-21 DIAGNOSIS — Z51.0 ENCOUNTER FOR ANTINEOPLASTIC RADIATION THERAPY: ICD-10-CM

## 2024-05-21 LAB
RAD ONC MSQ ACTUAL FRACTIONS DELIVERED: 2
RAD ONC MSQ ACTUAL SESSION DELIVERED DOSE: 600 CGRAY
RAD ONC MSQ ACTUAL TOTAL DOSE: 1200 CGRAY
RAD ONC MSQ ELAPSED DAYS: 1
RAD ONC MSQ LAST DATE: NORMAL
RAD ONC MSQ PRESCRIBED FRACTIONAL DOSE: 600 CGRAY
RAD ONC MSQ PRESCRIBED NUMBER OF FRACTIONS: 5
RAD ONC MSQ PRESCRIBED TECHNIQUE: NORMAL
RAD ONC MSQ PRESCRIBED TOTAL DOSE: 3000 CGRAY
RAD ONC MSQ PRESCRIPTION PATTERN COMMENT: NORMAL
RAD ONC MSQ START DATE: NORMAL
RAD ONC MSQ TREATMENT COURSE NUMBER: 1
RAD ONC MSQ TREATMENT SITE: NORMAL

## 2024-05-21 PROCEDURE — 77014 CHG CT GUIDANCE RADIATION THERAPY FLDS PLACEMENT: CPT | Performed by: STUDENT IN AN ORGANIZED HEALTH CARE EDUCATION/TRAINING PROGRAM

## 2024-05-21 PROCEDURE — 77385 HC INTENSITY-MODULATED RADIATION THERAPY (IMRT), SIMPLE: CPT | Performed by: RADIOLOGY

## 2024-05-22 ENCOUNTER — HOSPITAL ENCOUNTER (OUTPATIENT)
Dept: RADIATION ONCOLOGY | Facility: HOSPITAL | Age: 84
Setting detail: RADIATION/ONCOLOGY SERIES
Discharge: HOME | End: 2024-05-22
Payer: MEDICARE

## 2024-05-22 DIAGNOSIS — Z51.0 ENCOUNTER FOR ANTINEOPLASTIC RADIATION THERAPY: ICD-10-CM

## 2024-05-22 DIAGNOSIS — D05.12 INTRADUCTAL CARCINOMA IN SITU OF LEFT BREAST: ICD-10-CM

## 2024-05-22 LAB
RAD ONC MSQ ACTUAL FRACTIONS DELIVERED: 3
RAD ONC MSQ ACTUAL SESSION DELIVERED DOSE: 600 CGRAY
RAD ONC MSQ ACTUAL TOTAL DOSE: 1800 CGRAY
RAD ONC MSQ ELAPSED DAYS: 2
RAD ONC MSQ LAST DATE: NORMAL
RAD ONC MSQ PRESCRIBED FRACTIONAL DOSE: 600 CGRAY
RAD ONC MSQ PRESCRIBED NUMBER OF FRACTIONS: 5
RAD ONC MSQ PRESCRIBED TECHNIQUE: NORMAL
RAD ONC MSQ PRESCRIBED TOTAL DOSE: 3000 CGRAY
RAD ONC MSQ PRESCRIPTION PATTERN COMMENT: NORMAL
RAD ONC MSQ START DATE: NORMAL
RAD ONC MSQ TREATMENT COURSE NUMBER: 1
RAD ONC MSQ TREATMENT SITE: NORMAL

## 2024-05-22 PROCEDURE — 77336 RADIATION PHYSICS CONSULT: CPT | Performed by: RADIOLOGY

## 2024-05-22 PROCEDURE — 77385 HC INTENSITY-MODULATED RADIATION THERAPY (IMRT), SIMPLE: CPT | Performed by: RADIOLOGY

## 2024-05-22 PROCEDURE — 77014 CHG CT GUIDANCE RADIATION THERAPY FLDS PLACEMENT: CPT | Performed by: STUDENT IN AN ORGANIZED HEALTH CARE EDUCATION/TRAINING PROGRAM

## 2024-05-23 ENCOUNTER — APPOINTMENT (OUTPATIENT)
Dept: RADIATION ONCOLOGY | Facility: CLINIC | Age: 84
End: 2024-05-23
Payer: MEDICARE

## 2024-05-23 ENCOUNTER — APPOINTMENT (OUTPATIENT)
Dept: RADIATION ONCOLOGY | Facility: HOSPITAL | Age: 84
End: 2024-05-23
Payer: MEDICARE

## 2024-05-23 ENCOUNTER — HOSPITAL ENCOUNTER (OUTPATIENT)
Dept: RADIATION ONCOLOGY | Facility: CLINIC | Age: 84
Setting detail: RADIATION/ONCOLOGY SERIES
Discharge: HOME | End: 2024-05-23
Payer: MEDICARE

## 2024-05-23 DIAGNOSIS — D05.12 INTRADUCTAL CARCINOMA IN SITU OF LEFT BREAST: ICD-10-CM

## 2024-05-23 DIAGNOSIS — Z51.0 ENCOUNTER FOR ANTINEOPLASTIC RADIATION THERAPY: ICD-10-CM

## 2024-05-23 LAB
RAD ONC MSQ ACTUAL FRACTIONS DELIVERED: 4
RAD ONC MSQ ACTUAL SESSION DELIVERED DOSE: 600 CGRAY
RAD ONC MSQ ACTUAL TOTAL DOSE: 2400 CGRAY
RAD ONC MSQ ELAPSED DAYS: 3
RAD ONC MSQ LAST DATE: NORMAL
RAD ONC MSQ PRESCRIBED FRACTIONAL DOSE: 600 CGRAY
RAD ONC MSQ PRESCRIBED NUMBER OF FRACTIONS: 5
RAD ONC MSQ PRESCRIBED TECHNIQUE: NORMAL
RAD ONC MSQ PRESCRIBED TOTAL DOSE: 3000 CGRAY
RAD ONC MSQ PRESCRIPTION PATTERN COMMENT: NORMAL
RAD ONC MSQ START DATE: NORMAL
RAD ONC MSQ TREATMENT COURSE NUMBER: 1
RAD ONC MSQ TREATMENT SITE: NORMAL

## 2024-05-23 PROCEDURE — 77014 CHG CT GUIDANCE RADIATION THERAPY FLDS PLACEMENT: CPT | Performed by: STUDENT IN AN ORGANIZED HEALTH CARE EDUCATION/TRAINING PROGRAM

## 2024-05-23 PROCEDURE — 77385 HC INTENSITY-MODULATED RADIATION THERAPY (IMRT), SIMPLE: CPT | Performed by: STUDENT IN AN ORGANIZED HEALTH CARE EDUCATION/TRAINING PROGRAM

## 2024-05-24 ENCOUNTER — HOSPITAL ENCOUNTER (OUTPATIENT)
Dept: RADIATION ONCOLOGY | Facility: HOSPITAL | Age: 84
Setting detail: RADIATION/ONCOLOGY SERIES
Discharge: HOME | End: 2024-05-24
Payer: MEDICARE

## 2024-05-24 ENCOUNTER — DOCUMENTATION (OUTPATIENT)
Dept: RADIATION ONCOLOGY | Facility: HOSPITAL | Age: 84
End: 2024-05-24

## 2024-05-24 DIAGNOSIS — Z51.0 ENCOUNTER FOR ANTINEOPLASTIC RADIATION THERAPY: ICD-10-CM

## 2024-05-24 DIAGNOSIS — D05.12 INTRADUCTAL CARCINOMA IN SITU OF LEFT BREAST: ICD-10-CM

## 2024-05-24 LAB
RAD ONC MSQ ACTUAL FRACTIONS DELIVERED: 5
RAD ONC MSQ ACTUAL SESSION DELIVERED DOSE: 600 CGRAY
RAD ONC MSQ ACTUAL TOTAL DOSE: 3000 CGRAY
RAD ONC MSQ ELAPSED DAYS: 4
RAD ONC MSQ LAST DATE: NORMAL
RAD ONC MSQ PRESCRIBED FRACTIONAL DOSE: 600 CGRAY
RAD ONC MSQ PRESCRIBED NUMBER OF FRACTIONS: 5
RAD ONC MSQ PRESCRIBED TECHNIQUE: NORMAL
RAD ONC MSQ PRESCRIBED TOTAL DOSE: 3000 CGRAY
RAD ONC MSQ PRESCRIPTION PATTERN COMMENT: NORMAL
RAD ONC MSQ START DATE: NORMAL
RAD ONC MSQ TREATMENT COURSE NUMBER: 1
RAD ONC MSQ TREATMENT SITE: NORMAL

## 2024-05-24 PROCEDURE — 77385 HC INTENSITY-MODULATED RADIATION THERAPY (IMRT), SIMPLE: CPT | Performed by: RADIOLOGY

## 2024-05-24 PROCEDURE — 77014 CHG CT GUIDANCE RADIATION THERAPY FLDS PLACEMENT: CPT | Performed by: RADIOLOGY

## 2024-05-27 RX ORDER — DOCUSATE SODIUM 100 MG/1
100 CAPSULE, LIQUID FILLED ORAL 2 TIMES DAILY
Qty: 60 CAPSULE | Refills: 0 | Status: SHIPPED | OUTPATIENT
Start: 2024-05-27 | End: 2024-05-31

## 2024-05-27 RX ORDER — ACETAMINOPHEN 500 MG
1000 TABLET ORAL EVERY 6 HOURS PRN
Qty: 240 TABLET | Refills: 0 | Status: SHIPPED | OUTPATIENT
Start: 2024-05-27 | End: 2024-05-31

## 2024-05-27 RX ORDER — PANTOPRAZOLE SODIUM 40 MG/1
40 TABLET, DELAYED RELEASE ORAL DAILY
Qty: 30 TABLET | Refills: 0 | Status: SHIPPED | OUTPATIENT
Start: 2024-05-27 | End: 2024-05-31

## 2024-05-27 RX ORDER — ASPIRIN 81 MG/1
81 TABLET ORAL 2 TIMES DAILY
Qty: 60 TABLET | Refills: 0 | Status: SHIPPED | OUTPATIENT
Start: 2024-05-27 | End: 2024-05-28 | Stop reason: HOSPADM

## 2024-05-27 RX ORDER — POLYETHYLENE GLYCOL 3350 17 G/17G
17 POWDER, FOR SOLUTION ORAL DAILY
Qty: 238 G | Refills: 0 | Status: SHIPPED | OUTPATIENT
Start: 2024-05-27 | End: 2024-05-31

## 2024-05-27 RX ORDER — TRAMADOL HYDROCHLORIDE 50 MG/1
50 TABLET ORAL EVERY 6 HOURS PRN
Qty: 28 TABLET | Refills: 0 | Status: SHIPPED | OUTPATIENT
Start: 2024-05-27 | End: 2024-05-30 | Stop reason: HOSPADM

## 2024-05-27 RX ORDER — OXYCODONE HYDROCHLORIDE 5 MG/1
5 TABLET ORAL EVERY 6 HOURS PRN
Qty: 28 TABLET | Refills: 0 | Status: SHIPPED | OUTPATIENT
Start: 2024-05-27 | End: 2024-05-30 | Stop reason: HOSPADM

## 2024-05-27 RX ORDER — MELOXICAM 15 MG/1
15 TABLET ORAL DAILY
Qty: 30 TABLET | Refills: 0 | Status: SHIPPED | OUTPATIENT
Start: 2024-05-27 | End: 2024-05-28 | Stop reason: HOSPADM

## 2024-05-28 ENCOUNTER — APPOINTMENT (OUTPATIENT)
Dept: RADIATION ONCOLOGY | Facility: HOSPITAL | Age: 84
End: 2024-05-28
Payer: MEDICARE

## 2024-05-28 ENCOUNTER — ANESTHESIA (OUTPATIENT)
Dept: OPERATING ROOM | Facility: HOSPITAL | Age: 84
DRG: 470 | End: 2024-05-28
Payer: MEDICARE

## 2024-05-28 ENCOUNTER — HOSPITAL ENCOUNTER (INPATIENT)
Facility: HOSPITAL | Age: 84
LOS: 3 days | Discharge: NURSING, REHAB OR LONG-TERM CARE | DRG: 470 | End: 2024-06-02
Attending: ORTHOPAEDIC SURGERY | Admitting: ORTHOPAEDIC SURGERY
Payer: MEDICARE

## 2024-05-28 ENCOUNTER — ANESTHESIA EVENT (OUTPATIENT)
Dept: OPERATING ROOM | Facility: HOSPITAL | Age: 84
DRG: 470 | End: 2024-05-28
Payer: MEDICARE

## 2024-05-28 DIAGNOSIS — M17.11 UNILATERAL PRIMARY OSTEOARTHRITIS, RIGHT KNEE: ICD-10-CM

## 2024-05-28 DIAGNOSIS — Z96.651 TOTAL KNEE REPLACEMENT STATUS, RIGHT: ICD-10-CM

## 2024-05-28 DIAGNOSIS — Z96.651 S/P TOTAL KNEE ARTHROPLASTY, RIGHT: ICD-10-CM

## 2024-05-28 DIAGNOSIS — M17.10 ARTHRITIS OF KNEE: Primary | ICD-10-CM

## 2024-05-28 DIAGNOSIS — Z96.651 S/P TKR (TOTAL KNEE REPLACEMENT) USING CEMENT, RIGHT: ICD-10-CM

## 2024-05-28 LAB
GLUCOSE BLD MANUAL STRIP-MCNC: 129 MG/DL (ref 74–99)
GLUCOSE BLD MANUAL STRIP-MCNC: 132 MG/DL (ref 74–99)
GLUCOSE BLD MANUAL STRIP-MCNC: 140 MG/DL (ref 74–99)
GLUCOSE BLD MANUAL STRIP-MCNC: 144 MG/DL (ref 74–99)

## 2024-05-28 PROCEDURE — 2500000005 HC RX 250 GENERAL PHARMACY W/O HCPCS: Performed by: ANESTHESIOLOGY

## 2024-05-28 PROCEDURE — 7100000011 HC EXTENDED STAY RECOVERY HOURLY - NURSING UNIT

## 2024-05-28 PROCEDURE — 97161 PT EVAL LOW COMPLEX 20 MIN: CPT | Mod: GP

## 2024-05-28 PROCEDURE — 82947 ASSAY GLUCOSE BLOOD QUANT: CPT

## 2024-05-28 PROCEDURE — 2500000004 HC RX 250 GENERAL PHARMACY W/ HCPCS (ALT 636 FOR OP/ED): Performed by: NURSE ANESTHETIST, CERTIFIED REGISTERED

## 2024-05-28 PROCEDURE — 2720000007 HC OR 272 NO HCPCS: Performed by: ORTHOPAEDIC SURGERY

## 2024-05-28 PROCEDURE — 2500000002 HC RX 250 W HCPCS SELF ADMINISTERED DRUGS (ALT 637 FOR MEDICARE OP, ALT 636 FOR OP/ED): Performed by: PHARMACIST

## 2024-05-28 PROCEDURE — 2500000004 HC RX 250 GENERAL PHARMACY W/ HCPCS (ALT 636 FOR OP/ED): Performed by: ANESTHESIOLOGY

## 2024-05-28 PROCEDURE — 2500000002 HC RX 250 W HCPCS SELF ADMINISTERED DRUGS (ALT 637 FOR MEDICARE OP, ALT 636 FOR OP/ED): Performed by: STUDENT IN AN ORGANIZED HEALTH CARE EDUCATION/TRAINING PROGRAM

## 2024-05-28 PROCEDURE — 9420000001 HC RT PATIENT EDUCATION 5 MIN

## 2024-05-28 PROCEDURE — C1713 ANCHOR/SCREW BN/BN,TIS/BN: HCPCS | Performed by: ORTHOPAEDIC SURGERY

## 2024-05-28 PROCEDURE — 3700000002 HC GENERAL ANESTHESIA TIME - EACH INCREMENTAL 1 MINUTE: Performed by: ORTHOPAEDIC SURGERY

## 2024-05-28 PROCEDURE — A4217 STERILE WATER/SALINE, 500 ML: HCPCS | Performed by: ORTHOPAEDIC SURGERY

## 2024-05-28 PROCEDURE — 7100000002 HC RECOVERY ROOM TIME - EACH INCREMENTAL 1 MINUTE: Performed by: ORTHOPAEDIC SURGERY

## 2024-05-28 PROCEDURE — 2500000005 HC RX 250 GENERAL PHARMACY W/O HCPCS: Performed by: PHYSICIAN ASSISTANT

## 2024-05-28 PROCEDURE — 2500000004 HC RX 250 GENERAL PHARMACY W/ HCPCS (ALT 636 FOR OP/ED): Mod: JZ | Performed by: STUDENT IN AN ORGANIZED HEALTH CARE EDUCATION/TRAINING PROGRAM

## 2024-05-28 PROCEDURE — 3700000001 HC GENERAL ANESTHESIA TIME - INITIAL BASE CHARGE: Performed by: ORTHOPAEDIC SURGERY

## 2024-05-28 PROCEDURE — G0378 HOSPITAL OBSERVATION PER HR: HCPCS

## 2024-05-28 PROCEDURE — 7100000001 HC RECOVERY ROOM TIME - INITIAL BASE CHARGE: Performed by: ORTHOPAEDIC SURGERY

## 2024-05-28 PROCEDURE — 2500000005 HC RX 250 GENERAL PHARMACY W/O HCPCS: Performed by: NURSE ANESTHETIST, CERTIFIED REGISTERED

## 2024-05-28 PROCEDURE — C1776 JOINT DEVICE (IMPLANTABLE): HCPCS | Performed by: ORTHOPAEDIC SURGERY

## 2024-05-28 PROCEDURE — 2500000004 HC RX 250 GENERAL PHARMACY W/ HCPCS (ALT 636 FOR OP/ED): Performed by: PHYSICIAN ASSISTANT

## 2024-05-28 PROCEDURE — 3600000005 HC OR TIME - INITIAL BASE CHARGE - PROCEDURE LEVEL FIVE: Performed by: ORTHOPAEDIC SURGERY

## 2024-05-28 PROCEDURE — 94640 AIRWAY INHALATION TREATMENT: CPT

## 2024-05-28 PROCEDURE — 2500000004 HC RX 250 GENERAL PHARMACY W/ HCPCS (ALT 636 FOR OP/ED): Performed by: ORTHOPAEDIC SURGERY

## 2024-05-28 PROCEDURE — 0SRC0J9 REPLACEMENT OF RIGHT KNEE JOINT WITH SYNTHETIC SUBSTITUTE, CEMENTED, OPEN APPROACH: ICD-10-PCS | Performed by: ORTHOPAEDIC SURGERY

## 2024-05-28 PROCEDURE — 2780000003 HC OR 278 NO HCPCS: Performed by: ORTHOPAEDIC SURGERY

## 2024-05-28 PROCEDURE — 2500000001 HC RX 250 WO HCPCS SELF ADMINISTERED DRUGS (ALT 637 FOR MEDICARE OP): Performed by: STUDENT IN AN ORGANIZED HEALTH CARE EDUCATION/TRAINING PROGRAM

## 2024-05-28 PROCEDURE — 97110 THERAPEUTIC EXERCISES: CPT | Mod: GP

## 2024-05-28 PROCEDURE — 27447 TOTAL KNEE ARTHROPLASTY: CPT | Performed by: ORTHOPAEDIC SURGERY

## 2024-05-28 PROCEDURE — 2500000001 HC RX 250 WO HCPCS SELF ADMINISTERED DRUGS (ALT 637 FOR MEDICARE OP): Performed by: PHYSICIAN ASSISTANT

## 2024-05-28 PROCEDURE — 3600000010 HC OR TIME - EACH INCREMENTAL 1 MINUTE - PROCEDURE LEVEL FIVE: Performed by: ORTHOPAEDIC SURGERY

## 2024-05-28 DEVICE — SMARTSET GHV GENTAMICIN HIGH VISCOSITY BONE CEMENT 40G
Type: IMPLANTABLE DEVICE | Site: KNEE | Status: FUNCTIONAL
Brand: SMARTSET

## 2024-05-28 DEVICE — ATTUNE KNEE SYSTEM TIBIAL BASE FIXED BEARING SIZE 3 CEMENTED
Type: IMPLANTABLE DEVICE | Site: KNEE | Status: FUNCTIONAL
Brand: ATTUNE

## 2024-05-28 DEVICE — ATTUNE KNEE SYSTEM TIBIAL INSERT FIXED BEARING POSTERIOR STABILIZED 5 5MM AOX
Type: IMPLANTABLE DEVICE | Site: KNEE | Status: FUNCTIONAL
Brand: ATTUNE

## 2024-05-28 DEVICE — ATTUNE KNEE SYSTEM FEMORAL POSTERIOR STABILIZED NARROW SIZE 5N RIGHT CEMENTED
Type: IMPLANTABLE DEVICE | Site: KNEE | Status: FUNCTIONAL
Brand: ATTUNE

## 2024-05-28 DEVICE — ATTUNE PATELLA MEDIALIZED DOME 35MM CEMENTED AOX
Type: IMPLANTABLE DEVICE | Site: KNEE | Status: FUNCTIONAL
Brand: ATTUNE

## 2024-05-28 RX ORDER — DEXTROSE 50 % IN WATER (D50W) INTRAVENOUS SYRINGE
25
Status: DISCONTINUED | OUTPATIENT
Start: 2024-05-28 | End: 2024-06-02 | Stop reason: HOSPADM

## 2024-05-28 RX ORDER — NALOXONE HYDROCHLORIDE 0.4 MG/ML
0.2 INJECTION, SOLUTION INTRAMUSCULAR; INTRAVENOUS; SUBCUTANEOUS EVERY 5 MIN PRN
Status: DISCONTINUED | OUTPATIENT
Start: 2024-05-28 | End: 2024-06-02 | Stop reason: HOSPADM

## 2024-05-28 RX ORDER — DOCUSATE SODIUM 100 MG/1
100 CAPSULE, LIQUID FILLED ORAL 2 TIMES DAILY
Status: DISCONTINUED | OUTPATIENT
Start: 2024-05-28 | End: 2024-06-02 | Stop reason: HOSPADM

## 2024-05-28 RX ORDER — INSULIN LISPRO 100 [IU]/ML
0-15 INJECTION, SOLUTION INTRAVENOUS; SUBCUTANEOUS
Status: DISCONTINUED | OUTPATIENT
Start: 2024-05-28 | End: 2024-06-02 | Stop reason: HOSPADM

## 2024-05-28 RX ORDER — CYCLOBENZAPRINE HCL 5 MG
5 TABLET ORAL 3 TIMES DAILY PRN
Status: DISCONTINUED | OUTPATIENT
Start: 2024-05-28 | End: 2024-05-30

## 2024-05-28 RX ORDER — KETOROLAC TROMETHAMINE 30 MG/ML
INJECTION, SOLUTION INTRAMUSCULAR; INTRAVENOUS AS NEEDED
Status: DISCONTINUED | OUTPATIENT
Start: 2024-05-28 | End: 2024-05-28

## 2024-05-28 RX ORDER — LEVOTHYROXINE SODIUM 112 UG/1
112 TABLET ORAL 2 TIMES WEEKLY
Status: DISCONTINUED | OUTPATIENT
Start: 2024-05-31 | End: 2024-06-02 | Stop reason: HOSPADM

## 2024-05-28 RX ORDER — DIPHENHYDRAMINE HYDROCHLORIDE 50 MG/ML
12.5 INJECTION INTRAMUSCULAR; INTRAVENOUS EVERY 6 HOURS PRN
Status: DISCONTINUED | OUTPATIENT
Start: 2024-05-28 | End: 2024-06-02 | Stop reason: HOSPADM

## 2024-05-28 RX ORDER — PROPOFOL 10 MG/ML
INJECTION, EMULSION INTRAVENOUS CONTINUOUS PRN
Status: DISCONTINUED | OUTPATIENT
Start: 2024-05-28 | End: 2024-05-28

## 2024-05-28 RX ORDER — LABETALOL HYDROCHLORIDE 5 MG/ML
5 INJECTION, SOLUTION INTRAVENOUS ONCE AS NEEDED
Status: DISCONTINUED | OUTPATIENT
Start: 2024-05-28 | End: 2024-05-28 | Stop reason: HOSPADM

## 2024-05-28 RX ORDER — METOCLOPRAMIDE 10 MG/1
10 TABLET ORAL EVERY 6 HOURS PRN
Status: DISCONTINUED | OUTPATIENT
Start: 2024-05-28 | End: 2024-06-02 | Stop reason: HOSPADM

## 2024-05-28 RX ORDER — SODIUM CHLORIDE 0.9 G/100ML
IRRIGANT IRRIGATION AS NEEDED
Status: DISCONTINUED | OUTPATIENT
Start: 2024-05-28 | End: 2024-05-28 | Stop reason: HOSPADM

## 2024-05-28 RX ORDER — HYDROMORPHONE HYDROCHLORIDE 1 MG/ML
1 INJECTION, SOLUTION INTRAMUSCULAR; INTRAVENOUS; SUBCUTANEOUS EVERY 2 HOUR PRN
Status: DISCONTINUED | OUTPATIENT
Start: 2024-05-28 | End: 2024-05-30

## 2024-05-28 RX ORDER — OXYCODONE HYDROCHLORIDE 5 MG/1
5 TABLET ORAL EVERY 4 HOURS PRN
Status: DISCONTINUED | OUTPATIENT
Start: 2024-05-28 | End: 2024-05-28 | Stop reason: HOSPADM

## 2024-05-28 RX ORDER — TRANEXAMIC ACID 650 MG/1
1950 TABLET ORAL ONCE
Status: DISCONTINUED | OUTPATIENT
Start: 2024-05-29 | End: 2024-05-28

## 2024-05-28 RX ORDER — SCOLOPAMINE TRANSDERMAL SYSTEM 1 MG/1
1 PATCH, EXTENDED RELEASE TRANSDERMAL ONCE
Status: COMPLETED | OUTPATIENT
Start: 2024-05-28 | End: 2024-05-31

## 2024-05-28 RX ORDER — ONDANSETRON HYDROCHLORIDE 2 MG/ML
4 INJECTION, SOLUTION INTRAVENOUS ONCE AS NEEDED
Status: DISCONTINUED | OUTPATIENT
Start: 2024-05-28 | End: 2024-05-28 | Stop reason: HOSPADM

## 2024-05-28 RX ORDER — OXYCODONE HYDROCHLORIDE 5 MG/1
5 TABLET ORAL EVERY 4 HOURS PRN
Status: DISCONTINUED | OUTPATIENT
Start: 2024-05-28 | End: 2024-05-30

## 2024-05-28 RX ORDER — OXYCODONE HYDROCHLORIDE 5 MG/1
10 TABLET ORAL EVERY 4 HOURS PRN
Status: DISCONTINUED | OUTPATIENT
Start: 2024-05-28 | End: 2024-05-30

## 2024-05-28 RX ORDER — BISACODYL 10 MG/1
10 SUPPOSITORY RECTAL DAILY PRN
Status: DISCONTINUED | OUTPATIENT
Start: 2024-05-28 | End: 2024-06-02 | Stop reason: HOSPADM

## 2024-05-28 RX ORDER — OXYCODONE HCL 10 MG/1
10 TABLET, FILM COATED, EXTENDED RELEASE ORAL ONCE
Status: COMPLETED | OUTPATIENT
Start: 2024-05-28 | End: 2024-05-28

## 2024-05-28 RX ORDER — ATORVASTATIN CALCIUM 20 MG/1
20 TABLET, FILM COATED ORAL 3 TIMES WEEKLY
Status: DISCONTINUED | OUTPATIENT
Start: 2024-05-29 | End: 2024-06-02 | Stop reason: HOSPADM

## 2024-05-28 RX ORDER — ASPIRIN 81 MG/1
81 TABLET ORAL 2 TIMES DAILY
Status: DISCONTINUED | OUTPATIENT
Start: 2024-05-29 | End: 2024-05-28

## 2024-05-28 RX ORDER — ONDANSETRON HYDROCHLORIDE 2 MG/ML
4 INJECTION, SOLUTION INTRAVENOUS EVERY 8 HOURS PRN
Status: DISCONTINUED | OUTPATIENT
Start: 2024-05-28 | End: 2024-06-02 | Stop reason: HOSPADM

## 2024-05-28 RX ORDER — METOCLOPRAMIDE HYDROCHLORIDE 5 MG/ML
10 INJECTION INTRAMUSCULAR; INTRAVENOUS EVERY 6 HOURS PRN
Status: DISCONTINUED | OUTPATIENT
Start: 2024-05-28 | End: 2024-06-02 | Stop reason: HOSPADM

## 2024-05-28 RX ORDER — DEXTROSE 50 % IN WATER (D50W) INTRAVENOUS SYRINGE
12.5
Status: DISCONTINUED | OUTPATIENT
Start: 2024-05-28 | End: 2024-06-02 | Stop reason: HOSPADM

## 2024-05-28 RX ORDER — PANTOPRAZOLE SODIUM 40 MG/1
40 TABLET, DELAYED RELEASE ORAL
Status: DISCONTINUED | OUTPATIENT
Start: 2024-05-29 | End: 2024-06-02 | Stop reason: HOSPADM

## 2024-05-28 RX ORDER — HYDRALAZINE HYDROCHLORIDE 20 MG/ML
5 INJECTION INTRAMUSCULAR; INTRAVENOUS EVERY 30 MIN PRN
Status: DISCONTINUED | OUTPATIENT
Start: 2024-05-28 | End: 2024-05-28 | Stop reason: HOSPADM

## 2024-05-28 RX ORDER — HYDROXYCHLOROQUINE SULFATE 200 MG/1
200 TABLET, FILM COATED ORAL DAILY
Status: DISCONTINUED | OUTPATIENT
Start: 2024-05-28 | End: 2024-06-02 | Stop reason: HOSPADM

## 2024-05-28 RX ORDER — ACYCLOVIR 400 MG/1
400 TABLET ORAL DAILY
Status: DISCONTINUED | OUTPATIENT
Start: 2024-05-28 | End: 2024-06-02 | Stop reason: HOSPADM

## 2024-05-28 RX ORDER — FENTANYL CITRATE 50 UG/ML
INJECTION, SOLUTION INTRAMUSCULAR; INTRAVENOUS AS NEEDED
Status: DISCONTINUED | OUTPATIENT
Start: 2024-05-28 | End: 2024-05-28

## 2024-05-28 RX ORDER — TRANEXAMIC ACID 100 MG/ML
INJECTION, SOLUTION INTRAVENOUS AS NEEDED
Status: DISCONTINUED | OUTPATIENT
Start: 2024-05-28 | End: 2024-05-28

## 2024-05-28 RX ORDER — LISINOPRIL 20 MG/1
20 TABLET ORAL DAILY
Status: DISCONTINUED | OUTPATIENT
Start: 2024-05-30 | End: 2024-06-02 | Stop reason: HOSPADM

## 2024-05-28 RX ORDER — ACETAMINOPHEN 325 MG/1
975 TABLET ORAL ONCE
Status: COMPLETED | OUTPATIENT
Start: 2024-05-28 | End: 2024-05-28

## 2024-05-28 RX ORDER — LIDOCAINE HYDROCHLORIDE 10 MG/ML
0.1 INJECTION, SOLUTION EPIDURAL; INFILTRATION; INTRACAUDAL; PERINEURAL ONCE
Status: DISCONTINUED | OUTPATIENT
Start: 2024-05-28 | End: 2024-05-28 | Stop reason: HOSPADM

## 2024-05-28 RX ORDER — ACETAMINOPHEN 325 MG/1
650 TABLET ORAL EVERY 6 HOURS PRN
Status: DISCONTINUED | OUTPATIENT
Start: 2024-05-28 | End: 2024-05-31

## 2024-05-28 RX ORDER — BUPIVACAINE HCL/EPINEPHRINE 0.5-1:200K
VIAL (ML) INJECTION AS NEEDED
Status: DISCONTINUED | OUTPATIENT
Start: 2024-05-28 | End: 2024-05-28

## 2024-05-28 RX ORDER — SODIUM CHLORIDE, SODIUM LACTATE, POTASSIUM CHLORIDE, CALCIUM CHLORIDE 600; 310; 30; 20 MG/100ML; MG/100ML; MG/100ML; MG/100ML
50 INJECTION, SOLUTION INTRAVENOUS CONTINUOUS
Status: ACTIVE | OUTPATIENT
Start: 2024-05-28 | End: 2024-05-29

## 2024-05-28 RX ORDER — LEVOTHYROXINE SODIUM 100 UG/1
100 TABLET ORAL
Status: DISCONTINUED | OUTPATIENT
Start: 2024-05-29 | End: 2024-06-02 | Stop reason: HOSPADM

## 2024-05-28 RX ORDER — AMLODIPINE BESYLATE 5 MG/1
2.5 TABLET ORAL DAILY
Status: DISCONTINUED | OUTPATIENT
Start: 2024-05-29 | End: 2024-06-02 | Stop reason: HOSPADM

## 2024-05-28 RX ORDER — TRANEXAMIC ACID 650 MG/1
1950 TABLET ORAL ONCE
Status: COMPLETED | OUTPATIENT
Start: 2024-05-28 | End: 2024-05-28

## 2024-05-28 RX ORDER — CEFAZOLIN SODIUM 2 G/100ML
2 INJECTION, SOLUTION INTRAVENOUS ONCE
Status: DISCONTINUED | OUTPATIENT
Start: 2024-05-28 | End: 2024-05-28 | Stop reason: HOSPADM

## 2024-05-28 RX ORDER — PROPRANOLOL HYDROCHLORIDE 10 MG/1
10 TABLET ORAL DAILY
Status: DISCONTINUED | OUTPATIENT
Start: 2024-05-29 | End: 2024-06-02 | Stop reason: HOSPADM

## 2024-05-28 RX ORDER — BISACODYL 5 MG
10 TABLET, DELAYED RELEASE (ENTERIC COATED) ORAL DAILY PRN
Status: DISCONTINUED | OUTPATIENT
Start: 2024-05-28 | End: 2024-06-02 | Stop reason: HOSPADM

## 2024-05-28 RX ORDER — PHENYLEPHRINE HCL IN 0.9% NACL 1 MG/10 ML
SYRINGE (ML) INTRAVENOUS AS NEEDED
Status: DISCONTINUED | OUTPATIENT
Start: 2024-05-28 | End: 2024-05-28

## 2024-05-28 RX ORDER — SCOLOPAMINE TRANSDERMAL SYSTEM 1 MG/1
1 PATCH, EXTENDED RELEASE TRANSDERMAL
Status: DISCONTINUED | OUTPATIENT
Start: 2024-05-28 | End: 2024-05-28

## 2024-05-28 RX ORDER — ALBUTEROL SULFATE 0.83 MG/ML
2.5 SOLUTION RESPIRATORY (INHALATION) ONCE AS NEEDED
Status: DISCONTINUED | OUTPATIENT
Start: 2024-05-28 | End: 2024-05-28 | Stop reason: HOSPADM

## 2024-05-28 RX ORDER — MELOXICAM 7.5 MG/1
7.5 TABLET ORAL ONCE
Status: COMPLETED | OUTPATIENT
Start: 2024-05-28 | End: 2024-05-28

## 2024-05-28 RX ORDER — ONDANSETRON HYDROCHLORIDE 2 MG/ML
INJECTION, SOLUTION INTRAVENOUS AS NEEDED
Status: DISCONTINUED | OUTPATIENT
Start: 2024-05-28 | End: 2024-05-28

## 2024-05-28 RX ORDER — ONDANSETRON 4 MG/1
4 TABLET, FILM COATED ORAL EVERY 8 HOURS PRN
Status: DISCONTINUED | OUTPATIENT
Start: 2024-05-28 | End: 2024-06-02 | Stop reason: HOSPADM

## 2024-05-28 RX ORDER — CEFAZOLIN SODIUM 2 G/100ML
2 INJECTION, SOLUTION INTRAVENOUS EVERY 8 HOURS
Qty: 200 ML | Refills: 0 | Status: COMPLETED | OUTPATIENT
Start: 2024-05-28 | End: 2024-05-29

## 2024-05-28 RX ORDER — POLYETHYLENE GLYCOL 3350 17 G/17G
17 POWDER, FOR SOLUTION ORAL DAILY
Status: DISCONTINUED | OUTPATIENT
Start: 2024-05-28 | End: 2024-06-02 | Stop reason: HOSPADM

## 2024-05-28 RX ORDER — ALBUTEROL SULFATE 0.83 MG/ML
2.5 SOLUTION RESPIRATORY (INHALATION) EVERY 6 HOURS PRN
Status: DISCONTINUED | OUTPATIENT
Start: 2024-05-28 | End: 2024-06-02 | Stop reason: HOSPADM

## 2024-05-28 RX ORDER — WATER 1 ML/ML
IRRIGANT IRRIGATION AS NEEDED
Status: DISCONTINUED | OUTPATIENT
Start: 2024-05-28 | End: 2024-05-28 | Stop reason: HOSPADM

## 2024-05-28 RX ORDER — CEFAZOLIN 1 G/1
INJECTION, POWDER, FOR SOLUTION INTRAVENOUS AS NEEDED
Status: DISCONTINUED | OUTPATIENT
Start: 2024-05-28 | End: 2024-05-28

## 2024-05-28 RX ORDER — PILOCARPINE HYDROCHLORIDE 5 MG/1
5 TABLET, FILM COATED ORAL NIGHTLY
Status: DISCONTINUED | OUTPATIENT
Start: 2024-05-28 | End: 2024-06-02 | Stop reason: HOSPADM

## 2024-05-28 RX ORDER — BUDESONIDE 0.5 MG/2ML
0.5 INHALANT ORAL
Status: DISCONTINUED | OUTPATIENT
Start: 2024-05-28 | End: 2024-06-02 | Stop reason: HOSPADM

## 2024-05-28 RX ORDER — SODIUM CHLORIDE, SODIUM LACTATE, POTASSIUM CHLORIDE, CALCIUM CHLORIDE 600; 310; 30; 20 MG/100ML; MG/100ML; MG/100ML; MG/100ML
75 INJECTION, SOLUTION INTRAVENOUS CONTINUOUS
Status: DISCONTINUED | OUTPATIENT
Start: 2024-05-28 | End: 2024-05-28

## 2024-05-28 RX ORDER — FLUTICASONE FUROATE AND VILANTEROL 200; 25 UG/1; UG/1
1 POWDER RESPIRATORY (INHALATION)
Status: DISCONTINUED | OUTPATIENT
Start: 2024-05-28 | End: 2024-05-28

## 2024-05-28 RX ORDER — ROPIVACAINE/EPI/CLONIDINE/KET 2.46-0.005
SYRINGE (ML) INJECTION AS NEEDED
Status: DISCONTINUED | OUTPATIENT
Start: 2024-05-28 | End: 2024-05-28 | Stop reason: HOSPADM

## 2024-05-28 RX ORDER — FORMOTEROL FUMARATE DIHYDRATE 20 UG/2ML
20 SOLUTION RESPIRATORY (INHALATION)
Status: DISCONTINUED | OUTPATIENT
Start: 2024-05-28 | End: 2024-06-02 | Stop reason: HOSPADM

## 2024-05-28 RX ORDER — LIDOCAINE HCL/PF 100 MG/5ML
SYRINGE (ML) INTRAVENOUS AS NEEDED
Status: DISCONTINUED | OUTPATIENT
Start: 2024-05-28 | End: 2024-05-28

## 2024-05-28 RX ORDER — PREGABALIN 75 MG/1
75 CAPSULE ORAL ONCE
Status: COMPLETED | OUTPATIENT
Start: 2024-05-28 | End: 2024-05-28

## 2024-05-28 RX ORDER — SODIUM CHLORIDE, SODIUM LACTATE, POTASSIUM CHLORIDE, CALCIUM CHLORIDE 600; 310; 30; 20 MG/100ML; MG/100ML; MG/100ML; MG/100ML
100 INJECTION, SOLUTION INTRAVENOUS CONTINUOUS
Status: DISCONTINUED | OUTPATIENT
Start: 2024-05-28 | End: 2024-05-28 | Stop reason: HOSPADM

## 2024-05-28 RX ADMIN — TRANEXAMIC ACID 1000 MG: 1 INJECTION, SOLUTION INTRAVENOUS at 10:18

## 2024-05-28 RX ADMIN — MEPIVACAINE HYDROCHLORIDE 3.5 ML: 15 INJECTION, SOLUTION EPIDURAL; INFILTRATION at 10:15

## 2024-05-28 RX ADMIN — PILOCARPINE HYDROCHLORIDE 5 MG: 5 TABLET, FILM COATED ORAL at 20:18

## 2024-05-28 RX ADMIN — PROPOFOL 50 MCG/KG/MIN: 10 INJECTION, EMULSION INTRAVENOUS at 10:20

## 2024-05-28 RX ADMIN — OXYCODONE HYDROCHLORIDE 10 MG: 10 TABLET, FILM COATED, EXTENDED RELEASE ORAL at 09:11

## 2024-05-28 RX ADMIN — CEFAZOLIN SODIUM 2 G: 2 INJECTION, SOLUTION INTRAVENOUS at 18:03

## 2024-05-28 RX ADMIN — Medication 20 ML: at 09:47

## 2024-05-28 RX ADMIN — FORMOTEROL FUMARATE DIHYDRATE 20 MCG: 20 SOLUTION RESPIRATORY (INHALATION) at 21:18

## 2024-05-28 RX ADMIN — BUDESONIDE INHALATION 0.5 MG: 0.5 SUSPENSION RESPIRATORY (INHALATION) at 21:18

## 2024-05-28 RX ADMIN — PREGABALIN 75 MG: 75 CAPSULE ORAL at 09:11

## 2024-05-28 RX ADMIN — CYCLOBENZAPRINE HYDROCHLORIDE 5 MG: 5 TABLET, FILM COATED ORAL at 15:02

## 2024-05-28 RX ADMIN — HYDROMORPHONE HYDROCHLORIDE 0.2 MG: 1 INJECTION, SOLUTION INTRAMUSCULAR; INTRAVENOUS; SUBCUTANEOUS at 12:31

## 2024-05-28 RX ADMIN — SODIUM CHLORIDE, POTASSIUM CHLORIDE, SODIUM LACTATE AND CALCIUM CHLORIDE 75 ML/HR: 600; 310; 30; 20 INJECTION, SOLUTION INTRAVENOUS at 09:14

## 2024-05-28 RX ADMIN — DOCUSATE SODIUM 100 MG: 100 CAPSULE, LIQUID FILLED ORAL at 20:17

## 2024-05-28 RX ADMIN — SODIUM CHLORIDE, POTASSIUM CHLORIDE, SODIUM LACTATE AND CALCIUM CHLORIDE: 600; 310; 30; 20 INJECTION, SOLUTION INTRAVENOUS at 09:54

## 2024-05-28 RX ADMIN — ACETAMINOPHEN 975 MG: 325 TABLET ORAL at 09:10

## 2024-05-28 RX ADMIN — SCOPALAMINE 1 PATCH: 1 PATCH, EXTENDED RELEASE TRANSDERMAL at 15:03

## 2024-05-28 RX ADMIN — Medication 200 MCG: at 11:02

## 2024-05-28 RX ADMIN — SCOPALAMINE 1 PATCH: 1 PATCH, EXTENDED RELEASE TRANSDERMAL at 09:11

## 2024-05-28 RX ADMIN — FENTANYL CITRATE 50 MCG: 50 INJECTION, SOLUTION INTRAMUSCULAR; INTRAVENOUS at 09:42

## 2024-05-28 RX ADMIN — CEFAZOLIN 2 G: 1 INJECTION, POWDER, FOR SOLUTION INTRAMUSCULAR; INTRAVENOUS at 10:10

## 2024-05-28 RX ADMIN — TRANEXAMIC ACID 1950 MG: 650 TABLET ORAL at 15:02

## 2024-05-28 RX ADMIN — LIDOCAINE HYDROCHLORIDE 70 MG: 20 INJECTION, SOLUTION INTRAVENOUS at 10:19

## 2024-05-28 RX ADMIN — FENTANYL CITRATE 50 MCG: 50 INJECTION, SOLUTION INTRAMUSCULAR; INTRAVENOUS at 09:40

## 2024-05-28 RX ADMIN — POLYETHYLENE GLYCOL 3350 17 G: 17 POWDER, FOR SOLUTION ORAL at 20:17

## 2024-05-28 RX ADMIN — Medication 6 L/MIN: at 11:49

## 2024-05-28 RX ADMIN — OXYCODONE HYDROCHLORIDE 10 MG: 5 TABLET ORAL at 15:01

## 2024-05-28 RX ADMIN — Medication 100 MCG: at 10:42

## 2024-05-28 RX ADMIN — MELOXICAM 7.5 MG: 7.5 TABLET ORAL at 09:11

## 2024-05-28 RX ADMIN — SODIUM CHLORIDE, POTASSIUM CHLORIDE, SODIUM LACTATE AND CALCIUM CHLORIDE 50 ML/HR: 600; 310; 30; 20 INJECTION, SOLUTION INTRAVENOUS at 15:33

## 2024-05-28 RX ADMIN — ONDANSETRON 4 MG: 2 INJECTION INTRAMUSCULAR; INTRAVENOUS at 15:33

## 2024-05-28 RX ADMIN — ONDANSETRON 4 MG: 2 INJECTION INTRAMUSCULAR; INTRAVENOUS at 10:10

## 2024-05-28 RX ADMIN — POVIDONE-IODINE 1 APPLICATION: 5 SOLUTION TOPICAL at 09:11

## 2024-05-28 RX ADMIN — APIXABAN 2.5 MG: 2.5 TABLET, FILM COATED ORAL at 20:17

## 2024-05-28 RX ADMIN — KETOROLAC TROMETHAMINE 30 MG: 30 INJECTION, SOLUTION INTRAMUSCULAR at 11:15

## 2024-05-28 SDOH — SOCIAL STABILITY: SOCIAL INSECURITY: HAVE YOU HAD THOUGHTS OF HARMING ANYONE ELSE?: NO

## 2024-05-28 SDOH — SOCIAL STABILITY: SOCIAL INSECURITY: DO YOU FEEL UNSAFE GOING BACK TO THE PLACE WHERE YOU ARE LIVING?: NO

## 2024-05-28 SDOH — SOCIAL STABILITY: SOCIAL INSECURITY: WERE YOU ABLE TO COMPLETE ALL THE BEHAVIORAL HEALTH SCREENINGS?: YES

## 2024-05-28 SDOH — SOCIAL STABILITY: SOCIAL INSECURITY: HAVE YOU HAD ANY THOUGHTS OF HARMING ANYONE ELSE?: NO

## 2024-05-28 SDOH — SOCIAL STABILITY: SOCIAL INSECURITY: DOES ANYONE TRY TO KEEP YOU FROM HAVING/CONTACTING OTHER FRIENDS OR DOING THINGS OUTSIDE YOUR HOME?: NO

## 2024-05-28 SDOH — SOCIAL STABILITY: SOCIAL INSECURITY: HAS ANYONE EVER THREATENED TO HURT YOUR FAMILY OR YOUR PETS?: NO

## 2024-05-28 SDOH — SOCIAL STABILITY: SOCIAL INSECURITY: ARE YOU OR HAVE YOU BEEN THREATENED OR ABUSED PHYSICALLY, EMOTIONALLY, OR SEXUALLY BY ANYONE?: NO

## 2024-05-28 SDOH — SOCIAL STABILITY: SOCIAL INSECURITY: DO YOU FEEL ANYONE HAS EXPLOITED OR TAKEN ADVANTAGE OF YOU FINANCIALLY OR OF YOUR PERSONAL PROPERTY?: NO

## 2024-05-28 SDOH — SOCIAL STABILITY: SOCIAL INSECURITY: ABUSE: ADULT

## 2024-05-28 SDOH — SOCIAL STABILITY: SOCIAL INSECURITY: ARE THERE ANY APPARENT SIGNS OF INJURIES/BEHAVIORS THAT COULD BE RELATED TO ABUSE/NEGLECT?: NO

## 2024-05-28 ASSESSMENT — COGNITIVE AND FUNCTIONAL STATUS - GENERAL
MOBILITY SCORE: 17
STANDING UP FROM CHAIR USING ARMS: A LITTLE
MOVING TO AND FROM BED TO CHAIR: A LITTLE
DAILY ACTIVITIY SCORE: 20
CLIMB 3 TO 5 STEPS WITH RAILING: A LITTLE
DRESSING REGULAR LOWER BODY CLOTHING: A LITTLE
MOBILITY SCORE: 18
HELP NEEDED FOR BATHING: A LITTLE
WALKING IN HOSPITAL ROOM: A LITTLE
MOVING FROM LYING ON BACK TO SITTING ON SIDE OF FLAT BED WITH BEDRAILS: A LITTLE
WALKING IN HOSPITAL ROOM: A LITTLE
WALKING IN HOSPITAL ROOM: A LITTLE
TURNING FROM BACK TO SIDE WHILE IN FLAT BAD: A LITTLE
MOVING FROM LYING ON BACK TO SITTING ON SIDE OF FLAT BED WITH BEDRAILS: A LITTLE
CLIMB 3 TO 5 STEPS WITH RAILING: A LOT
TOILETING: A LITTLE
CLIMB 3 TO 5 STEPS WITH RAILING: TOTAL
DRESSING REGULAR UPPER BODY CLOTHING: A LITTLE
DAILY ACTIVITIY SCORE: 21
DRESSING REGULAR LOWER BODY CLOTHING: A LITTLE
STANDING UP FROM CHAIR USING ARMS: A LITTLE
TOILETING: A LITTLE
TURNING FROM BACK TO SIDE WHILE IN FLAT BAD: A LITTLE
TURNING FROM BACK TO SIDE WHILE IN FLAT BAD: A LITTLE
MOVING TO AND FROM BED TO CHAIR: A LITTLE
HELP NEEDED FOR BATHING: A LITTLE
MOBILITY SCORE: 16
PATIENT BASELINE BEDBOUND: NO
MOVING FROM LYING ON BACK TO SITTING ON SIDE OF FLAT BED WITH BEDRAILS: A LITTLE
STANDING UP FROM CHAIR USING ARMS: A LITTLE
MOVING TO AND FROM BED TO CHAIR: A LITTLE

## 2024-05-28 ASSESSMENT — LIFESTYLE VARIABLES
AUDIT-C TOTAL SCORE: 0
HOW OFTEN DO YOU HAVE A DRINK CONTAINING ALCOHOL: NEVER
SKIP TO QUESTIONS 9-10: 1
AUDIT-C TOTAL SCORE: 0
HOW OFTEN DO YOU HAVE 6 OR MORE DRINKS ON ONE OCCASION: NEVER
HOW MANY STANDARD DRINKS CONTAINING ALCOHOL DO YOU HAVE ON A TYPICAL DAY: PATIENT DOES NOT DRINK

## 2024-05-28 ASSESSMENT — PAIN SCALES - GENERAL
PAINLEVEL_OUTOF10: 4
PAINLEVEL_OUTOF10: 5 - MODERATE PAIN
PAINLEVEL_OUTOF10: 0 - NO PAIN
PAINLEVEL_OUTOF10: 5 - MODERATE PAIN
PAINLEVEL_OUTOF10: 4
PAINLEVEL_OUTOF10: 8
PAINLEVEL_OUTOF10: 0 - NO PAIN
PAINLEVEL_OUTOF10: 4
PAINLEVEL_OUTOF10: 5 - MODERATE PAIN
PAINLEVEL_OUTOF10: 4

## 2024-05-28 ASSESSMENT — COLUMBIA-SUICIDE SEVERITY RATING SCALE - C-SSRS
1. IN THE PAST MONTH, HAVE YOU WISHED YOU WERE DEAD OR WISHED YOU COULD GO TO SLEEP AND NOT WAKE UP?: NO
6. HAVE YOU EVER DONE ANYTHING, STARTED TO DO ANYTHING, OR PREPARED TO DO ANYTHING TO END YOUR LIFE?: NO
2. HAVE YOU ACTUALLY HAD ANY THOUGHTS OF KILLING YOURSELF?: NO

## 2024-05-28 ASSESSMENT — ACTIVITIES OF DAILY LIVING (ADL)
TOILETING: INDEPENDENT
WALKS IN HOME: INDEPENDENT
FEEDING YOURSELF: INDEPENDENT
HEARING - LEFT EAR: FUNCTIONAL
LACK_OF_TRANSPORTATION: NO
BATHING: INDEPENDENT
GROOMING: INDEPENDENT
ADL_ASSISTANCE: INDEPENDENT
ASSISTIVE_DEVICE: WALKER;CANE
DRESSING YOURSELF: INDEPENDENT
ADEQUATE_TO_COMPLETE_ADL: YES
HEARING - RIGHT EAR: FUNCTIONAL
PATIENT'S MEMORY ADEQUATE TO SAFELY COMPLETE DAILY ACTIVITIES?: YES
JUDGMENT_ADEQUATE_SAFELY_COMPLETE_DAILY_ACTIVITIES: YES

## 2024-05-28 ASSESSMENT — PAIN - FUNCTIONAL ASSESSMENT
PAIN_FUNCTIONAL_ASSESSMENT: 0-10
PAIN_FUNCTIONAL_ASSESSMENT: UNABLE TO SELF-REPORT
PAIN_FUNCTIONAL_ASSESSMENT: 0-10
PAIN_FUNCTIONAL_ASSESSMENT: UNABLE TO SELF-REPORT
PAIN_FUNCTIONAL_ASSESSMENT: 0-10

## 2024-05-28 ASSESSMENT — PATIENT HEALTH QUESTIONNAIRE - PHQ9
2. FEELING DOWN, DEPRESSED OR HOPELESS: NOT AT ALL
1. LITTLE INTEREST OR PLEASURE IN DOING THINGS: NOT AT ALL
SUM OF ALL RESPONSES TO PHQ9 QUESTIONS 1 & 2: 0

## 2024-05-28 ASSESSMENT — PAIN DESCRIPTION - DESCRIPTORS
DESCRIPTORS: SORE

## 2024-05-28 ASSESSMENT — PAIN DESCRIPTION - ORIENTATION: ORIENTATION: RIGHT

## 2024-05-28 ASSESSMENT — PAIN DESCRIPTION - LOCATION: LOCATION: KNEE

## 2024-05-28 NOTE — PROGRESS NOTES
Physical Therapy    Physical Therapy Evaluation & Treatment    Patient Name: Louisa Rodas  MRN: [id number]  Today's Date: 5/28/2024   Time Calculation  Start Time: 1435  Stop Time: 1530  Time Calculation (min): 55 min    Assessment/Plan   PT Assessment  PT Assessment Results: Decreased strength, Decreased range of motion, Decreased endurance, Impaired balance, Decreased mobility, Pain, Orthopedic restrictions  Rehab Prognosis: Good  End of Session Communication: Bedside nurse  Assessment Comment: Pt is POD #0, s/p R TKA & is WBAT.  The pt presents with diminished safety & independence regarding bed mobility, transfers, and gait.  The patient is also unable to ascend/descend stairs and requires cueing in regard to TKA precautions.  Contributing to these impairments are post-op pain, decreased R knee ROM & strength.  The patient would benefit from continued PT to address the above functional limitations & improve independence/safety.  Anticipate low frequency PT needs, at discharge  End of Session Patient Position: Alarm on, Up in chair   IP OR SWING BED PT PLAN  Inpatient or Swing Bed: Inpatient  PT Plan  Treatment/Interventions: Bed mobility, Transfer training, Gait training, Stair training, Balance training, Strengthening, Endurance training, Range of motion, Therapeutic exercise, Therapeutic activity, Home exercise program  PT Plan: Skilled PT  PT Frequency: BID  PT Discharge Recommendations: Low intensity level of continued care  PT Recommended Transfer Status: Assist x1  PT - OK to Discharge: Yes (PT POC Established.)      Subjective     General Visit Information:  General  Reason for Referral: R TKA  Referred By: Portillo Arthur MD  Past Medical History Relevant to Rehab:   Past Medical History:   Diagnosis Date    Asthma ([facility])     Cervical spondylosis without myelopathy     Chronic kidney disease, stage 3a (Multi)     3.21.24: creat 1.43, GFR 37    DM (diabetes mellitus), type 2 (Multi)     2/20/2024 A1C 7.2%     Ductal carcinoma in situ (DCIS) of left breast     s/p lumpectomy 3/27/24    Essential tremor     Fatigue     Fibromyalgia     H/O echocardiogram 05/25/2023    CONCLUSIONS: 1. Left ventricular systolic function is hyperdynamic with a 70-75% estimated EF. 2. Spectral Doppler shows an impaired relaxation pattern of left ventricular diastolic filling. 3. Mild aortic valve regurgitation. 4. Left ventricular cavity size is decreased. 5. There is a mid cavity left ventricular obstruction noted w/ the Valsalva maneuver. The provoked gradient is 28 mmHg.    Hyperlipidemia     Hypermetropia, bilateral     Hyperopia of both eyes with astigmatism and presbyopia    Hypertension     Hypogammaglobulinemia (Multi)     Hypothyroidism     Ischemic optic neuropathy, bilateral     Ischemic optic neuropathy, bilateral    Meibomian gland dysfunction right eye, upper and lower eyelids     Meibomian gland dysfunction (MGD) of upper and lower lids of both eyes    Near syncope     NPH (normal pressure hydrocephalus) (Multi)     MILD TO MODERATE, brain MRI 5/4/23:mild enlargement of ventricles, mild diffuse cerebral atrophy    Obesity     Obstructive sleep apnea on CPAP     Osteoarthritis     Peripheral motor neuropathy     Polycystic liver disease     follows with hepatology yearly    PONV (postoperative nausea and vomiting)     Primary open-angle glaucoma, bilateral, indeterminate stage     Sciatica     Sjogren syndrome, unspecified (Multi)     Vocal cord dysfunction        Prior to Session Communication: Bedside nurse  Patient Position Received: Bed, 3 rail up, Alarm on  General Comment: Pt lethargic but agreeable to eval. Pt's daughter and  present. Pt limited by nausea  Home Living:  Home Living  Type of Home: House  Lives With: Spouse  Home Adaptive Equipment: Cane, Walker rolling or standard  Home Layout: Two level  Home Access: Stairs to enter with rails  Entrance Stairs-Rails: Both  Entrance Stairs-Number of Steps:  4  Bathroom Equipment:  (shower chair)  Home Living Comments: Pt will be staying with daughter who lives in a house with 0 MELISSA and first floor setup.  Prior Level of Function:  Prior Function Per Pt/Caregiver Report  Level of Bertie: Independent with ADLs and functional transfers, Independent with homemaking with ambulation  Receives Help From: Family  ADL Assistance: Independent  Homemaking Assistance: Independent  Ambulatory Assistance: Independent (cane for community distances)  Prior Function Comments: Denies any recent falls. Drives.  Precautions:  Precautions  LE Weight Bearing Status: Weight Bearing as Tolerated  Medical Precautions: Fall precautions  Post-Surgical Precautions: Left total knee precautions  Vital Signs:  Vital Signs  BP: 146/63 (Sitting: 160/64)  BP Location: Right arm  BP Method: Automatic  Patient Position: Lying    Objective   Pain:  Pain Assessment  Pain Assessment: 0-10  Pain Score: 4  Pain Type: Surgical pain  Pain Location: Knee  Pain Orientation: Right  Cognition:  Cognition  Overall Cognitive Status: Within Functional Limits (lethargic)  Orientation Level: Oriented X4    General Assessments:                  Activity Tolerance  Endurance: Tolerates 30 min exercise with multiple rests  Activity Tolerance Comments: Limited by nausea. Increased time needed for all activities.    Sensation  Light Touch: No apparent deficits    Postural Control  Postural Control: Within Functional Limits    Static Sitting Balance  Static Sitting-Balance Support: Feet supported, Bilateral upper extremity supported  Static Sitting-Level of Assistance: Close supervision  Dynamic Sitting Balance  Dynamic Sitting-Balance Support: Feet supported, Bilateral upper extremity supported  Dynamic Sitting-Comments: Close supervision    Static Standing Balance  Static Standing-Balance Support: Bilateral upper extremity supported  Static Standing-Level of Assistance: Contact guard  Dynamic Standing Balance  Dynamic  Standing-Balance Support: Bilateral upper extremity supported  Dynamic Standing-Comments: Contact guard  Functional Assessments:  Bed Mobility  Bed Mobility: Yes  Bed Mobility 1  Bed Mobility 1: Supine to sitting  Level of Assistance 1: Close supervision  Bed Mobility Comments 1: Cues for sequencing and hand placement.    Transfers  Transfer: Yes  Transfer 1  Technique 1: Sit to stand, Stand to sit  Transfer Device 1: Walker  Transfer Level of Assistance 1: Minimum assistance  Trials/Comments 1: Cues for hand placement, scooting to the EOB, and anterior weight shifting    Ambulation/Gait Training  Ambulation/Gait Training Performed: Yes  Ambulation/Gait Training 1  Surface 1: Level tile  Device 1: Rolling walker  Assistance 1: Contact guard  Comments/Distance (ft) 1: 3 ft. Pt ambulates from bed to chair with cues for walker placement, sequencing, and body mechanics. Mildly antalgic chadd.  Extremity/Trunk Assessments:  RUE   RUE : Within Functional Limits  LUE   LUE: Within Functional Limits  RLE   RLE :  (R knee lacking 9 degrees extension. Knee flexion measurements not obtained 2/2 nausea limiting session.)  LLE   LLE : Within Functional Limits  Treatments:  Therapeutic Exercise  Therapeutic Exercise Performed: Yes  Therapeutic Exercise Activity 1: Pt complete each of the following x15 reps on the R LE in order to increase strength and endurance: ankle pumps, glute sets, quad sets, heel slides, SAQs, SLR. MAx cues for form and technique. Increased time needed due to lethargy.    Therapeutic Activity  Therapeutic Activity Performed: Yes  Therapeutic Activity 1: Pt educated on proper bed mobility technique including sequencing and hand placement. Facilitated safe transfer technique including hand placement, foot placement, scooting to the EOB, anterior weight shifting, and backing up fully prior to sitting. Cues for proper gait training including body mechanics, walker placement, and sequencing. Pt requires  increased time for all mobility due to lethargy and nausea with several rest breaks given.  Outcome Measures:  [facility] Basic Mobility  Turning from your back to your side while in a flat bed without using bedrails: A little  Moving from lying on your back to sitting on the side of a flat bed without using bedrails: A little  Moving to and from bed to chair (including a wheelchair): A little  Standing up from a chair using your arms (e.g. wheelchair or bedside chair): A little  To walk in hospital room: A little  Climbing 3-5 steps with railing: Total  Basic Mobility - Total Score: 16    Encounter Problems       Encounter Problems (Active)       Mobility       STG - Patient will ambulate 150 ft mod I with a RW.        Start:  05/28/24    Expected End:  06/11/24            STG - Patient will ascend and descend four stairs using екатерина Hrs mod I.        Start:  05/28/24    Expected End:  06/11/24            Pt will tolerate 15 reps of each LE exercise in order to improve strength and endurance.        Start:  05/28/24    Expected End:  06/11/24            Pt will achieve 0-90 degrees R knee ROM       Start:  05/28/24    Expected End:  06/11/24               PT Transfers       STG - Patient will perform bed mobility independently       Start:  05/28/24    Expected End:  06/11/24            STG - Patient will transfer sit to and from stand mod I with a RW.        Start:  05/28/24    Expected End:  06/11/24            STG - Patient will perform car transfer independently.        Start:  05/28/24    Expected End:  06/11/24                   Education Documentation  Handouts, taught by Rubi Levine, PT at 5/28/2024  4:10 PM.  Learner: Patient  Readiness: Acceptance  Method: Explanation, Handout  Response: Verbalizes Understanding    Precautions, taught by Rubi Levine, PT at 5/28/2024  4:10 PM.  Learner: Patient  Readiness: Acceptance  Method: Explanation, Handout  Response: Verbalizes Understanding    Body Mechanics,  taught by Rbui Levine, PT at 5/28/2024  4:10 PM.  Learner: Patient  Readiness: Acceptance  Method: Explanation, Handout  Response: Verbalizes Understanding    Home Exercise Program, taught by Rubi Levine PT at 5/28/2024  4:10 PM.  Learner: Patient  Readiness: Acceptance  Method: Explanation, Handout  Response: Verbalizes Understanding    Mobility Training, taught by Rubi Levine PT at 5/28/2024  4:10 PM.  Learner: Patient  Readiness: Acceptance  Method: Explanation, Handout  Response: Verbalizes Understanding    Education Comments  No comments found.

## 2024-05-28 NOTE — PERIOPERATIVE NURSING NOTE
1149 Pt arrived to pacu bay at this time, report received from OR and anesthesia staff. Phase 1 care started.   1157 Message sent to family via text at this time.   1200 pt remains sedated without s/sx of distress.   1210 pt becoming responsive to voice. Weaned to 3L NC, no pain reported.   1215 pt resting quietly, asleep in bed.   1230 pt becoming more alert, reporting pain and to be medicated. Pt tolerating water without issue, able to bend knees & ankles and reporting full sensation.   1231 pt medicated per order with 0.2 mg IVP dilaudid.   1245 pt resting quietly reporting dec in pain. Tolerating PO intake of full chocolate pudding & water.   1250 report to room 721 RN via secure chat. PT Rubi made aware of pt status via charge nurse.   1253 pt family updated on pt status via phone call at this time, made aware of pt status and plan for transport up to room.   1300 pt resting quietly without needs, reporting pain now tolerable.   1301 pt meeting criteria for phase 1 dc. Pt aware of plan of care and need to await transport up to room.   1316 Pt taken up to room via transport at this time in stable condition. Report previously sent to receiving RN. All belongings taken with pt to room, family updated on pt status.

## 2024-05-28 NOTE — ANESTHESIA POSTPROCEDURE EVALUATION
Patient: Louisa [place]    Procedure Summary       Date: 05/28/24 Room / Location: [facility] OR 12 / Virtual [facility] OR    Anesthesia Start: 0954 Anesthesia Stop: 1154    Procedure: Right Knee Total Arthroplasty (Right: Knee) Diagnosis:       Unilateral primary osteoarthritis, right knee      (Unilateral primary osteoarthritis, right knee [M17.11])    Surgeons: Isael Bhagat MD Responsible Provider: Ricky Dudley MD    Anesthesia Type: MAC, spinal ASA Status: 3            Anesthesia Type: MAC, spinal    Vitals Value Taken Time   /84 05/28/24 1315   Temp 36.2 °C (97.2 °F) 05/28/24 1315   Pulse 62 05/28/24 1315   Resp 14 05/28/24 1315   SpO2 96 % 05/28/24 1315       Anesthesia Post Evaluation    Patient participation: complete - patient participated  Level of consciousness: awake  Pain management: adequate  Airway patency: patent  Cardiovascular status: acceptable and hemodynamically stable  Respiratory status: acceptable  Hydration status: acceptable  Postoperative Nausea and Vomiting: none        No notable events documented.

## 2024-05-28 NOTE — OP NOTE
Right Knee Total Arthroplasty (R) Operative Note     Date: 2024  OR Location: [facility] OR    Name: Louisa Rodas, : 1940, Age: 83 y.o., MRN: [id number], Sex: female    Diagnosis  Pre-op Diagnosis     * Unilateral primary osteoarthritis, right knee [M17.11] Post-op Diagnosis     * Unilateral primary osteoarthritis, right knee [M17.11]     Procedures  Right Knee Total Arthroplasty  08641 - MT ARTHRP KNE CONDYLE&PLATU MEDIAL&LAT COMPARTMENTS      Surgeons      * Isael Bhagat - Primary    Resident/Fellow/Other Assistant:  Surgeons and Role:     * Portillo Arthur MD - Resident - Assisting    Procedure Summary  Anesthesia: Consult  ASA: III  Anesthesia Staff: Anesthesiologist: Ricky Dudley MD  CRNA: JEAN MARIE Ramos-CRNA  Estimated Blood Loss: 25mL  Intra-op Medications:   Administrations occurring from 1000 to 1230 on 24:   Medication Name Total Dose   ropivacaine-epinephrine-clonidine-ketorolac 2.46-0.005- 0.0008-0.3mg/mL periarticular syringe 50 mL   sodium chloride 0.9 % irrigation solution 4,000 mL   sterile water irrigation solution 1,000 mL   lactated Ringer's infusion Cannot be calculated   oxygen (O2) therapy 186 L              Anesthesia Record               Intraprocedure I/O Totals          Intake    Tranexamic Acid 0.00 mL    The total shown is the total volume documented since Anesthesia Start was filed.    Propofol Drip 0.00 mL    The total shown is the total volume documented since Anesthesia Start was filed.    lactated Ringer's infusion 900.00 mL    Total Intake 900 mL       Output    Est. Blood Loss 50 mL    Total Output 50 mL       Net    Net Volume 850 mL          Specimen: No specimens collected     Staff:   Circulator: Lucita Simsub Person: Shira Simsub Person: Roney         Drains and/or Catheters:   Closed/Suction Drain 1 Right;Anterior Knee Accordion 15 Fr. (Active)   Dressing Status Clean;Dry 24 1149   Drainage Appearance Serosanguineous 24 1149   Status  To bulb suction 05/28/24 1149   Output (mL) 35 mL 05/28/24 1343       Tourniquet Times:     Total Tourniquet Time Documented:  Thigh (Right) - 73 minutes  Total: Thigh (Right) - 73 minutes      Implants:  Implants       Type Name Action Serial No.      Implant CEMENT, GENTAMICIN, SMARSET GHV, 40 GM - SN/A - [id number] Implanted N/A     Joint Knee FEMORAL, ATTUNE PS, AVIS, NRW, SZ 5, RT - SN/A - [id number] Implanted N/A     Joint Knee TIBAL BASE ATTUNE FB, SZ 3 AVIS - SN/A - [id number] Implanted N/A     Joint Knee DOME, PATELLA, MEDIALIZED, 35MM - SN/A - [id number] Implanted N/A     Joint Knee INSERT, ATTUNE PS FB, SZ 5, 5MM - SN/A - [id number] Implanted N/A              Findings: advanced OA    Indications: Louisa Rodas is an 83 y.o. female who is having surgery for Unilateral primary osteoarthritis, right knee [M17.11].     The patient was seen in the preoperative area. The risks, benefits, complications, treatment options, non-operative alternatives, expected recovery and outcomes were discussed with the patient. The possibilities of reaction to medication, pulmonary aspiration, injury to surrounding structures, bleeding, recurrent infection, the need for additional procedures, failure to diagnose a condition, and creating a complication requiring transfusion or operation were discussed with the patient. The patient concurred with the proposed plan, giving informed consent.  The site of surgery was properly noted/marked if necessary per policy. The patient has been actively warmed in preoperative area. Preoperative antibiotics have been ordered and given within 1 hours of incision. Venous thrombosis prophylaxis have been ordered including bilateral sequential compression devices    Procedure Details: R TKA  Complications:  None; patient tolerated the procedure well.    Disposition: PACU - hemodynamically stable.  Condition: stable     PREOPERATIVE DIAGNOSIS:  right knee osteoarthritis     POSTOPERATIVE  DIAGNOSIS:  right knee osteoarthritis     OPERATION/PROCEDURE:  right total knee arthroplasty     SURGEON:  Isael Bhagat MD     ASSISTANT(S):  Portillo Arthur     ANESTHESIA:  spinal     LOCATION:  [facility]     ESTIMATED BLOOD LOSS AND INTRAVENOUS FLUIDS:  Please see Anesthesia record        BRIEF CLINICAL NOTE:  The patient is a 84 yo female with advanced osteoarthritis of  their right knee.  They failed conservative treatment and wished to  proceed with total knee arthroplasty which is indicated at this time.  We discussed the risks, benefits, and alternatives of surgery  including but not limited to infection, damage to vessel or nerve,  bleeding, soft tissue pain, DVT, PE, problems with anesthesia, lack  of range of motion, continued soft tissue pain, need for further  surgery, etc.  Consent was obtained.  They were taken to the operating  room in order to undergo the procedure.    PRE-OP ROM: -5-110     OPERATIVE REPORT:  The patient was transferred to the operating room table.  Time-out  was performed confirming patient name, medical record number,  surgical site, and adequate and appropriate imaging.  The patient  received appropriate IV antibiotics as well as tranexamic acid.  Once we were prepped and draped,midline skin incision was performed.  Hemostasis was obtained using electrocautery.  Underlying extensor mechanism was easily identified and entered using a standard medial parapatellar arthrotomy performedsharply.  The infrapatellar and suprapatellar fat pads were debrided.Initial medial release was performed.  The patella was subluxed laterally.  Distal femur was entered using a step drill.  Distal  femoral cut was performed, and the femur was sized and prepared.  The tibia was subluxed forward using a double-prong PCL retractor.  The proximal tibia was cut in neutral mechanical axis using an  extramedullary tibial guide.  We then used the lamina  to clear out the posterior osteophytes and clear  the meniscal remnants. We then sized the tibia and prepared it. We cut the patella freehand using a caliper to restore the native patellar height. We then trialed with multiple polyethylene inserts.  Once I was happy with the position of components and stability of the knee, all trial components were removed.  The  cut bony surfaces were thoroughly irrigated and dried.  We then cemented into place the real components.  The knee was held in extension until all cement was hardened.  All extraneous cement was  removed.  We then closed the extensor mechanism over a drain using interrupted #2 Ethibond as well as interrupted 0 Vicryl.  The subcu was closed with interrupted 2-0 Vicryl.  The skin was closed with  running 3-0 Biosyn followed by Dermabond and Steri-Strips.  Dry sterile dressing was placed.  The patient was transferred back to the hospital bed without incident or complication.  She will be  weightbearing as tolerated.  They will be on Eliquis and SCDs for DVT prophylaxis.     Additional Details: WBAT, Eliquis    Attending Attestation: I was present and scrubbed for the key portions of the procedure.

## 2024-05-28 NOTE — ANESTHESIA PROCEDURE NOTES
"Spinal Block    Patient location during procedure: OR  Start time: 5/28/2024 10:08 AM  End time: 5/28/2024 10:15 AM  Reason for block: primary anesthetic and at surgeon's request  Staffing  Performed: attending   Authorized by: Ricky Dudley MD    Performed by: Ricky Dudley MD    Preanesthetic Checklist  Completed: patient identified, IV checked, risks and benefits discussed, surgical consent, pre-op evaluation, timeout performed and sterile techniques followed  Block Timeout  RN/Licensed healthcare professional reads aloud to the Anesthesia provider and entire team: Patient identity, procedure with side and site, patient position, and as applicable the availability of implants/special equipment/special requirements.  Patient on coagulant treatment: no  Timeout performed at: 5/28/2024 10:08 AM  Spinal Block  Patient position: sitting  Prep: Betadine  Sterility prep: cap, drape, gloves, hand hygiene, gown and mask  Patient monitoring: blood pressure, continuous pulse oximetry and EKG  Approach: midline  Vertebral space: L3-4  Injection technique: single-shot  Needle  Needle type: Quincke   Needle gauge: 22 G  Free flowing CSF: yes    Assessment  Procedure assessment: patient tolerated procedure well with no immediate complications  Additional Notes  Initially attempted L3-4 midline with 24g spinal plus introducer but his \"os\".  Next attempt with 22g Quincke with +CSF.          "

## 2024-05-28 NOTE — PROGRESS NOTES
"Orthopaedic Surgery Progress Note    S:    Evaluated post-operatively. Pain controlled on current regimen.  Denies any new onset numbness, tingling or weakness. Denies nausea, vomiting, chest pain, dyspnea, or calf tenderness. Denies any fever or chills.     O:  /79   Pulse 68   Temp 36.1 °C (97 °F) (Temporal)   Resp 14   Ht 1.575 m (5' 2\")   Wt 78 kg (171 lb 15.3 oz)   SpO2 97%   BMI 31.45 kg/m²     GEN - NAD, resting comfortably in hospital bed  HEENT - MMM, EOMI, NCAT   CV - RRR by peripheral palpation, limbs wwp  PULM - NWOB on RA  ABD - Non-distended  NEURO - JARA spontaneously, CNs II - XII grossly intact  PSYCH - Appropriate mood and affect    MSK:  RLE:   -Post-operative dressing/splint in place without strikethrough bleeding.   - Drain in place, holding suction  -Motor intact in DF/PF/EHL/FHL, SILT in saph/sural/SPN/DPN/tibial distributions  -Foot wwp, brisk cap refill, 2+ DP/PT pulse  -Compartments soft and compressible, no pain with passive dorsiflexion    A/P: 83F PMH CKD3, DM2, PONV, asthma, HLD, breast Ca, HTN, hypothyroid, MENDY on CPAP, Sjogrens S/P R TKA with Dr. Bhagat on 5/28.     -Clear liquid diet. Advance diet as tolerated  -Bowel regimen of colace, senna, and dulcolax  -Tylenol, oxy 5/10, dilaudid PRN, tramadol PRN, IV toradol 15mg q6hr x 4 doses for pain management  -Continue LR@100; HLIV with good PO intake  -PT/OT consult; WB status: WBAT; Maintain heel on pillow with knee straight when laying flat.   -Encourage IS, continue home inhaler, Albuterol duonebs PRN, home CPAP  -Perioperative abx - ancef 24 hours  -F/u CBC in AM, No indication for transfusion; 1x PO TXA on floor  -DVT ppx: eliquis 2.5 mg bid, SCDs + TEDs   -will dc drain POD1  -Continue home medications - CPAP and inhalers, Plaquenil, acyclovir, amlodipine, statin, synthroid, lisinopril (POD2), montelukast, pilocarpine, propranolol  -Glycemic: SSI for DM    Dispo: Pending PT, pain control    Plan was discussed with " Dr. Bhagat.     Orthopedic Joints Team:     Diaz Castro, PGY-2 ([id number]), Epic Chat Preferred  Portillo Arthur, PGY-4 ([id number]), Epic Chat Preferred    Please call on call orthopedics on nights after 6pm and weekends

## 2024-05-28 NOTE — DISCHARGE INSTRUCTIONS
MD Yolette Wood MPAS, LILLI, ATC  Adult Reconstruction and Joint Replacement Surgery  Phone: [phone]     Fax: [phone]        DISCHARGE INSTRUCTIONS      PLEASE READ CAREFULLY BEFORE CONTACTING YOUR PROVIDER.    WE WORK COLLABORATIVELY AS A TEAM. CALLING MULTIPLE STAFF MEMBERS REGARDING THE SAME ISSUE WILL DELAY YOUR CARE.    [employer] IS THE PREFERRED COMMUNICATION FOR ALL TEAM MEMBERS.    POSTOPERATIVE INSTRUCTIONS: TOTAL HIP & TOTAL KNEE ARTHROPLASTY    JOINT CARE TEAM  Please use the information below to contact your care team following surgery.  If you are leaving a message or using the [facility] portal, please include your full name, date of birth and date of surgery so that we can correctly identify you.  Your call will be returned within 1-2 business days, please do not leave multiple messages with other staff regarding a single issue while you are awaiting a return call.     Who to call Contact Information Matters needing handled   Yolette Carlos PA-C  Physician Assistant [facility] Portal   Medical questions/concerns       Javier Ng-    [email or web address]           [phone]  opt. 2    [phone] fax  [phone]         Scheduling office Visits  Medical questions/concerns  Leave of Absence or other paperwork  Any concerns more than 6 weeks from surgery - an appointment will need to be made   Leeann Goncalves MBA, BSN, RN-BC  Ortho Nurse Navigator [place]    Ashleigh Kirk RN, BSN  Ortho Nurse Navigator [place]        __________________________________    Martir Restrepo RN, BSN  Ortho Coordinator [facility] HUGON-BC  Ortho Nurse Navigator [facility]       [phone] Please call staff at the institution in which you had surgery for prescription refills        Prescription Refills  Nursing, medical question related questions or concerns within 6 weeks of surgery   Orders  for Outpatient Physical Therapy             MEDICATION REFILLS - MyChart or Nurse Navigator (Above) at the institution in which you had surgery. Ie [facility] or [facility].    -You will NOT receive a call indicating that your prescription has been filled.  Please contact your pharmacy with any questions.    Medication refills will be filled Monday-Friday 7am to 1pm ONLY. Please call the nurse navigator office or send a Round[employer] message for a refill request.  Any requests received outside of this timeframe will be handled on the next business day.  Please do not call multiple times or call other members of the care team for medication needs, this will cause the refill to take longer.    Per State and Institutional policy, pain medications can only be refilled every 7 days for up to six weeks following surgery.    My Chart Portal: If you are using the My Chart portal and are requesting a medication refill, please list what type of surgery you had and left or right side, medication that needs refilled, and pharmacy you would like your medication sent.     WEIGHT BEARING- weight bearing as tolerated to operative extremity     ACTIVITY-As Tolerated    DRIVING & TRAVEL AFTER SURGERY   Patients should anticipate waiting at least 4-6 weeks before traveling long distances after surgery.  You will need to stop to walk around ever 1 hour during your travel to help with blood clot prevention.    Patients may not drive until cleared by the joint nurse or the office and you are off of all narcotics.    DENTAL PROCEDURES & CLEANINGS  You must wait a minimum of 3 months for elective dental appointments after a total joint replacement, including routine cleanings or dental work including bridges, crowns, extractions, etc.. Unless, it is an emergency. You will need a prophylactic antibiotic lifelong prior to any dental visit, cleaning or procedure. Your surgeon's office or your dentist may provide a prescription antibiotic. Antibiotics are  a lifelong need before dental appointments.      You do not need antibiotics for endoscopic procedures such as colonoscopy or EGD, dermatologic biopsies or eye surgeries.    WOUND CARE  If you experience continued drainage or bleeding, you may cover with abdominal/Maxi pads (purchase at local drug store).  Knee replacements should wrap with an ace wrap.  You may shower with waterproof dressing on. Your surgical bandage will be removed by your home therapist 1 week after surgery. If you have staples intact, home care will remove in 2 weeks. If you have sutures intact, you will need to return to the office in 2 weeks for suture removal. Once the dressing is removed by home care, you may continue to shower. Let soap and water wash over the wound. DO NOT SCRUB.  Steri-strips under the bandage will remain in place until they fall off on their own.  If they are loose, you may gently remove.  If they have not fallen off in two weeks, gently peel them off. Do not remove if pulling causes resistance against the incision.  You will see suture tails sticking out of the ends of the incision.  DO NOT CUT THEM.  They will fall off when the sutures dissolve.  If they are bothersome, cover with a band aid.  Do not soak in a bath tub, hot tub, pool or lake until you are 8 weeks out from surgery.  Do not apply lotions, creams or ointments until you are 6 weeks out from surgery,    PAIN, SWELLING, BRUISING & CLICKING  Pain and swelling are a natural part of your recovery which is considered normal for up to a year after surgery.  Symptoms may be treated with movement, ice, compression stockings, elevating your leg, and by following the pain medication regimen as prescribed.  Bruising is normal for several weeks after surgery. You may also have leg swelling and pain in your shin.  You may ice areas that are tender to help with discomfort.  You are required to wear the provided compression stockings, every day, for 4 weeks following  surgery.  Remove the stockings at night and place them back on in the morning.  Pain and swelling may temporarily increase with an increase in activity or exercise.  Use ice after activity.  Audible clicking with movement or exercises is considered normal following joint replacement.  If this persists at 6 months or 1 year, please notify your surgeon.  You may also feel decreased sensation or numbness near the incision site.  This is normal and sensation may or may not return.    PERSONAL HYGIENE  You may shower upon discharging from the hospital.  Soap and water is permitted to run over the surgical dressing, steri-strips and incision.  Do not scrub directly over these items.  DO NOT soak your incision in a bath, hot tub, pool or pond/lake for a minimum of 8 weeks following your surgery.  DO NOT use lotions, creams, ointments on your wound for a minimum of 6 weeks following your surgery. At that time you may use vitamin E to assist with softening of your incision.      RESTARTING HOME ROUTINE - DIET & MEDICATIONS  Post-operative constipation can result due to a combination of inactivity, anesthesia and pain medication. To help prevent this, you should increase your water and fiber intake. Physical activity such as walking will also help stimulate the bowels.   You may resume your normal diet when you discharge home.    To avoid constipation, choose foods that help promote good bowel habits, such as foods high in fiber.  You may restart your home medications the following day after your surgery UNLESS you have been given alternate instructions.  Follow the instructions given to you on your hospital discharge instructions for more information regarding your home medications.  IF YOU EXPERIENCE NAUSEA OR DIARRHEA, FOLLOW THE B.R.A.T. DIET UNTIL SYMPTOMS RESOLVE.  If you are experiencing vomiting that lasts more than 24 hours, please contact your joint nurse.      IN-HOME PHYSICAL THERAPY & OUTPATIENT PHYSICAL  THERAPY  In-home physical therapy will start 1-2 days after you get home from the hospital.    The home care agency will call within the first 24-48 hours to set up their first visit.  Please do not call your care team to inquire during this timeframe.  Continue the exercises you were given in the hospital until you have been seen by in-home therapy.  Make sure to provide a phone number with the ability for the home care staff to leave a message if you do not answer your phone.    Outpatient physical therapy following knee replacement surgery should begin 2-3 weeks after surgery.  You will be given physical therapy order prior to discharge from the hospital. You should call to schedule this appointment ASAP if not already scheduled before surgery.  Waiting until you are ready for outpatient physical therapy will cause a delay in your care.  You may choose any outpatient physical therapy location.      EMERGENCIES - WHEN TO CONTACT THE SURGEON'S OFFICE IMMEDIATELY  Fever >101 with chills that has been present for at least 48 hours.   Excessive bleeding from incision that will not slow down. A small amount of drainage is normal and expected.  Once pressure is applied and the area is covered, do not continue to check the area regularly.  This will remove pressure and bleeding will continue.  Leave in place for 4-6 hours.  Signs of infection of incision-excessive drainage that is soaking through your dressing (especially if it is pus-like), redness that is spreading out from the edges of your incision, or increased warmth around the area.  Excruciating pain for which the pain medication, taken as instructed, is not helping.  Severe calf pain.  Go directly to the emergency room or call 911, if you are experiencing chest pain or difficulty breathing.    After Hour and Weekend Emergency Answering Service [phone]    ICE & COLD THERAPY INSTRUCTIONS    To assist with pain control and post-op swelling, you should be using  ice regularly throughout recovery, especially for the first 6 weeks, regardless of the cold therapy method you use.      Always make sure there is a layer of protection between the cold pad and your skin.    If you are using ICE PACKS or GEL PACKS, you will need to alternate 20 minutes on, 20 minutes off twice per hour.    If you are using an ICE MACHINE, please follow the provided ice machine instructions.  These devices differ from ice or ice packs whereas the mechanism circulates water through tubing and a pad to provide longer periods of cold therapy to the desired site.  You can use your cold devices around the clock for optimal comfort.  We recommend using cold therapy after working with therapy or completing exercises on your own.  There is no set schedule in which you must follow while using cold therapy.  Below are a few points to remember when using a cold therapy device:    You do not need to need to use the 20 on, 20 off method.  Detach the pad from the cooler and ambulate at least once every hour.  You can check your skin under the pad at this time.  You may wear the cold therapy device during periods of sleep including overnight.  If you wake up during the night, you can check the skin at this time.  You do not need to wake up specifically to perform skin checks.  Empty the cooler and pad when device is not in use.  Follow 's instructions for cleaning your cold therapy device.    DISCHARGE MEDICATIONS - Please reference the sample schedule on the reverse side for instructions on how to best schedule medications.    PAIN MEDICATION    ___X_ Tramadol / Oxycodone  Tramadol and Oxycodone have been prescribed for post-operative pain control.    These medications will only be refilled ONCE every 7 days for a period of up to 6 weeks following surgery.  After 6 weeks, you will transition to acetaminophen and over -the- counter anti-inflammatories such as Ibuprofen, Advil or Aleve in conjunction  with ICE/COLD THERAPY.   Side effects may be constipation and nausea, vomiting, sleepiness, dizziness, lightheadedness, headache, blurred vision, dry mouth sweating, itching (if you have itching, over-the -counter Benadryl can be used as needed).  You may NOT operate a motor vehicle while taking these medications or have been cleared by your care team.     ___X_ Acetaminophen (Tylenol)  Acetaminophen has been prescribed as an adjunct for pain control. Take two 500 mg tablets every 6 hours for 4 weeks. You will not receive a refill on this medication.  Do not exceed 4000mg of acetaminophen within a 24 hour period.  Side effects may include nausea, heartburn, drowsiness, and headache.    ____ Meloxicam (Mobic)-Meloxicam has been prescribed as an adjunct anti-inflammatory to assist in pain control.    Take one 15mg tablet once daily for 4 weeks.  You will not receive refills on this medication.   Side effects may include nausea.  May not be prescribed if you are on a more potent blood thinner than aspirin or have chronic kidney disease.    BLOOD THINNER    ___X_ Blood Thinner   A blood thinner has been prescribed to prevent blood clots in your leg or lungs. Take as prescribed on the bottle for 4 weeks. You will not receive a refill on this medication.    ANTI NAUSEA    ___X_ Proton Pump Inhibitor (PPI)-Stomach Acid Reduction Medication  If you are already on a PPI, you will continue your regular medication. If you are not, you will be prescribed Pantoprazole to help with nausea and protect your stomach while taking pain medication.  You will not receive a refill on this medication.    STOOL SOFTENERS    ___X_ Colace (Docusate Sodium) & Miralax (polyethylene glycol)  Take both medications to help with constipation while using the Oxycodone and Tramadol for pain control.  You will not receive a refill on this medication.    Continued Constipation  If you continue to be constipated despite daily use of Miralax and Colace,  you try an over-the-counter Dulcolax Suppository and use per instructions on the package.       SPECIAL INSTRUCTIONS    Eliquis for anticoagulation    You will not receive refills on the following medications.   Acetaminophen (Tylenol  Meloxicam  Miralax  Colace  Proton Pump Inhibitor (PPI)  Blood Thinner    Pain Medication Refills - Ortho Nurse Navigator or MyChart- Monday through Friday 7am-1pm    FOLLOW-UP- You should have an appointment with either Dr. Bhagat or NUBIA Pérez in 6 weeks.         SAMPLE              The times below are an example of how to organize medications to optimize pain control  Your actual medication schedule may vary based on your last dose taken IN THE HOSPITAL      Time 3:00 am 6:00 am 9:00 am 12:00 pm 3:00 pm 6:00 pm 9:00 pm 12:00 am   Medications Tramadol Tylenol  Oxycodone  Miralax   Blood Thinner  Colace  Pantoprazole or other PPI  Tramadol  Meloxicam Tylenol  Oxycodone Tramadol Tylenol  Oxycodone  Miralax Blood Thinner  Colace  Tramadol   Tylenol  Oxycodone            You may begin to wean off the pain medication as your pain remains controlled with increased activity.  The schedules provided are meant to serve as an example.  You may wean off based on your pain control.  Please note that pain medications are not filled beyond 6 weeks after surgery.              The times below are an example of how to WEAN OFF medications WHILE CONTINUING TO OPTIMIZE PAIN CONTROL.  Your actual medication schedule may vary based on your last dose taken.    Time 12:00am 4:00am 8:00am 12:00pm 4:00pm 8:00pm   Med Tramadol Oxycodone   Tramadol Oxycodone Tramadol Oxycodone     Time 12:00am 6:00am 12:00pm 6:00pm   Med Tramadol Oxycodone   Tramadol Oxycodone     Time 12:00am 8:00am 4:00pm   Med Tramadol Oxycodone   Tramadol     Time 12:00am 12:00pm   Med Tramadol Tramadol         TOTAL KNEE REPLACEMENT PATIENTS SHOULD TRANSITION TO OUTPATIENT PHYSICAL THERAPY NO MORE THAN 3 WEEKS FOLLOWING  SURGERY.  PLEASE SEE THE LIST OF  FACILITIES BELOW.  CALL TO SCHEDULE YOUR FIRST APPOINTMENT BEFORE YOU HAVE YOUR SURGERY.

## 2024-05-28 NOTE — CARE PLAN
The patient's goals for the shift include      The clinical goals for the shift include      Over the shift, the patient did not make progress toward the following goals. Barriers to progression include . Recommendations to address these barriers include   Problem: Fall/Injury  Goal: Not fall by end of shift  Outcome: Progressing  Goal: Be free from injury by end of the shift  Outcome: Progressing  Goal: Verbalize understanding of personal risk factors for fall in the hospital  Outcome: Progressing  Goal: Verbalize understanding of risk factor reduction measures to prevent injury from fall in the home  Outcome: Progressing  Goal: Use assistive devices by end of the shift  Outcome: Progressing  Goal: Pace activities to prevent fatigue by end of the shift  Outcome: Progressing   .

## 2024-05-28 NOTE — ANESTHESIA PREPROCEDURE EVALUATION
"Patient: Louisa MCCAIN Rodas    Procedure Information       Date/Time: 05/28/24 1000    Procedure: Right Knee Total Arthroplasty (Right: Knee)    Location: [facility] OR 12 / Virtual [facility] OR    Surgeons: Isael Bhagat MD          82 yo F hx MENDY on CPAP, PONV (does well with antiemetics), OA, HTN, hx past \"vocal cord dysfunction\" (no longer a problem), polycystic liver dx, CKD3, Dm2 (A1c 7.2), s/p breast lumpectomy 3/2024.    Relevant Problems   Anesthesia   (+) PONV (postoperative nausea and vomiting)      Cardiac   (+) HTN (hypertension)   (+) Hyperlipidemia      Pulmonary   (+) Mild intermittent asthma without complication (HHS-HCC)   (+) Moderate persistent asthma without complication (HHS-HCC)   (+) MENDY on CPAP   (+) Shortness of breath on exertion      Neuro   (+) Carpal tunnel syndrome   (+) Lumbar radiculopathy   (+) Radiculitis, cervical      GI   (+) Acid reflux   (+) Dysphagia      /Renal   (+) Recurrent UTI      Liver   (+) Hepatic cirrhosis (Multi)   (+) Liver cyst   (+) Liver lesion   (+) Polycystic liver disease      Endocrine   (+) Hypothyroidism, unspecified   (+) Type 2 diabetes mellitus with stage 3b chronic kidney disease, without long-term current use of insulin (Multi)      Musculoskeletal   (+) Arthritis of hand, right, degenerative   (+) Arthritis, degenerative, localized, primary, hand   (+) Carpal tunnel syndrome   (+) Cervical spondylosis without myelopathy   (+) Degenerative arthritis of hip   (+) Degenerative arthritis of knee, bilateral   (+) Fibromyalgia   (+) Localized osteoarthrosis of left ankle and foot   (+) Localized, secondary osteoarthritis of the hand   (+) Osteoarthritis   (+) Osteoarthritis of right ankle and foot   (+) Unilateral primary osteoarthritis, right knee      HEENT   (+) Glaucoma   (+) Glaucoma of both eyes   (+) POAG (primary open-angle glaucoma)      ID   (+) Herpes genitalis in women   (+) Recurrent UTI       Clinical information reviewed:   Tobacco  Allergies  " Meds   Med Hx  Surg Hx   Fam Hx  Soc Hx        NPO Detail:  NPO/Void Status  Date of Last Liquid: 05/27/24  Date of Last Solid: 05/27/24  Last Intake Type: Clear fluids         Physical Exam    Airway  Mallampati: III  TM distance: >3 FB  Neck ROM: full     Cardiovascular   Rate: normal     Dental    Pulmonary - normal exam     Abdominal            Anesthesia Plan    History of general anesthesia?: yes  History of complications of general anesthesia?: no    ASA 3     MAC and spinal   (GA as backup)  intravenous induction   Postoperative administration of opioids is intended.  Anesthetic plan and risks discussed with patient.

## 2024-05-28 NOTE — ANESTHESIA PROCEDURE NOTES
Peripheral Block    Patient location during procedure: pre-op  Start time: 5/28/2024 9:40 AM  End time: 5/28/2024 9:47 AM  Reason for block: at surgeon's request and post-op pain management  Staffing  Performed: attending   Authorized by: Ricky Dudley MD    Performed by: Ricky Dudley MD  Preanesthetic Checklist  Completed: patient identified, IV checked, site marked, risks and benefits discussed, surgical consent, monitors and equipment checked, pre-op evaluation and timeout performed   Timeout performed at: 5/28/2024 9:40 AM  Peripheral Block  Patient position: laying flat  Prep: ChloraPrep  Patient monitoring: heart rate and continuous pulse ox  Block type: adductor canal  Laterality: right  Injection technique: single-shot  Guidance: ultrasound guided  Local infiltration: lidocaine  Infiltration strength: 1 %  Dose: 1 mL  Needle  Needle gauge: 21 G  Needle length: 8 cm  Needle localization: ultrasound guidance     image stored in chart  Assessment  Injection assessment: negative aspiration for heme, no paresthesia on injection, incremental injection and local visualized surrounding nerve on ultrasound

## 2024-05-29 ENCOUNTER — DOCUMENTATION (OUTPATIENT)
Dept: HOME HEALTH SERVICES | Facility: HOME HEALTH | Age: 84
End: 2024-05-29

## 2024-05-29 ENCOUNTER — HOME HEALTH ADMISSION (OUTPATIENT)
Dept: HOME HEALTH SERVICES | Facility: HOME HEALTH | Age: 84
End: 2024-05-29
Payer: MEDICARE

## 2024-05-29 LAB
ANION GAP SERPL CALC-SCNC: 11 MMOL/L (ref 10–20)
BUN SERPL-MCNC: 18 MG/DL (ref 6–23)
CALCIUM SERPL-MCNC: 8 MG/DL (ref 8.6–10.3)
CHLORIDE SERPL-SCNC: 104 MMOL/L (ref 98–107)
CO2 SERPL-SCNC: 25 MMOL/L (ref 21–32)
CREAT SERPL-MCNC: 1.34 MG/DL (ref 0.5–1.05)
EGFRCR SERPLBLD CKD-EPI 2021: 40 ML/MIN/1.73M*2
ERYTHROCYTE [DISTWIDTH] IN BLOOD BY AUTOMATED COUNT: 13.3 % (ref 11.5–14.5)
GLUCOSE BLD MANUAL STRIP-MCNC: 121 MG/DL (ref 74–99)
GLUCOSE BLD MANUAL STRIP-MCNC: 131 MG/DL (ref 74–99)
GLUCOSE BLD MANUAL STRIP-MCNC: 131 MG/DL (ref 74–99)
GLUCOSE BLD MANUAL STRIP-MCNC: 247 MG/DL (ref 74–99)
GLUCOSE SERPL-MCNC: 142 MG/DL (ref 74–99)
HCT VFR BLD AUTO: 33.4 % (ref 36–46)
HGB BLD-MCNC: 10.9 G/DL (ref 12–16)
MCH RBC QN AUTO: 30.6 PG (ref 26–34)
MCHC RBC AUTO-ENTMCNC: 32.6 G/DL (ref 32–36)
MCV RBC AUTO: 94 FL (ref 80–100)
NRBC BLD-RTO: 0 /100 WBCS (ref 0–0)
PLATELET # BLD AUTO: 133 X10*3/UL (ref 150–450)
POTASSIUM SERPL-SCNC: 4.2 MMOL/L (ref 3.5–5.3)
RBC # BLD AUTO: 3.56 X10*6/UL (ref 4–5.2)
SODIUM SERPL-SCNC: 136 MMOL/L (ref 136–145)
WBC # BLD AUTO: 5.9 X10*3/UL (ref 4.4–11.3)

## 2024-05-29 PROCEDURE — 97530 THERAPEUTIC ACTIVITIES: CPT | Mod: GP,CQ

## 2024-05-29 PROCEDURE — 80048 BASIC METABOLIC PNL TOTAL CA: CPT | Performed by: STUDENT IN AN ORGANIZED HEALTH CARE EDUCATION/TRAINING PROGRAM

## 2024-05-29 PROCEDURE — 2500000001 HC RX 250 WO HCPCS SELF ADMINISTERED DRUGS (ALT 637 FOR MEDICARE OP): Performed by: STUDENT IN AN ORGANIZED HEALTH CARE EDUCATION/TRAINING PROGRAM

## 2024-05-29 PROCEDURE — 2500000002 HC RX 250 W HCPCS SELF ADMINISTERED DRUGS (ALT 637 FOR MEDICARE OP, ALT 636 FOR OP/ED): Performed by: PHARMACIST

## 2024-05-29 PROCEDURE — 97116 GAIT TRAINING THERAPY: CPT | Mod: GP,CQ

## 2024-05-29 PROCEDURE — 97110 THERAPEUTIC EXERCISES: CPT | Mod: GP,CQ

## 2024-05-29 PROCEDURE — 2500000004 HC RX 250 GENERAL PHARMACY W/ HCPCS (ALT 636 FOR OP/ED): Mod: JZ | Performed by: STUDENT IN AN ORGANIZED HEALTH CARE EDUCATION/TRAINING PROGRAM

## 2024-05-29 PROCEDURE — 94640 AIRWAY INHALATION TREATMENT: CPT

## 2024-05-29 PROCEDURE — 85027 COMPLETE CBC AUTOMATED: CPT | Performed by: STUDENT IN AN ORGANIZED HEALTH CARE EDUCATION/TRAINING PROGRAM

## 2024-05-29 PROCEDURE — 2500000005 HC RX 250 GENERAL PHARMACY W/O HCPCS: Performed by: STUDENT IN AN ORGANIZED HEALTH CARE EDUCATION/TRAINING PROGRAM

## 2024-05-29 PROCEDURE — 36415 COLL VENOUS BLD VENIPUNCTURE: CPT | Performed by: STUDENT IN AN ORGANIZED HEALTH CARE EDUCATION/TRAINING PROGRAM

## 2024-05-29 PROCEDURE — 2500000002 HC RX 250 W HCPCS SELF ADMINISTERED DRUGS (ALT 637 FOR MEDICARE OP, ALT 636 FOR OP/ED): Performed by: STUDENT IN AN ORGANIZED HEALTH CARE EDUCATION/TRAINING PROGRAM

## 2024-05-29 PROCEDURE — 9420000001 HC RT PATIENT EDUCATION 5 MIN

## 2024-05-29 PROCEDURE — 82947 ASSAY GLUCOSE BLOOD QUANT: CPT

## 2024-05-29 PROCEDURE — 7100000011 HC EXTENDED STAY RECOVERY HOURLY - NURSING UNIT

## 2024-05-29 RX ORDER — ONDANSETRON 4 MG/1
4 TABLET, FILM COATED ORAL EVERY 8 HOURS PRN
Qty: 20 TABLET | Refills: 0 | Status: SHIPPED | OUTPATIENT
Start: 2024-05-29

## 2024-05-29 RX ADMIN — DOCUSATE SODIUM 100 MG: 100 CAPSULE, LIQUID FILLED ORAL at 20:57

## 2024-05-29 RX ADMIN — APIXABAN 2.5 MG: 2.5 TABLET, FILM COATED ORAL at 20:57

## 2024-05-29 RX ADMIN — ACYCLOVIR 400 MG: 400 TABLET ORAL at 08:29

## 2024-05-29 RX ADMIN — OXYCODONE HYDROCHLORIDE 5 MG: 5 TABLET ORAL at 20:57

## 2024-05-29 RX ADMIN — BUDESONIDE INHALATION 0.5 MG: 0.5 SUSPENSION RESPIRATORY (INHALATION) at 20:57

## 2024-05-29 RX ADMIN — LEVOTHYROXINE SODIUM 100 MCG: 100 TABLET ORAL at 05:44

## 2024-05-29 RX ADMIN — HYDROXYCHLOROQUINE SULFATE 200 MG: 200 TABLET, FILM COATED ORAL at 08:29

## 2024-05-29 RX ADMIN — DOCUSATE SODIUM 100 MG: 100 CAPSULE, LIQUID FILLED ORAL at 08:29

## 2024-05-29 RX ADMIN — CEFAZOLIN SODIUM 2 G: 2 INJECTION, SOLUTION INTRAVENOUS at 01:52

## 2024-05-29 RX ADMIN — INSULIN LISPRO 6 UNITS: 100 INJECTION, SOLUTION INTRAVENOUS; SUBCUTANEOUS at 18:27

## 2024-05-29 RX ADMIN — Medication 2 L/MIN: at 23:19

## 2024-05-29 RX ADMIN — PILOCARPINE HYDROCHLORIDE 5 MG: 5 TABLET, FILM COATED ORAL at 20:57

## 2024-05-29 RX ADMIN — PANTOPRAZOLE SODIUM 40 MG: 40 TABLET, DELAYED RELEASE ORAL at 08:35

## 2024-05-29 RX ADMIN — ACETAMINOPHEN 650 MG: 325 TABLET ORAL at 16:15

## 2024-05-29 RX ADMIN — ATORVASTATIN CALCIUM 20 MG: 20 TABLET, FILM COATED ORAL at 08:29

## 2024-05-29 RX ADMIN — APIXABAN 2.5 MG: 2.5 TABLET, FILM COATED ORAL at 08:29

## 2024-05-29 RX ADMIN — FORMOTEROL FUMARATE DIHYDRATE 20 MCG: 20 SOLUTION RESPIRATORY (INHALATION) at 20:57

## 2024-05-29 RX ADMIN — PROPRANOLOL HYDROCHLORIDE 10 MG: 10 TABLET ORAL at 08:29

## 2024-05-29 RX ADMIN — CYCLOBENZAPRINE HYDROCHLORIDE 5 MG: 5 TABLET, FILM COATED ORAL at 21:49

## 2024-05-29 RX ADMIN — OXYCODONE HYDROCHLORIDE 10 MG: 5 TABLET ORAL at 09:37

## 2024-05-29 RX ADMIN — AMLODIPINE BESYLATE 2.5 MG: 5 TABLET ORAL at 08:28

## 2024-05-29 ASSESSMENT — COGNITIVE AND FUNCTIONAL STATUS - GENERAL
CLIMB 3 TO 5 STEPS WITH RAILING: TOTAL
DRESSING REGULAR LOWER BODY CLOTHING: A LITTLE
WALKING IN HOSPITAL ROOM: A LOT
STANDING UP FROM CHAIR USING ARMS: A LOT
MOVING FROM LYING ON BACK TO SITTING ON SIDE OF FLAT BED WITH BEDRAILS: A LITTLE
WALKING IN HOSPITAL ROOM: A LITTLE
CLIMB 3 TO 5 STEPS WITH RAILING: A LOT
MOVING TO AND FROM BED TO CHAIR: A LOT
TURNING FROM BACK TO SIDE WHILE IN FLAT BAD: A LOT
MOVING FROM LYING ON BACK TO SITTING ON SIDE OF FLAT BED WITH BEDRAILS: A LOT
MOVING FROM LYING ON BACK TO SITTING ON SIDE OF FLAT BED WITH BEDRAILS: A LOT
MOBILITY SCORE: 14
TURNING FROM BACK TO SIDE WHILE IN FLAT BAD: A LOT
STANDING UP FROM CHAIR USING ARMS: A LOT
WALKING IN HOSPITAL ROOM: A LOT
TOILETING: A LITTLE
STANDING UP FROM CHAIR USING ARMS: A LOT
MOBILITY SCORE: 12
DAILY ACTIVITIY SCORE: 21
MOBILITY SCORE: 11
CLIMB 3 TO 5 STEPS WITH RAILING: TOTAL
TURNING FROM BACK TO SIDE WHILE IN FLAT BAD: A LITTLE
MOVING TO AND FROM BED TO CHAIR: A LOT
MOVING TO AND FROM BED TO CHAIR: A LOT
HELP NEEDED FOR BATHING: A LITTLE

## 2024-05-29 ASSESSMENT — PAIN - FUNCTIONAL ASSESSMENT
PAIN_FUNCTIONAL_ASSESSMENT: 0-10

## 2024-05-29 ASSESSMENT — PAIN SCALES - GENERAL
PAINLEVEL_OUTOF10: 0 - NO PAIN
PAINLEVEL_OUTOF10: 4
PAINLEVEL_OUTOF10: 8
PAINLEVEL_OUTOF10: 0 - NO PAIN
PAINLEVEL_OUTOF10: 6
PAINLEVEL_OUTOF10: 6
PAINLEVEL_OUTOF10: 1

## 2024-05-29 ASSESSMENT — PAIN DESCRIPTION - LOCATION: LOCATION: KNEE

## 2024-05-29 ASSESSMENT — PAIN DESCRIPTION - DESCRIPTORS
DESCRIPTORS: ACHING
DESCRIPTORS: ACHING

## 2024-05-29 ASSESSMENT — ACTIVITIES OF DAILY LIVING (ADL): LACK_OF_TRANSPORTATION: NO

## 2024-05-29 ASSESSMENT — PAIN DESCRIPTION - ORIENTATION: ORIENTATION: RIGHT

## 2024-05-29 NOTE — HH CARE COORDINATION
Home Care received a Referral for Physical Therapy. We have processed the referral for a Start of Care on 5/30/2024.     If you have any questions or concerns, please feel free to contact us at [phone]. Follow the prompts, enter your five digit zip code, and you will be directed to your care team on [facility].

## 2024-05-29 NOTE — PROGRESS NOTES
Physical Therapy    Physical Therapy Treatment    Patient Name: Louisa Rodas  MRN: [id number]  Today's Date: 5/29/2024  Time Calculation  Start Time: 0920  Stop Time: 1019  Time Calculation (min): 59 min    Assessment/Plan   PT Assessment  End of Session Communication: Bedside nurse  Assessment Comment: able to ambulate this tx  End of Session Patient Position: Alarm on, Bed, 3 rail up  PT Plan  Inpatient/Swing Bed or Outpatient: Inpatient  PT Plan  Treatment/Interventions: Bed mobility, Transfer training, Gait training  PT Plan: Skilled PT  PT Frequency: BID  PT Discharge Recommendations: Low intensity level of continued care  PT Recommended Transfer Status: Assist x1 (mod assist)  PT - OK to Discharge: Yes (per POC)      General Visit Information:   PT  Visit  PT Received On: 05/29/24  General  Reason for Referral: R TKA  Referred By: Portillo Arthur MD  Family/Caregiver Present: Yes  Prior to Session Communication: Bedside nurse  Patient Position Received: Bed, 3 rail up, Alarm on  General Comment: pt having difficult time keeping eyes open, difficulty standing this date.  pt demo'd safety concerns throughout.    Subjective   Precautions:  Precautions  LE Weight Bearing Status: Weight Bearing as Tolerated  Medical Precautions: Fall precautions  Post-Surgical Precautions: Left total knee precautions  Vital Signs:  Vital Signs  Heart Rate: 73  SpO2: 92 %  BP: 105/53  MAP (mmHg): 65  BP Location: Right arm  BP Method: Automatic  Patient Position: Lying    Objective   Pain:  Pain Assessment  Pain Score: 6  Pain Type: Surgical pain  Pain Location: Knee  Pain Orientation: Right  Pain Interventions: Medication (See MAR)  Cognition:  Cognition  Overall Cognitive Status: Within Functional Limits  Coordination:     Postural Control:     Extremity/Trunk Assessments:    Activity Tolerance:     Treatments:  Therapeutic Exercise  Therapeutic Exercise Performed: Yes  Therapeutic Exercise Activity 1: pt performed supine ther ex x15 :  AP, QS, GS, SAQ AA, SLR AA, seated Knee flexion. vc/tc req.  ROM -9-79         Bed Mobility  Bed Mobility: Yes  Bed Mobility 1  Bed Mobility 1: Supine to sitting, Sitting to supine  Level of Assistance 1: Minimum assistance, Moderate assistance  Bed Mobility Comments 1: assist req to complete bed mobility with max vc/tc increased time req    Ambulation/Gait Training  Ambulation/Gait Training Performed: Yes  Ambulation/Gait Training 1  Surface 1: Level tile  Device 1: Rolling walker  Gait Support Devices: Gait belt  Assistance 1: Minimum assistance  Comments/Distance (ft) 1: 15x2, 3x1 (pt performed with decreased step height and length, VC for forward gaze and max vc to stay with in RW.  pt req x1 seated rest breaks.  pt completed slowly.)  Transfer 1  Technique 1: Sit to stand, Stand to sit  Transfer Device 1: Walker  Transfer Level of Assistance 1: Moderate assistance  Trials/Comments 1: max vc/tc for hand placment on solid sitting surface and not RW. pt performed x3 this date    Outcome Measures:  [facility] Basic Mobility  Turning from your back to your side while in a flat bed without using bedrails: A lot  Moving from lying on your back to sitting on the side of a flat bed without using bedrails: A lot  Moving to and from bed to chair (including a wheelchair): A lot  Standing up from a chair using your arms (e.g. wheelchair or bedside chair): A lot  To walk in hospital room: A little  Climbing 3-5 steps with railing: Total  Basic Mobility - Total Score: 12    Education Documentation  Home Exercise Program, taught by Manohar Alcaraz PTA at 5/29/2024  1:34 PM.  Learner: Patient  Readiness: Acceptance  Method: Explanation  Response: Needs Reinforcement    Mobility Training, taught by Manohar Alcaraz PTA at 5/29/2024  1:34 PM.  Learner: Patient  Readiness: Acceptance  Method: Explanation  Response: Needs Reinforcement    Education Comments  No comments found.        OP EDUCATION:       Encounter Problems        Encounter Problems (Active)       Mobility       STG - Patient will ambulate 150 ft mod I with a RW.  (Progressing)       Start:  05/28/24    Expected End:  06/11/24            STG - Patient will ascend and descend four stairs using екатерина Hrs mod I.  (Not Progressing)       Start:  05/28/24    Expected End:  06/11/24            Pt will tolerate 15 reps of each LE exercise in order to improve strength and endurance.  (Progressing)       Start:  05/28/24    Expected End:  06/11/24            Pt will achieve 0-90 degrees R knee ROM (Progressing)       Start:  05/28/24    Expected End:  06/11/24               PT Transfers       STG - Patient will perform bed mobility independently (Progressing)       Start:  05/28/24    Expected End:  06/11/24            STG - Patient will transfer sit to and from stand mod I with a RW.  (Progressing)       Start:  05/28/24    Expected End:  06/11/24            STG - Patient will perform car transfer independently.        Start:  05/28/24    Expected End:  06/11/24

## 2024-05-29 NOTE — PROGRESS NOTES
05/29/24 1206   Discharge Planning   Living Arrangements Spouse/significant other   Support Systems Spouse/significant other   Assistance Needed Independent prior to admission   Type of Residence Private residence   Number of Stairs to Enter Residence 4   Number of Stairs Within Residence 14   Who is requesting discharge planning? Patient   Home or Post Acute Services In home services   Type of Home Care Services Home PT   Patient expects to be discharged to: Home with [facility]   Does the patient need discharge transport arranged? No   Financial Resource Strain   How hard is it for you to pay for the very basics like food, housing, medical care, and heating? Not hard   Housing Stability   In the last 12 months, was there a time when you were not able to pay the mortgage or rent on time? N   In the last 12 months, was there a time when you did not have a steady place to sleep or slept in a shelter (including now)? N   Transportation Needs   In the past 12 months, has lack of transportation kept you from medical appointments or from getting medications? no   In the past 12 months, has lack of transportation kept you from meetings, work, or from getting things needed for daily living? No   Patient Choice   Provider Choice list and CMS website (https://medicare.gov/care-compare#search) for post-acute Quality and Resource Measure Data were provided and reviewed with: Patient   Patient / Family choosing to utilize agency / facility established prior to hospitalization Yes     Patient is POD 1 for a right TKA.  She lives with her  and family will transport home at discharge.  Secure chat to [facility] to inform of today's discharge.  Confirmed SOC for 5/30 with [facility].  PLAN: PT  DISP: home with [facility]  ADOD: today  Meds to beds.  Krysten Christensen RN     5/29/24 @ 1401  Patient could not keep eyes open during therapy.  Ortho NP notified by PT and plan is to re-evaluate patient tomorrow morning for discharge.  Secure chat to [facility]  to inform of change in discharge.  Krysten Christensen RN     5/29/29 @ 4501  Updated patient's POA on change in discharge plan.  Secure chat to [facility] to inform them that patient will be going to her daughter's home upon discharge.  [address], [place] [address]  Krysten Christensen RN

## 2024-05-29 NOTE — DISCHARGE SUMMARY
Discharge Diagnosis  Unilateral primary osteoarthritis, right knee    Issues Requiring Follow-Up  S/p right total knee arthroplasty    Test Results Pending At Discharge  Pending Labs       No current pending labs.            Hospital Course   Briefly, the patient is a 83 year-old female with past Hx of osteoarthritis of right knee.  Pt is now S/P right total knee arthroplasty      HOSPITAL COURSE: The patient underwent right total knee arthroplasty on 5/28/24 which patient tolerated well and remained stable in the postoperative period. On postoperative day #1, patient was tolerating a diet, and remained afebrile with stable vital signs. Patient was ordered oral and intravenous narcotics for pain control.  Patient was judged stable for discharge home on 5/29/24,tolerating diet, afebrile, stable vital signs and lab values, with adequate pain control. The wound was clean and dry. Patient was instructed to follow up in the office within 2 weeks. Pt given prescription for pain, eliquis for DVT prophylaxis and compression stockings. Colace as needed for constipation. Home PT/ OT was arranged prior to discharge.     Pertinent Physical Exam At Time of Discharge  Physical Exam  Cardiovascular:      Pulses: Normal pulses.   Pulmonary:      Effort: Pulmonary effort is normal.   Abdominal:      Palpations: Abdomen is soft.   Musculoskeletal:      Comments: Right Lower Extremity:   -Skin with mepilex c/d/i  -Tender at site of injury with painful ROM.  -Fires DF/PF/EHL/FHL  -SILT in saph/sural/SPN/DPN distributions  -Foot warm, well perfused  -Palpable DP pulse, brisk cap refill  -Compartments soft and compressible     Skin:     General: Skin is warm.      Capillary Refill: Capillary refill takes less than 2 seconds.   Neurological:      General: No focal deficit present.      Mental Status: She is alert.   Psychiatric:         Mood and Affect: Mood normal.         Home Medications     Medication List      START taking these  medications     acetaminophen 500 mg tablet; Commonly known as: Tylenol Extra Strength;   Take 2 tablets (1,000 mg) by mouth every 6 hours if needed for mild pain   (1 - 3).   apixaban 2.5 mg tablet; Commonly known as: Eliquis; Take 1 tablet (2.5   mg) by mouth 2 times a day.   docusate sodium 100 mg capsule; Commonly known as: Colace; Take 1   capsule (100 mg) by mouth 2 times a day.   ondansetron 4 mg tablet; Commonly known as: Zofran; Take 1 tablet (4 mg)   by mouth every 8 hours if needed for nausea or vomiting.   oxyCODONE 5 mg immediate release tablet; Commonly known as: Roxicodone;   Take 1 tablet (5 mg) by mouth every 6 hours if needed for severe pain (7 -   10) for up to 7 days.   pantoprazole 40 mg EC tablet; Commonly known as: ProtoNix; Take 1 tablet   (40 mg) by mouth once daily. Do not crush, chew, or split.   polyethylene glycol 17 gram/dose powder; Commonly known as: Glycolax,   Miralax; Mix 1 capful (17 g) in 8 oz of water and drink by mouth once   daily   traMADol 50 mg tablet; Commonly known as: Ultram; Take 1 tablet (50 mg)   by mouth every 6 hours if needed for severe pain (7 - 10) for up to 7   days.     CONTINUE taking these medications     acyclovir 400 mg tablet; Commonly known as: Zovirax; Take 1 tablet (400   mg) by mouth once daily.   allantoin gel   amLODIPine 2.5 mg tablet; Commonly known as: Norvasc; Take 1 tablet (2.5   mg) by mouth once daily.   atorvastatin 20 mg tablet; Commonly known as: Lipitor   azelastine 137 mcg (0.1 %) nasal spray; Commonly known as: Astelin   b complex 0.4 mg tablet   budesonide-formoteroL 160-4.5 mcg/actuation inhaler; Commonly known as:   Symbicort; Inhale 2 puffs once daily. RINSE MOUTH AFTER USE.   carboxymethylcellulose 1 % ophthalmic solution dropperette; Commonly   known as: Refresh Celluvisc   cholecalciferol 50 MCG (2000 UT) tablet; Commonly known as: Vitamin D-3   fluocinolone and shower cap 0.01 % oil   hydroxychloroquine 200 mg tablet; Commonly  known as: Plaquenil; TAKE 1   TABLET BY MOUTH WITH FOOD OR MILK ONCE A DAY   iVizia (PF) 0.5 % drops; Generic drug: povidone (PF)   ketoconazole 2 % shampoo; Commonly known as: NIZOral   latanoprost (PF) 0.005 % drops; Administer 1 drop into both eyes once   daily at bedtime.   levothyroxine 100 mcg tablet; Commonly known as: Synthroid, Levoxyl;   Take 1 tablet (100 mcg) by mouth once daily. Five days per week (takes 112   mcgs 2 days per week)   lisinopril 20 mg tablet; Take 1 tablet (20 mg) by mouth once daily.   medical cannabis   MENTAX TOP   montelukast 10 mg tablet; Commonly known as: Singulair   OneTouch Verio test strips strip; Generic drug: blood sugar diagnostic   pilocarpine 5 mg tablet; Commonly known as: Salagen   propranolol 10 mg tablet; Commonly known as: Inderal; Take 1 tablet (10   mg) by mouth once daily.   sulfamethoxazole-trimethoprim 800-160 mg tablet; Commonly known as:   Bactrim DS   Vitamin B-12 2,500 mcg tablet, sublingual SL tablet; Generic drug:   cyanocobalamin (vitamin B-12)     STOP taking these medications     chlorhexidine 0.12 % solution; Commonly known as: Peridex       Outpatient Follow-Up  Future Appointments   Date Time Provider Department Center   6/10/2024 10:30 AM Dinesh Jacobson MD [id number] [facility]   6/18/2024 10:45 AM Aureliano Pabon MD [id number] [facility]   6/24/2024  1:00 PM Adwoa Wilhelm, PT [id number] [facility]   7/10/2024  9:00 AM Hu Bernabe OD [id number] [facility]   7/10/2024 11:40 AM Arian Man MD [id number] [facility] 7/11/2024  1:40 PM Yolette Carlos PA-C [id number] [facility]   7/22/2024  2:20 PM Christiane Cooper MD [id number] [facility]   9/4/2024  2:30 PM Nadira Moctezuma DO [id number] [facility]   9/12/2024  3:15 PM Ricky Garcia MD PhD [id number] [facility]   10/23/2024 10:30 AM Rakesh Verma MD [id number] [facility]   11/11/2024 11:45 AM JEAN MARIE Bazzi-CNP [id number] [facility]   2/11/2025  9:30 AM [facility] MAMMO 3 CMCMAM [facility]   2/11/2025  10:30 AM Rhina Fields MD [id number] [facility]   3/19/2025 11:40 AM Arian Man MD [id number] [facility]       Nevin Barragan, APRN-CNP

## 2024-05-29 NOTE — PROGRESS NOTES
Physical Therapy    Physical Therapy Treatment    Patient Name: Louisa Rodas  MRN: [id number]  Today's Date: 5/29/2024  Time Calculation  Start Time: 1354  Stop Time: 1436  Time Calculation (min): 42 min    Assessment/Plan   PT Assessment  End of Session Communication: Bedside nurse  Assessment Comment: able to ambulate this tx  End of Session Patient Position: Alarm on, Bed, 3 rail up  PT Plan  Inpatient/Swing Bed or Outpatient: Inpatient  PT Plan  Treatment/Interventions: Bed mobility, Transfer training, Gait training  PT Plan: Skilled PT  PT Frequency: BID  PT Discharge Recommendations: Moderate intensity level of continued care  PT Recommended Transfer Status: Assist x1 (mod assist)  PT - OK to Discharge: Yes (per POC)      General Visit Information:   PT  Visit  PT Received On: 05/29/24  General  Reason for Referral: R TKA  Referred By: Portillo Arthur MD  Family/Caregiver Present: Yes  Prior to Session Communication: Bedside nurse  Patient Position Received: Bed, 3 rail up, Alarm on  General Comment: pt req max vc/tc to complete all activity.  still safety concerns remain for home going.  PT and rest of team notified.    Subjective   Precautions:  Precautions  LE Weight Bearing Status: Weight Bearing as Tolerated  Medical Precautions: Fall precautions  Post-Surgical Precautions: Left total knee precautions  Vital Signs:  Vital Signs  Heart Rate: 73  SpO2: 92 %  BP: 105/53  MAP (mmHg): 65  BP Location: Right arm  BP Method: Automatic  Patient Position: Lying    Objective   Pain:  Pain Assessment  Pain Score: 6  Pain Type: Surgical pain  Pain Location: Knee  Pain Orientation: Right  Pain Interventions: Medication (See MAR)  Cognition:  Cognition  Overall Cognitive Status: Within Functional Limits  Coordination:     Postural Control:     Extremity/Trunk Assessments:    Activity Tolerance:     Treatments:  Therapeutic Exercise  Therapeutic Exercise Performed: Yes  Therapeutic Exercise Activity 1: pt performed supine ther  ex x15 : AP, QS, GS, SAQ, seated Knee flexion. vc/tc req         Bed Mobility  Bed Mobility: Yes  Bed Mobility 1  Bed Mobility 1: Supine to sitting, Sitting to supine  Level of Assistance 1: Minimum assistance  Bed Mobility Comments 1: pt req max vc/tc to complete, assist to bring BLE over EOB and to elevate trunk.  very slow to complete bed mobility espeically sitting > supine.    Ambulation/Gait Training  Ambulation/Gait Training Performed: Yes  Ambulation/Gait Training 1  Surface 1: Level tile  Device 1: Rolling walker  Gait Support Devices: Gait belt  Assistance 1: Minimum assistance  Comments/Distance (ft) 1: cues for forward gaze/up right posture.  VC to stay within RW.  slow and steady, no overt LOB noted.  Transfer 1  Technique 1: Sit to stand, Stand to sit  Transfer Device 1: Walker  Transfer Level of Assistance 1: Minimum assistance, Moderate assistance  Trials/Comments 1: 15x1 (pt performed with decreased step height and length.  assist req with max vc to keep AD closer and stay inside of RW.  x1 seated rest break.  x1 standing rest break.  max vc for safety.)    Outcome Measures:  [facility] Basic Mobility  Turning from your back to your side while in a flat bed without using bedrails: A lot  Moving from lying on your back to sitting on the side of a flat bed without using bedrails: A lot  Moving to and from bed to chair (including a wheelchair): A lot  Standing up from a chair using your arms (e.g. wheelchair or bedside chair): A lot  To walk in hospital room: A lot  Climbing 3-5 steps with railing: Total  Basic Mobility - Total Score: 11    Education Documentation  Home Exercise Program, taught by Manohar Alcaraz PTA at 5/29/2024  3:38 PM.  Learner: Patient  Readiness: Acceptance  Method: Explanation  Response: Needs Reinforcement    Mobility Training, taught by Manohar Alcaraz PTA at 5/29/2024  3:38 PM.  Learner: Patient  Readiness: Acceptance  Method: Explanation  Response: Needs Reinforcement    Home  Exercise Program, taught by Manohar Alcaraz PTA at 5/29/2024  1:34 PM.  Learner: Patient  Readiness: Acceptance  Method: Explanation  Response: Needs Reinforcement    Mobility Training, taught by Manohar Alcaraz PTA at 5/29/2024  1:34 PM.  Learner: Patient  Readiness: Acceptance  Method: Explanation  Response: Needs Reinforcement    Education Comments  No comments found.        OP EDUCATION:       Encounter Problems       Encounter Problems (Active)       Mobility       STG - Patient will ambulate 150 ft mod I with a RW.  (Progressing)       Start:  05/28/24    Expected End:  06/11/24            STG - Patient will ascend and descend four stairs using екатерина Hrs mod I.  (Not Progressing)       Start:  05/28/24    Expected End:  06/11/24            Pt will tolerate 15 reps of each LE exercise in order to improve strength and endurance.  (Progressing)       Start:  05/28/24    Expected End:  06/11/24            Pt will achieve 0-90 degrees R knee ROM (Progressing)       Start:  05/28/24    Expected End:  06/11/24               PT Transfers       STG - Patient will perform bed mobility independently (Progressing)       Start:  05/28/24    Expected End:  06/11/24            STG - Patient will transfer sit to and from stand mod I with a RW.  (Progressing)       Start:  05/28/24    Expected End:  06/11/24            STG - Patient will perform car transfer independently.        Start:  05/28/24    Expected End:  06/11/24

## 2024-05-29 NOTE — PROGRESS NOTES
"Orthopaedic Surgery Progress Note    S:    Patient awake in bed,  Pain controlled on current regimen.  Denies any new onset numbness, tingling or weakness. Denies nausea, vomiting, chest pain, dyspnea, or calf tenderness. Denies any fever or chills.     O:  /53 (BP Location: Right arm, Patient Position: Lying)   Pulse 73   Temp 37.4 °C (99.3 °F) (Temporal)   Resp 18   Ht 1.575 m (5' 2\")   Wt 78 kg (171 lb 15.3 oz)   SpO2 92%   BMI 31.45 kg/m²     GEN - NAD, resting comfortably in hospital bed  HEENT - MMM, EOMI, NCAT   CV - RRR by peripheral palpation, limbs wwp  PULM - NWOB on RA  ABD - Non-distended  NEURO - JARA spontaneously, CNs II - XII grossly intact  PSYCH - Appropriate mood and affect    MSK:  RLE:   -Post-operative dressing/splint in place without strikethrough bleeding.   - Drain in place, holding suction  -Motor intact in DF/PF/EHL/FHL, SILT in saph/sural/SPN/DPN/tibial distributions  -Foot wwp, brisk cap refill, 2+ DP/PT pulse  -Compartments soft and compressible, no pain with passive dorsiflexion    A/P: 83F PMH CKD3, DM2, PONV, asthma, HLD, breast Ca, HTN, hypothyroid, MENDY on CPAP, Sjogrens S/P R TKA with Dr. Bhagat on 5/28.     -regular diet  -Bowel regimen of colace, senna, and dulcolax  -Tylenol, oxy 5/10, dilaudid PRN, tramadol PRN, IV toradol 15mg q6hr x 4 doses for pain management  -Continue LR@100; HLIV with good PO intake  -PT/OT consult; WB status: WBAT; Maintain heel on pillow with knee straight when laying flat.   -Encourage IS, continue home inhaler, Albuterol duonebs PRN, home CPAP  -Perioperative abx - ancef 24 hours  -F/u CBC in AM, No indication for transfusion; 1x PO TXA on floor  -DVT ppx: eliquis 2.5 mg bid, SCDs + TEDs   -drain removed today  -Continue home medications - CPAP and inhalers, Plaquenil, acyclovir, amlodipine, statin, synthroid, lisinopril (POD2), montelukast, pilocarpine, propranolol  -Glycemic: SSI for DM    Dispo: requiring PM PT, patient will stay " overnight and assess tomorrow for DC home with HHC    Plan was discussed with Dr. Bhagat.

## 2024-05-29 NOTE — DISCHARGE SUMMARY
MD Yolette Wood, MPAS, PADocC, ATC  Adult Reconstruction and Joint Replacement Surgery  Phone: [phone]     Fax:[phone]             Discharge Summary    Discharge Diagnosis  Right Total Knee Arthroplasty    Issues Requiring Follow-Up  Home care services to start within 48 hours. Outpatient PT to start 2 weeks  S/P total Joint for Knees only. Hips optional.    Test Results Pending At Discharge  Pending Labs       No current pending labs.          Hospital Course  Patient underwent Right Total Knee Arthroplasty on 5/28 without complications. The patient was then taken to the PACU in stable condition. Patient was then transferred to the or.  Pain was appropriately controlled. Diet was advanced as tolerated. Patient progressed adequately through their recovery during hospital stay including PT/ OT and were recommended for discharge. Patient was then discharged on  to home in stable condition. Patient had uneventful hospital course. Patient was instructed on the use of pain medications as needed for pain. The signs and symptoms of infection were discussed and the patient was given our number to call should they have any questions or concerns following discharge.    Based on my clinical judgment, the patient was provided with a 7-day prescription for opioid medication at 30 MED, indicated for treatment of acute pain in the setting of recent Total Joint Arthroplasty. OARRS report was run and has demonstrated an appropriate time course.  The patient has been provided with counseling pertaining to safe use of opioid medication.    Patient may use operative extremity WBAT with use of walker for assistance with ambulation .  Mepilex dressing to be removed POD # 7 by home care and incision left open to air  OAC (Eliquis 2.5 mg BID) for DVT prophylaxis started on POD #1 and to be taken for 30 days    Patient is to follow-up in 6 weeks at scheduled post-op visit.     Face-to Face after  surgery progress note  Pertinent Physical Exam At Time of Discharge  Review of Systems   Constitutional: Negative.  Negative for activity change, chills, fatigue and fever.   HENT: Negative.     Eyes: Negative.    Respiratory: Negative.  Negative for cough, chest tightness, shortness of breath and wheezing.    Cardiovascular: Negative.  Negative for palpitations.   Gastrointestinal:  Negative for abdominal pain, blood in stool, nausea and vomiting.   Endocrine: Negative.  Negative for cold intolerance and polyuria.   Genitourinary: Negative.  Negative for difficulty urinating, dysuria, frequency, hematuria and urgency.   Musculoskeletal:  Positive for gait problem and joint swelling. Negative for arthralgias and back pain.   Skin: Negative.  Negative for color change, pallor, rash and wound.   Allergic/Immunologic: Negative.  Negative for environmental allergies.   Neurological:  Negative for dizziness, weakness and light-headedness.   Hematological: Negative.    Psychiatric/Behavioral:  Negative for agitation, confusion and suicidal ideas. The patient is not nervous/anxious.    All other systems reviewed and are negative    Physical Exam  side: right knee  Vitals and nursing note reviewed. VSS, Afebrile  Constitutional:       Appearance: Normal appearance, awake and alert.  HENT:      Head: Normocephalic and atraumatic.       Pupils: Pupils are equal, round, and reactive to light.   Cardiovascular:      Rate and Rhythm: Normal rate and regular rhythm.   Pulmonary:      Effort: Pulmonary effort is normal.     Abdominal:         Palpations: Abdomen is soft.   Musculoskeletal:   Sensation intact bilaterally, sural/saph/sp/tibal n.  Motor intact flexion/extension/DF/PF/EHL/FHL bilaterally. Palpable symmetric DP/PT pulse bilaterally. Spinal wearing off.    Skin:      Bulky Dressing intact to the surgical extremity. No signs of gross bloody or purulent drainage.     General: Skin is warm and dry.      Capillary Refill:  Capillary refill takes less than 2 seconds.   Neurological:      General: No focal deficit present.      Mental Status: She is alert and oriented to person, place, and time. Mental status is at baseline.   Psychiatric:         Mood and Affect: Mood normal.        Home Medications  Scheduled medications    Current Facility-Administered Medications:     acetaminophen (Tylenol) tablet 650 mg, 650 mg, oral, q6h PRN, Portillo Arthur MD    acyclovir (Zovirax) tablet 400 mg, 400 mg, oral, Daily, Portillo Arthur MD    albuterol 2.5 mg /3 mL (0.083 %) nebulizer solution 2.5 mg, 2.5 mg, nebulization, q6h PRN, Portillo Arthur MD    amLODIPine (Norvasc) tablet 2.5 mg, 2.5 mg, oral, Daily, Portillo Arthur MD    apixaban (Eliquis) tablet 2.5 mg, 2.5 mg, oral, q12h, Portillo Arthur MD, 2.5 mg at 05/28/24 2017    atorvastatin (Lipitor) tablet 20 mg, 20 mg, oral, Once per day on Monday Wednesday Friday, Portillo Arthur MD    benzocaine-menthol (Cepastat Sore Throat) lozenge 1 lozenge, 1 lozenge, Mouth/Throat, q4h PRN, Portillo Arthur MD    bisacodyl (Dulcolax) EC tablet 10 mg, 10 mg, oral, Daily PRN, Portillo Arthur MD    bisacodyl (Dulcolax) suppository 10 mg, 10 mg, rectal, Daily PRN, Portillo Arthur MD    budesonide (Pulmicort) 0.5 mg/2 mL nebulizer solution 0.5 mg, 0.5 mg, nebulization, BID, Maximilian Moore PharmD, 0.5 mg at 05/28/24 2118    cyclobenzaprine (Flexeril) tablet 5 mg, 5 mg, oral, TID PRN, Portillo Arthur MD, 5 mg at 05/28/24 1502    dextrose 50 % injection 12.5 g, 12.5 g, intravenous, q15 min PRN, Portillo Arthur MD    dextrose 50 % injection 25 g, 25 g, intravenous, q15 min PRN, Portillo Arthur MD    diphenhydrAMINE (BENADryl) injection 12.5 mg, 12.5 mg, intravenous, q6h PRN, Portillo Arthur MD    docusate sodium (Colace) capsule 100 mg, 100 mg, oral, BID, Portillo Arthur MD, 100 mg at 05/28/24 2017    formoterol (Perforomist) 20 mcg/2 mL nebulizer solution 20 mcg, 20 mcg, nebulization, BID, Angy NaiduD, 20 mcg at 05/28/24 2118    glucagon (Glucagen)  injection 1 mg, 1 mg, intramuscular, q15 min PRN, Portillo Arthur MD    glucagon (Glucagen) injection 1 mg, 1 mg, intramuscular, q15 min PRN, Portillo Arthur MD    HYDROmorphone (Dilaudid) injection 1 mg, 1 mg, intravenous, q2h PRN, Portillo Arthur MD    hydroxychloroquine (Plaquenil) tablet 200 mg, 200 mg, oral, Daily, Portillo Arthur MD    insulin lispro (HumaLOG) injection 0-15 Units, 0-15 Units, subcutaneous, TID, Portillo Arthur MD    lactated Ringer's infusion, 50 mL/hr, intravenous, Continuous, Portillo Arthur MD, Last Rate: 50 mL/hr at 05/28/24 2146, 50 mL/hr at 05/28/24 2146    levothyroxine (Synthroid, Levoxyl) tablet 100 mcg, 100 mcg, oral, Once per day on Sunday Tuesday Wednesday Thursday Saturday, Portillo Arthur MD, 100 mcg at 05/29/24 0544    [START ON 5/31/2024] levothyroxine (Synthroid, Levoxyl) tablet 112 mcg, 112 mcg, oral, Once per day on Monday Friday, Maximilian Moore, PharmD    [START ON 5/30/2024] lisinopril tablet 20 mg, 20 mg, oral, Daily, Portillo Arthur MD    metoclopramide (Reglan) tablet 10 mg, 10 mg, oral, q6h PRN **OR** metoclopramide (Reglan) injection 10 mg, 10 mg, intravenous, q6h PRN, Portillo Arthur MD    naloxone (Narcan) injection 0.2 mg, 0.2 mg, intravenous, q5 min PRN, Portillo Arthur MD    ondansetron (Zofran) tablet 4 mg, 4 mg, oral, q8h PRN **OR** ondansetron (Zofran) injection 4 mg, 4 mg, intravenous, q8h PRN, Portillo Arthur MD, 4 mg at 05/28/24 1533    oxyCODONE (Roxicodone) immediate release tablet 10 mg, 10 mg, oral, q4h PRN, Portillo Arthur MD, 10 mg at 05/28/24 1501    oxyCODONE (Roxicodone) immediate release tablet 5 mg, 5 mg, oral, q4h PRN, Portillo Arthur MD    oxygen (O2) therapy, 2 L/min, inhalation, Continuous, Portillo Arthur MD    pantoprazole (ProtoNix) EC tablet 40 mg, 40 mg, oral, Daily before breakfast, Portillo Arthur MD    pilocarpine (Salagen) tablet 5 mg, 5 mg, oral, Nightly, Portillo Arthur MD, 5 mg at 05/28/24 2018    polyethylene glycol (Glycolax, Miralax) packet 17 g, 17 g, oral, Daily, Portillo Arthur MD, 17 g at  05/28/24 2017    propranolol (Inderal) tablet 10 mg, 10 mg, oral, Daily, Portillo Arthur MD    scopolamine (Transderm-Scop) patch 1 patch, 1 patch, transdermal, Once, Portillo Arthur MD, 1 patch at 05/28/24 1503     PRN medications  PRN medications: acetaminophen, albuterol, benzocaine-menthol, bisacodyl, bisacodyl, cyclobenzaprine, dextrose, dextrose, diphenhydrAMINE, glucagon, glucagon, HYDROmorphone, metoclopramide **OR** metoclopramide, naloxone, ondansetron **OR** ondansetron, oxyCODONE, oxyCODONE    Discharge medications     Your medication list        START taking these medications        Instructions Last Dose Given Next Dose Due   acetaminophen 500 mg tablet  Commonly known as: Tylenol Extra Strength      Take 2 tablets (1,000 mg) by mouth every 6 hours if needed for mild pain (1 - 3).       apixaban 2.5 mg tablet  Commonly known as: Eliquis      Take 1 tablet (2.5 mg) by mouth 2 times a day.       docusate sodium 100 mg capsule  Commonly known as: Colace      Take 1 capsule (100 mg) by mouth 2 times a day.       oxyCODONE 5 mg immediate release tablet  Commonly known as: Roxicodone      Take 1 tablet (5 mg) by mouth every 6 hours if needed for severe pain (7 - 10) for up to 7 days.       pantoprazole 40 mg EC tablet  Commonly known as: ProtoNix      Take 1 tablet (40 mg) by mouth once daily. Do not crush, chew, or split.       polyethylene glycol 17 gram/dose powder  Commonly known as: Glycolax, Miralax      Mix 1 capful (17 g) in 8 oz of water and drink by mouth once daily       traMADol 50 mg tablet  Commonly known as: Ultram      Take 1 tablet (50 mg) by mouth every 6 hours if needed for severe pain (7 - 10) for up to 7 days.              CONTINUE taking these medications        Instructions Last Dose Given Next Dose Due   acyclovir 400 mg tablet  Commonly known as: Zovirax      Take 1 tablet (400 mg) by mouth once daily.       allantoin gel           amLODIPine 2.5 mg tablet  Commonly known as: Norvasc       Take 1 tablet (2.5 mg) by mouth once daily.       atorvastatin 20 mg tablet  Commonly known as: Lipitor           azelastine 137 mcg (0.1 %) nasal spray  Commonly known as: Astelin           b complex 0.4 mg tablet           budesonide-formoteroL 160-4.5 mcg/actuation inhaler  Commonly known as: Symbicort      Inhale 2 puffs once daily. RINSE MOUTH AFTER USE.       carboxymethylcellulose 1 % ophthalmic solution dropperette  Commonly known as: Refresh Celluvisc           cholecalciferol 50 MCG (2000 UT) tablet  Commonly known as: Vitamin D-3           fluocinolone and shower cap 0.01 % oil           hydroxychloroquine 200 mg tablet  Commonly known as: Plaquenil      TAKE 1 TABLET BY MOUTH WITH FOOD OR MILK ONCE A DAY       iVizia (PF) 0.5 % drops  Generic drug: povidone (PF)           ketoconazole 2 % shampoo  Commonly known as: NIZOral           latanoprost (PF) 0.005 % drops      Administer 1 drop into both eyes once daily at bedtime.       levothyroxine 100 mcg tablet  Commonly known as: Synthroid, Levoxyl      Take 1 tablet (100 mcg) by mouth once daily. Five days per week (takes 112 mcgs 2 days per week)       lisinopril 20 mg tablet      Take 1 tablet (20 mg) by mouth once daily.       medical cannabis           MENTAX TOP           montelukast 10 mg tablet  Commonly known as: Singulair           OneTouch Verio test strips strip  Generic drug: blood sugar diagnostic           pilocarpine 5 mg tablet  Commonly known as: Salagen           propranolol 10 mg tablet  Commonly known as: Inderal      Take 1 tablet (10 mg) by mouth once daily.       sulfamethoxazole-trimethoprim 800-160 mg tablet  Commonly known as: Bactrim DS           Vitamin B-12 2,500 mcg tablet, sublingual SL tablet  Generic drug: cyanocobalamin (vitamin B-12)                  STOP taking these medications      chlorhexidine 0.12 % solution  Commonly known as: Peridex                  Where to Get Your Medications        These medications were  sent to [facility]  [address], [facility] [address]      Hours: 8 AM to 6 PM Mon-Fri, 9 AM to 1 PM Saturday Phone: [phone]   acetaminophen 500 mg tablet  apixaban 2.5 mg tablet  docusate sodium 100 mg capsule  oxyCODONE 5 mg immediate release tablet  pantoprazole 40 mg EC tablet  polyethylene glycol 17 gram/dose powder  traMADol 50 mg tablet         You have not been prescribed any medications.     Outpatient Follow-Up  Patient to follow-up with /Yolette Carlos PA-C.  Thank you for trusting us with your care. You should be scheduled for a follow-up post-surgical visit in 6 weeks.    Special Instructions  Eliquis 2.5 mg BID for DVT prophylaxis     Please read discharge instructions provided by your surgeon before calling with questions as this will delay care.    Medication refills-Oxycodone and Tramadol will be refilled every 7 days per state law. Request refills through Joint Navigator at the institution in which you had surgery or MyChart. All medication requests may take up to 72 hours to refill and refills after Friday 1pm will be refilled on the next business day.      No future appointments.    BAKARI Cervantes, PA-C ATC  Orthopedic Physician Assisant  Adult Reconstruction and Total Joint Replacement  General Orthopedics  Department of Orthopaedic Surgery  [place] [address]  Chiquita messaging preferred

## 2024-05-29 NOTE — CARE PLAN
The patient's goals for the shift include      The clinical goals for the shift include pt will ambulate during shift    Over the shift, the patient did not make progress toward the following goals. Barriers to progression include . Recommendations to address these barriers include   Problem: Fall/Injury  Goal: Not fall by end of shift  Outcome: Progressing  Goal: Be free from injury by end of the shift  Outcome: Progressing  Goal: Verbalize understanding of personal risk factors for fall in the hospital  Outcome: Progressing  Goal: Verbalize understanding of risk factor reduction measures to prevent injury from fall in the home  Outcome: Progressing  Goal: Use assistive devices by end of the shift  Outcome: Progressing  Goal: Pace activities to prevent fatigue by end of the shift  Outcome: Progressing   .

## 2024-05-29 NOTE — CARE PLAN
The patient's goals for the shift include      The clinical goals for the shift include pt will ambulate during shift    Over the shift, the patient did not make progress toward the following goals. Barriers to progression include mobility. Recommendations to address these barriers include therapy.

## 2024-05-29 NOTE — NURSING NOTE
Met with Patient, Care Partner, and Family at bedside- Patient is s/p Right Total Knee Replacement with Dr. Bhagat. Discussion with patient included education on the following topics: TJR Education: Wound Care, Post-Op Activity, Post-Op Precautions, Cold-Therapy, Importance of post-op prescriptions, When to call the Surgeon's Office, Use of MyChart, and When to call 9-1-1. Patient Live class prior to surgery. Patient is able to verbalize understanding of class content/discussion. Contact information was provided to patient for support and assistance during the post-operative period. Met with patient, discussed total knee replacement, importance of range of motion/physical therapy, and activity/assistive device. Reviewed constipation, pain management/medications, and cold therapy. Reviewed signs and symptoms of infection, when to call MD/navigator, when to call 911. Discussed DVT prophylaxis and discharge planning. Patient verbalizes understanding of when to call Navigator/MD with questions or concerns following discharge. She was given My Medication Education tool as a reference for her discharge medications.

## 2024-05-29 NOTE — NURSING NOTE
Interdisciplinary team present: NP, PT, NM, CC, SW, Orthopedic Coordinator, and bedside RN.  Pain - controlled  Nausea - none  Discharge barrier - n/a  Discharge plan - Home with [facility]  Discharge date/time - today

## 2024-05-30 ENCOUNTER — APPOINTMENT (OUTPATIENT)
Dept: RADIATION ONCOLOGY | Facility: HOSPITAL | Age: 84
End: 2024-05-30
Payer: MEDICARE

## 2024-05-30 ENCOUNTER — DOCUMENTATION (OUTPATIENT)
Dept: HOME HEALTH SERVICES | Facility: HOME HEALTH | Age: 84
End: 2024-05-30

## 2024-05-30 LAB
ANION GAP SERPL CALC-SCNC: 13 MMOL/L (ref 10–20)
BUN SERPL-MCNC: 14 MG/DL (ref 6–23)
CALCIUM SERPL-MCNC: 9.3 MG/DL (ref 8.6–10.3)
CHLORIDE SERPL-SCNC: 101 MMOL/L (ref 98–107)
CO2 SERPL-SCNC: 26 MMOL/L (ref 21–32)
CREAT SERPL-MCNC: 1.09 MG/DL (ref 0.5–1.05)
EGFRCR SERPLBLD CKD-EPI 2021: 51 ML/MIN/1.73M*2
ERYTHROCYTE [DISTWIDTH] IN BLOOD BY AUTOMATED COUNT: 13.4 % (ref 11.5–14.5)
GLUCOSE BLD MANUAL STRIP-MCNC: 145 MG/DL (ref 74–99)
GLUCOSE BLD MANUAL STRIP-MCNC: 149 MG/DL (ref 74–99)
GLUCOSE BLD MANUAL STRIP-MCNC: 154 MG/DL (ref 74–99)
GLUCOSE SERPL-MCNC: 150 MG/DL (ref 74–99)
HCT VFR BLD AUTO: 35.3 % (ref 36–46)
HGB BLD-MCNC: 11.8 G/DL (ref 12–16)
MCH RBC QN AUTO: 31 PG (ref 26–34)
MCHC RBC AUTO-ENTMCNC: 33.4 G/DL (ref 32–36)
MCV RBC AUTO: 93 FL (ref 80–100)
NRBC BLD-RTO: 0 /100 WBCS (ref 0–0)
PLATELET # BLD AUTO: 137 X10*3/UL (ref 150–450)
POTASSIUM SERPL-SCNC: 3.8 MMOL/L (ref 3.5–5.3)
RBC # BLD AUTO: 3.81 X10*6/UL (ref 4–5.2)
SODIUM SERPL-SCNC: 136 MMOL/L (ref 136–145)
WBC # BLD AUTO: 8.4 X10*3/UL (ref 4.4–11.3)

## 2024-05-30 PROCEDURE — 2500000002 HC RX 250 W HCPCS SELF ADMINISTERED DRUGS (ALT 637 FOR MEDICARE OP, ALT 636 FOR OP/ED): Performed by: STUDENT IN AN ORGANIZED HEALTH CARE EDUCATION/TRAINING PROGRAM

## 2024-05-30 PROCEDURE — 2500000005 HC RX 250 GENERAL PHARMACY W/O HCPCS: Performed by: STUDENT IN AN ORGANIZED HEALTH CARE EDUCATION/TRAINING PROGRAM

## 2024-05-30 PROCEDURE — 97116 GAIT TRAINING THERAPY: CPT | Mod: GP,CQ

## 2024-05-30 PROCEDURE — 9420000001 HC RT PATIENT EDUCATION 5 MIN

## 2024-05-30 PROCEDURE — 97110 THERAPEUTIC EXERCISES: CPT | Mod: GP,CQ

## 2024-05-30 PROCEDURE — 36415 COLL VENOUS BLD VENIPUNCTURE: CPT

## 2024-05-30 PROCEDURE — 94664 DEMO&/EVAL PT USE INHALER: CPT

## 2024-05-30 PROCEDURE — 85027 COMPLETE CBC AUTOMATED: CPT

## 2024-05-30 PROCEDURE — 97530 THERAPEUTIC ACTIVITIES: CPT | Mod: GP,CQ

## 2024-05-30 PROCEDURE — 80048 BASIC METABOLIC PNL TOTAL CA: CPT

## 2024-05-30 PROCEDURE — 2500000004 HC RX 250 GENERAL PHARMACY W/ HCPCS (ALT 636 FOR OP/ED): Performed by: STUDENT IN AN ORGANIZED HEALTH CARE EDUCATION/TRAINING PROGRAM

## 2024-05-30 PROCEDURE — 94640 AIRWAY INHALATION TREATMENT: CPT

## 2024-05-30 PROCEDURE — 2500000001 HC RX 250 WO HCPCS SELF ADMINISTERED DRUGS (ALT 637 FOR MEDICARE OP): Performed by: STUDENT IN AN ORGANIZED HEALTH CARE EDUCATION/TRAINING PROGRAM

## 2024-05-30 PROCEDURE — 82947 ASSAY GLUCOSE BLOOD QUANT: CPT

## 2024-05-30 PROCEDURE — 1100000001 HC PRIVATE ROOM DAILY

## 2024-05-30 PROCEDURE — 2500000002 HC RX 250 W HCPCS SELF ADMINISTERED DRUGS (ALT 637 FOR MEDICARE OP, ALT 636 FOR OP/ED): Mod: MUE | Performed by: PHARMACIST

## 2024-05-30 RX ORDER — OXYCODONE HYDROCHLORIDE 5 MG/1
2.5 TABLET ORAL EVERY 4 HOURS PRN
Status: DISCONTINUED | OUTPATIENT
Start: 2024-05-30 | End: 2024-05-31

## 2024-05-30 RX ADMIN — BUDESONIDE INHALATION 0.5 MG: 0.5 SUSPENSION RESPIRATORY (INHALATION) at 10:10

## 2024-05-30 RX ADMIN — INSULIN LISPRO 3 UNITS: 100 INJECTION, SOLUTION INTRAVENOUS; SUBCUTANEOUS at 08:00

## 2024-05-30 RX ADMIN — DOCUSATE SODIUM 100 MG: 100 CAPSULE, LIQUID FILLED ORAL at 21:15

## 2024-05-30 RX ADMIN — FORMOTEROL FUMARATE DIHYDRATE 20 MCG: 20 SOLUTION RESPIRATORY (INHALATION) at 10:10

## 2024-05-30 RX ADMIN — PILOCARPINE HYDROCHLORIDE 5 MG: 5 TABLET, FILM COATED ORAL at 21:15

## 2024-05-30 RX ADMIN — PROPRANOLOL HYDROCHLORIDE 10 MG: 10 TABLET ORAL at 09:00

## 2024-05-30 RX ADMIN — PANTOPRAZOLE SODIUM 40 MG: 40 TABLET, DELAYED RELEASE ORAL at 06:31

## 2024-05-30 RX ADMIN — HYDROXYCHLOROQUINE SULFATE 200 MG: 200 TABLET, FILM COATED ORAL at 09:01

## 2024-05-30 RX ADMIN — APIXABAN 2.5 MG: 2.5 TABLET, FILM COATED ORAL at 09:00

## 2024-05-30 RX ADMIN — LISINOPRIL 20 MG: 20 TABLET ORAL at 09:00

## 2024-05-30 RX ADMIN — LEVOTHYROXINE SODIUM 100 MCG: 100 TABLET ORAL at 06:31

## 2024-05-30 RX ADMIN — APIXABAN 2.5 MG: 2.5 TABLET, FILM COATED ORAL at 21:15

## 2024-05-30 RX ADMIN — FORMOTEROL FUMARATE DIHYDRATE 20 MCG: 20 SOLUTION RESPIRATORY (INHALATION) at 20:11

## 2024-05-30 RX ADMIN — POLYETHYLENE GLYCOL 3350 17 G: 17 POWDER, FOR SOLUTION ORAL at 09:00

## 2024-05-30 RX ADMIN — BUDESONIDE INHALATION 0.5 MG: 0.5 SUSPENSION RESPIRATORY (INHALATION) at 20:11

## 2024-05-30 RX ADMIN — DOCUSATE SODIUM 100 MG: 100 CAPSULE, LIQUID FILLED ORAL at 09:01

## 2024-05-30 RX ADMIN — Medication 2 L/MIN: at 03:15

## 2024-05-30 RX ADMIN — AMLODIPINE BESYLATE 2.5 MG: 5 TABLET ORAL at 09:01

## 2024-05-30 RX ADMIN — OXYCODONE HYDROCHLORIDE 5 MG: 5 TABLET ORAL at 09:14

## 2024-05-30 RX ADMIN — ACYCLOVIR 400 MG: 400 TABLET ORAL at 09:00

## 2024-05-30 ASSESSMENT — COGNITIVE AND FUNCTIONAL STATUS - GENERAL
EATING MEALS: A LITTLE
MOBILITY SCORE: 12
DRESSING REGULAR LOWER BODY CLOTHING: A LITTLE
PERSONAL GROOMING: A LITTLE
CLIMB 3 TO 5 STEPS WITH RAILING: TOTAL
CLIMB 3 TO 5 STEPS WITH RAILING: A LOT
TURNING FROM BACK TO SIDE WHILE IN FLAT BAD: A LOT
MOVING FROM LYING ON BACK TO SITTING ON SIDE OF FLAT BED WITH BEDRAILS: A LOT
TURNING FROM BACK TO SIDE WHILE IN FLAT BAD: A LOT
STANDING UP FROM CHAIR USING ARMS: A LOT
MOVING TO AND FROM BED TO CHAIR: A LOT
TOILETING: A LITTLE
STANDING UP FROM CHAIR USING ARMS: A LOT
MOVING TO AND FROM BED TO CHAIR: A LOT
CLIMB 3 TO 5 STEPS WITH RAILING: TOTAL
WALKING IN HOSPITAL ROOM: A LITTLE
DRESSING REGULAR UPPER BODY CLOTHING: A LITTLE
MOVING TO AND FROM BED TO CHAIR: A LOT
MOVING FROM LYING ON BACK TO SITTING ON SIDE OF FLAT BED WITH BEDRAILS: A LOT
TURNING FROM BACK TO SIDE WHILE IN FLAT BAD: A LOT
STANDING UP FROM CHAIR USING ARMS: A LOT
TURNING FROM BACK TO SIDE WHILE IN FLAT BAD: A LOT
MOBILITY SCORE: 12
MOVING FROM LYING ON BACK TO SITTING ON SIDE OF FLAT BED WITH BEDRAILS: A LOT
HELP NEEDED FOR BATHING: A LITTLE
CLIMB 3 TO 5 STEPS WITH RAILING: TOTAL
WALKING IN HOSPITAL ROOM: A LITTLE
WALKING IN HOSPITAL ROOM: A LITTLE
MOVING FROM LYING ON BACK TO SITTING ON SIDE OF FLAT BED WITH BEDRAILS: A LOT
WALKING IN HOSPITAL ROOM: A LITTLE
MOBILITY SCORE: 13
STANDING UP FROM CHAIR USING ARMS: A LOT
MOBILITY SCORE: 12
MOVING TO AND FROM BED TO CHAIR: A LOT
DAILY ACTIVITIY SCORE: 18

## 2024-05-30 ASSESSMENT — PAIN SCALES - GENERAL: PAINLEVEL_OUTOF10: 5 - MODERATE PAIN

## 2024-05-30 ASSESSMENT — PAIN - FUNCTIONAL ASSESSMENT: PAIN_FUNCTIONAL_ASSESSMENT: 0-10

## 2024-05-30 NOTE — PROGRESS NOTES
05/30/24 1050   Discharge Planning   Living Arrangements Spouse/significant other   Support Systems Spouse/significant other   Does the patient need discharge transport arranged? Yes   RoundTrip coordination needed? Yes     Received message from therapy about patient not being safe to go home.  Patient is not following/processing commands and appears more confused today.  Currently on 2L 02.  NP aware.  Krysten Christensen RN

## 2024-05-30 NOTE — PROGRESS NOTES
Physical Therapy    Physical Therapy Treatment    Patient Name: Louisa Rodas  MRN: [id number]  Today's Date: 5/30/2024  Time Calculation  Start Time: 1323  Stop Time: 1403  Time Calculation (min): 40 min    Assessment/Plan   PT Assessment  End of Session Communication: Bedside nurse  End of Session Patient Position: Alarm on, Bed, 3 rail up  PT Plan  Inpatient/Swing Bed or Outpatient: Inpatient  PT Plan  Treatment/Interventions: Gait training, Transfer training, Bed mobility  PT Plan: Skilled PT  PT Frequency: BID  PT Discharge Recommendations: Moderate intensity level of continued care  PT Recommended Transfer Status: Assist x1  PT - OK to Discharge: Yes (per POC)      General Visit Information:   PT  Visit  PT Received On: 05/30/24  General  Reason for Referral: R TKA  Referred By: Portillo Arthur MD  Family/Caregiver Present: No  Prior to Session Communication: Bedside nurse  Patient Position Received: Up in chair, Alarm on  General Comment: pt required increased time for processing, still req increased vc/tc to complete all activity. Pt continues to have difficult time keeping eyes open throughout tx.     Subjective   Precautions:  Precautions  LE Weight Bearing Status: Weight Bearing as Tolerated  Medical Precautions: Fall precautions  Post-Surgical Precautions: Left total knee precautions  Vital Signs:       Objective   Pain:  Pain Assessment  Pain Score:  (pt has pain but unable to give numerica value)  Pain Type: Surgical pain  Pain Location: Knee  Pain Orientation: Right  Pain Interventions: Medication (See MAR)  Cognition:  Cognition  Overall Cognitive Status: Within Functional Limits  Coordination:     Postural Control:     Extremity/Trunk Assessments:    Activity Tolerance:     Treatments:  Therapeutic Exercise  Therapeutic Exercise Performed: Yes  Therapeutic Exercise Activity 1: pt performed supine ther ex x15 : AP, QS, GS, SAQ AA, SLR AA, seated Knee flexion. vc/tc req         Bed Mobility  Bed Mobility:  Yes  Bed Mobility 1  Bed Mobility 1: Sitting to supine  Level of Assistance 1: Minimum assistance, Moderate assistance  Bed Mobility Comments 1: assist req to bring BLE over EOB as well as for trunk control, slow to complete    Ambulation/Gait Training  Ambulation/Gait Training Performed: Yes  Ambulation/Gait Training 1  Surface 1: Level tile  Device 1: Rolling walker  Gait Support Devices: Gait belt  Assistance 1: Minimum assistance  Comments/Distance (ft) 1: 15x2 (pt performed slowly, antalgic, decreased step length, vc/tc to keep AD closer to self, cues to improve safety.  increased time req)  Transfer 1  Technique 1: Sit to stand, Stand to sit  Transfer Device 1: Walker  Transfer Level of Assistance 1: Minimum assistance, Moderate assistance  Trials/Comments 1: max vc/tc for hand placment on solid sitting surface and not RW.    Outcome Measures:  [facility] Basic Mobility  Turning from your back to your side while in a flat bed without using bedrails: A lot  Moving from lying on your back to sitting on the side of a flat bed without using bedrails: A lot  Moving to and from bed to chair (including a wheelchair): A lot  Standing up from a chair using your arms (e.g. wheelchair or bedside chair): A lot  To walk in hospital room: A little  Climbing 3-5 steps with railing: Total  Basic Mobility - Total Score: 12    Education Documentation  Home Exercise Program, taught by Manohar Alcaraz PTA at 5/30/2024  3:42 PM.  Learner: Patient  Readiness: Acceptance  Method: Explanation  Response: Needs Reinforcement    Mobility Training, taught by Manohar Alcaraz PTA at 5/30/2024  3:42 PM.  Learner: Patient  Readiness: Acceptance  Method: Explanation  Response: Needs Reinforcement    Home Exercise Program, taught by Manohar Alcaraz PTA at 5/30/2024  3:36 PM.  Learner: Patient  Readiness: Acceptance  Method: Explanation  Response: Needs Reinforcement    Mobility Training, taught by Manohar Alcaraz PTA at 5/30/2024  3:36  PM.  Learner: Patient  Readiness: Acceptance  Method: Explanation  Response: Needs Reinforcement    Education Comments  No comments found.        OP EDUCATION:       Encounter Problems       Encounter Problems (Active)       Mobility       STG - Patient will ambulate 150 ft mod I with a RW.  (Progressing)       Start:  05/28/24    Expected End:  06/11/24            STG - Patient will ascend and descend four stairs using екатерина Hrs mod I.  (Not Progressing)       Start:  05/28/24    Expected End:  06/11/24            Pt will tolerate 15 reps of each LE exercise in order to improve strength and endurance.  (Progressing)       Start:  05/28/24    Expected End:  06/11/24            Pt will achieve 0-90 degrees R knee ROM (Progressing)       Start:  05/28/24    Expected End:  06/11/24               PT Transfers       STG - Patient will perform bed mobility independently (Progressing)       Start:  05/28/24    Expected End:  06/11/24            STG - Patient will transfer sit to and from stand mod I with a RW.  (Progressing)       Start:  05/28/24    Expected End:  06/11/24            STG - Patient will perform car transfer independently.        Start:  05/28/24    Expected End:  06/11/24

## 2024-05-30 NOTE — CARE PLAN
Problem: Skin  Goal: Prevent/minimize sheer/friction injuries  Flowsheets (Taken 5/30/2024 1115)  Prevent/minimize sheer/friction injuries:   Turn/reposition every 2 hours/use positioning/transfer devices   Increase activity/out of bed for meals   The patient's goals for the shift include      The clinical goals for the shift include free from falls

## 2024-05-30 NOTE — PROGRESS NOTES
Orthopaedic Surgery Progress Note    S:    Drowsy, reports 8/10 pain. Tolerating diet. On 2L NC. Endorsing neuropathic pain distally     Denies F/C, CP, SOB, N/V      O:  Vitals:    05/29/24 2319 05/29/24 2355 05/30/24 0315 05/30/24 0747   BP:  159/69  167/84   BP Location:    Left arm   Patient Position:  Lying  Lying   Pulse:  83  95   Resp: 16 18  17   Temp:  37.6 °C (99.6 °F)  37.1 °C (98.8 °F)   TempSrc:  Oral  Temporal   SpO2: 95% 97% 96% 97%   Weight:       Height:                 PE:  Constitutional: A&Ox3, calm and cooperative, NAD  Eyes: EOMI, clear sclera   Cardiovascular: Normal rate and regular rhythm. No murmurs  Respiratory/Thorax: CTAB, on RA  Genitourinary: Voiding independently   Musculoskeletal: mepilex CDI w/o surrounding erythema or drainage. fires EHL/FHL/TA/TP/EDL/FDL. SILT in SP/DP/T distribution. 2+ DP/PT, <2 CRT. Compartments soft, compressible.  Extremities: TEDs and SCDs in place  Neurological: A&Ox3, No focal deficits   Psychological: Appropriate mood and behavior    Lab Results   Component Value Date    WBC 8.4 05/30/2024    HGB 11.8 (L) 05/30/2024    HCT 35.3 (L) 05/30/2024    MCV 93 05/30/2024     (L) 05/30/2024     Lab Results   Component Value Date    GLUCOSE 150 (H) 05/30/2024    CALCIUM 9.3 05/30/2024     05/30/2024    K 3.8 05/30/2024    CO2 26 05/30/2024     05/30/2024    BUN 14 05/30/2024    CREATININE 1.09 (H) 05/30/2024         Estimated Creatinine Clearance: 39.2 mL/min (A) (by C-G formula based on SCr of 1.09 mg/dL (H)).  C-Reactive Protein   Date/Time Value Ref Range Status   12/28/2023 03:29 PM 0.15 <1.00 mg/dL Final         A/P: 83F PMH CKD3, DM2, PONV, asthma, HLD, breast Ca, HTN, hypothyroid, MENDY on CPAP, Sjogrens S/P R TKA with Dr. Bhagat on 5/28.     -regular diet  -Bowel regimen of colace, senna, and dulcolax  -Tylenol, oxy 5/10, dilaudid PRN, tramadol PRN, IV toradol 15mg q6hr x 4 doses for pain management  -Continue LR@100; HLIV with good PO  intake  -PT/OT consult; WB status: WBAT; Maintain heel on pillow with knee straight when laying flat.   -Encourage IS, continue home inhaler, Albuterol duonebs PRN, home CPAP  -Perioperative abx - ancef 24 hours  -DVT ppx: eliquis 2.5 mg bid, SCDs + TEDs   -drain removed 5/29  -Continue home medications - CPAP and inhalers, Plaquenil, acyclovir, amlodipine, statin, synthroid, lisinopril (POD2), montelukast, pilocarpine, propranolol  -Glycemic: SSI for DM  - wean O2 as able    Dispo: Anticipate discharge today pending PT eval.     Plan was discussed with Dr. Young    I spent 35 minutes in the professional and overall care of this patient.     Chanda Alves PA-C  Department of Surgical Services  [facility]

## 2024-05-30 NOTE — CARE PLAN
SW  met  with   spouse and  pt  at  bedside  to  discuss  SNF recommendation. Per  spouse, he  and  HPOA  will discuss.  Spouse  agreed for  referrals   currently  to be  made to  [facility]   and  [facility].     TCC/SW  should  follow up with family  on  5-31-24  re:  dc  plan  dispo.    ZULEMA Clemente, LSW          5-30-24   [id number]  [facility]  can  accept    No response  from [facility]  left  with their  admissions  office   asking  for update  ZULEMA Clemente, LSW

## 2024-05-30 NOTE — HH CARE COORDINATION
Home Care received a referral for Physical Therapy. Unfortunately, we are unable to accept and process the referral at this time.    Patients, please reach out to the referring provider or your PCP to assist in obtaining an alternative home care agency and/or guidance to meet your needs.    Providers, please reach out to  Home Care with any questions regarding the declined referral.

## 2024-05-30 NOTE — PROGRESS NOTES
Physical Therapy    Physical Therapy Treatment    Patient Name: Louisa Rodas  MRN: [id number]  Today's Date: 5/30/2024  Time Calculation  Start Time: 0930  Stop Time: 1009  Time Calculation (min): 39 min    Assessment/Plan   PT Assessment  End of Session Communication: Bedside nurse  End of Session Patient Position: Alarm on, Up in chair  PT Plan  Inpatient/Swing Bed or Outpatient: Inpatient  PT Plan  Treatment/Interventions: Bed mobility, Transfer training, Gait training  PT Plan: Skilled PT  PT Frequency: BID  PT Discharge Recommendations: Moderate intensity level of continued care  PT Recommended Transfer Status: Assist x1  PT - OK to Discharge: Yes (per POC)      General Visit Information:   PT  Visit  PT Received On: 05/30/24  General  Reason for Referral: R TKA  Referred By: Portillo Arthur MD  Family/Caregiver Present: No  Prior to Session Communication: Bedside nurse  Patient Position Received: Up in chair, Alarm on  General Comment: pt required increased time for processing, still req increased vc/tc to complete all activity.    Subjective   Precautions:  Precautions  LE Weight Bearing Status: Weight Bearing as Tolerated  Medical Precautions: Fall precautions  Post-Surgical Precautions: Left total knee precautions  Vital Signs:       Objective   Pain:  Pain Assessment  Pain Score:  (pt has pain but unable to give numerica value)  Pain Type: Surgical pain  Pain Location: Knee  Pain Orientation: Right  Pain Interventions: Medication (See MAR)  Cognition:  Cognition  Overall Cognitive Status: Within Functional Limits  Coordination:     Postural Control:     Extremity/Trunk Assessments:    Activity Tolerance:     Treatments:  Therapeutic Exercise  Therapeutic Exercise Performed: Yes  Therapeutic Exercise Activity 1: pt performed supine ther ex x15 : AP, QS, GS, SAQ AA, SLR AA, seated Knee flexion. vc/tc req         Bed Mobility  Bed Mobility: Yes  Bed Mobility 1  Bed Mobility 1: Supine to sitting, Sitting to  supine  Level of Assistance 1: Minimum assistance, Moderate assistance  Bed Mobility Comments 1: pt req min/mod assist to bring BLE over EOB, very slow to complete bed mobility    Ambulation/Gait Training  Ambulation/Gait Training Performed: Yes  Ambulation/Gait Training 1  Surface 1: Level tile  Device 1: Rolling walker  Gait Support Devices: Gait belt  Assistance 1: Minimum assistance  Comments/Distance (ft) 1: 15x1, 10x1 (pt performed with antalgic decreased step length, VC and assist to keep AD closer.  increased time req.  vc for safety during gait training.)  Transfer 1  Technique 1: Sit to stand, Stand to sit  Transfer Device 1: Walker  Transfer Level of Assistance 1: Minimum assistance, Moderate assistance  Trials/Comments 1: max vc/tc for hand placment on solid sitting surface and not RW. pt performed x2    Outcome Measures:  [facility] Basic Mobility  Turning from your back to your side while in a flat bed without using bedrails: A lot  Moving from lying on your back to sitting on the side of a flat bed without using bedrails: A lot  Moving to and from bed to chair (including a wheelchair): A lot  Standing up from a chair using your arms (e.g. wheelchair or bedside chair): A lot  To walk in hospital room: A little  Climbing 3-5 steps with railing: Total  Basic Mobility - Total Score: 12    Education Documentation  Home Exercise Program, taught by Manohar Alcaraz PTA at 5/30/2024  3:36 PM.  Learner: Patient  Readiness: Acceptance  Method: Explanation  Response: Needs Reinforcement    Mobility Training, taught by Manohar Alcaraz PTA at 5/30/2024  3:36 PM.  Learner: Patient  Readiness: Acceptance  Method: Explanation  Response: Needs Reinforcement    Education Comments  No comments found.        OP EDUCATION:       Encounter Problems       Encounter Problems (Active)       Mobility       STG - Patient will ambulate 150 ft mod I with a RW.  (Progressing)       Start:  05/28/24    Expected End:  06/11/24             STG - Patient will ascend and descend four stairs using екатерина Hrs mod I.  (Not Progressing)       Start:  05/28/24    Expected End:  06/11/24            Pt will tolerate 15 reps of each LE exercise in order to improve strength and endurance.  (Progressing)       Start:  05/28/24    Expected End:  06/11/24            Pt will achieve 0-90 degrees R knee ROM (Progressing)       Start:  05/28/24    Expected End:  06/11/24               PT Transfers       STG - Patient will perform bed mobility independently (Progressing)       Start:  05/28/24    Expected End:  06/11/24            STG - Patient will transfer sit to and from stand mod I with a RW.  (Progressing)       Start:  05/28/24    Expected End:  06/11/24            STG - Patient will perform car transfer independently.        Start:  05/28/24    Expected End:  06/11/24

## 2024-05-30 NOTE — CARE PLAN
The patient's goals for the shift include      The clinical goals for the shift include free from falls    Over the shift, the patient did not make progress toward the following goals. Barriers to progression include mobility. Recommendations to address these barriers include therapy.

## 2024-05-31 LAB
GLUCOSE BLD MANUAL STRIP-MCNC: 160 MG/DL (ref 74–99)
GLUCOSE BLD MANUAL STRIP-MCNC: 162 MG/DL (ref 74–99)
GLUCOSE BLD MANUAL STRIP-MCNC: 266 MG/DL (ref 74–99)
GLUCOSE BLD MANUAL STRIP-MCNC: 99 MG/DL (ref 74–99)

## 2024-05-31 PROCEDURE — 97110 THERAPEUTIC EXERCISES: CPT | Mod: GP,CQ

## 2024-05-31 PROCEDURE — 82947 ASSAY GLUCOSE BLOOD QUANT: CPT | Mod: 91

## 2024-05-31 PROCEDURE — 9420000001 HC RT PATIENT EDUCATION 5 MIN

## 2024-05-31 PROCEDURE — 2500000002 HC RX 250 W HCPCS SELF ADMINISTERED DRUGS (ALT 637 FOR MEDICARE OP, ALT 636 FOR OP/ED): Performed by: STUDENT IN AN ORGANIZED HEALTH CARE EDUCATION/TRAINING PROGRAM

## 2024-05-31 PROCEDURE — 2500000001 HC RX 250 WO HCPCS SELF ADMINISTERED DRUGS (ALT 637 FOR MEDICARE OP): Performed by: PHARMACIST

## 2024-05-31 PROCEDURE — 94640 AIRWAY INHALATION TREATMENT: CPT

## 2024-05-31 PROCEDURE — 2500000001 HC RX 250 WO HCPCS SELF ADMINISTERED DRUGS (ALT 637 FOR MEDICARE OP): Performed by: STUDENT IN AN ORGANIZED HEALTH CARE EDUCATION/TRAINING PROGRAM

## 2024-05-31 PROCEDURE — 97116 GAIT TRAINING THERAPY: CPT | Mod: GP,CQ

## 2024-05-31 PROCEDURE — 2500000002 HC RX 250 W HCPCS SELF ADMINISTERED DRUGS (ALT 637 FOR MEDICARE OP, ALT 636 FOR OP/ED): Performed by: PHARMACIST

## 2024-05-31 PROCEDURE — 97530 THERAPEUTIC ACTIVITIES: CPT | Mod: GP,CQ

## 2024-05-31 PROCEDURE — 2500000004 HC RX 250 GENERAL PHARMACY W/ HCPCS (ALT 636 FOR OP/ED): Performed by: STUDENT IN AN ORGANIZED HEALTH CARE EDUCATION/TRAINING PROGRAM

## 2024-05-31 PROCEDURE — 1100000001 HC PRIVATE ROOM DAILY

## 2024-05-31 RX ORDER — OXYCODONE HYDROCHLORIDE 5 MG/1
2.5 TABLET ORAL EVERY 4 HOURS PRN
Status: DISCONTINUED | OUTPATIENT
Start: 2024-05-31 | End: 2024-06-02 | Stop reason: HOSPADM

## 2024-05-31 RX ORDER — LATANOPROST 50 UG/ML
1 SOLUTION/ DROPS OPHTHALMIC NIGHTLY
Status: DISCONTINUED | OUTPATIENT
Start: 2024-05-31 | End: 2024-06-02 | Stop reason: HOSPADM

## 2024-05-31 RX ORDER — ACETAMINOPHEN 500 MG
1000 TABLET ORAL EVERY 6 HOURS PRN
Start: 2024-05-31

## 2024-05-31 RX ORDER — DOCUSATE SODIUM 100 MG/1
100 CAPSULE, LIQUID FILLED ORAL 2 TIMES DAILY
Start: 2024-05-31 | End: 2024-06-30

## 2024-05-31 RX ORDER — OXYCODONE HYDROCHLORIDE 5 MG/1
5 TABLET ORAL EVERY 6 HOURS PRN
Status: DISCONTINUED | OUTPATIENT
Start: 2024-05-31 | End: 2024-06-02 | Stop reason: HOSPADM

## 2024-05-31 RX ORDER — TRAMADOL HYDROCHLORIDE 50 MG/1
50 TABLET ORAL EVERY 8 HOURS PRN
Status: DISCONTINUED | OUTPATIENT
Start: 2024-05-31 | End: 2024-06-02 | Stop reason: HOSPADM

## 2024-05-31 RX ORDER — LATANOPROST/PF 0.005 %
1 DROPS OPHTHALMIC (EYE) NIGHTLY
Status: DISCONTINUED | OUTPATIENT
Start: 2024-05-31 | End: 2024-05-31

## 2024-05-31 RX ORDER — PANTOPRAZOLE SODIUM 40 MG/1
40 TABLET, DELAYED RELEASE ORAL DAILY
Start: 2024-05-31 | End: 2024-06-30

## 2024-05-31 RX ORDER — POLYETHYLENE GLYCOL 3350 17 G/17G
17 POWDER, FOR SOLUTION ORAL DAILY
Start: 2024-05-31 | End: 2024-06-30

## 2024-05-31 RX ORDER — OXYCODONE HYDROCHLORIDE 5 MG/1
5 TABLET ORAL EVERY 6 HOURS PRN
Qty: 20 TABLET | Refills: 0 | Status: SHIPPED | OUTPATIENT
Start: 2024-05-31 | End: 2024-06-05

## 2024-05-31 RX ORDER — ACETAMINOPHEN 325 MG/1
650 TABLET ORAL EVERY 6 HOURS
Status: DISCONTINUED | OUTPATIENT
Start: 2024-05-31 | End: 2024-06-02 | Stop reason: HOSPADM

## 2024-05-31 RX ADMIN — LEVOTHYROXINE SODIUM 112 MCG: 0.11 TABLET ORAL at 06:25

## 2024-05-31 RX ADMIN — INSULIN LISPRO 3 UNITS: 100 INJECTION, SOLUTION INTRAVENOUS; SUBCUTANEOUS at 08:31

## 2024-05-31 RX ADMIN — DOCUSATE SODIUM 100 MG: 100 CAPSULE, LIQUID FILLED ORAL at 08:29

## 2024-05-31 RX ADMIN — BUDESONIDE INHALATION 0.5 MG: 0.5 SUSPENSION RESPIRATORY (INHALATION) at 08:55

## 2024-05-31 RX ADMIN — FORMOTEROL FUMARATE DIHYDRATE 20 MCG: 20 SOLUTION RESPIRATORY (INHALATION) at 20:43

## 2024-05-31 RX ADMIN — FORMOTEROL FUMARATE DIHYDRATE 20 MCG: 20 SOLUTION RESPIRATORY (INHALATION) at 08:55

## 2024-05-31 RX ADMIN — ACETAMINOPHEN 650 MG: 325 TABLET ORAL at 14:08

## 2024-05-31 RX ADMIN — HYDROXYCHLOROQUINE SULFATE 200 MG: 200 TABLET, FILM COATED ORAL at 08:30

## 2024-05-31 RX ADMIN — ACYCLOVIR 400 MG: 400 TABLET ORAL at 08:29

## 2024-05-31 RX ADMIN — POLYETHYLENE GLYCOL 3350 17 G: 17 POWDER, FOR SOLUTION ORAL at 08:26

## 2024-05-31 RX ADMIN — PROPRANOLOL HYDROCHLORIDE 10 MG: 10 TABLET ORAL at 08:29

## 2024-05-31 RX ADMIN — OXYCODONE HYDROCHLORIDE 2.5 MG: 5 TABLET ORAL at 17:24

## 2024-05-31 RX ADMIN — INSULIN LISPRO 9 UNITS: 100 INJECTION, SOLUTION INTRAVENOUS; SUBCUTANEOUS at 13:06

## 2024-05-31 RX ADMIN — PANTOPRAZOLE SODIUM 40 MG: 40 TABLET, DELAYED RELEASE ORAL at 06:25

## 2024-05-31 RX ADMIN — TRAMADOL HYDROCHLORIDE 50 MG: 50 TABLET, COATED ORAL at 20:15

## 2024-05-31 RX ADMIN — ACETAMINOPHEN 650 MG: 325 TABLET ORAL at 20:15

## 2024-05-31 RX ADMIN — APIXABAN 2.5 MG: 2.5 TABLET, FILM COATED ORAL at 08:30

## 2024-05-31 RX ADMIN — PILOCARPINE HYDROCHLORIDE 5 MG: 5 TABLET, FILM COATED ORAL at 20:15

## 2024-05-31 RX ADMIN — DOCUSATE SODIUM 100 MG: 100 CAPSULE, LIQUID FILLED ORAL at 20:16

## 2024-05-31 RX ADMIN — LISINOPRIL 20 MG: 20 TABLET ORAL at 08:29

## 2024-05-31 RX ADMIN — ACETAMINOPHEN 650 MG: 325 TABLET ORAL at 08:30

## 2024-05-31 RX ADMIN — LATANOPROST 1 DROP: 50 SOLUTION/ DROPS OPHTHALMIC at 20:16

## 2024-05-31 RX ADMIN — AMLODIPINE BESYLATE 2.5 MG: 5 TABLET ORAL at 08:29

## 2024-05-31 RX ADMIN — BUDESONIDE INHALATION 0.5 MG: 0.5 SUSPENSION RESPIRATORY (INHALATION) at 20:44

## 2024-05-31 RX ADMIN — ATORVASTATIN CALCIUM 20 MG: 20 TABLET, FILM COATED ORAL at 08:29

## 2024-05-31 RX ADMIN — APIXABAN 2.5 MG: 2.5 TABLET, FILM COATED ORAL at 20:16

## 2024-05-31 ASSESSMENT — PAIN SCALES - WONG BAKER: WONGBAKER_NUMERICALRESPONSE: HURTS LITTLE BIT

## 2024-05-31 ASSESSMENT — COGNITIVE AND FUNCTIONAL STATUS - GENERAL
EATING MEALS: A LITTLE
DRESSING REGULAR LOWER BODY CLOTHING: A LOT
TURNING FROM BACK TO SIDE WHILE IN FLAT BAD: A LOT
TURNING FROM BACK TO SIDE WHILE IN FLAT BAD: A LOT
DAILY ACTIVITIY SCORE: 17
EATING MEALS: A LITTLE
TOILETING: A LITTLE
CLIMB 3 TO 5 STEPS WITH RAILING: TOTAL
WALKING IN HOSPITAL ROOM: A LITTLE
TOILETING: A LITTLE
MOVING FROM LYING ON BACK TO SITTING ON SIDE OF FLAT BED WITH BEDRAILS: A LOT
MOVING FROM LYING ON BACK TO SITTING ON SIDE OF FLAT BED WITH BEDRAILS: A LOT
WALKING IN HOSPITAL ROOM: A LITTLE
HELP NEEDED FOR BATHING: A LITTLE
PERSONAL GROOMING: A LITTLE
STANDING UP FROM CHAIR USING ARMS: A LITTLE
HELP NEEDED FOR BATHING: A LITTLE
STANDING UP FROM CHAIR USING ARMS: A LITTLE
TURNING FROM BACK TO SIDE WHILE IN FLAT BAD: A LOT
MOBILITY SCORE: 13
PERSONAL GROOMING: A LITTLE
CLIMB 3 TO 5 STEPS WITH RAILING: TOTAL
STANDING UP FROM CHAIR USING ARMS: A LOT
DRESSING REGULAR LOWER BODY CLOTHING: A LOT
MOVING TO AND FROM BED TO CHAIR: A LOT
MOVING FROM LYING ON BACK TO SITTING ON SIDE OF FLAT BED WITH BEDRAILS: A LOT
STANDING UP FROM CHAIR USING ARMS: A LITTLE
MOVING TO AND FROM BED TO CHAIR: A LOT
CLIMB 3 TO 5 STEPS WITH RAILING: TOTAL
CLIMB 3 TO 5 STEPS WITH RAILING: TOTAL
MOVING TO AND FROM BED TO CHAIR: A LOT
EATING MEALS: A LITTLE
MOVING FROM LYING ON BACK TO SITTING ON SIDE OF FLAT BED WITH BEDRAILS: A LOT
MOBILITY SCORE: 12
MOBILITY SCORE: 13
DRESSING REGULAR UPPER BODY CLOTHING: A LITTLE
WALKING IN HOSPITAL ROOM: A LITTLE
DAILY ACTIVITIY SCORE: 17
DRESSING REGULAR LOWER BODY CLOTHING: A LOT
MOVING TO AND FROM BED TO CHAIR: A LOT
HELP NEEDED FOR BATHING: A LITTLE
MOBILITY SCORE: 13
DRESSING REGULAR UPPER BODY CLOTHING: A LITTLE
WALKING IN HOSPITAL ROOM: A LITTLE
DRESSING REGULAR UPPER BODY CLOTHING: A LITTLE
DAILY ACTIVITIY SCORE: 17
TOILETING: A LITTLE
PERSONAL GROOMING: A LITTLE
TURNING FROM BACK TO SIDE WHILE IN FLAT BAD: A LOT

## 2024-05-31 ASSESSMENT — PAIN SCALES - GENERAL
PAINLEVEL_OUTOF10: 9
PAINLEVEL_OUTOF10: 4
PAINLEVEL_OUTOF10: 0 - NO PAIN
PAINLEVEL_OUTOF10: 4
PAINLEVEL_OUTOF10: 5 - MODERATE PAIN

## 2024-05-31 ASSESSMENT — PAIN - FUNCTIONAL ASSESSMENT
PAIN_FUNCTIONAL_ASSESSMENT: 0-10

## 2024-05-31 NOTE — PROGRESS NOTES
05/31/24 0949   Current Planned Discharge Disposition   Current Planned Discharge Disposition SNF        SW  spoke  with  pt's  dtr  Salima  at  bedside. Salima  said  that the  family  spoke and  agreed to [facility] which  accepted. Family  aware that 3 midnights  are needed. [facility]  updated.     ZULEMA Clemente, MAXIMO          (Prior  dc  plan was  for  pt  to discharge to daughter's home at   [address] [address])          5-31-24    1220  Family  aware  that pt  needs to bring  CPAP  to [facility] - it is  sitting in the  room  with the pt    ZULEMA Clemente, MAXIMO        5-31-24   1700  The [employer] Vehicle you requested for Louisa VERGARAMimi in unit/room [address] on 06/02/2024 is scheduled to arrive at 10:00am EDT! [facility] is handling this ride and you can contact them at [phone]. to delilah rn report [phone] mdgf and AVS sent [phone] in hens     Pt  told, board updated    ZULEMA Clemente, AVERYW

## 2024-05-31 NOTE — PROGRESS NOTES
Physical Therapy    Physical Therapy Treatment    Patient Name: Louisa Rodas  MRN: [id number]  Today's Date: 5/31/2024  Time Calculation  Start Time: 1120  Stop Time: 1200  Time Calculation (min): 40 min    Assessment/Plan   PT Assessment  PT Assessment Results: Decreased strength, Decreased range of motion, Decreased endurance, Impaired balance, Decreased mobility, Pain, Orthopedic restrictions  Rehab Prognosis: Good  End of Session Communication: Bedside nurse  Assessment Comment: Patient tolerates session well.  C/o pain but able to participate in session.  Demonstrates increased mobility endurance.  End of Session Patient Position: Up in chair, Alarm on  PT Plan  Inpatient/Swing Bed or Outpatient: Inpatient  PT Plan  Treatment/Interventions: Bed mobility, Transfer training, Gait training, Balance training, Strengthening, Range of motion, Therapeutic exercise, Therapeutic activity, Home exercise program, Positioning  PT Plan: Skilled PT  PT Frequency: BID  PT Discharge Recommendations: Moderate intensity level of continued care  PT Recommended Transfer Status: Assist x1  PT - OK to Discharge: Yes (Per POC)      General Visit Information:   PT  Visit  PT Received On: 05/31/24  Response to Previous Treatment: Patient with no complaints from previous session.  General  Reason for Referral: R TKA  Referred By: Portillo Arthur MD  Family/Caregiver Present: Yes  Prior to Session Communication: Bedside nurse  Patient Position Received: Up in chair, Alarm on  Preferred Learning Style: auditory, kinesthetic, verbal  General Comment: Patient is seated in bedside chair upon arrival.  C/o increased pain and fatigue.  Family is present during session.    Subjective   Precautions:  Precautions  LE Weight Bearing Status: Weight Bearing as Tolerated  Medical Precautions: Fall precautions  Post-Surgical Precautions: Left total knee precautions  Precautions Comment: Review of total knee precautions with patient able to verbalize  knowledge.  Vital Signs:       Objective   Pain:  Pain Assessment  Pain Assessment: 0-10  Pain Score: 9  Pain Type: Surgical pain  Pain Location: Knee  Pain Orientation: Right  Cognition:  Cognition  Overall Cognitive Status: Within Functional Limits  Orientation Level: Oriented X4  Coordination:     Postural Control:  Postural Control  Postural Control: Within Functional Limits  Posture Comment: Patient is seated at the edge of chair for 2-minutes with review of precautions.  Static Sitting Balance  Static Sitting-Balance Support: Feet supported, Bilateral upper extremity supported  Static Sitting-Level of Assistance: Close supervision  Static Sitting-Comment/Number of Minutes: 2-mins  Dynamic Sitting Balance  Dynamic Sitting-Balance Support: Feet supported, Bilateral upper extremity supported  Dynamic Sitting-Balance: Lateral lean  Static Standing Balance  Static Standing-Balance Support: Bilateral upper extremity supported  Static Standing-Level of Assistance: Close supervision  Extremity/Trunk Assessments:    Activity Tolerance:  Activity Tolerance  Endurance: Tolerates 30 min exercise with multiple rests  Treatments:  Therapeutic Exercise  Therapeutic Exercise Performed: Yes  Therapeutic Exercise Activity 1: Patient is instructed in and performs seated bilateral lower extremity toe raises, knee extension, marches, and abduction x15 reps.  Long sitting ankle pumps, quad sets, glute sets, heel slides, and abduction x 10reps x 1.    Therapeutic Activity  Therapeutic Activity Performed: Yes  Therapeutic Activity 1: Review of total knee precautions              Bed Mobility  Bed Mobility: No    Ambulation/Gait Training  Ambulation/Gait Training Performed: Yes  Ambulation/Gait Training 1  Surface 1: Level tile  Device 1: Rolling walker  Gait Support Devices: Gait belt  Assistance 1: Minimum assistance  Quality of Gait 1: Diminished heel strike, Inconsistent stride length  Comments/Distance (ft) 1: 25 feet x 2 with  walker and at least CGA.  Slow chadd with decreased step length and height.  Flexed posture with constant cues required for correction.  Transfers  Transfer: Yes  Transfer 1  Transfer From 1: Chair with drop arm to  Transfer to 1: Stand  Technique 1: Sit to stand, Stand to sit  Transfer Device 1: Walker  Transfer Level of Assistance 1: Moderate assistance, Contact guard  Trials/Comments 1: Cues required to improve safety awareness.    Stairs  Stairs: No    Outcome Measures:  [facility] Basic Mobility  Turning from your back to your side while in a flat bed without using bedrails: A lot  Moving from lying on your back to sitting on the side of a flat bed without using bedrails: A lot  Moving to and from bed to chair (including a wheelchair): A lot  Standing up from a chair using your arms (e.g. wheelchair or bedside chair): A little  To walk in hospital room: A little  Climbing 3-5 steps with railing: Total  Basic Mobility - Total Score: 13    Education Documentation  Handouts, taught by Lanie Gee PTA at 5/31/2024  1:55 PM.  Learner: Family, Patient  Readiness: Acceptance  Method: Explanation, Demonstration, Teach-back  Response: Verbalizes Understanding, Demonstrated Understanding    Precautions, taught by Lanie Gee PTA at 5/31/2024  1:55 PM.  Learner: Family, Patient  Readiness: Acceptance  Method: Explanation, Demonstration, Teach-back  Response: Verbalizes Understanding, Demonstrated Understanding    Body Mechanics, taught by Lanie Gee PTA at 5/31/2024  1:55 PM.  Learner: Family, Patient  Readiness: Acceptance  Method: Explanation, Demonstration, Teach-back  Response: Verbalizes Understanding, Demonstrated Understanding    Home Exercise Program, taught by Lanie Gee PTA at 5/31/2024  1:55 PM.  Learner: Family, Patient  Readiness: Acceptance  Method: Explanation, Demonstration, Teach-back  Response: Verbalizes Understanding, Demonstrated Understanding    Mobility Training, taught by Lanie Gee PTA at  5/31/2024  1:55 PM.  Learner: Family, Patient  Readiness: Acceptance  Method: Explanation, Demonstration, Teach-back  Response: Verbalizes Understanding, Demonstrated Understanding    Education Comments  No comments found.        OP EDUCATION:  Outpatient Education  Individual(s) Educated: Patient, Child, Spouse  Education Provided: Home Exercise Program, Post-Op Precautions, Posture    Encounter Problems       Encounter Problems (Active)       Mobility       STG - Patient will ambulate 150 ft mod I with a RW.  (Progressing)       Start:  05/28/24    Expected End:  06/11/24            STG - Patient will ascend and descend four stairs using екатерина Hrs mod I.  (Progressing)       Start:  05/28/24    Expected End:  06/11/24            Pt will tolerate 15 reps of each LE exercise in order to improve strength and endurance.  (Progressing)       Start:  05/28/24    Expected End:  06/11/24            Pt will achieve 0-90 degrees R knee ROM (Progressing)       Start:  05/28/24    Expected End:  06/11/24               PT Transfers       STG - Patient will perform bed mobility independently (Progressing)       Start:  05/28/24    Expected End:  06/11/24            STG - Patient will transfer sit to and from stand mod I with a RW.  (Progressing)       Start:  05/28/24    Expected End:  06/11/24            STG - Patient will perform car transfer independently.  (Progressing)       Start:  05/28/24    Expected End:  06/11/24

## 2024-05-31 NOTE — PROGRESS NOTES
Orthopaedic Surgery Progress Note    S:    Answers questions more appropriately today, oriented x 3. Does report she has not been sleeping well and she still feels drowsy. Tolerating diet, endorsing pain and stiffness.     Denies F/C, CP, SOB, N/V      O:  Vitals:    05/31/24 0025 05/31/24 0315 05/31/24 0756 05/31/24 0855   BP: 144/75  120/59    BP Location:   Right arm    Patient Position:   Sitting    Pulse: 87  87    Resp: 18 16 16    Temp: 36.9 °C (98.5 °F)  36.5 °C (97.7 °F)    TempSrc:   Temporal    SpO2:   94% 96%   Weight:       Height:                 PE:  Constitutional: A&Ox3, calm and cooperative, NAD  Eyes: EOMI, clear sclera   Cardiovascular: Normal rate and regular rhythm. No murmurs  Respiratory/Thorax: CTAB, on RA  Genitourinary: Voiding independently   Musculoskeletal: mepilex CDI w/o surrounding erythema or drainage. fires EHL/FHL/TA/TP/EDL/FDL. SILT in SP/DP/T distribution. 2+ DP/PT, <2 CRT. Compartments soft, compressible.  Extremities: TEDs and SCDs in place  Neurological: A&Ox3, No focal deficits   Psychological: Appropriate mood and behavior    Lab Results   Component Value Date    WBC 8.4 05/30/2024    HGB 11.8 (L) 05/30/2024    HCT 35.3 (L) 05/30/2024    MCV 93 05/30/2024     (L) 05/30/2024     Lab Results   Component Value Date    GLUCOSE 150 (H) 05/30/2024    CALCIUM 9.3 05/30/2024     05/30/2024    K 3.8 05/30/2024    CO2 26 05/30/2024     05/30/2024    BUN 14 05/30/2024    CREATININE 1.09 (H) 05/30/2024         Estimated Creatinine Clearance: 39.2 mL/min (A) (by C-G formula based on SCr of 1.09 mg/dL (H)).  C-Reactive Protein   Date/Time Value Ref Range Status   12/28/2023 03:29 PM 0.15 <1.00 mg/dL Final         A/P: 83F PMH CKD3, DM2, PONV, asthma, HLD, breast Ca, HTN, hypothyroid, MENDY on CPAP, Sjogrens S/P R TKA with Dr. Bhagat on 5/28.     -regular diet  -Bowel regimen of colace, senna, and dulcolax  - pain regimen: scheduled tylenol, oxy 2.5 mod, tramadol 50  severe  -PT/OT consult; WB status: WBAT; Maintain heel on pillow with knee straight when laying flat.   -Encourage IS, continue home inhaler, Albuterol duonebs PRN, home CPAP  -Perioperative abx - ancef 24 hours  -DVT ppx: eliquis 2.5 mg bid, SCDs + TEDs   -drain removed 5/29  -Continue home medications - CPAP and inhalers, Plaquenil, acyclovir, amlodipine, statin, synthroid, lisinopril (POD2), montelukast, pilocarpine, propranolol  -Glycemic: SSI for DM      Dispo: Anticipate discharge today to SNF    Plan was discussed with Dr. Young in jeannie of Dr. Bhagat    I spent 35 minutes in the professional and overall care of this patient.     Chanda Alves PA-C  Department of Surgical Services  [facility]

## 2024-05-31 NOTE — NURSING NOTE
Interdisciplinary team present: NP, PT, NM, CC, SW, Orthopedic Coordinator, and bedside RN.  Pain - controlled  Nausea - none  Discharge barrier - needs 3 inpatient nights for SNF. Less confused today  Discharge plan - [facility]  Discharge date/time - here until Sunday at least per insurance

## 2024-05-31 NOTE — CARE PLAN
Problem: Fall/Injury  Goal: Not fall by end of shift  Outcome: Progressing  Goal: Be free from injury by end of the shift  Outcome: Progressing  Goal: Verbalize understanding of personal risk factors for fall in the hospital  Outcome: Progressing  Goal: Verbalize understanding of risk factor reduction measures to prevent injury from fall in the home  Outcome: Progressing  Goal: Use assistive devices by end of the shift  Outcome: Progressing  Goal: Pace activities to prevent fatigue by end of the shift  Outcome: Progressing     Problem: Skin  Goal: Decreased wound size/increased tissue granulation at next dressing change  Outcome: Progressing  Goal: Participates in plan/prevention/treatment measures  Outcome: Progressing  Goal: Prevent/manage excess moisture  Outcome: Progressing  Goal: Prevent/minimize sheer/friction injuries  Outcome: Progressing  Goal: Promote/optimize nutrition  Outcome: Progressing  Goal: Promote skin healing  Outcome: Progressing   The patient's goals for the shift include      The clinical goals for the shift include saftey from falls

## 2024-06-01 LAB
GLUCOSE BLD MANUAL STRIP-MCNC: 124 MG/DL (ref 74–99)
GLUCOSE BLD MANUAL STRIP-MCNC: 125 MG/DL (ref 74–99)
GLUCOSE BLD MANUAL STRIP-MCNC: 127 MG/DL (ref 74–99)
GLUCOSE BLD MANUAL STRIP-MCNC: 184 MG/DL (ref 74–99)

## 2024-06-01 PROCEDURE — 2500000002 HC RX 250 W HCPCS SELF ADMINISTERED DRUGS (ALT 637 FOR MEDICARE OP, ALT 636 FOR OP/ED): Mod: MUE | Performed by: STUDENT IN AN ORGANIZED HEALTH CARE EDUCATION/TRAINING PROGRAM

## 2024-06-01 PROCEDURE — 82947 ASSAY GLUCOSE BLOOD QUANT: CPT

## 2024-06-01 PROCEDURE — 94640 AIRWAY INHALATION TREATMENT: CPT

## 2024-06-01 PROCEDURE — 97530 THERAPEUTIC ACTIVITIES: CPT | Mod: CQ,GP

## 2024-06-01 PROCEDURE — 1100000001 HC PRIVATE ROOM DAILY

## 2024-06-01 PROCEDURE — 94660 CPAP INITIATION&MGMT: CPT

## 2024-06-01 PROCEDURE — 2500000001 HC RX 250 WO HCPCS SELF ADMINISTERED DRUGS (ALT 637 FOR MEDICARE OP): Performed by: STUDENT IN AN ORGANIZED HEALTH CARE EDUCATION/TRAINING PROGRAM

## 2024-06-01 PROCEDURE — 2500000002 HC RX 250 W HCPCS SELF ADMINISTERED DRUGS (ALT 637 FOR MEDICARE OP, ALT 636 FOR OP/ED): Performed by: STUDENT IN AN ORGANIZED HEALTH CARE EDUCATION/TRAINING PROGRAM

## 2024-06-01 PROCEDURE — 97116 GAIT TRAINING THERAPY: CPT | Mod: CQ,59,GP

## 2024-06-01 PROCEDURE — 94664 DEMO&/EVAL PT USE INHALER: CPT

## 2024-06-01 PROCEDURE — 97165 OT EVAL LOW COMPLEX 30 MIN: CPT | Mod: GO

## 2024-06-01 PROCEDURE — 97110 THERAPEUTIC EXERCISES: CPT | Mod: CQ,GP

## 2024-06-01 PROCEDURE — 2500000004 HC RX 250 GENERAL PHARMACY W/ HCPCS (ALT 636 FOR OP/ED): Performed by: STUDENT IN AN ORGANIZED HEALTH CARE EDUCATION/TRAINING PROGRAM

## 2024-06-01 RX ORDER — SULFAMETHOXAZOLE AND TRIMETHOPRIM 800; 160 MG/1; MG/1
1 TABLET ORAL DAILY
Status: DISCONTINUED | OUTPATIENT
Start: 2024-06-01 | End: 2024-06-02 | Stop reason: HOSPADM

## 2024-06-01 RX ADMIN — APIXABAN 2.5 MG: 2.5 TABLET, FILM COATED ORAL at 20:11

## 2024-06-01 RX ADMIN — SULFAMETHOXAZOLE AND TRIMETHOPRIM 1 TABLET: 800; 160 TABLET ORAL at 11:01

## 2024-06-01 RX ADMIN — PANTOPRAZOLE SODIUM 40 MG: 40 TABLET, DELAYED RELEASE ORAL at 06:16

## 2024-06-01 RX ADMIN — INSULIN LISPRO 3 UNITS: 100 INJECTION, SOLUTION INTRAVENOUS; SUBCUTANEOUS at 12:08

## 2024-06-01 RX ADMIN — OXYCODONE HYDROCHLORIDE 5 MG: 5 TABLET ORAL at 09:22

## 2024-06-01 RX ADMIN — LEVOTHYROXINE SODIUM 100 MCG: 100 TABLET ORAL at 06:16

## 2024-06-01 RX ADMIN — AMLODIPINE BESYLATE 2.5 MG: 5 TABLET ORAL at 09:19

## 2024-06-01 RX ADMIN — DOCUSATE SODIUM 100 MG: 100 CAPSULE, LIQUID FILLED ORAL at 09:19

## 2024-06-01 RX ADMIN — TRAMADOL HYDROCHLORIDE 50 MG: 50 TABLET, COATED ORAL at 21:24

## 2024-06-01 RX ADMIN — BUDESONIDE INHALATION 0.5 MG: 0.5 SUSPENSION RESPIRATORY (INHALATION) at 08:37

## 2024-06-01 RX ADMIN — BUDESONIDE INHALATION 0.5 MG: 0.5 SUSPENSION RESPIRATORY (INHALATION) at 20:31

## 2024-06-01 RX ADMIN — FORMOTEROL FUMARATE DIHYDRATE 20 MCG: 20 SOLUTION RESPIRATORY (INHALATION) at 08:37

## 2024-06-01 RX ADMIN — OXYCODONE HYDROCHLORIDE 5 MG: 5 TABLET ORAL at 03:21

## 2024-06-01 RX ADMIN — APIXABAN 2.5 MG: 2.5 TABLET, FILM COATED ORAL at 09:19

## 2024-06-01 RX ADMIN — LISINOPRIL 20 MG: 20 TABLET ORAL at 09:18

## 2024-06-01 RX ADMIN — LATANOPROST 1 DROP: 50 SOLUTION/ DROPS OPHTHALMIC at 20:11

## 2024-06-01 RX ADMIN — ACETAMINOPHEN 650 MG: 325 TABLET ORAL at 12:08

## 2024-06-01 RX ADMIN — HYDROXYCHLOROQUINE SULFATE 200 MG: 200 TABLET, FILM COATED ORAL at 09:19

## 2024-06-01 RX ADMIN — POLYETHYLENE GLYCOL 3350 17 G: 17 POWDER, FOR SOLUTION ORAL at 09:18

## 2024-06-01 RX ADMIN — FORMOTEROL FUMARATE DIHYDRATE 20 MCG: 20 SOLUTION RESPIRATORY (INHALATION) at 20:31

## 2024-06-01 RX ADMIN — ACETAMINOPHEN 650 MG: 325 TABLET ORAL at 03:21

## 2024-06-01 RX ADMIN — ACYCLOVIR 400 MG: 400 TABLET ORAL at 09:19

## 2024-06-01 RX ADMIN — ACETAMINOPHEN 650 MG: 325 TABLET ORAL at 18:09

## 2024-06-01 RX ADMIN — PILOCARPINE HYDROCHLORIDE 5 MG: 5 TABLET, FILM COATED ORAL at 20:11

## 2024-06-01 RX ADMIN — BISACODYL 10 MG: 5 TABLET, COATED ORAL at 21:38

## 2024-06-01 RX ADMIN — ACETAMINOPHEN 650 MG: 325 TABLET ORAL at 09:18

## 2024-06-01 RX ADMIN — DOCUSATE SODIUM 100 MG: 100 CAPSULE, LIQUID FILLED ORAL at 20:11

## 2024-06-01 RX ADMIN — PROPRANOLOL HYDROCHLORIDE 10 MG: 10 TABLET ORAL at 09:19

## 2024-06-01 ASSESSMENT — COGNITIVE AND FUNCTIONAL STATUS - GENERAL
PERSONAL GROOMING: A LITTLE
STANDING UP FROM CHAIR USING ARMS: A LOT
DRESSING REGULAR UPPER BODY CLOTHING: A LITTLE
CLIMB 3 TO 5 STEPS WITH RAILING: TOTAL
MOVING FROM LYING ON BACK TO SITTING ON SIDE OF FLAT BED WITH BEDRAILS: A LOT
MOVING TO AND FROM BED TO CHAIR: A LOT
MOVING TO AND FROM BED TO CHAIR: A LOT
MOVING FROM LYING ON BACK TO SITTING ON SIDE OF FLAT BED WITH BEDRAILS: A LOT
TURNING FROM BACK TO SIDE WHILE IN FLAT BAD: A LOT
WALKING IN HOSPITAL ROOM: A LITTLE
MOBILITY SCORE: 12
MOBILITY SCORE: 12
WALKING IN HOSPITAL ROOM: A LITTLE
TOILETING: A LOT
STANDING UP FROM CHAIR USING ARMS: A LOT
DRESSING REGULAR LOWER BODY CLOTHING: A LOT
CLIMB 3 TO 5 STEPS WITH RAILING: TOTAL
HELP NEEDED FOR BATHING: A LOT
TURNING FROM BACK TO SIDE WHILE IN FLAT BAD: A LOT
DAILY ACTIVITIY SCORE: 16

## 2024-06-01 ASSESSMENT — PAIN - FUNCTIONAL ASSESSMENT
PAIN_FUNCTIONAL_ASSESSMENT: 0-10

## 2024-06-01 ASSESSMENT — PAIN SCALES - GENERAL
PAINLEVEL_OUTOF10: 6
PAINLEVEL_OUTOF10: 7
PAINLEVEL_OUTOF10: 3
PAINLEVEL_OUTOF10: 6

## 2024-06-01 ASSESSMENT — ACTIVITIES OF DAILY LIVING (ADL)
BATHING_ASSISTANCE: MAXIMAL
ADL_ASSISTANCE: INDEPENDENT

## 2024-06-01 NOTE — PROGRESS NOTES
Occupational Therapy    Evaluation    Patient Name: Louisa Rodas  MRN: [id number]  Today's Date: 6/1/2024  Time Calculation  Start Time: 1150  Stop Time: 1202  Time Calculation (min): 12 min        Assessment:  OT Assessment: Pt 82 yo F s/p R TKA by Dr. Pathak 5/28. Pt currently at MOD A for functional tasks. Pt at risk for further decline d/t decreased balance, decreased activity tolerance, overall fatigue, increased pain, and decreased low level reach needed for LB ADLs. Pt to benefit from add't skilled OT services to maximize indep/safety needed for occupational participation.  Prognosis: Good  Evaluation/Treatment Tolerance: Patient limited by pain, Patient limited by fatigue  Medical Staff Made Aware: Yes  End of Session Communication: PCT/NA/CTA  End of Session Patient Position: Up in chair, Alarm on  OT Assessment Results: Decreased ADL status, Decreased endurance, Decreased functional mobility, Decreased IADLs  Prognosis: Good  Evaluation/Treatment Tolerance: Patient limited by pain, Patient limited by fatigue  Medical Staff Made Aware: Yes  Plan:  Treatment Interventions: ADL retraining, Functional transfer training, UE strengthening/ROM, Endurance training, Patient/family training, Equipment evaluation/education  OT Frequency: 3 times per week  OT Discharge Recommendations: Moderate intensity level of continued care  OT Recommended Transfer Status: Assist of 1 (with fww)  OT - OK to Discharge: Yes (per OT POC)  Treatment Interventions: ADL retraining, Functional transfer training, UE strengthening/ROM, Endurance training, Patient/family training, Equipment evaluation/education    Subjective   Current Problem:  1. Arthritis of knee  acetaminophen (Tylenol Extra Strength) 500 mg tablet    apixaban (Eliquis) 2.5 mg tablet    docusate sodium (Colace) 100 mg capsule    polyethylene glycol (Glycolax, Miralax) 17 gram/dose powder    pantoprazole (ProtoNix) 40 mg EC tablet    DISCONTINUED: polyethylene glycol  (Glycolax, Miralax) 17 gram/dose powder    DISCONTINUED: aspirin 81 mg EC tablet    DISCONTINUED: meloxicam (Mobic) 15 mg tablet    DISCONTINUED: pantoprazole (ProtoNix) 40 mg EC tablet    DISCONTINUED: docusate sodium (Colace) 100 mg capsule    DISCONTINUED: traMADol (Ultram) 50 mg tablet    DISCONTINUED: oxyCODONE (Roxicodone) 5 mg immediate release tablet    DISCONTINUED: acetaminophen (Tylenol Extra Strength) 500 mg tablet    DISCONTINUED: apixaban (Eliquis) 2.5 mg tablet      2. Unilateral primary osteoarthritis, right knee        3. S/P TKR (total knee replacement) using cement, right  Referral to Home Health    Referral to Physical Therapy    oxyCODONE (Roxicodone) 5 mg immediate release tablet      4. S/P total knee arthroplasty, right  ondansetron (Zofran) 4 mg tablet      5. Total knee replacement status, right  Referral to Physical Therapy    Referral to Occupational Therapy        General:  General  Reason for Referral: Pt 82 yo F s/p R TKA by Dr. Bhagat 5/28  Referred By: Joselyn REEDER  Past Medical History Relevant to Rehab: PMG: asthma, CKD, DM, breast CA s/p lumpectomy, fibromyalgia, essential tremor, HLD, hypothyroidism, MENDY on CPAP, nephrophaty, Sjogrens  Family/Caregiver Present: Yes  Prior to Session Communication: Bedside nurse, PCT/NA/CTA  Patient Position Received: Bed, 2 rail up, Alarm on  General Comment: semi supine in bed on arrival, agreeable to therapy with encouragement. reporting increased pain levels this AM.  Precautions:  LE Weight Bearing Status: Weight Bearing as Tolerated  Medical Precautions: Fall precautions  Post-Surgical Precautions: Left total knee precautions     Pain:  Pain Assessment  Pain Assessment: 0-10  Pain Score: 6  Pain Type: Acute pain, Surgical pain  Pain Location: Knee  Pain Orientation: Left  Clinical Progression: Gradually improving  Patient's Stated Pain Goal: No pain  Pain Interventions: Cold applied, Repositioned, Ambulation/increased activity  Response  to Interventions: improvement with repositioning noted, provided ice at end of session for comfort prn    Objective   Cognition:  Overall Cognitive Status: Within Functional Limits  Orientation Level: Oriented X4    Home Living:  Type of Home: House  Lives With: Spouse  Home Adaptive Equipment: Cane, Walker rolling or standard  Home Layout: Two level  Home Access: Stairs to enter with rails  Entrance Stairs-Rails: Both  Entrance Stairs-Number of Steps: 4  Home Living Comments: Initial plan post op was d/c home to dtr's house. no entry steps, first floor setup.  Prior Function:  Level of [place]: Independent with ADLs and functional transfers, Independent with homemaking with ambulation  Receives Help From: Family  ADL Assistance: Independent  Homemaking Assistance: Independent  Ambulatory Assistance: Independent (cane in community)     ADL:  Eating Assistance: Independent  Grooming Assistance: Stand by  Bathing Assistance: Maximal  UE Dressing Assistance: Stand by  LE Dressing Assistance: Maximal  Toileting Assistance with Device: Moderate  Functional Assistance: Moderate  ADL Comments: pt with significant pain this date limiting decreased low level reach needed for LB ADLs, decreased balance with standing tasks, and overall decreased tolerance to activity  Activity Tolerance:  Endurance: Tolerates 10 - 20 min exercise with multiple rests  Bed Mobility/Transfers: Bed Mobility  Bed Mobility: Yes  Bed Mobility 1  Bed Mobility 1: Supine to sitting  Level of Assistance 1: Moderate assistance  Bed Mobility Comments 1: slight assist for LLE mgmt to EOB d/t pain levels, then pt pulling up on therapist for boost at trunk into sitting position    Transfers  Transfer: Yes  Transfer 1  Transfer From 1: Bed to  Transfer to 1: Stand  Technique 1: Sit to stand, Stand to sit  Transfer Device 1: Walker  Transfer Level of Assistance 1: Moderate assistance  Trials/Comments 1: from EOB. heavy pull from fww with unilateral reach.  edu on safety with fww use to prevent fall risk.    Functional Mobility:  Functional Mobility  Functional Mobility Performed: Yes  Functional Mobility 1  Surface 1: Level tile  Device 1: Rolling walker  Assistance 1: Contact guard  Comments 1: short distance within room. good WBing thru LLE, good use of UEs on walker prn for support. edu'd on use of fww to offload for pain relief to L knee. unable to complete add't distance this date d/t pain.  Sitting Balance:  Static Sitting Balance  Static Sitting-Balance Support: Feet supported  Static Sitting-Level of Assistance: Close supervision  Standing Balance:  Static Standing Balance  Static Standing-Balance Support: Bilateral upper extremity supported  Static Standing-Level of Assistance: Contact guard      Coordination:  Movements are Fluid and Coordinated: Yes   Hand Function:  Gross Grasp: Functional  Extremities: RUE   RUE : Within Functional Limits and LUE   LUE: Within Functional Limits    Outcome Measures:[facility] Daily Activity  Putting on and taking off regular lower body clothing: A lot  Bathing (including washing, rinsing, drying): A lot  Putting on and taking off regular upper body clothing: A little  Toileting, which includes using toilet, bedpan or urinal: A lot  Taking care of personal grooming such as brushing teeth: A little  Eating Meals: None  Daily Activity - Total Score: 16    Education Documentation  Handouts, taught by Kassandra Valdes OT at 6/1/2024  1:20 PM.  Learner: Caregiver, Patient  Readiness: Acceptance  Method: Explanation, Demonstration  Response: Verbalizes Understanding, Needs Reinforcement    Body Mechanics, taught by Kassandra Valdes OT at 6/1/2024  1:20 PM.  Learner: Caregiver, Patient  Readiness: Acceptance  Method: Explanation, Demonstration  Response: Verbalizes Understanding, Needs Reinforcement    Precautions, taught by Kassandra Valdes OT at 6/1/2024  1:20 PM.  Learner: Caregiver, Patient  Readiness: Acceptance  Method: Explanation,  Demonstration  Response: Verbalizes Understanding, Needs Reinforcement    ADL Training, taught by Kassandra Valdes OT at 6/1/2024  1:20 PM.  Learner: Caregiver, Patient  Readiness: Acceptance  Method: Explanation, Demonstration  Response: Verbalizes Understanding, Needs Reinforcement    Education Comments  No comments found.      Goals:  Encounter Problems       Encounter Problems (Active)       ADLs       Patient with complete upper body dressing with modified independent level of assistance donning and doffing all UE clothes with PRN adaptive equipment  (Progressing)       Start:  06/01/24    Expected End:  06/15/24            Patient with complete lower body dressing with modified independent level of assistance donning and doffing all LE clothes  with PRN adaptive equipment  (Progressing)       Start:  06/01/24    Expected End:  06/15/24            Patient will complete daily grooming tasks with modified independent level of assistance and PRN adaptive equipment. (Progressing)       Start:  06/01/24    Expected End:  06/15/24            Patient will complete toileting including hygiene clothing management/hygiene with modified independent level of assistance and PRN adaptive equipment. (Progressing)       Start:  06/01/24    Expected End:  06/15/24               MOBILITY       Patient will perform Functional mobility Household distances/Community Distances with modified independent level of assistance and front wheeled walker in order to improve safety and functional mobility. (Progressing)       Start:  06/01/24    Expected End:  06/15/24

## 2024-06-01 NOTE — CARE PLAN
The patient's goals for the shift include  ambulating    The clinical goals for the shift include safe from falls    Over the shift, the patient did make progress toward the following goals.

## 2024-06-01 NOTE — PROGRESS NOTES
Orthopaedic Surgery Progress Note    S:    Pain tolerable  today. Tolerating diet. On 2L NC. Endorsing neuropathic pain distally     Denies F/C, CP, SOB, N/V      O:  Vitals:    06/01/24 0312 06/01/24 0456 06/01/24 0700 06/01/24 0837   BP:  131/79 160/71    BP Location:  Right arm Right arm    Patient Position:  Lying Lying    Pulse:  86 80    Resp: 16 16 16    Temp:  36.1 °C (97 °F) 36.8 °C (98.2 °F)    TempSrc:  Temporal Oral    SpO2: 95% 94% 95% 93%   Weight:       Height:                 PE:  Constitutional: A&Ox3, calm and cooperative, NAD  Eyes: EOMI, clear sclera   Cardiovascular: Normal rate and regular rhythm. No murmurs  Respiratory/Thorax: CTAB, on RA  Genitourinary: Voiding independently   Musculoskeletal: mepilex CDI w/o surrounding erythema or drainage. fires EHL/FHL/TA/TP/EDL/FDL. SILT in SP/DP/T distribution. 2+ DP/PT, <2 CRT. Compartments soft, compressible.  Extremities: TEDs and SCDs in place  Neurological: A&Ox3, No focal deficits   Psychological: Appropriate mood and behavior    Lab Results   Component Value Date    WBC 8.4 05/30/2024    HGB 11.8 (L) 05/30/2024    HCT 35.3 (L) 05/30/2024    MCV 93 05/30/2024     (L) 05/30/2024     Lab Results   Component Value Date    GLUCOSE 150 (H) 05/30/2024    CALCIUM 9.3 05/30/2024     05/30/2024    K 3.8 05/30/2024    CO2 26 05/30/2024     05/30/2024    BUN 14 05/30/2024    CREATININE 1.09 (H) 05/30/2024         Estimated Creatinine Clearance: 39.2 mL/min (A) (by C-G formula based on SCr of 1.09 mg/dL (H)).  C-Reactive Protein   Date/Time Value Ref Range Status   12/28/2023 03:29 PM 0.15 <1.00 mg/dL Final         A/P: 83F PMH CKD3, DM2, PONV, asthma, HLD, breast Ca, HTN, hypothyroid, MENDY on CPAP, Sjogrens S/P R TKA with Dr. Bhagat on 5/28.     -regular diet  -Bowel regimen of colace, senna, and dulcolax  -Tylenol, oxy 5/10, dilaudid PRN, tramadol PRN, IV toradol 15mg q6hr x 4 doses for pain management  -Continue LR@100; HLIV with  good PO intake  -PT/OT consult; WB status: WBAT; Maintain heel on pillow with knee straight when laying flat.   -Encourage IS, continue home inhaler, Albuterol duonebs PRN, home CPAP  -Perioperative abx - ancef 24 hours  -DVT ppx: eliquis 2.5 mg bid, SCDs + TEDs   -drain removed 5/29  -Continue home medications - CPAP and inhalers, Plaquenil, acyclovir, amlodipine, statin, synthroid, lisinopril (POD2), montelukast, pilocarpine, propranolol  -Glycemic: SSI for DM  - wean O2 as able    Dispo: Anticipate discharge today to SNF when accepted    Plan was discussed with Dr. Young    I spent 35 minutes in the professional and overall care of this patient.     Chanda Alves PA-C  Department of Surgical Services  [facility]

## 2024-06-01 NOTE — PROGRESS NOTES
Physical Therapy    Physical Therapy Treatment    Patient Name: Louisa Rodas  MRN: [id number]  Today's Date: 6/1/2024  Time Calculation  Start Time: 1400  Stop Time: 1430  Time Calculation (min): 30 min    Assessment/Plan   PT Assessment  End of Session Communication: Bedside nurse  Assessment Comment: Pt tolerates treatment well provided increased time to complete transfers and rest breaks to manage pain level.  End of Session Patient Position: Up in chair, Alarm on  PT Plan  Inpatient/Swing Bed or Outpatient: Inpatient  PT Plan  Treatment/Interventions: Bed mobility, Transfer training, Gait training, Balance training, Strengthening, Range of motion, Therapeutic exercise, Therapeutic activity, Home exercise program, Positioning  PT Plan: Skilled PT  PT Frequency: BID  PT Discharge Recommendations: Moderate intensity level of continued care  PT Recommended Transfer Status: Assist x1  PT - OK to Discharge: Yes (Per PT POC)      General Visit Information:   PT  Visit  PT Received On: 06/01/24  Response to Previous Treatment: Patient with no complaints from previous session.  General  Reason for Referral: Pt 82 yo F s/p R TKA by Dr. Bhagat 5/28  Referred By: Joselyn REEDER  Past Medical History Relevant to Rehab: PMG: asthma, CKD, DM, breast CA s/p lumpectomy, fibromyalgia, essential tremor, HLD, hypothyroidism, MENDY on CPAP, nephrophaty, Sjogrens  Family/Caregiver Present: Yes  Prior to Session Communication: Bedside nurse  Patient Position Received: Up in chair, Alarm on  General Comment: Pt up in chair upon arrival and agreeable to therapy.    Subjective   Precautions:  Precautions  LE Weight Bearing Status: Weight Bearing as Tolerated  Medical Precautions: Fall precautions  Post-Surgical Precautions: Right total knee precautions  Precautions Comment: Review of total knee precautions with patient able to verbalize knowledge.  Vital Signs:       Objective   Pain:  Pain Assessment  Pain Assessment: 0-10 (Did not rate but  reported that pain level wasn't high. some grimacing noted during exercises with RLE)  Pain Score: 6  Pain Type: Acute pain  Pain Location: Knee  Pain Orientation: Left  Cognition:  Cognition  Orientation Level: Oriented X4    Treatments:  Therapeutic Exercise  Therapeutic Exercise Performed: Yes  Therapeutic Exercise Activity 1: Pt instructed in Seated knee flexion, QS, HS, LAQ x15 each. ROM -8-74 (Limited exercises this session in effort to conserve energy for gait training.)         Ambulation/Gait Training  Ambulation/Gait Training Performed: Yes  Ambulation/Gait Training 1  Surface 1: Level tile  Device 1: Rolling walker  Gait Support Devices: Gait belt  Assistance 1: Contact guard  Quality of Gait 1: Antalgic, Diminished heel strike  Comments/Distance (ft) 1: ~90ft  Transfers  Transfer: Yes  Transfer 1  Transfer From 1: Chair with arms to  Transfer to 1: Stand  Technique 1: Sit to stand, Stand to sit  Transfer Device 1: Walker  Transfer Level of Assistance 1: Moderate assistance  Trials/Comments 1: Toilet transfer also completed. Max A from toilet to handrail. Pt instructed to pivot to allow for both hands on handrails. VC's to kick out RLE to manage pain.    Outcome Measures:  [facility] Basic Mobility  Turning from your back to your side while in a flat bed without using bedrails: A lot  Moving from lying on your back to sitting on the side of a flat bed without using bedrails: A lot  Moving to and from bed to chair (including a wheelchair): A lot  Standing up from a chair using your arms (e.g. wheelchair or bedside chair): A lot  To walk in hospital room: A little  Climbing 3-5 steps with railing: Total  Basic Mobility - Total Score: 12    Education Documentation  Handouts, taught by Carolyn Ricci PTA at 6/1/2024  2:51 PM.  Learner: Patient  Readiness: Acceptance  Method: Explanation  Response: Verbalizes Understanding    Body Mechanics, taught by Carolyn Ricci PTA at 6/1/2024  2:51 PM.  Learner:  Patient  Readiness: Acceptance  Method: Explanation  Response: Verbalizes Understanding    Precautions, taught by Carolyn Ricci PTA at 6/1/2024  2:51 PM.  Learner: Patient  Readiness: Acceptance  Method: Explanation  Response: Verbalizes Understanding    ADL Training, taught by Carolyn Ricci PTA at 6/1/2024  2:51 PM.  Learner: Patient  Readiness: Acceptance  Method: Explanation  Response: Verbalizes Understanding    Handouts, taught by Carolyn Ricci PTA at 6/1/2024  2:51 PM.  Learner: Patient  Readiness: Acceptance  Method: Explanation  Response: Verbalizes Understanding    Precautions, taught by Carolyn Ricci PTA at 6/1/2024  2:51 PM.  Learner: Patient  Readiness: Acceptance  Method: Explanation  Response: Verbalizes Understanding    Body Mechanics, taught by Carolyn Ricci PTA at 6/1/2024  2:51 PM.  Learner: Patient  Readiness: Acceptance  Method: Explanation  Response: Verbalizes Understanding    Home Exercise Program, taught by Carolyn Ricci PTA at 6/1/2024  2:51 PM.  Learner: Patient  Readiness: Acceptance  Method: Explanation  Response: Verbalizes Understanding    Mobility Training, taught by Carolyn Ricci PTA at 6/1/2024  2:51 PM.  Learner: Patient  Readiness: Acceptance  Method: Explanation  Response: Verbalizes Understanding    Education Comments  No comments found.        OP EDUCATION:       Encounter Problems       Encounter Problems (Active)       Mobility       STG - Patient will ambulate 150 ft mod I with a RW.  (Progressing)       Start:  05/28/24    Expected End:  06/11/24            STG - Patient will ascend and descend four stairs using екатерина Hrs mod I.  (Not Progressing)       Start:  05/28/24    Expected End:  06/11/24            Pt will tolerate 15 reps of each LE exercise in order to improve strength and endurance.  (Progressing)       Start:  05/28/24    Expected End:  06/11/24            Pt will achieve 0-90 degrees R knee ROM (Progressing)       Start:  05/28/24     Expected End:  06/11/24               PT Transfers       STG - Patient will perform bed mobility independently (Progressing)       Start:  05/28/24    Expected End:  06/11/24            STG - Patient will transfer sit to and from stand mod I with a RW.  (Progressing)       Start:  05/28/24    Expected End:  06/11/24            STG - Patient will perform car transfer independently.  (Not Progressing)       Start:  05/28/24    Expected End:  06/11/24

## 2024-06-01 NOTE — PROGRESS NOTES
Physical Therapy    Physical Therapy Treatment    Patient Name: Louisa Rodas  MRN: [id number]  Today's Date: 6/1/2024  Time Calculation  Start Time: 0903  Stop Time: 0958  Time Calculation (min): 55 min    Assessment/Plan   PT Assessment  End of Session Communication: Bedside nurse  Assessment Comment: Pt tolerates treatment well provided increased time to complete transfers and rest breaks to manage pain level.  End of Session Patient Position: Bed, 2 rail up, Alarm on  PT Plan  Inpatient/Swing Bed or Outpatient: Inpatient  PT Plan  Treatment/Interventions: Bed mobility, Transfer training, Gait training, Balance training, Strengthening, Range of motion, Therapeutic exercise, Therapeutic activity, Home exercise program, Positioning  PT Plan: Skilled PT  PT Frequency: BID  PT Discharge Recommendations: Moderate intensity level of continued care  PT Recommended Transfer Status: Assist x1  PT - OK to Discharge: Yes (Per PT POC)      General Visit Information:   PT  Visit  PT Received On: 06/01/24  Response to Previous Treatment: Patient with no complaints from previous session.  General  Reason for Referral: R TKA  Referred By: Portillo Arthur MD  Prior to Session Communication: Bedside nurse  Patient Position Received: Bed, 2 rail up, Alarm off, not on at start of session  General Comment: Pt semi-supine in bed upon arrivsl and agreeable to therapy.    Subjective   Precautions:  Precautions  LE Weight Bearing Status: Weight Bearing as Tolerated  Medical Precautions: Fall precautions  Post-Surgical Precautions: Left total knee precautions  Precautions Comment: Review of total knee precautions with patient able to verbalize knowledge.  Vital Signs:       Objective   Pain:  Pain Assessment  Pain Assessment: 0-10  Pain Score: 6  Pain Type: Acute pain  Pain Location: Knee  Pain Orientation: Right  Cognition:  Cognition  Orientation Level: Oriented X4    Treatments:  Therapeutic Exercise  Therapeutic Exercise Performed:  Yes  Therapeutic Exercise Activity 1: Pt instructed in supine exercises AP, AA SAQ, GS,QS, AA HS, AA SLR 10-15 reps each. verbal/tactile cues to improve form and activate desired muscle group.         Bed Mobility  Bed Mobility: Yes  Bed Mobility 1  Bed Mobility 1: Supine to sitting, Sitting to supine  Level of Assistance 1: Minimum assistance, Moderate assistance  Bed Mobility Comments 1: Verbal cues for sequencing, scooting, and hand placment.    Ambulation/Gait Training  Ambulation/Gait Training Performed: Yes  Ambulation/Gait Training 1  Surface 1: Level tile  Device 1: Rolling walker  Gait Support Devices: Gait belt  Assistance 1: Contact guard  Quality of Gait 1: Antalgic, Diminished heel strike  Comments/Distance (ft) 1: 10ft x 2 with decreased chadd.  Transfers  Transfer: Yes  Transfer 1  Transfer From 1: Bed to, Stand to  Transfer to 1: Stand, Bed  Technique 1: Sit to stand, Stand to sit  Transfer Device 1: Walker  Transfer Level of Assistance 1: Moderate assistance, Contact guard  Trials/Comments 1: Toilet transfer also completed. Max A from toilet to handrail. Pt instructed to pivot to allow for both hands on handrails. VC's to kick out RLE to manage pain.    Outcome Measures:  [facility] [address] [facility] Basic Mobility  Turning from your back to your side while in a flat bed without using bedrails: A lot  Moving from lying on your back to sitting on the side of a flat bed without using bedrails: A lot  Moving to and from bed to chair (including a wheelchair): A lot  Standing up from a chair using your arms (e.g. wheelchair or bedside chair): A lot  To walk in hospital room: A little  Climbing 3-5 steps with railing: Total  Basic Mobility - Total Score: 12    Education Documentation  Handouts, taught by Carolyn Ricci PTA at 6/1/2024 10:27 AM.  Learner: Patient  Readiness: Acceptance  Method: Explanation  Response: Verbalizes Understanding    Precautions, taught by Carolyn Ricci PTA at 6/1/2024 10:27 AM.  Learner:  Patient  Readiness: Acceptance  Method: Explanation  Response: Verbalizes Understanding    Body Mechanics, taught by Carolyn Ricci PTA at 6/1/2024 10:27 AM.  Learner: Patient  Readiness: Acceptance  Method: Explanation  Response: Verbalizes Understanding    Home Exercise Program, taught by Carolyn iRcci PTA at 6/1/2024 10:27 AM.  Learner: Patient  Readiness: Acceptance  Method: Explanation  Response: Verbalizes Understanding    Mobility Training, taught by Carolyn Ricci PTA at 6/1/2024 10:27 AM.  Learner: Patient  Readiness: Acceptance  Method: Explanation  Response: Verbalizes Understanding    Education Comments  No comments found.        OP EDUCATION:       Encounter Problems       Encounter Problems (Active)       Mobility       STG - Patient will ambulate 150 ft mod I with a RW.  (Progressing)       Start:  05/28/24    Expected End:  06/11/24            STG - Patient will ascend and descend four stairs using екатерина Hrs mod I.  (Not Progressing)       Start:  05/28/24    Expected End:  06/11/24            Pt will tolerate 15 reps of each LE exercise in order to improve strength and endurance.  (Progressing)       Start:  05/28/24    Expected End:  06/11/24            Pt will achieve 0-90 degrees R knee ROM (Progressing)       Start:  05/28/24    Expected End:  06/11/24               PT Transfers       STG - Patient will perform bed mobility independently (Progressing)       Start:  05/28/24    Expected End:  06/11/24            STG - Patient will transfer sit to and from stand mod I with a RW.  (Progressing)       Start:  05/28/24    Expected End:  06/11/24            STG - Patient will perform car transfer independently.  (Not Progressing)       Start:  05/28/24    Expected End:  06/11/24

## 2024-06-02 VITALS
RESPIRATION RATE: 18 BRPM | HEART RATE: 82 BPM | DIASTOLIC BLOOD PRESSURE: 74 MMHG | WEIGHT: 171.96 LBS | SYSTOLIC BLOOD PRESSURE: 132 MMHG | HEIGHT: 62 IN | TEMPERATURE: 98.3 F | BODY MASS INDEX: 31.64 KG/M2 | OXYGEN SATURATION: 93 %

## 2024-06-02 LAB
GLUCOSE BLD MANUAL STRIP-MCNC: 124 MG/DL (ref 74–99)
GLUCOSE BLD MANUAL STRIP-MCNC: 167 MG/DL (ref 74–99)

## 2024-06-02 PROCEDURE — 2500000002 HC RX 250 W HCPCS SELF ADMINISTERED DRUGS (ALT 637 FOR MEDICARE OP, ALT 636 FOR OP/ED): Mod: MUE | Performed by: STUDENT IN AN ORGANIZED HEALTH CARE EDUCATION/TRAINING PROGRAM

## 2024-06-02 PROCEDURE — 94660 CPAP INITIATION&MGMT: CPT

## 2024-06-02 PROCEDURE — 97110 THERAPEUTIC EXERCISES: CPT | Mod: GP,CQ

## 2024-06-02 PROCEDURE — 2500000001 HC RX 250 WO HCPCS SELF ADMINISTERED DRUGS (ALT 637 FOR MEDICARE OP): Performed by: STUDENT IN AN ORGANIZED HEALTH CARE EDUCATION/TRAINING PROGRAM

## 2024-06-02 PROCEDURE — 82947 ASSAY GLUCOSE BLOOD QUANT: CPT

## 2024-06-02 PROCEDURE — 97116 GAIT TRAINING THERAPY: CPT | Mod: GP,CQ

## 2024-06-02 PROCEDURE — 94640 AIRWAY INHALATION TREATMENT: CPT

## 2024-06-02 PROCEDURE — 94664 DEMO&/EVAL PT USE INHALER: CPT

## 2024-06-02 PROCEDURE — 2500000002 HC RX 250 W HCPCS SELF ADMINISTERED DRUGS (ALT 637 FOR MEDICARE OP, ALT 636 FOR OP/ED): Performed by: STUDENT IN AN ORGANIZED HEALTH CARE EDUCATION/TRAINING PROGRAM

## 2024-06-02 PROCEDURE — 2500000004 HC RX 250 GENERAL PHARMACY W/ HCPCS (ALT 636 FOR OP/ED): Performed by: STUDENT IN AN ORGANIZED HEALTH CARE EDUCATION/TRAINING PROGRAM

## 2024-06-02 RX ADMIN — AMLODIPINE BESYLATE 2.5 MG: 5 TABLET ORAL at 08:08

## 2024-06-02 RX ADMIN — ACYCLOVIR 400 MG: 400 TABLET ORAL at 08:08

## 2024-06-02 RX ADMIN — SULFAMETHOXAZOLE AND TRIMETHOPRIM 1 TABLET: 800; 160 TABLET ORAL at 08:07

## 2024-06-02 RX ADMIN — PANTOPRAZOLE SODIUM 40 MG: 40 TABLET, DELAYED RELEASE ORAL at 06:36

## 2024-06-02 RX ADMIN — BUDESONIDE INHALATION 0.5 MG: 0.5 SUSPENSION RESPIRATORY (INHALATION) at 07:46

## 2024-06-02 RX ADMIN — APIXABAN 2.5 MG: 2.5 TABLET, FILM COATED ORAL at 08:08

## 2024-06-02 RX ADMIN — LISINOPRIL 20 MG: 20 TABLET ORAL at 08:07

## 2024-06-02 RX ADMIN — ACETAMINOPHEN 650 MG: 325 TABLET ORAL at 06:36

## 2024-06-02 RX ADMIN — FORMOTEROL FUMARATE DIHYDRATE 20 MCG: 20 SOLUTION RESPIRATORY (INHALATION) at 07:46

## 2024-06-02 RX ADMIN — HYDROXYCHLOROQUINE SULFATE 200 MG: 200 TABLET, FILM COATED ORAL at 08:08

## 2024-06-02 RX ADMIN — DOCUSATE SODIUM 100 MG: 100 CAPSULE, LIQUID FILLED ORAL at 08:08

## 2024-06-02 RX ADMIN — TRAMADOL HYDROCHLORIDE 50 MG: 50 TABLET, COATED ORAL at 09:34

## 2024-06-02 RX ADMIN — PROPRANOLOL HYDROCHLORIDE 10 MG: 10 TABLET ORAL at 08:07

## 2024-06-02 RX ADMIN — POLYETHYLENE GLYCOL 3350 17 G: 17 POWDER, FOR SOLUTION ORAL at 08:07

## 2024-06-02 RX ADMIN — LEVOTHYROXINE SODIUM 100 MCG: 100 TABLET ORAL at 06:36

## 2024-06-02 ASSESSMENT — COGNITIVE AND FUNCTIONAL STATUS - GENERAL
MOBILITY SCORE: 15
STANDING UP FROM CHAIR USING ARMS: A LOT
MOVING TO AND FROM BED TO CHAIR: A LITTLE
WALKING IN HOSPITAL ROOM: A LITTLE
CLIMB 3 TO 5 STEPS WITH RAILING: TOTAL
TURNING FROM BACK TO SIDE WHILE IN FLAT BAD: A LITTLE
MOVING FROM LYING ON BACK TO SITTING ON SIDE OF FLAT BED WITH BEDRAILS: A LITTLE

## 2024-06-02 ASSESSMENT — PAIN SCALES - GENERAL
PAINLEVEL_OUTOF10: 8
PAINLEVEL_OUTOF10: 8

## 2024-06-02 ASSESSMENT — PAIN SCALES - WONG BAKER: WONGBAKER_NUMERICALRESPONSE: HURTS LITTLE BIT

## 2024-06-02 ASSESSMENT — PAIN - FUNCTIONAL ASSESSMENT: PAIN_FUNCTIONAL_ASSESSMENT: 0-10

## 2024-06-02 NOTE — DISCHARGE SUMMARY
MD Yolette Wood, MPAS, PADocC, ATC  Adult Reconstruction and Joint Replacement Surgery  Phone: [phone]     Fax:[phone]                      Discharge Summary    Discharge Diagnosis  Right Total Knee Arthroplasty    Issues Requiring Follow-Up  Home care services to start within 48 hours. Outpatient PT to start 2 weeks  S/P total Joint for Knees only.    Test Results Pending At Discharge  Pending Labs       No current pending labs.            Hospital Course  Patient underwent Right Total Knee Arthroplasty on 5/28/24 without complications. The patient was then taken to the PACU in stable condition. Patient was then transferred to the or.  Pain was appropriately controlled. Diet was advanced as tolerated. Patient progressed adequately through their recovery during hospital stay including PT/ OT and were recommended for discharge. Patient was then discharged on 6/2/2024 to home in stable condition. Patient had uneventful hospital course. Patient was instructed on the use of pain medications as needed for pain. The signs and symptoms of infection were discussed and the patient was given our number to call should they have any questions or concerns following discharge.    Based on my clinical judgment, the patient was provided with a 7-day prescription for opioid medication at 30 MED, indicated for treatment of acute pain in the setting of recent Total Joint Arthroplasty. OARRS report was run and has demonstrated an appropriate time course.  The patient has been provided with counseling pertaining to safe use of opioid medication.    Patient may use operative extremity WBAT with use of walker for assistance with ambulation .  Mepilex dressing to be removed POD # 7 by home care and incision left open to air  OAC for DVT prophylaxis started on POD #1 and to be taken for 30 days    Patient is to follow-up in 6 weeks at scheduled post-op visit.     Face-to Face  Pertinent  Physical Exam At Time of Discharge  Physical Exam  Vitals and nursing note reviewed. VSS, Afebrile  Constitutional:       Appearance: Normal appearance, awake and alert.  HENT:      Head: Normocephalic and atraumatic.       Pupils: Pupils are equal, round, and reactive to light.   Cardiovascular:      Rate and Rhythm: Normal rate and regular rhythm.   Pulmonary:      Effort: Pulmonary effort is normal.     Abdominal:         Palpations: Abdomen is soft.   Musculoskeletal:   Sensation intact bilaterally, sural/saph/sp/tibal n.  Motor intact flexion/extension/DF/PF/EHL/FHL bilaterally. Palpable symmetric DP/PT pulse bilaterally.    Skin:      Bulky Dressing intact to the surgical extremity. No signs of gross bloody or purulent drainage.     General: Skin is warm and dry.      Capillary Refill: Capillary refill takes less than 2 seconds.   Neurological:      General: No focal deficit present.      Mental Status: She is alert and oriented to person, place, and time. Mental status is at baseline.   Psychiatric:         Mood and Affect: Mood normal.        Home Medications  Scheduled medications    Current Facility-Administered Medications:     acetaminophen (Tylenol) tablet 650 mg, 650 mg, oral, q6h, Chanda E Sephel, PA-C, 650 mg at 06/02/24 0636    acyclovir (Zovirax) tablet 400 mg, 400 mg, oral, Daily, Chanda E Sephel, PA-C, 400 mg at 06/02/24 0808    albuterol 2.5 mg /3 mL (0.083 %) nebulizer solution 2.5 mg, 2.5 mg, nebulization, q6h PRN, Chanda E Ludwigel, PA-C    amLODIPine (Norvasc) tablet 2.5 mg, 2.5 mg, oral, Daily, Chanda E Sephel, PA-C, 2.5 mg at 06/02/24 0808    apixaban (Eliquis) tablet 2.5 mg, 2.5 mg, oral, q12h, Chanda E Sephel, PA-C, 2.5 mg at 06/02/24 0808    atorvastatin (Lipitor) tablet 20 mg, 20 mg, oral, Once per day on Monday Wednesday Friday, Chanda E Sephel, PA-C, 20 mg at 05/31/24 0829    benzocaine-menthol (Cepastat Sore Throat) lozenge 1 lozenge, 1 lozenge, Mouth/Throat, q4h PRN, Chanda Alves PA-C     bisacodyl (Dulcolax) EC tablet 10 mg, 10 mg, oral, Daily PRN, Chanda E Sephel, PA-C, 10 mg at 06/01/24 2138    bisacodyl (Dulcolax) suppository 10 mg, 10 mg, rectal, Daily PRN, Chanda E Sephel, PA-C    budesonide (Pulmicort) 0.5 mg/2 mL nebulizer solution 0.5 mg, 0.5 mg, nebulization, BID, Chanda E Sephel, PA-C, 0.5 mg at 06/02/24 0746    dextrose 50 % injection 12.5 g, 12.5 g, intravenous, q15 min PRN, Chanda E Sephel, PA-C    dextrose 50 % injection 25 g, 25 g, intravenous, q15 min PRN, Chanda E Sephel, PA-C    diphenhydrAMINE (BENADryl) injection 12.5 mg, 12.5 mg, intravenous, q6h PRN, Chanda E Sephel, PA-C    docusate sodium (Colace) capsule 100 mg, 100 mg, oral, BID, Chanad E Sephel, PA-C, 100 mg at 06/02/24 0808    formoterol (Perforomist) 20 mcg/2 mL nebulizer solution 20 mcg, 20 mcg, nebulization, BID, Chanad E Sephel, PA-C, 20 mcg at 06/02/24 0746    glucagon (Glucagen) injection 1 mg, 1 mg, intramuscular, q15 min PRN, Chanda E Sephel, PA-C    glucagon (Glucagen) injection 1 mg, 1 mg, intramuscular, q15 min PRN, Chanda E Sephel, PA-C    hydroxychloroquine (Plaquenil) tablet 200 mg, 200 mg, oral, Daily, Chanda E Sephel, PA-C, 200 mg at 06/02/24 0808    insulin lispro (HumaLOG) injection 0-15 Units, 0-15 Units, subcutaneous, TID, Chanda E Sephel, PA-C, 3 Units at 06/01/24 1208    latanoprost (Xalatan) 0.005 % ophthalmic solution 1 drop, 1 drop, Both Eyes, Nightly, Alton Jade, PharmD, 1 drop at 06/01/24 2011    levothyroxine (Synthroid, Levoxyl) tablet 100 mcg, 100 mcg, oral, Once per day on Sunday Tuesday Wednesday Thursday Saturday, Chanda Alves PA-C, 100 mcg at 06/02/24 0636    levothyroxine (Synthroid, Levoxyl) tablet 112 mcg, 112 mcg, oral, Once per day on Monday Friday, Chanda Alves PA-C, 112 mcg at 05/31/24 0625    lisinopril tablet 20 mg, 20 mg, oral, Daily, Chanda Alves PA-C, 20 mg at 06/02/24 0807    metoclopramide (Reglan) tablet 10 mg, 10 mg, oral, q6h PRN **OR** metoclopramide (Reglan) injection  10 mg, 10 mg, intravenous, q6h PRN, Chanda Alves, PA-C    naloxone (Narcan) injection 0.2 mg, 0.2 mg, intravenous, q5 min PRN, Chanda E Ludwigel, PA-C    ondansetron (Zofran) tablet 4 mg, 4 mg, oral, q8h PRN **OR** ondansetron (Zofran) injection 4 mg, 4 mg, intravenous, q8h PRN, Chanda E Ludwigel, PA-C, 4 mg at 05/28/24 1533    oxyCODONE (Roxicodone) immediate release tablet 2.5 mg, 2.5 mg, oral, q4h PRN, Chanda E Ludwigel, PA-C, 2.5 mg at 05/31/24 1724    oxyCODONE (Roxicodone) immediate release tablet 5 mg, 5 mg, oral, q6h PRN, Chanda Munroeel, PA-C, 5 mg at 06/01/24 0922    oxygen (O2) therapy, 2 L/min, inhalation, Continuous, Chanda MARTINEZ Sephel, PA-C, Stopped at 06/01/24 0837    pantoprazole (ProtoNix) EC tablet 40 mg, 40 mg, oral, Daily before breakfast, Chanda E Sephel, PA-C, 40 mg at 06/02/24 0636    pilocarpine (Salagen) tablet 5 mg, 5 mg, oral, Nightly, Chanda E Sephel, PA-C, 5 mg at 06/01/24 2011    polyethylene glycol (Glycolax, Miralax) packet 17 g, 17 g, oral, Daily, Chanda E Sephel, PA-C, 17 g at 06/02/24 0807    propranolol (Inderal) tablet 10 mg, 10 mg, oral, Daily, Chanda E Sephel, PA-C, 10 mg at 06/02/24 0807    sulfamethoxazole-trimethoprim (Bactrim DS) 800-160 mg per tablet 1 tablet, 1 tablet, oral, Daily, Az Olivo DO, 1 tablet at 06/02/24 0807    traMADol (Ultram) tablet 50 mg, 50 mg, oral, q8h PRN, Chanda E Ludwigel, PA-C, 50 mg at 06/02/24 0934    Current Outpatient Medications:     acyclovir (Zovirax) 400 mg tablet, Take 1 tablet (400 mg) by mouth once daily., Disp: 30 tablet, Rfl: 3    allantoin gel, Apply topically if needed for wound care. Contains cannibus, Disp: , Rfl:     amLODIPine (Norvasc) 2.5 mg tablet, Take 1 tablet (2.5 mg) by mouth once daily., Disp: 30 tablet, Rfl: 5    atorvastatin (Lipitor) 20 mg tablet, Take 1 tablet (20 mg) by mouth 3 (three) times a week., Disp: , Rfl:     azelastine (Astelin) 137 mcg (0.1 %) nasal spray, Administer 2 sprays into each nostril 2 times a day as needed  for allergies. Use in each nostril as directed, Disp: , Rfl:     b complex 0.4 mg tablet, Take 1 tablet by mouth once daily., Disp: , Rfl:     budesonide-formoteroL (Symbicort) 160-4.5 mcg/actuation inhaler, Inhale 2 puffs once daily. RINSE MOUTH AFTER USE. (Patient taking differently: Inhale 1 puff once daily. RINSE MOUTH AFTER USE.), Disp: 10.2 g, Rfl: 1    butenafine HCl (MENTAX TOP), Apply topically., Disp: , Rfl:     carboxymethylcellulose (Refresh Celluvisc) 1 % ophthalmic solution dropperette, Administer into affected eye(s) 4 times a day., Disp: , Rfl:     cholecalciferol (Vitamin D-3) 50 MCG (2000 UT) tablet, Take 1 tablet (50 mcg) by mouth once daily., Disp: , Rfl:     cyanocobalamin, vitamin B-12, (Vitamin B-12) 2,500 mcg tablet, sublingual SL tablet, Place 1 tablet (2,500 mcg) under the tongue., Disp: , Rfl:     fluocinolone and shower cap 0.01 % oil, Apply to scalp  leave on overnight and wash off in the morning. Use daily, Disp: , Rfl:     hydroxychloroquine (Plaquenil) 200 mg tablet, TAKE 1 TABLET BY MOUTH WITH FOOD OR MILK ONCE A DAY, Disp: 90 tablet, Rfl: 1    ketoconazole (NIZOral) 2 % shampoo, , Disp: , Rfl:     latanoprost, PF, 0.005 % drops, Administer 1 drop into both eyes once daily at bedtime., Disp: 7.5 mL, Rfl: 11    levothyroxine (Synthroid, Levoxyl) 100 mcg tablet, Take 1 tablet (100 mcg) by mouth once daily. Five days per week (takes 112 mcgs 2 days per week) (Patient taking differently: Take 1 tablet (100 mcg) by mouth once daily. 6 days per week), Disp: 90 tablet, Rfl: 0    lisinopril 20 mg tablet, Take 1 tablet (20 mg) by mouth once daily., Disp: 90 tablet, Rfl: 1    medical cannabis, Take 1 each by mouth if needed., Disp: , Rfl:     montelukast (Singulair) 10 mg tablet, Take 1 tablet (10 mg) by mouth once daily., Disp: , Rfl:     OneTouch Verio test strips strip, TEST BLOOD SUGAR ONCE A DAY, Disp: , Rfl:     pilocarpine (Salagen) 5 mg tablet, Take 1 tablet (5 mg) by mouth once daily  at bedtime., Disp: , Rfl:     povidone, PF, (iVizia, PF,) 0.5 % drops, Administer 1 drop into affected eye(s) 3 times a day as needed., Disp: , Rfl:     propranolol (Inderal) 10 mg tablet, Take 1 tablet (10 mg) by mouth once daily., Disp: 90 tablet, Rfl: 0    sulfamethoxazole-trimethoprim (Bactrim DS) 800-160 mg tablet, Take 1 tablet by mouth once daily. Takes daily not bid, Disp: , Rfl:     acetaminophen (Tylenol Extra Strength) 500 mg tablet, Take 2 tablets (1,000 mg) by mouth every 6 hours if needed for mild pain (1 - 3)., Disp: , Rfl:     apixaban (Eliquis) 2.5 mg tablet, Take 1 tablet (2.5 mg) by mouth 2 times a day., Disp: , Rfl:     docusate sodium (Colace) 100 mg capsule, Take 1 capsule (100 mg) by mouth 2 times a day., Disp: , Rfl:     ondansetron (Zofran) 4 mg tablet, Take 1 tablet (4 mg) by mouth every 8 hours if needed for nausea or vomiting., Disp: 20 tablet, Rfl: 0    oxyCODONE (Roxicodone) 5 mg immediate release tablet, Take 1 tablet (5 mg) by mouth every 6 hours if needed for severe pain (7 - 10) for up to 5 days., Disp: 20 tablet, Rfl: 0    pantoprazole (ProtoNix) 40 mg EC tablet, Take 1 tablet (40 mg) by mouth once daily. Do not crush, chew, or split., Disp: , Rfl:     polyethylene glycol (Glycolax, Miralax) 17 gram/dose powder, Mix 1 capful (17 g) in 8 oz of water and drink by mouth once daily, Disp: , Rfl:      PRN medications  PRN medications: albuterol, benzocaine-menthol, bisacodyl, bisacodyl, dextrose, dextrose, diphenhydrAMINE, glucagon, glucagon, metoclopramide **OR** metoclopramide, naloxone, ondansetron **OR** ondansetron, oxyCODONE, oxyCODONE, traMADol    Discharge medications     Your medication list        START taking these medications        Instructions Last Dose Given Next Dose Due   acetaminophen 500 mg tablet  Commonly known as: Tylenol Extra Strength      Take 2 tablets (1,000 mg) by mouth every 6 hours if needed for mild pain (1 - 3).       apixaban 2.5 mg tablet  Commonly  known as: Eliquis      Take 1 tablet (2.5 mg) by mouth 2 times a day.       docusate sodium 100 mg capsule  Commonly known as: Colace      Take 1 capsule (100 mg) by mouth 2 times a day.       ondansetron 4 mg tablet  Commonly known as: Zofran      Take 1 tablet (4 mg) by mouth every 8 hours if needed for nausea or vomiting.       oxyCODONE 5 mg immediate release tablet  Commonly known as: Roxicodone      Take 1 tablet (5 mg) by mouth every 6 hours if needed for severe pain (7 - 10) for up to 5 days.       pantoprazole 40 mg EC tablet  Commonly known as: ProtoNix      Take 1 tablet (40 mg) by mouth once daily. Do not crush, chew, or split.       polyethylene glycol 17 gram/dose powder  Commonly known as: Glycolax, Miralax      Mix 1 capful (17 g) in 8 oz of water and drink by mouth once daily              CONTINUE taking these medications        Instructions Last Dose Given Next Dose Due   acyclovir 400 mg tablet  Commonly known as: Zovirax      Take 1 tablet (400 mg) by mouth once daily.       allantoin gel           amLODIPine 2.5 mg tablet  Commonly known as: Norvasc      Take 1 tablet (2.5 mg) by mouth once daily.       atorvastatin 20 mg tablet  Commonly known as: Lipitor           azelastine 137 mcg (0.1 %) nasal spray  Commonly known as: Astelin           b complex 0.4 mg tablet           budesonide-formoteroL 160-4.5 mcg/actuation inhaler  Commonly known as: Symbicort      Inhale 2 puffs once daily. RINSE MOUTH AFTER USE.       carboxymethylcellulose 1 % ophthalmic solution dropperette  Commonly known as: Refresh Celluvisc           cholecalciferol 50 MCG (2000 UT) tablet  Commonly known as: Vitamin D-3           fluocinolone and shower cap 0.01 % oil           hydroxychloroquine 200 mg tablet  Commonly known as: Plaquenil      TAKE 1 TABLET BY MOUTH WITH FOOD OR MILK ONCE A DAY       iVizia (PF) 0.5 % drops  Generic drug: povidone (PF)           ketoconazole 2 % shampoo  Commonly known as: NIZOral            latanoprost (PF) 0.005 % drops      Administer 1 drop into both eyes once daily at bedtime.       levothyroxine 100 mcg tablet  Commonly known as: Synthroid, Levoxyl      Take 1 tablet (100 mcg) by mouth once daily. Five days per week (takes 112 mcgs 2 days per week)       lisinopril 20 mg tablet      Take 1 tablet (20 mg) by mouth once daily.       medical cannabis           MENTAX TOP           montelukast 10 mg tablet  Commonly known as: Singulair           OneTouch Verio test strips strip  Generic drug: blood sugar diagnostic           pilocarpine 5 mg tablet  Commonly known as: Salagen           propranolol 10 mg tablet  Commonly known as: Inderal      Take 1 tablet (10 mg) by mouth once daily.       sulfamethoxazole-trimethoprim 800-160 mg tablet  Commonly known as: Bactrim DS           Vitamin B-12 2,500 mcg tablet, sublingual SL tablet  Generic drug: cyanocobalamin (vitamin B-12)                  STOP taking these medications      chlorhexidine 0.12 % solution  Commonly known as: Peridex                  Where to Get Your Medications        These medications were sent to [facility]  [address] [address] [address]      Hours: 8 AM to 6 PM Mon-Fri, 9 AM to 1 PM Saturday Phone: [phone]   ondansetron 4 mg tablet       You can get these medications from any pharmacy    Bring a paper prescription for each of these medications  oxyCODONE 5 mg immediate release tablet       Information about where to get these medications is not yet available    Ask your nurse or doctor about these medications  acetaminophen 500 mg tablet  apixaban 2.5 mg tablet  docusate sodium 100 mg capsule  pantoprazole 40 mg EC tablet  polyethylene glycol 17 gram/dose powder         You have not been prescribed any medications.     Review of Systems  ROS      >Gen: Denies recent weight loss      >Neuro: Denies recent confusion      >Ophtho: Denies changes in vision      >ENT: Denies changes in hearing       >CV: Denies chest pain      >Resp: Denies shortness of breath      >GI: Denies melena/hematochezia      >: Denies painful urination      >MSK: Per above HPI      >Heme: No abnormal bleeding, eliquis for anticoagulation      >Psych: Denies hallucinations       Outpatient Follow-Up  Patient to follow-up with /Yolette Carlos PA-C.  Thank you for trusting us with your care. You should be scheduled for a follow-up post-surgical visit in 6 weeks.    Special Instructions  none    Please read discharge instructions provided by your surgeon before calling with questions as this will delay care.    Medication refills-Oxycodone and Tramadol will be refilled every 7 days per state law. Request refills through [facility] preferably. All medication requests may take up to 72 hours to refill and refills after Friday 1pm will be refilled on the next business day.      Az Olivo, DO

## 2024-06-02 NOTE — PROGRESS NOTES
Physical Therapy    Physical Therapy Treatment    Patient Name: Louisa Rodas  MRN: [id number]  Today's Date: 6/2/2024  Time Calculation  Start Time: 0823  Stop Time: 0911  Time Calculation (min): 48 min    Assessment/Plan   PT Assessment  Rehab Prognosis: Good  End of Session Communication: Bedside nurse  Assessment Comment: Decreased hands on assist required although decreased activity tolerance and decreased strength in BLE noted  End of Session Patient Position: Bed, 3 rail up, Alarm on (pt transporting to SNF shortly)  PT Plan  Inpatient/Swing Bed or Outpatient: Inpatient  PT Plan  Treatment/Interventions: Bed mobility, Transfer training, Gait training  PT Plan: Skilled PT  PT Frequency: BID  PT Discharge Recommendations: Moderate intensity level of continued care  PT Recommended Transfer Status: Assist x1  PT - OK to Discharge: Yes (per POC)      General Visit Information:   PT  Visit  PT Received On: 06/02/24  General  Reason for Referral: Pt 84 yo F s/p R TKA by Dr. Bhagat 5/28  Referred By: Joselyn REEDER  Past Medical History Relevant to Rehab: PMG: asthma, CKD, DM, breast CA s/p lumpectomy, fibromyalgia, essential tremor, HLD, hypothyroidism, MENDY on CPAP, nephrophaty, Sjogrens  Prior to Session Communication: Bedside nurse  Patient Position Received: Bed, 3 rail up, Alarm on    Subjective   Precautions:  Precautions  LE Weight Bearing Status: Weight Bearing as Tolerated (RLE)  Medical Precautions: Fall precautions  Post-Surgical Precautions: Right total knee precautions  Precautions Comment: Pt unable to state knee precautions, education provided  Vital Signs:       Objective   Pain:  Pain Assessment  Pain Assessment: 0-10  Pain Score: 8 (RN notified)  Pain Type: Surgical pain  Pain Location: Knee  Pain Orientation: Right  Cognition:     Coordination:     Postural Control:  Static Sitting Balance  Static Sitting-Balance Support: Feet supported, Bilateral upper extremity supported  Static Sitting-Level of  Assistance: Close supervision  Static Sitting-Comment/Number of Minutes: Left lateral lean to reduce WB on RLE  Static Standing Balance  Static Standing-Balance Support: Bilateral upper extremity supported  Static Standing-Level of Assistance: Close supervision  Static Standing-Comment/Number of Minutes: Mod VC for erect posture  Extremity/Trunk Assessments:    Activity Tolerance:     Treatments:  Therapeutic Exercise  Therapeutic Exercise Performed: Yes  Therapeutic Exercise Activity 1: Pt performed supine x 20 reps GS and GS. Pt performed supine x 15 reps AA SAQ, AA SLR, and AA HS. Pt performed seated x 15 reps LAQ and knee flexion. Max difficulty to perform all exercises    Therapeutic Activity  Therapeutic Activity Performed: Yes  Therapeutic Activity 1: Education of precautions    Bed Mobility  Bed Mobility: Yes  Bed Mobility 1  Bed Mobility 1: Supine to sitting, Sitting to supine  Level of Assistance 1: Contact guard  Bed Mobility Comments 1: HOB elevated. Pt able to use bed rail and self assist in RLE towards EOB  Bed Mobility 2  Bed Mobility  2: Sitting to supine  Level of Assistance 2: Minimum assistance  Bed Mobility Comments 2: To assist with RLE into bed  Bed Mobility 3  Bed Mobility 3: Rolling right  Level of Assistance 3: Minimum assistance    Ambulation/Gait Training  Ambulation/Gait Training Performed: Yes  Ambulation/Gait Training 1  Surface 1: Level tile  Device 1: Rolling walker  Gait Support Devices: Gait belt  Assistance 1: Contact guard  Quality of Gait 1: Antalgic, Diminished heel strike, Forward flexed posture  Comments/Distance (ft) 1: 40, 3 x 2 (Slow pacing throughout. Decreased WB noted on RLE, heavy UE use on WW.  No LOB although pt quick to fatigue)  Transfers  Transfer: Yes  Transfer 1  Technique 1: Sit to stand, Stand to sit  Transfer Device 1: Walker, Gait belt  Transfer Level of Assistance 1: Moderate assistance, Minimum assistance  Trials/Comments 1: Initial sit<>stand from bed to  chair ModA for trunk elevation. Pt progressed to Jose L for trunk elevation. Max VC for correct hand placement    Outcome Measures:  [facility] Basic Mobility  Turning from your back to your side while in a flat bed without using bedrails: A little  Moving from lying on your back to sitting on the side of a flat bed without using bedrails: A little  Moving to and from bed to chair (including a wheelchair): A little  Standing up from a chair using your arms (e.g. wheelchair or bedside chair): A lot  To walk in hospital room: A little  Climbing 3-5 steps with railing: Total  Basic Mobility - Total Score: 15    Education Documentation  Handouts, taught by Lena Bolton PTA at 6/2/2024 10:14 AM.  Learner: Patient  Readiness: Acceptance  Method: Explanation  Response: Needs Reinforcement    Precautions, taught by Lena Bolton PTA at 6/2/2024 10:14 AM.  Learner: Patient  Readiness: Acceptance  Method: Explanation  Response: Needs Reinforcement    Body Mechanics, taught by Lena Bolton PTA at 6/2/2024 10:14 AM.  Learner: Patient  Readiness: Acceptance  Method: Explanation  Response: Needs Reinforcement    Home Exercise Program, taught by Lena Bolton PTA at 6/2/2024 10:14 AM.  Learner: Patient  Readiness: Acceptance  Method: Explanation  Response: Needs Reinforcement    Mobility Training, taught by Lena Bolton PTA at 6/2/2024 10:14 AM.  Learner: Patient  Readiness: Acceptance  Method: Explanation  Response: Needs Reinforcement    Education Comments  No comments found.        OP EDUCATION:       Encounter Problems       Encounter Problems (Active)       Mobility       STG - Patient will ambulate 150 ft mod I with a RW.  (Not Progressing)       Start:  05/28/24    Expected End:  06/11/24            STG - Patient will ascend and descend four stairs using екатерина Hrs mod I.  (Not Progressing)       Start:  05/28/24    Expected End:  06/11/24            Pt will tolerate 15 reps of each LE exercise in order to improve strength and  endurance.  (Not Progressing)       Start:  05/28/24    Expected End:  06/11/24            Pt will achieve 0-90 degrees R knee ROM (Not Progressing)       Start:  05/28/24    Expected End:  06/11/24               PT Transfers       STG - Patient will perform bed mobility independently (Progressing)       Start:  05/28/24    Expected End:  06/11/24            STG - Patient will transfer sit to and from stand mod I with a RW.  (Not Progressing)       Start:  05/28/24    Expected End:  06/11/24            STG - Patient will perform car transfer independently.  (Not Progressing)       Start:  05/28/24    Expected End:  06/11/24

## 2024-06-05 NOTE — PROGRESS NOTES
RADIATION COMPLETION OF THERAPY NOTE    Patient Name:  Louisa Rodas  MRN:  [id number]  :  1940    Radiation Oncologist: No care team member to display   Referring Provider: No ref. provider found  Primary Care Provider: Nadira Moctezuma DO    Brief History: Louisa Rodas is a 83 y.o. female with Ductal carcinoma in situ (DCIS) of left breast, Clinical: Stage 0 (cTis (DCIS), cN0, cM0, G2, ER+, WV: Unknown, HER2: Unknown)  Ductal carcinoma in situ (DCIS) of left breast, Pathologic: Stage 0 (pTis (DCIS), pN0, cM0, G2, ER+, WV: Not Assessed, HER2: Not Assessed).  The patient completed radiotherapy as outlined below.    Radiation Treatment Summary:    Radiation Oncology   Radiation Treatments       Active   No active radiation treatments to show.     Completed   BREAST_APBI_L (Started on 2024)   Most recent fraction: 600 cGy given on 2024   Total given: 3,000 cGy / 3,000 cGy  (5 of 5 fractions)   Elapsed Days: 4   Technique: VMAT                       Concurrent Chemotherapy:  [No matching plan found]    CTCAE Toxicity Overview:   Toxicity Assessment          2024    13:21   Toxicity Assessment   Adverse Events Reviewed (WDL) Yes (Within Defined Limits)   Treatment Site Breast   Anorexia Grade 0   Anxiety Grade 0   Dehydration Grade 0   Depression Grade 0   Dermatitis Radiation Grade 0   Diarrhea Grade 0   Fatigue Grade 0   Fibrosis Deep Connective Tissue Grade 0   Fracture Grade 0   Nausea Grade 0   Pain Grade 0   Treatment Related Secondary Malignancy Grade 0   Tumor Pain Grade 0   Vomiting Grade 0   Abdominal Pain Grade 0   Dysphagia Grade 0   Esophagitis Grade 0   Gastric Hemorrhage Grade 0   Mucositis Oral Grade 0   Dry Mouth Grade 0   Breast Infection Grade 0   Seroma Grade 0   Joint Range of Motion Decreased Grade 0   Joint Range of Motion Decreased Lumbar Spine Grade 0   Brachial Plexopathy Grade 0   Breast Atrophy Grade 0   Breast Pain Grade 0   Nipple Deformity Grade 0   Pneumonitis Grade 0    Edema Limbs Grade 0   Lymphedema Grade 0   Thromboembolic Event Grade 0   Hot Flashes Grade 0     Patient Disposition:    Future Appointments       Date / Time Provider Department Dept Phone    6/10/2024 10:30 AM Dinesh Jacobson MD [facility] [phone]    6/18/2024 10:45 AM Aureliano Pabon MD [facility] [phone]    6/24/2024 2:30 PM Adwoa Wilhelm, PT [facility] [phone]    7/10/2024 9:00 AM Hu Bernabe, OD [facility] [phone]    7/10/2024 11:40 AM Arian Man MD [facility] [phone]    7/11/2024 1:40 PM (Arrive by 1:25 PM) Yolette Carlos PA-C  Daniel Fern[facility]dez [phone]    7/22/2024 2:20 PM Christiane Cooper MD [facility] [phone]    9/4/2024 2:30 PM (Arrive by 2:15 PM) Nadira Moctezuma DO [facility] [phone]    9/12/2024 3:15 PM Ricky Garcia MD PhD [facility] [phone]    10/23/2024 10:30 AM Rakesh Verma MD [facility]     11/11/2024 11:45 AM Margarita Man, APRN-CNP [facility] [phone]    2/11/2025 9:30 AM (Arrive by 9:15 AM) [facility] [phone]    2/11/2025 10:30 AM Rhina Fields MD [facility] [phone]    3/19/2025 11:40 AM Arian Man MD [facility] [phone]        Patient is scheduled for one month follow up as noted in Epic.    Alon Llamas MD, PhD  Attending Physician

## 2024-06-09 DIAGNOSIS — N18.32 STAGE 3B CHRONIC KIDNEY DISEASE (MULTI): Primary | ICD-10-CM

## 2024-06-09 RX ORDER — ACETAMINOPHEN 500 MG
1 TABLET ORAL DAILY
Qty: 1 EACH | Refills: 0 | Status: SHIPPED | OUTPATIENT
Start: 2024-06-09 | End: 2025-06-09

## 2024-06-10 ENCOUNTER — TELEMEDICINE (OUTPATIENT)
Dept: PAIN MEDICINE | Facility: CLINIC | Age: 84
End: 2024-06-10
Payer: MEDICARE

## 2024-06-10 DIAGNOSIS — M47.812 CERVICAL SPONDYLOSIS WITHOUT MYELOPATHY: Primary | ICD-10-CM

## 2024-06-10 NOTE — PROGRESS NOTES
6/10/2024    Virtual interview    Ms. Rodas had virtual interview today. She is very pleasant 83 y lady known to my clinic. She had cervical RF a year ago for her neck pain and she did well for almost a year. Recently she was diagnosed with cancer breast and had radiotherapy . Most recent she had knee replacement and she is in rehab facility . She is doing well with her rehab. Her neck issue not bothering currently    Plan  Will evaluate her chronic neck pain after she completes her rehab

## 2024-06-18 ENCOUNTER — APPOINTMENT (OUTPATIENT)
Dept: OPHTHALMOLOGY | Facility: CLINIC | Age: 84
End: 2024-06-18
Payer: MEDICARE

## 2024-06-20 ENCOUNTER — TELEPHONE (OUTPATIENT)
Dept: PRIMARY CARE | Facility: CLINIC | Age: 84
End: 2024-06-20

## 2024-06-20 ENCOUNTER — HOSPITAL ENCOUNTER (OUTPATIENT)
Dept: RADIOLOGY | Facility: CLINIC | Age: 84
Discharge: HOME | End: 2024-06-20
Payer: MEDICARE

## 2024-06-20 ENCOUNTER — OFFICE VISIT (OUTPATIENT)
Dept: ORTHOPEDIC SURGERY | Facility: CLINIC | Age: 84
End: 2024-06-20
Payer: MEDICARE

## 2024-06-20 DIAGNOSIS — Z96.651 S/P TOTAL KNEE ARTHROPLASTY, RIGHT: ICD-10-CM

## 2024-06-20 PROCEDURE — 99024 POSTOP FOLLOW-UP VISIT: CPT | Performed by: PHYSICIAN ASSISTANT

## 2024-06-20 PROCEDURE — 1111F DSCHRG MED/CURRENT MED MERGE: CPT | Performed by: PHYSICIAN ASSISTANT

## 2024-06-20 PROCEDURE — 73562 X-RAY EXAM OF KNEE 3: CPT | Mod: RT

## 2024-06-20 PROCEDURE — 1159F MED LIST DOCD IN RCRD: CPT | Performed by: PHYSICIAN ASSISTANT

## 2024-06-20 ASSESSMENT — PAIN - FUNCTIONAL ASSESSMENT: PAIN_FUNCTIONAL_ASSESSMENT: 0-10

## 2024-06-20 ASSESSMENT — PAIN DESCRIPTION - DESCRIPTORS: DESCRIPTORS: ACHING;THROBBING

## 2024-06-20 ASSESSMENT — PAIN SCALES - GENERAL: PAINLEVEL_OUTOF10: 10 - WORST POSSIBLE PAIN

## 2024-06-20 NOTE — PROGRESS NOTES
Yolette Carlos, BAKARI, PADocC, ATC  Adult Reconstruction and Joint Replacement Surgery  Phone: [phone]     Fax:[phone]            Chief Complaint   Patient presents with    Right Knee - Post-op       HPI:  Louisa Rodas is a pleasant 83 y.o. year-old female here for follow-up of their side: right total knee arthroplasty by Dr. Bhagat.  She is 1 month status post right total knee arthroplasty.  [facility]  She presents today with a dressing still intact.  She has no erythema or drainage or effusion.  She is having difficulty with rehab due to pain.  She states that she is not taking oxycodone at the rehab center that they discontinued it due to being on it for over 20 days.  She is only taking Tylenol as she cannot take NSAIDs.    Review of Systems  Past Medical History:   Diagnosis Date    Asthma ([facility])     Cervical spondylosis without myelopathy     Chronic kidney disease, stage 3a (Multi)     3.21.24: creat 1.43, GFR 37    DM (diabetes mellitus), type 2 (Multi)     2/20/2024 A1C 7.2%    Ductal carcinoma in situ (DCIS) of left breast     s/p lumpectomy 3/27/24    Essential tremor     Fatigue     Fibromyalgia     H/O echocardiogram 05/25/2023    CONCLUSIONS: 1. Left ventricular systolic function is hyperdynamic with a 70-75% estimated EF. 2. Spectral Doppler shows an impaired relaxation pattern of left ventricular diastolic filling. 3. Mild aortic valve regurgitation. 4. Left ventricular cavity size is decreased. 5. There is a mid cavity left ventricular obstruction noted w/ the Valsalva maneuver. The provoked gradient is 28 mmHg.    Hyperlipidemia     Hypermetropia, bilateral     Hyperopia of both eyes with astigmatism and presbyopia    Hypertension     Hypogammaglobulinemia (Multi)     Hypothyroidism     Ischemic optic neuropathy, bilateral     Ischemic optic neuropathy, bilateral    Meibomian gland dysfunction right eye, upper and lower eyelids      Meibomian gland dysfunction (MGD) of upper and lower lids of both eyes    Near syncope     NPH (normal pressure hydrocephalus) (Multi)     MILD TO MODERATE, brain MRI 5/4/23:mild enlargement of ventricles, mild diffuse cerebral atrophy    Obesity     Obstructive sleep apnea on CPAP     Osteoarthritis     Peripheral motor neuropathy     Polycystic liver disease     follows with hepatology yearly    PONV (postoperative nausea and vomiting)     Primary open-angle glaucoma, bilateral, indeterminate stage     Sciatica     Sjogren syndrome, unspecified (Multi)     Vocal cord dysfunction      Patient Active Problem List   Diagnosis    Abnormal EEG    Abnormal liver ultrasound    Arthritis of hand, right, degenerative    Arthritis of knee    Arthritis, degenerative, localized, primary, hand    Aspiration of food    Benign neoplasm of soft tissue of upper extremity    Carpal tunnel syndrome    Cervical disc disease    Cervical spondylosis without myelopathy    Chills (without fever)    Chronic pain of both knees    Degenerative arthritis of knee, bilateral    Vertical diplopia    Drusen of left macula    Drusen of right macula    Dysphagia    Dysphonia    Foot pain, bilateral    Glaucoma of both eyes    Hallux rigidus of right foot    Headache, cervicogenic    Hepatic cirrhosis (Multi)    Abdominal pain    Generalized abdominal pain    Hip pain    Incomplete rotator cuff tear    Left shoulder pain    Liver cyst    Liver lesion    Lower back pain    Lumbar radiculopathy    Myalgia, other site    Neck pain    MENDY on CPAP    Osteoarthritis of right ankle and foot    Localized osteoarthrosis of left ankle and foot    Degenerative arthritis of hip    Pain, wrist joint    Pelvic pain in female    POAG (primary open-angle glaucoma)    Radiculitis, cervical    Scapholunate dissociation    Schwannoma    Segmental and somatic dysfunction of cervical region    Segmental and somatic dysfunction of lumbar region    Segmental and  somatic dysfunction of pelvic region    Segmental and somatic dysfunction of thoracic region    Shortness of breath at rest    Shortness of breath on exertion    Skew deviation    Thumb pain, right    Thyroid eye disease    Vaginal atrophy    Vocal cord dysfunction    Weakness of both lower extremities    Polycystic liver disease    Joint pain, knee    Acid reflux    Esophageal dysmotility    Muscle tension dysphonia    Stage 3b chronic kidney disease (Multi)    Hypothyroidism, unspecified    Imbalance    Hyperlipidemia    Near syncope    Urine frequency    Hypertensive heart disease without heart failure    Abnormal brain MRI    Bilateral presbyopia    Communicating hydrocephalus (Multi)    Type 2 diabetes mellitus with stage 3b chronic kidney disease, without long-term current use of insulin (Multi)    Dry eye syndrome of both lacrimal glands    Glaucoma    Herpes genitalis in women    Hypogammaglobulinemia (Multi)    Insomnia due to medical condition    Keratoconjunctivitis sicca, in Sjogren's syndrome (Multi)    OAB (overactive bladder)    Other fatigue    Peripheral motor neuropathy    Recurrent UTI    Vitiligo    Localized, secondary osteoarthritis of the hand    Inflammatory polyarthritis (Multi)    Right foot pain    Arthralgia of multiple sites    Hyperopia of both eyes    Regular astigmatism of both eyes    Other low back pain    Fibromyalgia    HTN (hypertension)    Lymphopenia    Enthesopathy    Weakness    Encounter for Medicare annual wellness exam    Osteoarthritis    Menopause    Encounter for screening mammogram for malignant neoplasm of breast    Chronic pain of right knee    Moderate persistent asthma without complication (HHS-HCC)    Sjogren syndrome, unspecified (Multi)    Osteopenia    Obesity, Class I, BMI 30-34.9    Ductal carcinoma in situ (DCIS) of left breast    PONV (postoperative nausea and vomiting)    Unilateral primary osteoarthritis, right knee    Meibomian gland disease    Long-term  use of Plaquenil    Encounter for follow-up surveillance of breast cancer    Stage 3a chronic kidney disease (Multi)    Mild intermittent asthma without complication ([facility])    Skin lesion     Medication Documentation Review Audit       Reviewed by Anastasia Arceo LPN (Licensed Nurse) on 06/20/24 at 0859      Medication Order Taking? Sig Documenting Provider Last Dose Status   acetaminophen (Tylenol Extra Strength) 500 mg tablet [id number] Yes Take 2 tablets (1,000 mg) by mouth every 6 hours if needed for mild pain (1 - 3). Chanda Alves PA-C Taking Active   acyclovir (Zovirax) 400 mg tablet [id number] Yes Take 1 tablet (400 mg) by mouth once daily. Margarita Man APRN-CNP Taking Active   allantoin gel [id number] Yes Apply topically if needed for wound care. Contains cannibus Historical Provider, MD Taking Active   amLODIPine (Norvasc) 2.5 mg tablet [id number] Yes Take 1 tablet (2.5 mg) by mouth once daily. Nadira Moctezuma DO Taking Active   apixaban (Eliquis) 2.5 mg tablet [id number] Yes Take 1 tablet (2.5 mg) by mouth 2 times a day. Chanda Alves PA-C Taking Active   atorvastatin (Lipitor) 20 mg tablet [id number] Yes Take 1 tablet (20 mg) by mouth 3 (three) times a week. Historical Provider, MD Taking Active   azelastine (Astelin) 137 mcg (0.1 %) nasal spray [id number] Yes Administer 2 sprays into each nostril 2 times a day as needed for allergies. Use in each nostril as directed Historical Provider, MD Taking Active   b complex 0.4 mg tablet [id number] Yes Take 1 tablet by mouth once daily. Historical Provider, MD Taking Active   blood pressure test kit-large kit [id number] Yes Use to check blood pressure once daily. Christiane Cooper MD Taking Active   budesonide-formoteroL (Symbicort) 160-4.5 mcg/actuation inhaler [id number] Yes Inhale 2 puffs once daily. RINSE MOUTH AFTER USE.   Patient taking differently: Inhale 1 puff once daily. RINSE MOUTH AFTER USE.    Nadira Moctezuma DO Taking Active   butenafine HCl (MENTAX  TOP) [id number] Yes Apply topically. Historical Provider, MD Taking Active   carboxymethylcellulose (Refresh Celluvisc) 1 % ophthalmic solution dropperette [id number] Yes Administer into affected eye(s) 4 times a day. Historical Provider, MD Taking Active   cholecalciferol (Vitamin D-3) 50 MCG (2000 UT) tablet [id number] Yes Take 1 tablet (50 mcg) by mouth once daily. Historical Provider, MD Taking Active   cyanocobalamin, vitamin B-12, (Vitamin B-12) 2,500 mcg tablet, sublingual SL tablet [id number] Yes Place 1 tablet (2,500 mcg) under the tongue. Historical Provider, MD Taking Active   docusate sodium (Colace) 100 mg capsule [id number] Yes Take 1 capsule (100 mg) by mouth 2 times a day. Chanda Alves PA-C Taking Active   fluocinolone and shower cap 0.01 % oil [id number] Yes Apply to scalp  leave on overnight and wash off in the morning. Use daily Historical Provider, MD Taking Active   hydroxychloroquine (Plaquenil) 200 mg tablet [id number] Yes TAKE 1 TABLET BY MOUTH WITH FOOD OR MILK ONCE A DAY Rakesh Verma MD Taking Active   ketoconazole (NIZOral) 2 % shampoo [id number] Yes  Historical Provider, MD Taking Active   latanoprost, PF, 0.005 % drops [id number] Yes Administer 1 drop into both eyes once daily at bedtime. Aureliano Pabon MD Taking Active   levothyroxine (Synthroid, Levoxyl) 100 mcg tablet [id number] Yes Take 1 tablet (100 mcg) by mouth once daily. Five days per week (takes 112 mcgs 2 days per week)   Patient taking differently: Take 1 tablet (100 mcg) by mouth once daily. 6 days per week    Nadira Moctezuma DO Taking Active   lisinopril 20 mg tablet [id number] Yes Take 1 tablet (20 mg) by mouth once daily. Nadira Moctezuma DO Taking Active   medical cannabis [id number] Yes Take 1 each by mouth if needed. Historical Provider, MD Taking Active   montelukast (Singulair) 10 mg tablet [id number] Yes Take 1 tablet (10 mg) by mouth once daily. Historical Provider, MD Taking Active   ondansetron (Zofran) 4 mg tablet  [id number] Yes Take 1 tablet (4 mg) by mouth every 8 hours if needed for nausea or vomiting. JEAN MARIE Way-CNP Taking Active   OneTouch Verio test strips strip [id number] Yes TEST BLOOD SUGAR ONCE A DAY Historical Provider, MD Taking Active   pantoprazole (ProtoNix) 40 mg EC tablet [id number] Yes Take 1 tablet (40 mg) by mouth once daily. Do not crush, chew, or split. Chanda Alves PA-C Taking Active   pilocarpine (Salagen) 5 mg tablet [id number] Yes Take 1 tablet (5 mg) by mouth once daily at bedtime. Historical Provider, MD Taking Active   polyethylene glycol (Glycolax, Miralax) 17 gram/dose powder [id number] Yes Mix 1 capful (17 g) in 8 oz of water and drink by mouth once daily Chanda Alves PA-C Taking Active   povidone, PF, (iVizia, PF,) 0.5 % drops [id number] Yes Administer 1 drop into affected eye(s) 3 times a day as needed. Historical Provider, MD Taking Active   propranolol (Inderal) 10 mg tablet [id number] Yes Take 1 tablet (10 mg) by mouth once daily. Nadira Moctezuma,  Taking Active   sulfamethoxazole-trimethoprim (Bactrim DS) 800-160 mg tablet [id number] Yes Take 1 tablet by mouth once daily. Takes daily not bid Historical Provider, MD Taking Active                  Allergies   Allergen Reactions    Penicillins Hives and Itching    Primidone Other     Stuttering     Tizanidine Other     Stuttering      Social History     Socioeconomic History    Marital status:      Spouse name: Emerson    Number of children: 3    Years of education: Not on file    Highest education level: Some college, no degree   Occupational History    Occupation: retired   Tobacco Use    Smoking status: Former     Current packs/day: 0.00     Average packs/day: 0.3 packs/day for 1 year (0.3 ttl pk-yrs)     Types: Cigarettes     Start date: 1966     Quit date: 1967     Years since quittin.5     Passive exposure: Past    Smokeless tobacco: Never   Vaping Use    Vaping status: Never Used   Substance and  Sexual Activity    Alcohol use: Not Currently    Drug use: Never    Sexual activity: Defer   Other Topics Concern    Not on file   Social History Narrative    Not on file     Social Determinants of Health     Financial Resource Strain: Low Risk  (5/29/2024)    Overall Financial Resource Strain (CARDIA)     Difficulty of Paying Living Expenses: Not hard at all   Food Insecurity: No Food Insecurity (10/11/2023)    Hunger Vital Sign     Worried About Running Out of Food in the Last Year: Never true     Ran Out of Food in the Last Year: Never true   Transportation Needs: No Transportation Needs (5/29/2024)    PRAPARE - Transportation     Lack of Transportation (Medical): No     Lack of Transportation (Non-Medical): No   Physical Activity: Sufficiently Active (5/6/2024)    Exercise Vital Sign     Days of Exercise per Week: 3 days     Minutes of Exercise per Session: 60 min   Stress: No Stress Concern Present (5/6/2024)    [place] of Occupational [employer] - Occupational Stress Questionnaire     Feeling of Stress : Not at all   Social Connections: Unknown (5/6/2024)    Social Connection and Isolation Panel [NHANES]     Frequency of Communication with Friends and Family: More than three times a week     Frequency of Social Gatherings with Friends and Family: More than three times a week     Attends Cheondoism Services: Patient unable to answer     Active Member of Clubs or Organizations: Patient unable to answer     Attends Club or Organization Meetings: More than 4 times per year     Marital Status:    Intimate Partner Violence: Not At Risk (5/6/2024)    Humiliation, Afraid, Rape, and Kick questionnaire     Fear of Current or Ex-Partner: No     Emotionally Abused: No     Physically Abused: No     Sexually Abused: No   Housing Stability: Low Risk  (5/29/2024)    Housing Stability Vital Sign     Unable to Pay for Housing in the Last Year: No     Number of Places Lived in the Last Year: 1     Unstable Housing in  the Last Year: No     Past Surgical History:   Procedure Laterality Date    BREAST LUMPECTOMY Left 03/27/2024    CATARACT EXTRACTION, BILATERAL      COLONOSCOPY  04/05/2018    tubular adenoma    ESOPHAGOGASTRODUODENOSCOPY  06/2019    HAND SURGERY Bilateral     bone removed right wrist, cyst removed left wrist    IR ABLATION NERVE      KNEE ARTHROSCOPY W/ DEBRIDEMENT Right     MR HEAD ANGIO WO IV CONTRAST  06/28/2021    MR HEAD ANGIO WO IV CONTRAST 6/28/2021 [facility] [place] [facility]    MR NECK ANGIO WO IV CONTRAST  06/28/2021    MR NECK ANGIO WO IV CONTRAST 6/28/2021 [facility]    ROTATOR CUFF REPAIR      TONSILLECTOMY      TOTAL KNEE ARTHROPLASTY Left        Physical Exam  side: right Knee  There were no vitals filed for this visit.  AxO x 3 in NAD.   Assistive Device: no device. Coordination and balance intact.  Normal bilateral upper and lower extremities.  No erythema, ecchymosis, temperature changes. No popliteal lymphadenopathy,  No overlying lesion  Mood: euthymic  Respirations non-labored  The incision is midline healing well with no signs of surrounding infection, dehiscence or drainage.   Neurovascular exam is at baseline.  No instability varus or valgus stressing the knee at 0, 30 or 60 degrees.  No instability in the AP plane at 90 degrees.  Range of motion: 20 degrees extension, 90 degrees flexion  5/5 hip flexion/knee extension/DF/PF/EHL  SILT in meredith/saph/ per/tib distribution   Extremities warm and well perfused.  No lower extremity calf tenderness, warmth or swelling. Lower extremity well perfused  2+ Femoral/DP/PT pulses bilaterally    Imaging:  No images are attached to the encounter.    I personally reviewed multiple views of the knee today in clinic. Status post side: right Total Knee aArthroplasty. The implant is well fixed, well aligned.  No evidence of kirt-implant fracture, lucency or dislocation.    Impression/Plan:  Louisa Rodas is doing well post-operatively and happy with the results  of the operation.     S/P side: right Total Knee Arthroplasty.  I had a lengthy discussion with the patient and family that the knee looks good.  His implant is functioning well.  There is no signs of erythema, drainage or effusion.  She has a low pain tolerance.  Would recommend continuation with oxycodone at the rehab center.  She does need to continue to do her exercises daily which family does state that she is only doing them for 25 minutes when the physical therapist is with her once a day.  I would like to see her back in a month.  I gave family my card and number to call with any questions or concerns or have the rehab facility call me.  All questions answered.    Follow-up 1 month     BAKARI Cervantes, PA-C, ATC  Orthopedic Physician Assisant  Adult Reconstruction and Total Joint Replacement  General Orthopedics  Department of Orthopaedic Surgery  [place] [address]  004 Technologiesaging preferred

## 2024-06-24 ENCOUNTER — DOCUMENTATION (OUTPATIENT)
Dept: PRIMARY CARE | Facility: CLINIC | Age: 84
End: 2024-06-24
Payer: MEDICARE

## 2024-06-24 ENCOUNTER — APPOINTMENT (OUTPATIENT)
Dept: PHYSICAL THERAPY | Facility: CLINIC | Age: 84
End: 2024-06-24
Payer: MEDICARE

## 2024-06-24 NOTE — PROGRESS NOTES
Discharge Facility:[facility]  Discharge Diagnosis:Right knee replacement  Admission Date:05/28/2024  Discharge Date: 06/23/24    PCP Appointment Date:To be scheduled by clerical pool  Specialist Appointment Date: Ortho 07/11/24  Hospital Encounter and Summary:  not available at this time   See discharge assessment below for further details     Engagement  Call Start Time: 1523 (6/24/2024  3:23 PM)    Medications  Medications reviewed with patient/caregiver?: Yes (6/24/2024  3:23 PM)  Is the patient having any side effects they believe may be caused by any medication additions or changes?: No (6/24/2024  3:23 PM)  Does the patient have all medications ordered at discharge?: Yes (6/24/2024  3:23 PM)  Care Management Interventions: No intervention needed (6/24/2024  3:23 PM)  Prescription Comments: States she has her medications. (6/24/2024  3:23 PM)  Is the patient taking all medications as directed (includes completed medication regime)?: Yes (6/24/2024  3:23 PM)  Medication Comments: N/A (6/24/2024  3:23 PM)  Follow Up Tasks: -- (N/A) (6/24/2024  3:23 PM)    Appointments  Does the patient have a primary care provider?: Yes (6/24/2024  3:23 PM)  Care Management Interventions: Educated patient on importance of making appointment (6/24/2024  3:23 PM)  Has the patient kept scheduled appointments due by today?: Yes (6/24/2024  3:23 PM)  Care Management Interventions: Educated on importance of keeping appointment (6/24/2024  3:23 PM)  Follow Up Tasks: Appointments (6/24/2024  3:23 PM)    Self Management  What is the home health agency?: Patient states she isn't sure what agency is suppose to come out and she got out of the SNF yesterday. (6/24/2024  3:23 PM)  Has home health visited the patient within 72 hours of discharge?: No (6/24/2024  3:23 PM)  What Durable Medical Equipment (DME) was ordered?: Patient has the walker and states she can use a raised toilet seat. (6/24/2024  3:23 PM)  Has all  Durable Medical Equipment (DME) been delivered?: Yes (6/24/2024  3:23 PM)  DME Interventions: -- (N/A) (6/24/2024  3:23 PM)  Care Management Interventions: Other (Comment) (Patient states she is doing some of the home exercises she was taught.) (6/24/2024  3:23 PM)  Follow Up Tasks: Home Health (Pt. to call me with the name of the Home care agency thats suppose to come and do PT/OT and homecare.) (6/24/2024  3:23 PM)    Patient Teaching  Does the patient have access to their discharge instructions?: Yes (6/24/2024  3:23 PM)  Care Management Interventions: Reviewed instructions with patient (6/24/2024  3:23 PM)  What is the patient's perception of their health status since discharge?: Same (6/24/2024  3:23 PM)  Is the patient/caregiver able to teach back the hierarchy of who to call/visit for symptoms/problems? PCP, Specialist, Home Health nurse, Urgent Care, ED, 911: Yes (6/24/2024  3:23 PM)  If the patient is a current smoker, are they able to teach back resources for cessation?: -- (Doesn't smoke) (6/24/2024  3:23 PM)    Wrap Up  Is the patient/caregiver familiar with Advance Care Planning?: Yes (Pt. states she has a living will and POA.) (6/24/2024  3:23 PM)  Would the patient like more information on Advance Care Planning?: No (6/24/2024  3:23 PM)  Call End Time: 1552 (6/24/2024  3:23 PM)

## 2024-06-25 ENCOUNTER — PATIENT OUTREACH (OUTPATIENT)
Dept: PRIMARY CARE | Facility: CLINIC | Age: 84
End: 2024-06-25
Payer: MEDICARE

## 2024-06-25 ENCOUNTER — PATIENT OUTREACH (OUTPATIENT)
Dept: PRIMARY CARE | Facility: CLINIC | Age: 84
End: 2024-06-25

## 2024-06-25 DIAGNOSIS — N18.32 TYPE 2 DIABETES MELLITUS WITH STAGE 3B CHRONIC KIDNEY DISEASE, WITHOUT LONG-TERM CURRENT USE OF INSULIN (MULTI): ICD-10-CM

## 2024-06-25 DIAGNOSIS — E03.9 HYPOTHYROIDISM, UNSPECIFIED TYPE: ICD-10-CM

## 2024-06-25 DIAGNOSIS — E11.22 TYPE 2 DIABETES MELLITUS WITH STAGE 3B CHRONIC KIDNEY DISEASE, WITHOUT LONG-TERM CURRENT USE OF INSULIN (MULTI): ICD-10-CM

## 2024-06-25 DIAGNOSIS — N18.31 CHRONIC KIDNEY DISEASE, STAGE 3A (MULTI): ICD-10-CM

## 2024-06-25 NOTE — PROGRESS NOTES
Spoke with Patient today and she wasn't sure which which home care agency was suppose to come to her home. Patient was discharged from [facility] (rehab) on 06/23/24. I called [place] [phone] and was told Pt. was referred to [facility]. I called [facility] [phone] and spoke with Aggie and she states they have the home care referral and will follow up and let me know the status and will they will come to the Pt's home for PT/OT therapy.     June 26, 2024 Called Patient and she did receive a call from [facility] and they are suppose to come out today for therapy. Patient states she was told the Eliquis would cost her $499.19 and she is trying to complete the online form for price reduction. If no success we can also reach out to the pharmacist to aide with patient assistance. Patient is going to see Dr. Moctezuma on 06/28/24 at 12:30.

## 2024-06-25 NOTE — PROGRESS NOTES
Patient called and monthly outreach done. States she is doing okay after her right knee replacement and has been doing some home exercises. Denies any falls and is using a walker to ambulate. Pt. was discharged from the [facility]) on Sunday. Appetite is decreased and she is not very hungry. Encouraged to eat smaller more frequent meals and not to skip meals. Patient states the cost of her Eliquis may be costly when she looked at the [facility]. Pt's  is going to take the prescription to [facility] and speak with the pharmacist to determine the price. If it is too costly I informed the Pt. There is a Patient assistance program she can get involved with and she verbalizes understanding. Patient to call with any problems or concerns.

## 2024-06-26 RX ORDER — LEVOTHYROXINE SODIUM 100 UG/1
100 TABLET ORAL
Qty: 66 TABLET | Refills: 0 | Status: SHIPPED | OUTPATIENT
Start: 2024-06-26

## 2024-06-28 ENCOUNTER — OFFICE VISIT (OUTPATIENT)
Dept: PRIMARY CARE | Facility: CLINIC | Age: 84
End: 2024-06-28
Payer: MEDICARE

## 2024-06-28 VITALS — OXYGEN SATURATION: 98 % | HEART RATE: 75 BPM | SYSTOLIC BLOOD PRESSURE: 115 MMHG | DIASTOLIC BLOOD PRESSURE: 69 MMHG

## 2024-06-28 PROBLEM — H02.59 FLOPPY EYELID SYNDROME: Status: ACTIVE | Noted: 2024-06-28

## 2024-06-28 PROBLEM — M85.9 LOW BONE DENSITY: Status: ACTIVE | Noted: 2018-03-02

## 2024-06-28 PROBLEM — H02.409 ACQUIRED PTOSIS OF EYELID: Status: ACTIVE | Noted: 2024-06-28

## 2024-06-28 PROBLEM — Z86.39 HISTORY OF DIABETES MELLITUS: Status: ACTIVE | Noted: 2024-06-28

## 2024-06-28 PROBLEM — H35.30 MACULAR DEGENERATION: Status: ACTIVE | Noted: 2024-06-28

## 2024-06-28 PROBLEM — Z86.39 HISTORY OF ELEVATED LIPIDS: Status: ACTIVE | Noted: 2024-06-28

## 2024-06-28 PROBLEM — H04.129 TEAR FILM INSUFFICIENCY: Status: ACTIVE | Noted: 2024-06-28

## 2024-06-28 PROBLEM — Z86.59 HISTORY OF DEPRESSION: Status: ACTIVE | Noted: 2024-06-28

## 2024-06-28 PROBLEM — H04.123 DRY EYES: Status: ACTIVE | Noted: 2024-06-28

## 2024-06-28 PROBLEM — H02.834 DERMATOCHALASIS OF BOTH UPPER EYELIDS: Status: ACTIVE | Noted: 2017-01-18

## 2024-06-28 PROBLEM — H11.30 SUBCONJUNCTIVAL HEMORRHAGE: Status: ACTIVE | Noted: 2024-06-28

## 2024-06-28 PROBLEM — E11.3299 NONPROLIFERATIVE DIABETIC RETINOPATHY (MULTI): Status: ACTIVE | Noted: 2017-04-18

## 2024-06-28 PROBLEM — H02.831 DERMATOCHALASIS OF BOTH UPPER EYELIDS: Status: ACTIVE | Noted: 2017-01-18

## 2024-06-28 PROBLEM — H47.013 ISCHEMIC OPTIC NEUROPATHY OF BOTH EYES: Status: ACTIVE | Noted: 2017-04-18

## 2024-06-28 PROBLEM — C61 MALIGNANT NEOPLASM OF PROSTATE (MULTI): Status: ACTIVE | Noted: 2024-06-28

## 2024-06-28 RX ORDER — OMEPRAZOLE 40 MG/1
CAPSULE, DELAYED RELEASE ORAL
COMMUNITY
Start: 2022-11-03

## 2024-06-28 RX ORDER — POLYETHYLENE GLYCOL AND PROPYLENE GLYCOL 4; 3 MG/ML; MG/ML
SOLUTION/ DROPS OPHTHALMIC
COMMUNITY

## 2024-06-28 RX ORDER — TRAVOPROST OPHTHALMIC SOLUTION 0.04 MG/ML
SOLUTION OPHTHALMIC
COMMUNITY

## 2024-06-28 RX ORDER — ZINC GLUCONATE 50 MG
TABLET ORAL EVERY 24 HOURS
COMMUNITY

## 2024-06-28 RX ORDER — TRAZODONE HYDROCHLORIDE 50 MG/1
TABLET ORAL
COMMUNITY

## 2024-06-28 RX ORDER — DIPHENHYDRAMINE HCL 25 MG
TABLET ORAL
COMMUNITY

## 2024-07-01 ENCOUNTER — APPOINTMENT (OUTPATIENT)
Dept: PRIMARY CARE | Facility: CLINIC | Age: 84
End: 2024-07-01
Payer: MEDICARE

## 2024-07-01 VITALS
OXYGEN SATURATION: 98 % | WEIGHT: 164.6 LBS | BODY MASS INDEX: 30.11 KG/M2 | HEART RATE: 92 BPM | DIASTOLIC BLOOD PRESSURE: 70 MMHG | SYSTOLIC BLOOD PRESSURE: 130 MMHG

## 2024-07-01 DIAGNOSIS — G91.2 NPH (NORMAL PRESSURE HYDROCEPHALUS) (MULTI): ICD-10-CM

## 2024-07-01 DIAGNOSIS — Q44.6 POLYCYSTIC LIVER DISEASE: ICD-10-CM

## 2024-07-01 DIAGNOSIS — I10 PRIMARY HYPERTENSION: ICD-10-CM

## 2024-07-01 DIAGNOSIS — Z09 HOSPITAL DISCHARGE FOLLOW-UP: Primary | ICD-10-CM

## 2024-07-01 DIAGNOSIS — M35.00 SJOGREN SYNDROME, UNSPECIFIED (MULTI): ICD-10-CM

## 2024-07-01 DIAGNOSIS — M17.10 ARTHRITIS OF KNEE: ICD-10-CM

## 2024-07-01 PROCEDURE — 99495 TRANSJ CARE MGMT MOD F2F 14D: CPT | Performed by: SPECIALIST

## 2024-07-01 PROCEDURE — 3078F DIAST BP <80 MM HG: CPT | Performed by: SPECIALIST

## 2024-07-01 PROCEDURE — 1159F MED LIST DOCD IN RCRD: CPT | Performed by: SPECIALIST

## 2024-07-01 PROCEDURE — 1036F TOBACCO NON-USER: CPT | Performed by: SPECIALIST

## 2024-07-01 PROCEDURE — 1160F RVW MEDS BY RX/DR IN RCRD: CPT | Performed by: SPECIALIST

## 2024-07-01 PROCEDURE — 1111F DSCHRG MED/CURRENT MED MERGE: CPT | Performed by: SPECIALIST

## 2024-07-01 PROCEDURE — 3075F SYST BP GE 130 - 139MM HG: CPT | Performed by: SPECIALIST

## 2024-07-01 RX ORDER — DOCUSATE SODIUM 100 MG/1
100 CAPSULE, LIQUID FILLED ORAL 2 TIMES DAILY
Qty: 60 CAPSULE | Refills: 1 | Status: SHIPPED | OUTPATIENT
Start: 2024-07-01 | End: 2024-08-30

## 2024-07-01 NOTE — PROGRESS NOTES
"Subjective   Patient ID: Louisa Rodas is a 83 y.o. female who presents for PPD Read and Follow-up.  HPI    84 yo female Pmhx sig Htn, Hyperlipidemia, Hypothyroidism, MRI Brain 5/4/2023 (mild enlargement of ventricles, mild diffuse cerebral atrophy) (Possible NPH in 2021) Glaucoma, MENDY on CPAP, Polycystic Liver Disease, ?Cirrhosis, Sjogrens Syndrome (Dr. Sparks), OA (Dr. Verduzco), Mod Persistent Asthma (Dr. Ramos), MENDY on CPAP, Nonfamilial hypogammaglobulinemia (sees Dr. Monique), Tremors, CKD3,  and Cervical Facet Arthropathy (C3-5) s/p facet medial branch radiofrequency ablation 1/2023 here for hospital f/up,    Here with  today    S/p right knee surgery, had PT assessment but waiting to hear about at home PT.  Was at [facility] after hospitalization.  6/3 to 6/20/2024  Said only was getting 25 minutes of PT there.  Ambulating with walker.  Leverage in the bathroom is not great so has bedside commode  Was able to shower today    Discharged 6/3/2024 and was discharged on anticoagulation 2.5 mg apixiban for 30 days  Received while in [facility] but on discharge was $452 with discount so has not been taking since 6/22 and was to take for 30 days post op so did not complete anticoagulation for 30 days    Home x 1 week    On bactrim prophylaxis  On propranolol for tremor, she said it is due to montelukast  Not taking miralax  Was taking colace which helped     Is concerned about discomfort in left breast since lumpectomy and siad it feels \"prickly\" since radiation  sees specialist tomorrow    Allergies   Allergen Reactions    Penicillins Hives and Itching    Primidone Other     Stuttering     Tizanidine Other     Stuttering       Current Outpatient Medications   Medication Instructions    acetaminophen (TYLENOL EXTRA STRENGTH) 1,000 mg, oral, Every 6 hours PRN    acyclovir (ZOVIRAX) 400 mg, oral, Daily    allantoin gel Topical, As needed, Contains cannibus    amLODIPine (NORVASC) 2.5 mg, oral, Daily    " apixaban (ELIQUIS) 2.5 mg, oral, 2 times daily    atorvastatin (Lipitor) 20 mg tablet 1 tablet, oral, 3 times weekly    azelastine (Astelin) 137 mcg (0.1 %) nasal spray 2 sprays, Each Nostril, 2 times daily PRN, Use in each nostril as directed    blood pressure test kit-large kit Use to check blood pressure once daily.    budesonide-formoteroL (Symbicort) 160-4.5 mcg/actuation inhaler 2 puffs, inhalation, Daily, RINSE MOUTH AFTER USE.    butenafine HCl (MENTAX TOP) topical (top)    cholecalciferol (VITAMIN D-3) 50 mcg, oral, Daily    cyanocobalamin, vitamin B-12, (Vitamin B-12) 2,500 mcg tablet, sublingual SL tablet 1 tablet, sublingual    docusate sodium (COLACE) 100 mg, oral, 2 times daily    fluocinolone and shower cap 0.01 % oil Apply to scalp  leave on overnight and wash off in the morning. Use daily    hydroxychloroquine (Plaquenil) 200 mg tablet TAKE 1 TABLET BY MOUTH WITH FOOD OR MILK ONCE A DAY    ketoconazole (NIZOral) 2 % shampoo No dose, route, or frequency recorded.    latanoprost, PF, 0.005 % drops 1 drop, Both Eyes, Nightly    levothyroxine (SYNTHROID, LEVOXYL) 100 mcg, oral, 5 times weekly    lisinopril 20 mg, oral, Daily    medical cannabis 1 each, oral, As needed    montelukast (Singulair) 10 mg tablet 1 tablet, oral, Daily    OneTouch Verio test strips strip TEST BLOOD SUGAR ONCE A DAY    peg 400-propylene glycol, PF, (Systane, PF,) 0.4-0.3 % dropperette ophthalmic (eye)    pilocarpine (SALAGEN) 5 mg, oral, Nightly    povidone, PF, (iVizia, PF,) 0.5 % drops 1 drop, ophthalmic (eye), 3 times daily PRN    propranolol (INDERAL) 10 mg, oral, Daily    sulfamethoxazole-trimethoprim (Bactrim DS) 800-160 mg tablet 1 tablet, oral, Daily, Takes daily not bid    travoprost (Travatan Z) 0.004 % drops ophthalmic solution 1 drop, Both Eyes, Nightly    VITAMIN B COMPLEX ORAL oral, Every 24 hours    zinc gluconate 50 mg tablet oral, Every 24 hours        Review of Systems  Constitutional  No fatigue, no fevers,  no chills, said intentional weight loss (wasn't eating)  HEENT:  No headaches, no dizziness,   Cardiovascular:  No chest pain, no palpitations, no shortness of breath with exertion (walking around house with walker),   Respiratory:  Occasional cough after eye drops from post nasal drip no hemoptysis, no wheezing, No shortness of breath at rest, no pain when taking deep breath in  GI:  No dysphagia, no odynophagia, no reflux, no abdominal pain, no nausea, no longer taking oxy takes tylenol 500 mg a little constipation (better with colace, refilled), no bright red blood per rectum, no melena  :   Some 2-3 times nocturia, no urinary frequency, no dysuria, no urine incontinence  MSK:  No falls, wrist left joint pain since using more to push off chair and is using restore cannibus with relief  Neuro:  Unchanged tremors, no extremity weakness    Physical Exam  /70   Pulse 92   Wt 74.7 kg (164 lb 9.6 oz) Comment: with shoes  SpO2 98%   BMI 30.11 kg/m²   Pulse ox 97% RA and HR 86  General:    Well-appearing  F in no acute distress, well nourished, well hydrated  Head:  Normocephalic, atraumatic  Skin:          Warm dry,   Eyes:  Anicteric sclera, pupils equal,   Ears:        TMs intact  Oral:      Not examined due to pandemic  Neck:   Supple, no cervical/supraclavicular adenopathy, no thyromegaly or nodules appreciated on exam  Cor:      Regular rate, normal S1, S2, no murmurs appreciated, no S3, no S4   Lungs:   Clear to auscultation b/l, no wheezes, no rhonchi, no crackles, no accessory respiratory muscle use  Abd:          Soft, nontender, no guarding, no rebound, no hepatosplenomegaly appreciated   Ext:            No lower extremity edema, no palpable cords, right knee incision is clear,     Assessment/Plan   Problem List Items Addressed This Visit       Arthritis of knee    Relevant Medications    docusate sodium (Colace) 100 mg capsule    Polycystic liver disease     Management per Hepatology  Cysts stable  on serial US  Per 11/2/2022 Hepatology note, no cirrhosis on recent MRIs but US in past demonstrated mild nodularity and shrunken appearance so unclear if truly cirrhosis (or radiologic appearance of cysts)         HTN (hypertension)     Controlled on current medication         NPH (normal pressure hydrocephalus) (Multi)     MRI 5/4/2023 with findings suggesting NPH, old subinsular infarct (new from 6/28/21 MRI)  Neuro had recommended LP or Lumbar Drain Trial but she opted to defer  Management per neurology         Sjogren syndrome, unspecified (Multi)     Management per rheumatology  Sees Dr. Sparks at clinic every 6 months  Sjogren disease stable         Hospital discharge follow-up - Primary     S/p right knee replacement  She did not complete course of Eliquis due to cost (took for 23 approximately days not 30)  Has no calf swelling or chest pain  Clinically doing well with PT               Nadira Moctezuma, DO

## 2024-07-02 ENCOUNTER — HOSPITAL ENCOUNTER (OUTPATIENT)
Dept: RADIOLOGY | Facility: CLINIC | Age: 84
Discharge: HOME | End: 2024-07-02
Payer: MEDICARE

## 2024-07-02 ENCOUNTER — APPOINTMENT (OUTPATIENT)
Dept: RADIOLOGY | Facility: CLINIC | Age: 84
End: 2024-07-02
Payer: MEDICARE

## 2024-07-02 DIAGNOSIS — N18.32 STAGE 3B CHRONIC KIDNEY DISEASE (MULTI): ICD-10-CM

## 2024-07-02 PROCEDURE — 76770 US EXAM ABDO BACK WALL COMP: CPT

## 2024-07-02 PROCEDURE — 76770 US EXAM ABDO BACK WALL COMP: CPT | Performed by: RADIOLOGY

## 2024-07-09 DIAGNOSIS — K74.60 CIRRHOSIS OF LIVER WITHOUT ASCITES, UNSPECIFIED HEPATIC CIRRHOSIS TYPE (MULTI): ICD-10-CM

## 2024-07-09 DIAGNOSIS — I10 PRIMARY HYPERTENSION: ICD-10-CM

## 2024-07-10 ENCOUNTER — APPOINTMENT (OUTPATIENT)
Dept: GASTROENTEROLOGY | Facility: HOSPITAL | Age: 84
End: 2024-07-10
Payer: MEDICARE

## 2024-07-10 ENCOUNTER — APPOINTMENT (OUTPATIENT)
Dept: OPHTHALMOLOGY | Facility: CLINIC | Age: 84
End: 2024-07-10
Payer: MEDICARE

## 2024-07-10 DIAGNOSIS — M35.01 KERATOCONJUNCTIVITIS SICCA, IN SJOGREN'S SYNDROME (MULTI): ICD-10-CM

## 2024-07-10 DIAGNOSIS — Z79.899 LONG-TERM USE OF PLAQUENIL: Primary | ICD-10-CM

## 2024-07-10 DIAGNOSIS — H35.3131 EARLY DRY STAGE NONEXUDATIVE AGE-RELATED MACULAR DEGENERATION OF BOTH EYES: ICD-10-CM

## 2024-07-10 PROCEDURE — 99213 OFFICE O/P EST LOW 20 MIN: CPT | Performed by: STUDENT IN AN ORGANIZED HEALTH CARE EDUCATION/TRAINING PROGRAM

## 2024-07-10 PROCEDURE — 92083 EXTENDED VISUAL FIELD XM: CPT | Performed by: STUDENT IN AN ORGANIZED HEALTH CARE EDUCATION/TRAINING PROGRAM

## 2024-07-10 PROCEDURE — 92134 CPTRZ OPH DX IMG PST SGM RTA: CPT | Performed by: STUDENT IN AN ORGANIZED HEALTH CARE EDUCATION/TRAINING PROGRAM

## 2024-07-10 ASSESSMENT — ENCOUNTER SYMPTOMS
MUSCULOSKELETAL NEGATIVE: 0
HEMATOLOGIC/LYMPHATIC NEGATIVE: 0
ENDOCRINE NEGATIVE: 0
RESPIRATORY NEGATIVE: 0
CARDIOVASCULAR NEGATIVE: 0
EYES NEGATIVE: 0
ALLERGIC/IMMUNOLOGIC NEGATIVE: 0
PSYCHIATRIC NEGATIVE: 0
NEUROLOGICAL NEGATIVE: 0
CONSTITUTIONAL NEGATIVE: 0
GASTROINTESTINAL NEGATIVE: 0

## 2024-07-10 ASSESSMENT — CONF VISUAL FIELD
OS_SUPERIOR_NASAL_RESTRICTION: 0
OS_INFERIOR_TEMPORAL_RESTRICTION: 0
OD_INFERIOR_NASAL_RESTRICTION: 0
OS_INFERIOR_NASAL_RESTRICTION: 0
OS_SUPERIOR_TEMPORAL_RESTRICTION: 0
OS_NORMAL: 1
OD_NORMAL: 1
OD_SUPERIOR_TEMPORAL_RESTRICTION: 0
OD_INFERIOR_TEMPORAL_RESTRICTION: 0
OD_SUPERIOR_NASAL_RESTRICTION: 0

## 2024-07-10 ASSESSMENT — REFRACTION_WEARINGRX
OS_ADD: +2.75
OD_AXIS: 110
OD_CYLINDER: -1.75
OD_SPHERE: +4.50
OS_SPHERE: +3.25
OS_AXIS: 115
OS_CYLINDER: -0.75
OD_ADD: +2.75

## 2024-07-10 ASSESSMENT — CUP TO DISC RATIO
OD_RATIO: .6
OS_RATIO: .6

## 2024-07-10 ASSESSMENT — TONOMETRY
OD_IOP_MMHG: 16
IOP_METHOD: GOLDMANN APPLANATION
OS_IOP_MMHG: 15

## 2024-07-10 ASSESSMENT — VISUAL ACUITY
CORRECTION_TYPE: GLASSES
OD_CC: 20/20
OS_CC: 20/25
OS_CC+: +2
METHOD: SNELLEN - LINEAR

## 2024-07-10 ASSESSMENT — EXTERNAL EXAM - RIGHT EYE: OD_EXAM: NORMAL

## 2024-07-10 ASSESSMENT — EXTERNAL EXAM - LEFT EYE: OS_EXAM: NORMAL

## 2024-07-10 ASSESSMENT — PACHYMETRY
OD_CT(UM): 502
OS_CT(UM): 486

## 2024-07-10 NOTE — PROGRESS NOTES
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 Garcia for Thyroid eye disease and Dr. Pabon for Glaucoma  -current TXT for ocular surface: Systane PF AT's QID, Refresh pm abel at bedtime, and warm compresses, and punctal plugs bilateral lower lids  -Rx'd Miebo but patient never received; ?whether it was denied with insurance  -(+)CPAP  -Hx of being prescribed Xiidra but medication was too expensive to obtain  -Patient is also taking Glaucoma medications (latanoprost) and with concurrent glaucoma would like to try and stay away from a steroid pulse  -H/o of being given samples of steroid pulses to control symptoms  -Patient previously had Eagle punctal plugs placed but they have fallen out  -Eagle Plugs last exam OD .5mm LOT [id number] EXP 3/28/29 OS .6mm LOT [id number] EXP 2/28/29    -TXT plan: Continue with Systane PF AT's QID, Refresh PM abel at bedtime, and warm compresses; Eagle punctal plugs today.     Discussed that we could add an amniotic membrane in the future if there is continued irritation.     Long-term use of Plaquenil  -patient currently taking 200mg Qday for Lupus; unknown duration  -Studies reveal that the risk of maculopathy when taking Plaquenil is 0% (0-5 years), 1% (over 5 years), 2% (over 10 years), and 20% cumulative, or 4% annual incidence (over 20 years) respectively.  Annual testing with 10-2 threshold visual field perimetry, as well as spectral domain optical coherence tomography of the macula is recommended. No signs of plaquenil toxicity today od/os, macula OCT today, HVF 10-2 today  monitor 1 year or sooner with any acute vision change. HVF 10-2 and OCT Mac at next comprehensive exam.    Early Dry Macular Degeneration  -noted on uDFE and MAC OCT  today  -continue to monitor with annual exams  -discussed good diet/exercise/multivitamin/monitoring [facility]/no smoking/sunglasses    RTC with Dr. Garcia and Dr. Pabon as directed  RTC Srinivasa dry eye check 4/2024 with MAC OCT

## 2024-07-11 ENCOUNTER — APPOINTMENT (OUTPATIENT)
Dept: RADIOLOGY | Facility: CLINIC | Age: 84
End: 2024-07-11
Payer: MEDICARE
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
## 2024-07-11 PROBLEM — K74.60 HEPATIC CIRRHOSIS (MULTI): Status: RESOLVED | Noted: 2023-02-16 | Resolved: 2024-07-11

## 2024-07-11 PROBLEM — M06.4 INFLAMMATORY POLYARTHRITIS (MULTI): Status: RESOLVED | Noted: 2023-09-06 | Resolved: 2024-07-11

## 2024-07-11 PROBLEM — T17.928A ASPIRATION OF FOOD: Status: RESOLVED | Noted: 2023-02-16 | Resolved: 2024-07-11

## 2024-07-11 PROBLEM — Z09 HOSPITAL DISCHARGE FOLLOW-UP: Status: ACTIVE | Noted: 2024-07-11

## 2024-07-11 RX ORDER — PROPRANOLOL HYDROCHLORIDE 10 MG/1
10 TABLET ORAL DAILY
Qty: 90 TABLET | Refills: 0 | Status: SHIPPED | OUTPATIENT
Start: 2024-07-11

## 2024-07-11 RX ORDER — AMLODIPINE BESYLATE 2.5 MG/1
2.5 TABLET ORAL DAILY
Qty: 90 TABLET | Refills: 0 | Status: SHIPPED | OUTPATIENT
Start: 2024-07-11

## 2024-07-11 NOTE — ASSESSMENT & PLAN NOTE
S/p right knee replacement  She did not complete course of Eliquis due to cost (took for 23 approximately days not 30)  Has no calf swelling or chest pain  Clinically doing well with PT

## 2024-07-11 NOTE — ASSESSMENT & PLAN NOTE
MRI 5/4/2023 with findings suggesting NPH, old subinsular infarct (new from 6/28/21 MRI)  Neuro had recommended LP or Lumbar Drain Trial but she opted to defer  Management per neurology

## 2024-07-11 NOTE — ASSESSMENT & PLAN NOTE
Management per Hepatology  Cysts stable on serial US  Per 11/2/2022 Hepatology note, no cirrhosis on recent MRIs but US in past demonstrated mild nodularity and shrunken appearance so unclear if truly cirrhosis (or radiologic appearance of cysts)

## 2024-07-18 ENCOUNTER — PATIENT OUTREACH (OUTPATIENT)
Dept: PRIMARY CARE | Facility: CLINIC | Age: 84
End: 2024-07-18
Payer: MEDICARE

## 2024-07-18 DIAGNOSIS — N18.32 TYPE 2 DIABETES MELLITUS WITH STAGE 3B CHRONIC KIDNEY DISEASE, WITHOUT LONG-TERM CURRENT USE OF INSULIN (MULTI): ICD-10-CM

## 2024-07-18 DIAGNOSIS — E11.22 TYPE 2 DIABETES MELLITUS WITH STAGE 3B CHRONIC KIDNEY DISEASE, WITHOUT LONG-TERM CURRENT USE OF INSULIN (MULTI): ICD-10-CM

## 2024-07-18 DIAGNOSIS — I10 HYPERTENSION, UNSPECIFIED TYPE: ICD-10-CM

## 2024-07-18 NOTE — PROGRESS NOTES
Chart reviewed and spoke with Patient to do monthly outreach. Patient states she is improving after her right knee replacement. She was using a walker and it bothered her left wrist and she wears a brace that helps. Patient is using a cane to ambulate and is using CBD gel and a 500 mg tablet of Tylenol if she has pain which seems to help. Denies any falls and we reviewed falls precautions and she verbalized understanding. Continues with at home physical therapy twice a week. Pt. denies any need for refills at this time. Appointments reviewed and she sees nephology on 07/22/24, ortho post op on 07/25/24 and PCP Dr. Moctezuma on 08/07/24. Patient to call with any questions or concerns.

## 2024-07-22 ENCOUNTER — LAB (OUTPATIENT)
Dept: LAB | Facility: LAB | Age: 84
End: 2024-07-22
Payer: MEDICARE

## 2024-07-22 ENCOUNTER — APPOINTMENT (OUTPATIENT)
Dept: NEPHROLOGY | Facility: CLINIC | Age: 84
End: 2024-07-22
Payer: MEDICARE

## 2024-07-22 VITALS
HEIGHT: 63 IN | HEART RATE: 73 BPM | WEIGHT: 164 LBS | SYSTOLIC BLOOD PRESSURE: 127 MMHG | BODY MASS INDEX: 29.06 KG/M2 | DIASTOLIC BLOOD PRESSURE: 63 MMHG

## 2024-07-22 DIAGNOSIS — N18.31 STAGE 3A CHRONIC KIDNEY DISEASE (MULTI): ICD-10-CM

## 2024-07-22 LAB
25(OH)D3 SERPL-MCNC: 60 NG/ML (ref 30–100)
APPEARANCE UR: ABNORMAL
BACTERIA #/AREA URNS AUTO: ABNORMAL /HPF
BASOPHILS # BLD AUTO: 0.03 X10*3/UL (ref 0–0.1)
BASOPHILS NFR BLD AUTO: 0.7 %
BILIRUB UR STRIP.AUTO-MCNC: NEGATIVE MG/DL
CAOX CRY #/AREA UR COMP ASSIST: ABNORMAL /HPF
COLOR UR: ABNORMAL
CREAT UR-MCNC: 369.9 MG/DL (ref 20–320)
CREAT UR-MCNC: 375.1 MG/DL (ref 20–320)
EOSINOPHIL # BLD AUTO: 0.2 X10*3/UL (ref 0–0.4)
EOSINOPHIL NFR BLD AUTO: 4.9 %
ERYTHROCYTE [DISTWIDTH] IN BLOOD BY AUTOMATED COUNT: 14.1 % (ref 11.5–14.5)
GLUCOSE UR STRIP.AUTO-MCNC: NORMAL MG/DL
HCT VFR BLD AUTO: 36.3 % (ref 36–46)
HGB BLD-MCNC: 11.8 G/DL (ref 12–16)
IMM GRANULOCYTES # BLD AUTO: 0.02 X10*3/UL (ref 0–0.5)
IMM GRANULOCYTES NFR BLD AUTO: 0.5 % (ref 0–0.9)
KETONES UR STRIP.AUTO-MCNC: ABNORMAL MG/DL
LEUKOCYTE ESTERASE UR QL STRIP.AUTO: ABNORMAL
LYMPHOCYTES # BLD AUTO: 1.01 X10*3/UL (ref 0.8–3)
LYMPHOCYTES NFR BLD AUTO: 24.6 %
MCH RBC QN AUTO: 30.8 PG (ref 26–34)
MCHC RBC AUTO-ENTMCNC: 32.5 G/DL (ref 32–36)
MCV RBC AUTO: 95 FL (ref 80–100)
MICROALBUMIN UR-MCNC: 39.1 MG/L
MICROALBUMIN/CREAT UR: 10.4 UG/MG CREAT
MONOCYTES # BLD AUTO: 0.4 X10*3/UL (ref 0.05–0.8)
MONOCYTES NFR BLD AUTO: 9.7 %
MUCOUS THREADS #/AREA URNS AUTO: ABNORMAL /LPF
NEUTROPHILS # BLD AUTO: 2.45 X10*3/UL (ref 1.6–5.5)
NEUTROPHILS NFR BLD AUTO: 59.6 %
NITRITE UR QL STRIP.AUTO: NEGATIVE
NRBC BLD-RTO: 0 /100 WBCS (ref 0–0)
PH UR STRIP.AUTO: 6 [PH]
PLATELET # BLD AUTO: 165 X10*3/UL (ref 150–450)
PROT SERPL-MCNC: 6.5 G/DL (ref 6.4–8.2)
PROT UR STRIP.AUTO-MCNC: ABNORMAL MG/DL
PTH-INTACT SERPL-MCNC: 115.6 PG/ML (ref 18.5–88)
RBC # BLD AUTO: 3.83 X10*6/UL (ref 4–5.2)
RBC # UR STRIP.AUTO: NEGATIVE /UL
RBC #/AREA URNS AUTO: >20 /HPF
SP GR UR STRIP.AUTO: 1.03
SQUAMOUS #/AREA URNS AUTO: ABNORMAL /HPF
UROBILINOGEN UR STRIP.AUTO-MCNC: NORMAL MG/DL
WBC # BLD AUTO: 4.1 X10*3/UL (ref 4.4–11.3)
WBC #/AREA URNS AUTO: ABNORMAL /HPF

## 2024-07-22 PROCEDURE — 3078F DIAST BP <80 MM HG: CPT | Performed by: INTERNAL MEDICINE

## 2024-07-22 PROCEDURE — 1125F AMNT PAIN NOTED PAIN PRSNT: CPT | Performed by: INTERNAL MEDICINE

## 2024-07-22 PROCEDURE — 1159F MED LIST DOCD IN RCRD: CPT | Performed by: INTERNAL MEDICINE

## 2024-07-22 PROCEDURE — 84155 ASSAY OF PROTEIN SERUM: CPT

## 2024-07-22 PROCEDURE — 1036F TOBACCO NON-USER: CPT | Performed by: INTERNAL MEDICINE

## 2024-07-22 PROCEDURE — 83970 ASSAY OF PARATHORMONE: CPT

## 2024-07-22 PROCEDURE — 86334 IMMUNOFIX E-PHORESIS SERUM: CPT

## 2024-07-22 PROCEDURE — 1160F RVW MEDS BY RX/DR IN RCRD: CPT | Performed by: INTERNAL MEDICINE

## 2024-07-22 PROCEDURE — 81001 URINALYSIS AUTO W/SCOPE: CPT

## 2024-07-22 PROCEDURE — 82043 UR ALBUMIN QUANTITATIVE: CPT

## 2024-07-22 PROCEDURE — 83521 IG LIGHT CHAINS FREE EACH: CPT

## 2024-07-22 PROCEDURE — 36415 COLL VENOUS BLD VENIPUNCTURE: CPT

## 2024-07-22 PROCEDURE — 82306 VITAMIN D 25 HYDROXY: CPT

## 2024-07-22 PROCEDURE — 82570 ASSAY OF URINE CREATININE: CPT

## 2024-07-22 PROCEDURE — 3074F SYST BP LT 130 MM HG: CPT | Performed by: INTERNAL MEDICINE

## 2024-07-22 PROCEDURE — 99203 OFFICE O/P NEW LOW 30 MIN: CPT | Performed by: INTERNAL MEDICINE

## 2024-07-22 PROCEDURE — 84165 PROTEIN E-PHORESIS SERUM: CPT

## 2024-07-22 PROCEDURE — 85025 COMPLETE CBC W/AUTO DIFF WBC: CPT

## 2024-07-22 ASSESSMENT — PAIN SCALES - GENERAL: PAINLEVEL: 6

## 2024-07-22 NOTE — PATIENT INSTRUCTIONS
Thank you for your visit!    Today we discussed:   Your kidney function is around 40%    Continue a low salt, DASH diet, no more than 2000 mg (2 g) sodium (salt) daily  Continue regular exercise, at least 20 minutes a day, as you can tolerate  Keep your diabetes well controlled, goal A1C is less than 7.0 per day  Your goal blood pressure is 120s/70-80s; please follow your home blood pressure daily and bring in blood pressure measurements to next office visits=  Avoid kidney toxic medications, such as nonsteroidal anti-inflammatories (NSAIDs); Tylenol or acetaminophen over the counter is okay    Please keep your immunizations up to date  Please discuss with your primary care provider getting the Hepatitis B series of vaccinations    Please follow up with labs one week prior to next visit in 3 months  (November)  Labs today  Clinical pharmacist will call to educate you about a new medication that can help preserve your kidney function    www.kidney.org ([employer] website) is a great source of information regarding kidney disease    Sincerely, Dr. Cooper  Phone [phone]

## 2024-07-22 NOTE — PROGRESS NOTES
84 yo CF  UTI difficulty emptying bladder (twice last year), nocturia    No NSAIDs  Cannabis, restore ointment, drops    PMH  HTN  Asthma  DLD  MENDY  R knee replacement  Diet controlled diabetes  Hypothyroidism  Sjogren  Breast cancer s/p XRT / lumpectomy    SOCHx  1961 tob, none since  No ETOH  Lives with   Retired liaison specialist at [employer]    FMx  F prostate cancer  B prostate cancer  Bro prostate cancer  CKD none    ROS OTW neg 10/14 systems except as above    NAD  Sclera AI s inj  MMM, no sores  Deferred secondary to COVID  No edema  No tremor  No rash  Appropriate    CKD Stage 3b  HTN well controlled  Urinary incontinence, incomplete emptying, UTIs    Discussed risks / benefits / indications of SGLT2  Clinical pharmacy referral for SGLT2 costing / education -- hold off on initiation for now  Labs today   Labs and follow up in 3 months  Discussed follow up with her gyne to discuss her difficulty voiding and incontinence issues, particularly before trying SGLT2

## 2024-07-23 LAB
KAPPA LC SERPL-MCNC: 2.1 MG/DL (ref 0.33–1.94)
KAPPA LC/LAMBDA SER: 1.27 {RATIO} (ref 0.26–1.65)
LAMBDA LC SERPL-MCNC: 1.66 MG/DL (ref 0.57–2.63)

## 2024-07-25 ENCOUNTER — OFFICE VISIT (OUTPATIENT)
Dept: ORTHOPEDIC SURGERY | Facility: CLINIC | Age: 84
End: 2024-07-25
Payer: MEDICARE

## 2024-07-25 ENCOUNTER — HOSPITAL ENCOUNTER (OUTPATIENT)
Dept: RADIOLOGY | Facility: CLINIC | Age: 84
Discharge: HOME | End: 2024-07-25
Payer: MEDICARE

## 2024-07-25 DIAGNOSIS — Z96.651 S/P TOTAL KNEE ARTHROPLASTY, RIGHT: ICD-10-CM

## 2024-07-25 DIAGNOSIS — Z96.651 S/P TOTAL KNEE ARTHROPLASTY, RIGHT: Primary | ICD-10-CM

## 2024-07-25 LAB
ALBUMIN: 4.1 G/DL (ref 3.4–5)
ALPHA 1 GLOBULIN: 0.3 G/DL (ref 0.2–0.6)
ALPHA 2 GLOBULIN: 0.5 G/DL (ref 0.4–1.1)
BETA GLOBULIN: 0.8 G/DL (ref 0.5–1.2)
GAMMA GLOBULIN: 0.8 G/DL (ref 0.5–1.4)
IMMUNOFIXATION COMMENT: NORMAL
PATH REVIEW - SERUM IMMUNOFIXATION: NORMAL
PATH REVIEW-SERUM PROTEIN ELECTROPHORESIS: NORMAL
PROTEIN ELECTROPHORESIS COMMENT: NORMAL

## 2024-07-25 PROCEDURE — 73562 X-RAY EXAM OF KNEE 3: CPT | Mod: RT

## 2024-07-25 PROCEDURE — 73562 X-RAY EXAM OF KNEE 3: CPT | Mod: RIGHT SIDE | Performed by: RADIOLOGY

## 2024-07-25 PROCEDURE — 99211 OFF/OP EST MAY X REQ PHY/QHP: CPT | Performed by: PHYSICIAN ASSISTANT

## 2024-07-25 ASSESSMENT — PAIN - FUNCTIONAL ASSESSMENT: PAIN_FUNCTIONAL_ASSESSMENT: NO/DENIES PAIN

## 2024-07-25 NOTE — PROGRESS NOTES
Yolette Carlos, BAKARI, PA-C, ATC  Adult Reconstruction and Joint Replacement Surgery  Phone: [phone]     Fax:[phone]            Chief Complaint   Patient presents with    Right Knee - Post-op       HPI:  Louisa Rodas is a pleasant 83 y.o. year-old female here for follow-up of their side: right total knee arthroplasty by Dr. Bhagat.  The patient is approximately 8 week(s) postop.The patient has no mechanical symptoms.  The patient has no swelling and pain.   The patients wound has healed uneventfully.  The patient has been doing HEP and/or outpatient PT.  No complications postoperatively.      Review of Systems  Past Medical History:   Diagnosis Date    Aspiration of food 02/16/2023    Asthma ([facility]-HCC)     Cervical spondylosis without myelopathy     Chronic kidney disease, stage 3a (Multi)     3.21.24: creat 1.43, GFR 37    DM (diabetes mellitus), type 2 (Multi)     2/20/2024 A1C 7.2%    Ductal carcinoma in situ (DCIS) of left breast     s/p lumpectomy 3/27/24    Essential tremor     Fatigue     Fibromyalgia     H/O echocardiogram 05/25/2023    CONCLUSIONS: 1. Left ventricular systolic function is hyperdynamic with a 70-75% estimated EF. 2. Spectral Doppler shows an impaired relaxation pattern of left ventricular diastolic filling. 3. Mild aortic valve regurgitation. 4. Left ventricular cavity size is decreased. 5. There is a mid cavity left ventricular obstruction noted w/ the Valsalva maneuver. The provoked gradient is 28 mmHg.    Hepatic cirrhosis (Multi) 02/16/2023    Hyperlipidemia     Hypermetropia, bilateral     Hyperopia of both eyes with astigmatism and presbyopia    Hypertension     Hypogammaglobulinemia (Multi)     Hypothyroidism     Ischemic optic neuropathy, bilateral     Ischemic optic neuropathy, bilateral    Meibomian gland dysfunction right eye, upper and lower eyelids     Meibomian gland dysfunction (MGD) of upper and lower lids of both eyes    Near syncope     NPH (normal  pressure hydrocephalus) (Multi)     MILD TO MODERATE, brain MRI 5/4/23:mild enlargement of ventricles, mild diffuse cerebral atrophy    Obesity     Obstructive sleep apnea on CPAP     Osteoarthritis     Peripheral motor neuropathy     Polycystic liver disease     follows with hepatology yearly    PONV (postoperative nausea and vomiting)     Primary open-angle glaucoma, bilateral, indeterminate stage     Sciatica     Sjogren syndrome, unspecified (Multi)     Vocal cord dysfunction      Patient Active Problem List   Diagnosis    Abnormal EEG    Abnormal liver ultrasound    Arthritis of hand, right, degenerative    Arthritis of knee    Arthritis, degenerative, localized, primary, hand    Benign neoplasm of soft tissue of upper extremity    Carpal tunnel syndrome    Cervical disc disease    Cervical spondylosis without myelopathy    Chills (without fever)    Chronic pain of both knees    Degenerative arthritis of knee, bilateral    Vertical diplopia    Drusen of left macula    Drusen of right macula    Dysphagia    Dysphonia    Foot pain, bilateral    Glaucoma of both eyes    Hallux rigidus of right foot    Headache, cervicogenic    Abdominal pain    Generalized abdominal pain    Hip pain    Incomplete rotator cuff tear    Left shoulder pain    Liver cyst    Liver lesion    Lower back pain    Lumbar radiculopathy    Myalgia, other site    Neck pain    MENDY on CPAP    Osteoarthritis of right ankle and foot    Localized osteoarthrosis of left ankle and foot    Degenerative arthritis of hip    Pain, wrist joint    Pelvic pain in female    POAG (primary open-angle glaucoma)    Radiculitis, cervical    Scapholunate dissociation    Schwannoma    Segmental and somatic dysfunction of cervical region    Segmental and somatic dysfunction of lumbar region    Segmental and somatic dysfunction of pelvic region    Segmental and somatic dysfunction of thoracic region    Shortness of breath at rest    Shortness of breath on exertion     Skew deviation    Thumb pain, right    Thyroid eye disease    Vaginal atrophy    Vocal cord dysfunction    Weakness of both lower extremities    Polycystic liver disease    Joint pain, knee    Acid reflux    Esophageal dysmotility    Muscle tension dysphonia    Stage 3b chronic kidney disease (Multi)    Hypothyroidism, unspecified    Imbalance    Hyperlipidemia    Near syncope    Urine frequency    Hypertensive heart disease without heart failure    Abnormal brain MRI    Bilateral presbyopia    Communicating hydrocephalus (Multi)    Type 2 diabetes mellitus with stage 3b chronic kidney disease, without long-term current use of insulin (Multi)    Dry eye syndrome of both lacrimal glands    Glaucoma    Herpes genitalis in women    Hypogammaglobulinemia (Multi)    Insomnia due to medical condition    Keratoconjunctivitis sicca, in Sjogren's syndrome (Multi)    OAB (overactive bladder)    Other fatigue    Peripheral motor neuropathy    Recurrent UTI    Vitiligo    Localized, secondary osteoarthritis of the hand    Right foot pain    Arthralgia of multiple sites    Hyperopia of both eyes    Regular astigmatism of both eyes    Other low back pain    Fibromyalgia    HTN (hypertension)    Lymphopenia    Enthesopathy    Weakness    Encounter for Medicare annual wellness exam    NPH (normal pressure hydrocephalus) (Multi)    Osteoarthritis    Menopause    Encounter for screening mammogram for malignant neoplasm of breast    Chronic pain of right knee    Moderate persistent asthma without complication (HHS-HCC)    Sjogren syndrome, unspecified (Multi)    Osteopenia    Obesity, Class I, BMI 30-34.9    Ductal carcinoma in situ (DCIS) of left breast    PONV (postoperative nausea and vomiting)    Unilateral primary osteoarthritis, right knee    Meibomian gland disease    Long-term use of Plaquenil    Encounter for follow-up surveillance of breast cancer    Stage 3a chronic kidney disease (Multi)    Mild intermittent asthma without  complication (HHS-HCC)    Skin lesion    Acquired ptosis of eyelid    Dry eyes    Dermatochalasis of both upper eyelids    Floppy eyelid syndrome    History of depression    Ischemic optic neuropathy of both eyes    Low bone density    Macular degeneration    Nonproliferative diabetic retinopathy (Multi)    Subconjunctival hemorrhage    Tear film insufficiency    Hospital discharge follow-up     Medication Documentation Review Audit       Reviewed by Christiane Cooper MD (Physician) on 07/22/24 at 1420      Medication Order Taking? Sig Documenting Provider Last Dose Status   acetaminophen (Tylenol Extra Strength) 500 mg tablet [id number] Yes Take 2 tablets (1,000 mg) by mouth every 6 hours if needed for mild pain (1 - 3). Chanda Alves PA-C Taking Active   acyclovir (Zovirax) 400 mg tablet [id number] No Take 1 tablet (400 mg) by mouth once daily.   Patient not taking: Reported on 7/22/2024    JEAN MARIE Bazzi-CNP Not Taking Active   allantoin gel [id number] No Apply topically if needed for wound care. Contains cannibus Historical Provider, MD Not Taking Active   amLODIPine (Norvasc) 2.5 mg tablet [id number] Yes Take 1 tablet (2.5 mg) by mouth once daily. Nadira Moctezuma,  Taking Active   apixaban (Eliquis) 2.5 mg tablet [id number]  Take 1 tablet (2.5 mg) by mouth 2 times a day. Chanda Alves PA-C  Active   atorvastatin (Lipitor) 20 mg tablet [id number] Yes Take 1 tablet (20 mg) by mouth 3 (three) times a week. Historical Provider, MD Taking Active   azelastine (Astelin) 137 mcg (0.1 %) nasal spray [id number] Yes Administer 2 sprays into each nostril 2 times a day as needed for allergies. Use in each nostril as directed Historical Provider, MD Taking Active   blood pressure test kit-large kit [id number] Yes Use to check blood pressure once daily. Christiane Cooper MD Taking Active   budesonide-formoteroL (Symbicort) 160-4.5 mcg/actuation inhaler [id number] Yes Inhale 2 puffs once daily. RINSE MOUTH AFTER USE.   Patient  taking differently: Inhale 1 puff once daily. RINSE MOUTH AFTER USE.    Nadira Moctezuma DO Taking Active   butenafine HCl (MENTAX TOP) [id number] Yes Apply topically. Historical Provider, MD Taking Active   cholecalciferol (Vitamin D-3) 50 MCG (2000 UT) tablet [id number] Yes Take 1 tablet (50 mcg) by mouth once daily. Historical Provider, MD Taking Active   cyanocobalamin, vitamin B-12, (Vitamin B-12) 2,500 mcg tablet, sublingual SL tablet [id number] Yes Place 1 tablet (2,500 mcg) under the tongue. Historical Provider, MD Taking Active   docusate sodium (Colace) 100 mg capsule [id number] Yes Take 1 capsule (100 mg) by mouth 2 times a day. Nadira Moctezuma DO Taking Active   fluocinolone and shower cap 0.01 % oil [id number] Yes Apply to scalp  leave on overnight and wash off in the morning. Use daily Historical Provider, MD Taking Active   hydroxychloroquine (Plaquenil) 200 mg tablet [id number] Yes TAKE 1 TABLET BY MOUTH WITH FOOD OR MILK ONCE A DAY Rakesh Verma MD Taking Active   ketoconazole (NIZOral) 2 % shampoo [id number] Yes  Historical Provider, MD Taking Active   latanoprost, PF, 0.005 % drops [id number] Yes Administer 1 drop into both eyes once daily at bedtime. Aureliano Pabon MD Taking Active   levothyroxine (Synthroid, Levoxyl) 100 mcg tablet [id number] Yes Take 1 tablet (100 mcg) by mouth 5 times a week. Nadira Moctezuma DO Taking Active   lisinopril 20 mg tablet [id number] Yes Take 1 tablet (20 mg) by mouth once daily. Nadira Moctezuma DO Taking Active   medical cannabis [id number] Yes Take 1 each by mouth if needed. Historical Provider, MD Taking Active   montelukast (Singulair) 10 mg tablet [id number] Yes Take 1 tablet (10 mg) by mouth once daily. Historical Provider, MD Taking Active   OneTouch Verio test strips strip [id number] Yes TEST BLOOD SUGAR ONCE A DAY Historical Provider, MD Taking Active   peg 400-propylene glycol, PF, (Systane, PF,) 0.4-0.3 % dropperette [id number] Yes Administer into affected eye(s).  Historical Provider, MD Taking Active   pilocarpine (Salagen) 5 mg tablet [id number] Yes Take 1 tablet (5 mg) by mouth once daily at bedtime. Historical Provider, MD Taking Active   povidone, PF, (iVizia, PF,) 0.5 % drops [id number] No Administer 1 drop into affected eye(s) 3 times a day as needed. Historical Provider, MD Not Taking Active   propranolol (Inderal) 10 mg tablet  Yes Take 1 tablet (10 mg) by mouth once daily. Nadira Moctezuma DO Taking Active   sulfamethoxazole-trimethoprim (Bactrim DS) 800-160 mg tablet [id number] Yes Take 1 tablet by mouth once daily. Takes daily not bid Historical Provider, MD Taking Active   travoprost (Travatan Z) 0.004 % drops ophthalmic solution  No Administer 1 drop into both eyes once daily at bedtime. Historical Provider, MD Unknown Active   VITAMIN B COMPLEX ORAL  Yes Take by mouth once every 24 hours. Historical Provider, MD Taking Active   zinc gluconate 50 mg tablet  Yes Take by mouth once every 24 hours. Historical Provider, MD Taking Active                  Allergies   Allergen Reactions    Penicillins Hives and Itching    Primidone Other     Stuttering     Tizanidine Other     Stuttering      Social History     Socioeconomic History    Marital status:      Spouse name: Emerson    Number of children: 3    Years of education: Not on file    Highest education level: Some college, no degree   Occupational History    Occupation: retired   Tobacco Use    Smoking status: Former     Current packs/day: 0.00     Average packs/day: 0.3 packs/day for 1 year (0.3 ttl pk-yrs)     Types: Cigarettes     Start date: 1966     Quit date: 1967     Years since quittin.6     Passive exposure: Past    Smokeless tobacco: Never    Tobacco comments:     Quit in    Vaping Use    Vaping status: Never Used   Substance and Sexual Activity    Alcohol use: Not Currently    Drug use: Never    Sexual activity: Defer   Other Topics Concern    Not  on file   Social History Narrative    Not on file     Social Determinants of Health     Financial Resource Strain: Low Risk  (5/29/2024)    Overall Financial Resource Strain (CARDIA)     Difficulty of Paying Living Expenses: Not hard at all   Food Insecurity: No Food Insecurity (10/11/2023)    Hunger Vital Sign     Worried About Running Out of Food in the Last Year: Never true     Ran Out of Food in the Last Year: Never true   Transportation Needs: No Transportation Needs (5/29/2024)    PRAPARE - Transportation     Lack of Transportation (Medical): No     Lack of Transportation (Non-Medical): No   Physical Activity: Sufficiently Active (5/6/2024)    Exercise Vital Sign     Days of Exercise per Week: 3 days     Minutes of Exercise per Session: 60 min   Stress: No Stress Concern Present (5/6/2024)    [employer] [place] [employer] - Occupational Stress Questionnaire     Feeling of Stress : Not at all   Social Connections: Unknown (5/6/2024)    Social Connection and Isolation Panel [NHANES]     Frequency of Communication with Friends and Family: More than three times a week     Frequency of Social Gatherings with Friends and Family: More than three times a week     Attends [employer] Services: Patient unable to answer     Active Member of Clubs or Organizations: Patient unable to answer     Attends Club or Organization Meetings: More than 4 times per year     Marital Status:    Intimate Partner Violence: Not At Risk (5/6/2024)    Humiliation, Afraid, Rape, and Kick questionnaire     Fear of Current or Ex-Partner: No     Emotionally Abused: No     Physically Abused: No     Sexually Abused: No   Housing Stability: Low Risk  (5/29/2024)    Housing Stability Vital Sign     Unable to Pay for Housing in the Last Year: No     Number of Places Lived in the Last Year: 1     Unstable Housing in the Last Year: No     Past Surgical History:   Procedure Laterality Date    BREAST LUMPECTOMY Left 03/27/2024     CATARACT EXTRACTION, BILATERAL      COLONOSCOPY  04/05/2018    tubular adenoma    ESOPHAGOGASTRODUODENOSCOPY  06/2019    HAND SURGERY Bilateral     bone removed right wrist, cyst removed left wrist    IR ABLATION NERVE      KNEE ARTHROSCOPY W/ DEBRIDEMENT Right     MR HEAD ANGIO WO IV CONTRAST  06/28/2021    MR HEAD ANGIO WO IV CONTRAST [place]/20[place] [facility]    MR NECK ANGIO WO IV CONTRAST  06/28/2021    MR NECK ANGIO WO IV CONTRAST [place]/20[place] [facility]    ROTATOR CUFF REPAIR      TONSILLECTOMY      TOTAL KNEE ARTHROPLASTY Left        Physical Exam  side: right Knee  There were no vitals filed for this visit.  AxO x 3 in NAD.   Assistive Device: cane. Coordination and balance intact.  Normal bilateral upper and lower extremities.  No erythema, ecchymosis, temperature changes. No popliteal lymphadenopathy,  No overlying lesion  Mood: euthymic  Respirations non-labored  The incision is midline healing well with no signs of surrounding infection, dehiscence or drainage.   Neurovascular exam is at baseline.  No instability varus or valgus stressing the knee at 0, 30 or 60 degrees.  No instability in the AP plane at 90 degrees.  Range of motion: 0 degrees extension, 120 degrees flexion  5/5 hip flexion/knee extension/DF/PF/EHL  SILT in meredith/saph/ per/tib distribution   Extremities warm and well perfused.  No lower extremity calf tenderness, warmth or swelling. Lower extremity well perfused  2+ Femoral/DP/PT pulses bilaterally    Imaging:  No images are attached to the encounter.    I personally reviewed multiple views of the knee today in clinic. Status post side: right Total Knee aArthroplasty. The implant is well fixed, well aligned.  No evidence of kirt-implant fracture, lucency or dislocation.    Impression/Plan:  Louisa Rodas is doing well post-operatively and happy with the results of the operation.     S/P side: right Total Knee Arthroplasty  I talked with patient at length about activity  precautions and progression of activities. The patient understands their permanent precautions. At this time, you may gradually increase your activities and get back to a normal, low-impact lifestyle. Please avoid running, jumping, and heavy lifting.      3. Continue HEP or outpatient PT, per protocol.    4. Continue Post-operative instructions.    5. Discussed the importance of dental prophylactic dental antibiotics lifelong. Patient may request medication refill through MyChart,       Pharmacy or surgeons office.    All questions answered.    Follow-up 1 year with x-rays at next visit.    BAKARI Cervantes, PA-C, ATC  Orthopedic Physician Assisant  Adult Reconstruction and Total Joint Replacement  General Orthopedics  Department of Orthopaedic Surgery  [place] [id number]  [employer] messaging preferred

## 2024-07-26 DIAGNOSIS — E78.5 HYPERLIPIDEMIA, UNSPECIFIED HYPERLIPIDEMIA TYPE: Primary | ICD-10-CM

## 2024-07-26 RX ORDER — ATORVASTATIN CALCIUM 20 MG/1
20 TABLET, FILM COATED ORAL 3 TIMES WEEKLY
Qty: 45 TABLET | Refills: 1 | Status: SHIPPED | OUTPATIENT
Start: 2024-07-26

## 2024-07-29 ENCOUNTER — TELEPHONE (OUTPATIENT)
Dept: PRIMARY CARE | Facility: CLINIC | Age: 84
End: 2024-07-29
Payer: MEDICARE

## 2024-07-29 DIAGNOSIS — M25.539 PAIN IN WRIST, UNSPECIFIED LATERALITY: Primary | ICD-10-CM

## 2024-07-29 NOTE — PROGRESS NOTES
Patient states she has some burning upon urination that started yesterday and continues today. States she has been taking cranberry tablets and she is already on an antibiotic (Bactrim DS) in which she takes daily. Patient would like to know what to do about this possible UTI. Pt would also like a wrist x-ray or referral to a hand doctor to to left wrist pain from using her walker.

## 2024-07-30 ENCOUNTER — OFFICE VISIT (OUTPATIENT)
Dept: OBSTETRICS AND GYNECOLOGY | Facility: CLINIC | Age: 84
End: 2024-07-30
Payer: MEDICARE

## 2024-07-30 VITALS
SYSTOLIC BLOOD PRESSURE: 115 MMHG | WEIGHT: 163 LBS | BODY MASS INDEX: 28.87 KG/M2 | HEART RATE: 65 BPM | DIASTOLIC BLOOD PRESSURE: 65 MMHG

## 2024-07-30 DIAGNOSIS — E78.5 HYPERLIPIDEMIA, UNSPECIFIED HYPERLIPIDEMIA TYPE: ICD-10-CM

## 2024-07-30 DIAGNOSIS — N39.0 RECURRENT UTI: ICD-10-CM

## 2024-07-30 LAB
POC APPEARANCE, URINE: ABNORMAL
POC BILIRUBIN, URINE: NEGATIVE
POC BLOOD, URINE: ABNORMAL
POC COLOR, URINE: YELLOW
POC GLUCOSE, URINE: NEGATIVE MG/DL
POC KETONES, URINE: ABNORMAL MG/DL
POC LEUKOCYTES, URINE: ABNORMAL
POC NITRITE,URINE: NEGATIVE
POC PH, URINE: 5.5 PH
POC PROTEIN, URINE: ABNORMAL MG/DL
POC SPECIFIC GRAVITY, URINE: >=1.03
POC UROBILINOGEN, URINE: 0.2 EU/DL

## 2024-07-30 PROCEDURE — 1159F MED LIST DOCD IN RCRD: CPT | Performed by: NURSE PRACTITIONER

## 2024-07-30 PROCEDURE — 99212 OFFICE O/P EST SF 10 MIN: CPT | Performed by: NURSE PRACTITIONER

## 2024-07-30 PROCEDURE — 1126F AMNT PAIN NOTED NONE PRSNT: CPT | Performed by: NURSE PRACTITIONER

## 2024-07-30 PROCEDURE — 3078F DIAST BP <80 MM HG: CPT | Performed by: NURSE PRACTITIONER

## 2024-07-30 PROCEDURE — 81003 URINALYSIS AUTO W/O SCOPE: CPT | Performed by: NURSE PRACTITIONER

## 2024-07-30 PROCEDURE — 3074F SYST BP LT 130 MM HG: CPT | Performed by: NURSE PRACTITIONER

## 2024-07-30 RX ORDER — ATORVASTATIN CALCIUM 20 MG/1
20 TABLET, FILM COATED ORAL 3 TIMES WEEKLY
Qty: 45 TABLET | Refills: 1 | Status: SHIPPED | OUTPATIENT
Start: 2024-07-31

## 2024-07-30 ASSESSMENT — PAIN SCALES - GENERAL: PAINLEVEL: 0-NO PAIN

## 2024-07-30 NOTE — PROGRESS NOTES
Subjective   Louisa Rodas is a 83 y.o. female here for follow up on Joe.     Review of Systems    Urinary Symptoms:           - Every time she urinated, she was having burning. The burning has now resolved. She is concerned for a UTI. She has been staying hydrated and using cranberry tablets.    - She says she is taking Bactrim.     - At times her urine stream is strong and other times it is weaker.   - Not using tv estrogen cream as it was expensive.      Vulvar Symptoms:   - Denies itching.   - She puts paper towels over the underwear.      Bowel Symptoms:   - She takes Colace.     Interval History:   - Pt had a recent kidney function panel ordered, but it hasn't been drawn yet. She follows with nephrology.   - She is getting an x ray of her wrist after injuring her hand.     Objective   /65 (BP Location: Left arm, Patient Position: Sitting, BP Cuff Size: Adult)   Pulse 65   Wt 73.9 kg (163 lb)   BMI 28.87 kg/m²    General: alert and oriented, in no acute distress             Laboratory:   Urine dipstick shows cloudy urine with ketones, blood, leukocytes, protein.     Assessment/Plan   83 y.o. female being assessed for recurrent UTI.     Plan:   1. Recurrent UTI   - Urine was sent for culture.   - We reviewed when she has a stronger urine stream it is likely related to having a full bladder and the stream will be weaker if her bladder isn't as full. If patient had a constant dribble and didn't have a stream, then she should RTC.   - She takes Bactrim and cranberry supplements, but she is not using tv estrogen cream as it was expensive.   - Patient can call and leave a urine sample at any  lab when she is symptomatic for a UTI.     2. General vulvar care measures   - Avoid using paper towels over the underwear and instead just use a pad to prevent vulvar irritation.        Follow-up in Spring 2025 with JEAN MARIE Bazzi-CNP.      Scribe Attestation:   ARNEL, Freida Perez, am scribing for virtually, and  in the presence of MATT Bazzi on 7/30/24 at 3:11 PM.   I, MATT Bazzi, personally performed the services described in this documentation which was scribed virtually and I confirm that it is both accurate and complete.

## 2024-08-06 ENCOUNTER — EVALUATION (OUTPATIENT)
Dept: PHYSICAL THERAPY | Facility: CLINIC | Age: 84
End: 2024-08-06
Payer: MEDICARE

## 2024-08-06 ENCOUNTER — TELEPHONE (OUTPATIENT)
Dept: PRIMARY CARE | Facility: CLINIC | Age: 84
End: 2024-08-06

## 2024-08-06 DIAGNOSIS — Z96.651 TOTAL KNEE REPLACEMENT STATUS, RIGHT: ICD-10-CM

## 2024-08-06 DIAGNOSIS — M25.532 LEFT WRIST PAIN: Primary | ICD-10-CM

## 2024-08-06 DIAGNOSIS — M25.661 KNEE STIFFNESS, RIGHT: ICD-10-CM

## 2024-08-06 DIAGNOSIS — M25.561 ACUTE PAIN OF RIGHT KNEE: Primary | ICD-10-CM

## 2024-08-06 PROCEDURE — 97110 THERAPEUTIC EXERCISES: CPT | Mod: GP | Performed by: PHYSICAL THERAPIST

## 2024-08-06 PROCEDURE — 97161 PT EVAL LOW COMPLEX 20 MIN: CPT | Mod: GP | Performed by: PHYSICAL THERAPIST

## 2024-08-06 NOTE — PROGRESS NOTES
Physical Therapy    Physical Therapy Evaluation and Treatment      Patient Name: Louisa Rodas  MRN: [id number]  Today's Date: 8/7/2024    Time Entry:   Time Calculation  Start Time: 1310  Stop Time: 1410  Time Calculation (min): 60 min  PT Evaluation Time Entry  PT Evaluation (Low) Time Entry: 20  PT Therapeutic Procedures Time Entry  Therapeutic Exercise Time Entry: 25       Visit: 1  Insurance: Medicare   Auth: No  Cert dates: 8/6/24-11/4/24    Assessment:  Pt presents to clinic following R TKA performed by Dr. Bhagat on 5/28/24. Pt continues to have post-operative stiffness and pain. She demonstrates reduced knee AROM, reduced LE strength, and mild gait deficits  Pt will benefit from skilled therapy to address current impairments for improved ability to return to regular activity with reduced pain     Plan:  OP PT Plan  Treatment/Interventions: Cryotherapy, Education/ Instruction, Hot pack, Gait training, Manual therapy, Neuromuscular re-education, Self care/ home management, Therapeutic activities, Therapeutic exercises  PT Plan: Skilled PT  PT Frequency: 1 time per week  Duration: 4-6 weeks  Onset Date: 05/28/24  Certification Period Start Date: 08/06/24  Certification Period End Date: 11/04/24  Rehab Potential: Good  Plan of Care Agreement: Patient    Current Problem:   1. Acute pain of right knee  Follow Up In Physical Therapy      2. Knee stiffness, right  Follow Up In Physical Therapy      3. Total knee replacement status, right  Referral to Physical Therapy          Subjective    General:  General  Reason for Referral: R TKA  Referred By: Dr. Isael Bhagat  Preferred Learning Style: verbal, visual, written  Precautions:  Precautions  STEADI Fall Risk Score (The score of 4 or more indicates an increased risk of falling): 2  Precautions Comment: None       Chief complaints:   Pt presents to clinic with chief complaint of R knee pain following R TKA  Pt did spend time in SNF and then had home health  "after discharge   Onset/Surgery Date: 5/28/24  Mechanism of Injury: OA; hx of L TKA   Previous History: No     Pain: 5/10      Location: anterior, posterior R knee    Type: \"nerve pain\"    Aggravators: transfers, walking distances, steps     Alleviators: Tylenol PRN for pain, rest     Function:    Prior Level: Fully functional with use of SPC     Current limitations: decreased endurance per pt     Condition: Improving       Home Situation:   Multiple level home   Steps up to second floor; single hand rail      Sleep:     Disturbed: Yes    Preferred position(s): supine       Goals for Therapy:    \"I want to be able to walk naturally\"     Objective      Observation:    Incision healing well   Single scab distal incision   No drainage   No s/s of infection       Very minimal edema just lateral to patella       ROM/Flexibility:    Knee Flexion R / L :      115 deg / 121 deg     Knee Extension R / L :  (3) deg, pain end range / 0 deg        Strength R / L :    Hip Flexion:     5 / 5    Hip Abduction:    5 / 5    Hip Adduction:    5 / 5      Knee Extension:   4+ / 5    Knee Flexion:       4+ / 5      Ankle DF:             5 / 5    Ankle PF:              5 / 5     Gait:    Pt ambulating with SPC, decreased chadd, mild antalgia      Special Tests R / L :     Anterior Drawer:    - / -    Lachman:               - / -     Posterior Drawer:   - / -     Posterior Sag:         - / -    Varus @ 0` :            - / -    Varus @ 30` :          - / -    Valgus @ 0` :          - / -    Valgus @ 30` :        - / -     Louis:             - / -      Outcome Measure:    LEFS : 24 / 80       TREATMENT  Therapeutic exercise:  Quad sets (knee, ankle) x 20 ea   Heel slides x 20   SLR x 10   SAQ 2 x 10   LAQ x 20     MHP to R knee x 10 min EOS        Goals:  Active       PT Problem       Pt will increase R knee AROM to 0-120 deg for improved ability to perform transfers       Start:  08/07/24    Expected End:  11/04/24            Pt will " report 50% reduction in R knee pain for improved ability to ambulate longer distances        Start:  08/07/24    Expected End:  11/04/24            Pt will increase LE strength by 1 MMT grade for all weakened muscle groups for improved ability to perform transfers       Start:  08/07/24    Expected End:  11/04/24            Pt will be independent in HEP for home maintenance        Start:  08/07/24    Expected End:  11/04/24

## 2024-08-07 ENCOUNTER — HOSPITAL ENCOUNTER (OUTPATIENT)
Dept: RADIOLOGY | Facility: CLINIC | Age: 84
Discharge: HOME | End: 2024-08-07
Payer: MEDICARE

## 2024-08-07 ENCOUNTER — APPOINTMENT (OUTPATIENT)
Dept: PRIMARY CARE | Facility: CLINIC | Age: 84
End: 2024-08-07
Payer: MEDICARE

## 2024-08-07 VITALS
SYSTOLIC BLOOD PRESSURE: 100 MMHG | DIASTOLIC BLOOD PRESSURE: 60 MMHG | OXYGEN SATURATION: 97 % | WEIGHT: 163.2 LBS | BODY MASS INDEX: 28.91 KG/M2 | HEART RATE: 81 BPM

## 2024-08-07 DIAGNOSIS — Z00.00 HEALTH CARE MAINTENANCE: ICD-10-CM

## 2024-08-07 DIAGNOSIS — E11.22 TYPE 2 DIABETES MELLITUS WITH STAGE 3B CHRONIC KIDNEY DISEASE, WITHOUT LONG-TERM CURRENT USE OF INSULIN (MULTI): ICD-10-CM

## 2024-08-07 DIAGNOSIS — N18.2 DIABETES MELLITUS DUE TO UNDERLYING CONDITION WITH STAGE 2 CHRONIC KIDNEY DISEASE, WITHOUT LONG-TERM CURRENT USE OF INSULIN (MULTI): Primary | ICD-10-CM

## 2024-08-07 DIAGNOSIS — M25.532 LEFT WRIST PAIN: ICD-10-CM

## 2024-08-07 DIAGNOSIS — Z86.000 HISTORY OF DUCTAL CARCINOMA IN SITU (DCIS) OF BREAST: ICD-10-CM

## 2024-08-07 DIAGNOSIS — N18.32 TYPE 2 DIABETES MELLITUS WITH STAGE 3B CHRONIC KIDNEY DISEASE, WITHOUT LONG-TERM CURRENT USE OF INSULIN (MULTI): ICD-10-CM

## 2024-08-07 DIAGNOSIS — I10 PRIMARY HYPERTENSION: ICD-10-CM

## 2024-08-07 DIAGNOSIS — E08.22 DIABETES MELLITUS DUE TO UNDERLYING CONDITION WITH STAGE 2 CHRONIC KIDNEY DISEASE, WITHOUT LONG-TERM CURRENT USE OF INSULIN (MULTI): Primary | ICD-10-CM

## 2024-08-07 PROCEDURE — 99214 OFFICE O/P EST MOD 30 MIN: CPT | Performed by: SPECIALIST

## 2024-08-07 PROCEDURE — 1159F MED LIST DOCD IN RCRD: CPT | Performed by: SPECIALIST

## 2024-08-07 PROCEDURE — 73110 X-RAY EXAM OF WRIST: CPT | Mod: LT

## 2024-08-07 PROCEDURE — 3074F SYST BP LT 130 MM HG: CPT | Performed by: SPECIALIST

## 2024-08-07 PROCEDURE — 1160F RVW MEDS BY RX/DR IN RCRD: CPT | Performed by: SPECIALIST

## 2024-08-07 PROCEDURE — 3078F DIAST BP <80 MM HG: CPT | Performed by: SPECIALIST

## 2024-08-07 RX ORDER — BLOOD-GLUCOSE METER
1 EACH MISCELLANEOUS DAILY PRN
Qty: 30 EACH | Refills: 3 | Status: SHIPPED | OUTPATIENT
Start: 2024-08-07

## 2024-08-07 RX ORDER — LANCETS
EACH MISCELLANEOUS
Qty: 100 EACH | Refills: 0 | Status: SHIPPED | OUTPATIENT
Start: 2024-08-07

## 2024-08-07 ASSESSMENT — ENCOUNTER SYMPTOMS
LOSS OF SENSATION IN FEET: 0
OCCASIONAL FEELINGS OF UNSTEADINESS: 1
DEPRESSION: 0

## 2024-08-07 NOTE — PROGRESS NOTES
Subjective   Patient ID: Louisa Rodas is a 83 y.o. female who presents for Follow-up.  HPI    82 yo female Pmhx sig DCIS Breast Cancer, Htn, Hyperlipidemia, CKD, Hypothyroidism, MRI Brain 5/4/2023 (mild enlargement of ventricles, mild diffuse cerebral atrophy) (Possible NPH in 2021) Glaucoma, MENDY on CPAP, Polycystic Liver Disease, ?Cirrhosis, Sjogrens Syndrome (Dr. Sparks), OA (Dr. Verduzco), Mod Persistent Asthma (Dr. Ramos), MENDY on CPAP, Nonfamilial hypogammaglobulinemia (sees Dr. Monique), Tremors, Recurrent UTI, and Cervical Facet Arthropathy (C3-5) s/p facet medial branch radiofrequency ablation 1/2023 and R Total Knee here for follow up    Took pain pill today but has days where she hurts  Her baby's seizures are worse (great grandson) and he's moving to [place]   Had trouble sleeping one night    Now doing outpatient PT  Has a lot of nerve pain    Some times takes Zinc,   On medicinal canibis prn    Ran out of plaquenil called for refill     Stopped colace may take again if needed prn    Needs needles/strips has onetouch Vertio Test strips    Had insulin during hospital     Allergies   Allergen Reactions    Penicillins Hives and Itching    Primidone Other     Stuttering     Tizanidine Other     Stuttering       Current Outpatient Medications   Medication Instructions    acetaminophen (TYLENOL EXTRA STRENGTH) 1,000 mg, oral, Every 6 hours PRN    acyclovir (ZOVIRAX) 400 mg, oral, Daily    allantoin gel Topical, As needed, Contains cannibus    amLODIPine (NORVASC) 2.5 mg, oral, Daily    atorvastatin (LIPITOR) 20 mg, oral, 3 times weekly    azelastine (Astelin) 137 mcg (0.1 %) nasal spray 2 sprays, Each Nostril, 2 times daily PRN, Use in each nostril as directed    blood pressure test kit-large kit Use to check blood pressure once daily.    budesonide-formoteroL (Symbicort) 160-4.5 mcg/actuation inhaler 2 puffs, inhalation, Daily, RINSE MOUTH AFTER USE.    butenafine HCl (MENTAX TOP) topical (top)     cholecalciferol (VITAMIN D-3) 50 mcg, oral, Daily    cyanocobalamin, vitamin B-12, (Vitamin B-12) 2,500 mcg tablet, sublingual SL tablet 1 tablet, sublingual    fluocinolone and shower cap 0.01 % oil Apply to scalp  leave on overnight and wash off in the morning. Use daily    hydroxychloroquine (Plaquenil) 200 mg tablet TAKE 1 TABLET BY MOUTH WITH FOOD OR MILK ONCE A DAY    ketoconazole (NIZOral) 2 % shampoo No dose, route, or frequency recorded.    lancets misc Uses one touch verio test strips    latanoprost, PF, 0.005 % drops 1 drop, Both Eyes, Nightly    levothyroxine (SYNTHROID, LEVOXYL) 100 mcg, oral, 5 times weekly    lisinopril 20 mg, oral, Daily    medical cannabis 1 each, oral, As needed    montelukast (Singulair) 10 mg tablet 1 tablet, oral, Daily    OneTouch Verio test strips strip 1 each, Topical, Daily PRN    peg 400-propylene glycol, PF, (Systane, PF,) 0.4-0.3 % dropperette ophthalmic (eye)    pilocarpine (SALAGEN) 5 mg, oral, Nightly    povidone, PF, (iVizia, PF,) 0.5 % drops 1 drop, ophthalmic (eye), 3 times daily PRN    propranolol (INDERAL) 10 mg, oral, Daily    sulfamethoxazole-trimethoprim (Bactrim DS) 800-160 mg tablet 1 tablet, oral, Daily, Takes daily not bid    travoprost (Travatan Z) 0.004 % drops ophthalmic solution 1 drop, Both Eyes, Nightly    VITAMIN B COMPLEX ORAL oral, Every 24 hours    zinc gluconate 50 mg tablet oral, Every 24 hours        Review of Systems  Constitutional  Always tired, no fevers, no chills, no unintentional weight loss,   HEENT:  No headaches, no dizziness, eye exams current  no vision changes  Cardiovascular:  No chest pain, no palpitations, no shortness of breath with exertion  Respiratory:  No cough, no hemoptysis, no wheezing, No shortness of breath at rest  GI:  No dysphagia, no odynophagia, occasional reflux (takes CBD gummie bears with relief)x, no abdominal pain, no nausea, no vomiting, no changes in bowel habits, no bright red blood per rectum, no  melena  :  No urinary frequency, no dysuria, no urine incontinence  MSK:  No falls, right knee joint pain and low back pain ambulates with cane    Physical Exam  /60   Pulse 81   Wt 74 kg (163 lb 3.2 oz) Comment: with shoes  SpO2 97%   BMI 28.91 kg/m²   100/60  General:    Well-appearing  F in no acute distress, well nourished, well hydrated  Head:  Normocephalic, atraumatic  Skin:          Warm dry,   Eyes:  Anicteric sclera, pupils equal,   Oral:      Not examined due to pandemic  Neck:   Supple  Cor:      Regular rate, normal S1, S2, no murmurs appreciated, no S3, no S4   Lungs:   Clear to auscultation b/l, no wheezes, no rhonchi, no crackles, no accessory respiratory muscle use      Assessment/Plan     Hypertension  -BP well controlled on current medication    Tremors  -Stable on low dose propranolol     DM2 with CKD  -Asked for refills of lancets and test strips, ordered  -HBA1C was 7 three months ago    CKD3  -GFR 51  -Management per Nephrology  -She decided against SGLT2 but now is re-considering    Hx DCIS Breast cancer  -Mammo 2/5/2024, dx L mammo   -S/p left partial mastecotomy 3/27/2024  -DCIS margin negative, surgical treatment is complete, she was referred to Radiation Onc & Surgery Nurse regarding Endocrine Therapy (completed 5 XRT, opted against HRT  -Plans f/up with breast surg  CNP in 2/2024    Health care maintenance  -Plans to get annual influenza vaccine in fall  -Plans Covid vaccine in fall  -Prior RSV Vaccine  -Prior Shingrix vaccines  -Prior Prevnar 13 (5/2021) and Prevnar 20 11/202023  -Recommend Tdap     Due for MAW in October, she will schedule   Nadira Moctezuma,

## 2024-08-07 NOTE — PATIENT INSTRUCTIONS
Recommend Tdap every 10 years  Recommend influenza vaccine in fall  Recommend Covid vaccine in fall

## 2024-08-08 ENCOUNTER — PATIENT OUTREACH (OUTPATIENT)
Dept: PRIMARY CARE | Facility: CLINIC | Age: 84
End: 2024-08-08
Payer: MEDICARE

## 2024-08-08 DIAGNOSIS — I10 PRIMARY HYPERTENSION: ICD-10-CM

## 2024-08-08 DIAGNOSIS — N18.2 DIABETES MELLITUS DUE TO UNDERLYING CONDITION WITH STAGE 2 CHRONIC KIDNEY DISEASE, WITHOUT LONG-TERM CURRENT USE OF INSULIN (MULTI): ICD-10-CM

## 2024-08-08 DIAGNOSIS — E08.22 DIABETES MELLITUS DUE TO UNDERLYING CONDITION WITH STAGE 2 CHRONIC KIDNEY DISEASE, WITHOUT LONG-TERM CURRENT USE OF INSULIN (MULTI): ICD-10-CM

## 2024-08-08 PROBLEM — Z86.000 HISTORY OF DUCTAL CARCINOMA IN SITU (DCIS) OF BREAST: Status: ACTIVE | Noted: 2024-08-08

## 2024-08-08 NOTE — PROGRESS NOTES
"Chart reviewed prior to Patient outreach. Patient was calling for results of her left wrist x-rays and she is aware the Radiologist has not read yet. Denies any falls and uses a cane to ambulate. Patient is getting physical therapy and has a PT follow up on 08/19/2024. States she is sleeping okay and her appetite is \"perfect\". States she is maintaining her weight. Pt. Checks her blood sugars periodically and they run under 140. Reviewed appointment and Pt has a virtual pharmacy appt. on 08/13/2024. Patient to call with anty questions or concerns.   "

## 2024-08-12 NOTE — PROGRESS NOTES
Subjective   Patient ID: Louisa Rodas is a 83 y.o. female who presents for SGLT2/CKD    Referring Provider: Ash)    CESAR  HPI: CKD stage 3a. last EGFR 51 sCr 1.09  CrCl 47  SGLT2- does have a history of recurrent UTI taken bactrim DS 1 tab daily.   HTN: controlled  /60  Medications  Propranolol 10 mg daily  Amlodipine 2.5 mg daily  Lisinopril 20 mg daily     DM:  A1C 7 due for new one  Medications  none    HLD:  LDL 66  On statin atorvastatin 20 mg daily    Medications reviewed for appropriate dosing in setting of CKD  Recommended changes:  No adjustments needed at this time    -  Review of Systems        Objective     There were no vitals taken for this visit.     Labs  Lab Results   Component Value Date    BILITOT 0.4 05/09/2024    CALCIUM 9.3 05/30/2024    CO2 26 05/30/2024     05/30/2024    CREATININE 1.09 (H) 05/30/2024    GLUCOSE 150 (H) 05/30/2024    ALKPHOS 89 05/09/2024    K 3.8 05/30/2024    PROT 6.5 07/22/2024     05/30/2024    AST 24 05/09/2024    ALT 20 05/09/2024    BUN 14 05/30/2024    ANIONGAP 13 05/30/2024    MG 1.86 05/24/2023    PHOS 2.3 (L) 01/08/2021    ALBUMIN 4.6 05/09/2024    GFRF 46 (A) 06/19/2023     Lab Results   Component Value Date    TRIG 91 11/13/2023    CHOL 139 11/13/2023    LDLCALC 66 11/13/2023    HDL 55.2 11/13/2023     Lab Results   Component Value Date    HGBA1C 7.0 (H) 05/09/2024       Current Outpatient Medications on File Prior to Visit   Medication Sig Dispense Refill    acetaminophen (Tylenol Extra Strength) 500 mg tablet Take 2 tablets (1,000 mg) by mouth every 6 hours if needed for mild pain (1 - 3).      acyclovir (Zovirax) 400 mg tablet Take 1 tablet (400 mg) by mouth once daily. 30 tablet 3    allantoin gel Apply topically if needed for wound care. Contains cannibus      amLODIPine (Norvasc) 2.5 mg tablet Take 1 tablet (2.5 mg) by mouth once daily. 90 tablet 0    atorvastatin (Lipitor) 20 mg tablet Take 1 tablet (20 mg) by mouth 3 (three) times a  week. 45 tablet 1    azelastine (Astelin) 137 mcg (0.1 %) nasal spray Administer 2 sprays into each nostril 2 times a day as needed for allergies. Use in each nostril as directed      blood pressure test kit-large kit Use to check blood pressure once daily. 1 each 0    budesonide-formoteroL (Symbicort) 160-4.5 mcg/actuation inhaler Inhale 2 puffs once daily. RINSE MOUTH AFTER USE. (Patient taking differently: Inhale 1 puff once daily. RINSE MOUTH AFTER USE.) 10.2 g 1    butenafine HCl (MENTAX TOP) Apply topically.      cholecalciferol (Vitamin D-3) 50 MCG (2000 UT) tablet Take 1 tablet (50 mcg) by mouth once daily.      cyanocobalamin, vitamin B-12, (Vitamin B-12) 2,500 mcg tablet, sublingual SL tablet Place 1 tablet (2,500 mcg) under the tongue.      fluocinolone and shower cap 0.01 % oil Apply to scalp  leave on overnight and wash off in the morning. Use daily      hydroxychloroquine (Plaquenil) 200 mg tablet TAKE 1 TABLET BY MOUTH WITH FOOD OR MILK ONCE A DAY 90 tablet 1    ketoconazole (NIZOral) 2 % shampoo       lancets misc Uses one touch verio test strips 100 each 0    latanoprost, PF, 0.005 % drops Administer 1 drop into both eyes once daily at bedtime. 7.5 mL 11    levothyroxine (Synthroid, Levoxyl) 100 mcg tablet Take 1 tablet (100 mcg) by mouth 5 times a week. 66 tablet 0    lisinopril 20 mg tablet Take 1 tablet (20 mg) by mouth once daily. 90 tablet 1    medical cannabis Take 1 each by mouth if needed.      montelukast (Singulair) 10 mg tablet Take 1 tablet (10 mg) by mouth once daily.      OneTouch Verio test strips strip Apply 1 each topically once daily as needed (low blood sugar symptoms). 30 each 3    peg 400-propylene glycol, PF, (Systane, PF,) 0.4-0.3 % dropperette Administer into affected eye(s).      pilocarpine (Salagen) 5 mg tablet Take 1 tablet (5 mg) by mouth once daily at bedtime.      povidone, PF, (iVizia, PF,) 0.5 % drops Administer 1 drop into affected eye(s) 3 times a day as needed.       propranolol (Inderal) 10 mg tablet Take 1 tablet (10 mg) by mouth once daily. 90 tablet 0    sulfamethoxazole-trimethoprim (Bactrim DS) 800-160 mg tablet Take 1 tablet by mouth once daily. Takes daily not bid      travoprost (Travatan Z) 0.004 % drops ophthalmic solution Administer 1 drop into both eyes once daily at bedtime.      VITAMIN B COMPLEX ORAL Take by mouth once every 24 hours.      zinc gluconate 50 mg tablet Take by mouth once every 24 hours.      [DISCONTINUED] docusate sodium (Colace) 100 mg capsule Take 1 capsule (100 mg) by mouth 2 times a day. 60 capsule 1    [DISCONTINUED] OneTouch Verio test strips strip TEST BLOOD SUGAR ONCE A DAY       No current facility-administered medications on file prior to visit.        Assessment/Plan   PATIENT EDUCATION/DISCUSSION:    Farxiga Education:    - Counseled patient on Farxiga MOA, expectations, side effects, duration of therapy, administration, and monitoring parameters.  - Reviewed the benefits of SGLT-2i therapy, such as glycemic control and kidney and CV protection.  - Advised patient to practice proper  hygiene to reduce risk of UTIs or yeast infections.  - Advised patient to maintain adequate fluid intake to remain hydrated while on SGLT2i therapy.  - Answered all patient questions and concerns.   - at this time with history of utis and current voiding issues we will hold initiation for now. Follow up with dr carlton in November. If progression in ckd or stabilization of urinary symptoms we can revisit.  _______________________________________________________________________  PLAN  1. Follow up with Dr Carlton 11/4      Vance Cisneros PharmD BCPS    Continue all meds under the continuation of care with the referring provider and clinical pharmacy team.

## 2024-08-13 ENCOUNTER — APPOINTMENT (OUTPATIENT)
Dept: PHARMACY | Facility: HOSPITAL | Age: 84
End: 2024-08-13
Payer: MEDICARE

## 2024-08-13 DIAGNOSIS — N18.31 STAGE 3A CHRONIC KIDNEY DISEASE (MULTI): ICD-10-CM

## 2024-08-14 ENCOUNTER — TREATMENT (OUTPATIENT)
Dept: PHYSICAL THERAPY | Facility: CLINIC | Age: 84
End: 2024-08-14
Payer: MEDICARE

## 2024-08-14 DIAGNOSIS — M25.561 ACUTE PAIN OF RIGHT KNEE: ICD-10-CM

## 2024-08-14 DIAGNOSIS — M25.661 KNEE STIFFNESS, RIGHT: ICD-10-CM

## 2024-08-14 PROCEDURE — 97110 THERAPEUTIC EXERCISES: CPT | Mod: GP | Performed by: PHYSICAL THERAPIST

## 2024-08-14 NOTE — PROGRESS NOTES
Physical Therapy    Physical Therapy Treatment      Patient Name: Louisa Rodas  MRN: [id number]  Today's Date: 8/14/2024    Time Entry:   Time Calculation  Start Time: 1200  Stop Time: 1255  Time Calculation (min): 55 min     PT Therapeutic Procedures Time Entry  Therapeutic Exercise Time Entry: 40       Visit: 2  Insurance: Medicare   Auth: No  Cert dates: 8/6/24-11/4/24    Assessment:  Pt did well with additional strengthening work. Continues to have the most pain with extension based rx   Progressing well with ROM    Plan:  Sit to stand  LAQ, SAQ with weight  Bridges        Current Problem:   Problem List Items Addressed This Visit             ICD-10-CM    Acute pain of right knee M25.561    Knee stiffness, right M25.661         Subjective    Pt reports she is doing okay overall  Did take pain medicine prior to session today       Objective     R knee flexion 118 deg after heel slides          TREATMENT  Therapeutic exercise:  Quad sets (knee, ankle) x 20 ea   Heel slides x 20   SLR 2 x 10   SAQ x 20   LAQ x 20   Step ups x 20 (6 in)   Heel raises x 20   SRB x 5 min R5    MHP to R knee x 10 min EOS        Goals:  Active       PT Problem       Pt will increase R knee AROM to 0-120 deg for improved ability to perform transfers       Start:  08/07/24    Expected End:  11/04/24            Pt will report 50% reduction in R knee pain for improved ability to ambulate longer distances        Start:  08/07/24    Expected End:  11/04/24            Pt will increase LE strength by 1 MMT grade for all weakened muscle groups for improved ability to perform transfers       Start:  08/07/24    Expected End:  11/04/24            Pt will be independent in [facility] for home maintenance        Start:  08/07/24    Expected End:  11/04/24

## 2024-08-15 ENCOUNTER — PATIENT OUTREACH (OUTPATIENT)
Dept: PRIMARY CARE | Facility: CLINIC | Age: 84
End: 2024-08-15
Payer: MEDICARE

## 2024-08-15 NOTE — PROGRESS NOTES
Patient called to inquire about x-ray results of her left wrist. Informed Patient per result note from Dr. Moctezuma x-ray with fairly advanced arthritis of the left wrist. Patient states she is wearing a brace to help with the discomfort.

## 2024-08-17 DIAGNOSIS — M35.01 SICCA SYNDROME WITH KERATOCONJUNCTIVITIS (MULTI): Primary | ICD-10-CM

## 2024-08-19 ENCOUNTER — TREATMENT (OUTPATIENT)
Dept: PHYSICAL THERAPY | Facility: CLINIC | Age: 84
End: 2024-08-19
Payer: MEDICARE

## 2024-08-19 DIAGNOSIS — M25.661 KNEE STIFFNESS, RIGHT: ICD-10-CM

## 2024-08-19 DIAGNOSIS — M25.561 ACUTE PAIN OF RIGHT KNEE: ICD-10-CM

## 2024-08-19 PROCEDURE — 97110 THERAPEUTIC EXERCISES: CPT | Mod: GP,CQ

## 2024-08-19 RX ORDER — PILOCARPINE HYDROCHLORIDE 5 MG/1
TABLET, FILM COATED ORAL
Qty: 90 TABLET | Refills: 1 | Status: SHIPPED | OUTPATIENT
Start: 2024-08-19 | End: 2024-11-17

## 2024-08-19 NOTE — PROGRESS NOTES
"Physical Therapy    Physical Therapy Treatment      Patient Name: Louisa Rodas  MRN: [id number]  Today's Date: 8/19/2024    Time Entry:   Time Calculation  Start Time: 0250  Stop Time: 0350  Time Calculation (min): 60 min     PT Therapeutic Procedures Time Entry  Therapeutic Exercise Time Entry: 45       Visit: 3  Insurance: Medicare   Auth: No  Cert dates: 8/6/24-11/4/24    Assessment:  Pt was able to gently progress, decreased tolerance to extension work, however, no pain reported w/ LAQ today.    Plan:  Sit to stand  LAQ  with weight  Bridges        Current Problem:   Problem List Items Addressed This Visit             ICD-10-CM    Acute pain of right knee M25.561    Knee stiffness, right M25.661           Subjective    \"I think the R knee is better, my LB is hurting.\"      Objective     R knee AROM initially 122`-5`         TREATMENT  Manual stretch for extension, C/R, manual HS stretch  Therapeutic exercise:  Quad sets (knee, ankle) x 20 ea   Heel slides x 10, w/ rope x 10   SLR 2 x 10   SAQ x 20, #1.5  LAQ x 20  Step ups x 20 (6 in)   Heel raises x 20  Slant board calf stretch 10x10\"   SRB x 6 min  HP to LB EOS       Goals:  Active       PT Problem       Pt will increase R knee AROM to 0-120 deg for improved ability to perform transfers       Start:  08/07/24    Expected End:  11/04/24            Pt will report 50% reduction in R knee pain for improved ability to ambulate longer distances        Start:  08/07/24    Expected End:  11/04/24            Pt will increase LE strength by 1 MMT grade for all weakened muscle groups for improved ability to perform transfers       Start:  08/07/24    Expected End:  11/04/24            Pt will be independent in [facility] for home maintenance        Start:  08/07/24    Expected End:  11/04/24                      "

## 2024-08-26 ENCOUNTER — OFFICE VISIT (OUTPATIENT)
Dept: ORTHOPEDIC SURGERY | Facility: CLINIC | Age: 84
End: 2024-08-26
Payer: MEDICARE

## 2024-08-26 VITALS — BODY MASS INDEX: 28.87 KG/M2 | WEIGHT: 163 LBS

## 2024-08-26 DIAGNOSIS — M54.9 CHRONIC NECK AND BACK PAIN: Primary | ICD-10-CM

## 2024-08-26 DIAGNOSIS — M54.2 CHRONIC NECK AND BACK PAIN: Primary | ICD-10-CM

## 2024-08-26 DIAGNOSIS — M25.539 PAIN IN WRIST, UNSPECIFIED LATERALITY: ICD-10-CM

## 2024-08-26 DIAGNOSIS — G89.29 CHRONIC NECK AND BACK PAIN: Primary | ICD-10-CM

## 2024-08-26 PROCEDURE — 1160F RVW MEDS BY RX/DR IN RCRD: CPT | Performed by: ORTHOPAEDIC SURGERY

## 2024-08-26 PROCEDURE — 2500000005 HC RX 250 GENERAL PHARMACY W/O HCPCS: Performed by: ORTHOPAEDIC SURGERY

## 2024-08-26 PROCEDURE — 99204 OFFICE O/P NEW MOD 45 MIN: CPT | Performed by: ORTHOPAEDIC SURGERY

## 2024-08-26 PROCEDURE — 2500000004 HC RX 250 GENERAL PHARMACY W/ HCPCS (ALT 636 FOR OP/ED): Performed by: ORTHOPAEDIC SURGERY

## 2024-08-26 PROCEDURE — 1159F MED LIST DOCD IN RCRD: CPT | Performed by: ORTHOPAEDIC SURGERY

## 2024-08-26 PROCEDURE — 1036F TOBACCO NON-USER: CPT | Performed by: ORTHOPAEDIC SURGERY

## 2024-08-26 PROCEDURE — 20605 DRAIN/INJ JOINT/BURSA W/O US: CPT | Mod: LT | Performed by: ORTHOPAEDIC SURGERY

## 2024-08-26 PROCEDURE — 99214 OFFICE O/P EST MOD 30 MIN: CPT | Performed by: ORTHOPAEDIC SURGERY

## 2024-08-26 NOTE — PROGRESS NOTES
Primary complaint is left wrist pain sounds like she had a right CMC arthroplasty done many years ago which worked well but she has achiness in both wrists and hands fairly poorly localized  She has grade 3 stage kidney disease  Unable to take NSAIDs  Also complains of chronic neck and back pain  Past medical,family and social histories have been reviewed and are up to date.  All other body systems have been reviewed and are negative for complaint.  Constitutional: Well-developed well-nourished   Eyes: Sclerae anicteric, pupils equal and round  HENT: Normocephalic atraumatic  Cardiovascular: Pulses full, regular rate and rhythm  Respiratory: Breathing not labored, no wheezing  Integumentary: Skin intact, no lesions or rashes  Neurological: Sensation intact, no gross strength deficits, reflexes equal  Psychiatric: Alert oriented and appropriate  Hematologic/lymphatic: No lymphadenopathy  Both hands she has some squaring of the CMC joints with crepitus she has mild restriction mobility of both wrists pain with dorsiflexion no particular swelling  X-rays reviewed which show radiocarpal and CMC arthritis      M Inj/Asp: L radiocarpal on 8/27/2024 11:20 AM  Indications: pain  Details: 21 G needle, dorsal approach  Medications: 40 mg methylPREDNISolone acetate 40 mg/mL; 2 mL lidocaine 20 mg/mL (2 %)            Radiocarpal arthritis  Injected left wrist today physical therapy for her neck and back I explained that I do not formally treat spine injuries and she may referral to spine subspecialist  Also discussed glucosamine

## 2024-08-27 ENCOUNTER — TREATMENT (OUTPATIENT)
Dept: PHYSICAL THERAPY | Facility: CLINIC | Age: 84
End: 2024-08-27
Payer: MEDICARE

## 2024-08-27 DIAGNOSIS — M25.561 ACUTE PAIN OF RIGHT KNEE: ICD-10-CM

## 2024-08-27 DIAGNOSIS — M25.661 KNEE STIFFNESS, RIGHT: ICD-10-CM

## 2024-08-27 PROCEDURE — 20605 DRAIN/INJ JOINT/BURSA W/O US: CPT | Mod: LT | Performed by: ORTHOPAEDIC SURGERY

## 2024-08-27 PROCEDURE — 97110 THERAPEUTIC EXERCISES: CPT | Mod: GP,CQ

## 2024-08-27 RX ORDER — METHYLPREDNISOLONE ACETATE 40 MG/ML
40 INJECTION, SUSPENSION INTRA-ARTICULAR; INTRALESIONAL; INTRAMUSCULAR; SOFT TISSUE
Status: COMPLETED | OUTPATIENT
Start: 2024-08-27 | End: 2024-08-27

## 2024-08-27 RX ORDER — LIDOCAINE HYDROCHLORIDE 20 MG/ML
2 INJECTION, SOLUTION INFILTRATION; PERINEURAL
Status: COMPLETED | OUTPATIENT
Start: 2024-08-27 | End: 2024-08-27

## 2024-08-27 NOTE — PROGRESS NOTES
"Physical Therapy    Physical Therapy Treatment      Patient Name: Louisa Rodas  MRN: [id number]  Today's Date: 8/27/2024    Time Entry:                 Visit: 3  Insurance: Medicare   Auth: No  Cert dates: 8/6/24-11/4/24    Assessment:  Pt reported decreased stiffness to R knee EOS.  Will resume rx as sx's allow    Plan:  Sit to stand  LAQ  with weight  Bridges        Current Problem:   Problem List Items Addressed This Visit             ICD-10-CM    Acute pain of right knee M25.561    Knee stiffness, right M25.661           Subjective    \"I am sore all over, I got an injection into my L wrist.\"      Objective     R knee AROM initially 122`-5`         TREATMENT  Passive stretch for extension x 5 min, R  Therapeutic exercise:  HS x 10  Quad sets x 20  Heel slides x 10, w/ rope x 10   SAQ x 20  LAQ x 20  SRB x 7 min  YTB lat amb x 3  Seated lumbar flexion stretch x 10  HP to LB EOS       Goals:  Active       PT Problem       Pt will increase R knee AROM to 0-120 deg for improved ability to perform transfers       Start:  08/07/24    Expected End:  11/04/24            Pt will report 50% reduction in R knee pain for improved ability to ambulate longer distances        Start:  08/07/24    Expected End:  11/04/24            Pt will increase LE strength by 1 MMT grade for all weakened muscle groups for improved ability to perform transfers       Start:  08/07/24    Expected End:  11/04/24            Pt will be independent in [facility] for home maintenance        Start:  08/07/24    Expected End:  11/04/24                      "

## 2024-09-03 ENCOUNTER — APPOINTMENT (OUTPATIENT)
Dept: NEPHROLOGY | Facility: CLINIC | Age: 84
End: 2024-09-03
Payer: MEDICARE

## 2024-09-04 ENCOUNTER — TREATMENT (OUTPATIENT)
Dept: PHYSICAL THERAPY | Facility: CLINIC | Age: 84
End: 2024-09-04
Payer: MEDICARE

## 2024-09-04 ENCOUNTER — APPOINTMENT (OUTPATIENT)
Dept: PRIMARY CARE | Facility: CLINIC | Age: 84
End: 2024-09-04
Payer: MEDICARE

## 2024-09-04 DIAGNOSIS — M25.561 ACUTE PAIN OF RIGHT KNEE: ICD-10-CM

## 2024-09-04 DIAGNOSIS — M25.661 KNEE STIFFNESS, RIGHT: ICD-10-CM

## 2024-09-04 PROCEDURE — 97110 THERAPEUTIC EXERCISES: CPT | Mod: GP,CQ

## 2024-09-04 NOTE — PROGRESS NOTES
"Physical Therapy    Physical Therapy Treatment      Patient Name: Louisa Rodas  MRN: [id number]  Today's Date: 9/4/2024    Time Entry:   Time Calculation  Start Time: 0100  Stop Time: 0200  Time Calculation (min): 60 min     PT Therapeutic Procedures Time Entry  Therapeutic Exercise Time Entry: 45       Visit: 5  Insurance: Medicare   Auth: No  Cert dates: 8/6/24-11/4/24    Assessment:  Good ROM, and tolerated additional resistance to PRE's    Plan:  Sit to stand    Current Problem:   Problem List Items Addressed This Visit             ICD-10-CM    Acute pain of right knee M25.561    Knee stiffness, right M25.661           Subjective    \"I would like to move faster than I do.\"      Objective     R knee AROM initially 122`-2`         TREATMENT  Therapeutic exercise:  Quad sets x 20, knee/ankle  SAQ x 20, #1.5  Black band H abd w/ Bridge x 20  LAQ x 20, #1.5  Slant board calf stretch 10x10\"  Step ups x 20 R, 6\"  Seated lumbar flexion stretch 10x10\"  RTB lat amb x 3  Nu Step x 5 min  HP to LB EOS       Goals:  Active       PT Problem       Pt will increase R knee AROM to 0-120 deg for improved ability to perform transfers       Start:  08/07/24    Expected End:  11/04/24            Pt will report 50% reduction in R knee pain for improved ability to ambulate longer distances        Start:  08/07/24    Expected End:  11/04/24            Pt will increase LE strength by 1 MMT grade for all weakened muscle groups for improved ability to perform transfers       Start:  08/07/24    Expected End:  11/04/24            Pt will be independent in [facility] for home maintenance        Start:  08/07/24    Expected End:  11/04/24                      "

## 2024-09-09 ENCOUNTER — TREATMENT (OUTPATIENT)
Dept: PHYSICAL THERAPY | Facility: CLINIC | Age: 84
End: 2024-09-09
Payer: MEDICARE

## 2024-09-09 DIAGNOSIS — M25.661 KNEE STIFFNESS, RIGHT: ICD-10-CM

## 2024-09-09 DIAGNOSIS — M25.561 ACUTE PAIN OF RIGHT KNEE: ICD-10-CM

## 2024-09-09 PROCEDURE — 97110 THERAPEUTIC EXERCISES: CPT | Mod: GP | Performed by: PHYSICAL THERAPIST

## 2024-09-09 NOTE — PROGRESS NOTES
"Physical Therapy    Physical Therapy Treatment      Patient Name: Louisa Rodas  MRN: [id number]  Today's Date: 9/9/2024    Time Entry:   Time Calculation  Start Time: 1205  Stop Time: 1305  Time Calculation (min): 60 min     PT Therapeutic Procedures Time Entry  Therapeutic Exercise Time Entry: 40       Visit: 6  Insurance: Medicare   Auth: No  Cert dates: 8/6/24-11/4/24    Assessment:  Increased soreness today; difficulty with regular table work. No increased weight today and rest breaks between sets   Will progress as able     Plan:  Sit to stand  All rx   Weight to table work     Current Problem:   Problem List Items Addressed This Visit             ICD-10-CM    Acute pain of right knee M25.561    Knee stiffness, right M25.661           Subjective    \"My knee is throbbing today\"       Objective     R knee AROM initially 122`-2`         TREATMENT  Therapeutic exercise:  Quad sets x 20, knee/ankle  SAQ x 20  Heel slides x 20   Black band H abd w/ Bridge x 20  LAQ x 20  Slant board calf stretch 10x10\"  Nu Step x 5 min  HP to LB EOS    Not today:  Step ups x 20 R, 6\"  Seated lumbar flexion stretch 10x10\"  RTB lat amb x 3       Goals:  Active       PT Problem       Pt will increase R knee AROM to 0-120 deg for improved ability to perform transfers       Start:  08/07/24    Expected End:  11/04/24            Pt will report 50% reduction in R knee pain for improved ability to ambulate longer distances        Start:  08/07/24    Expected End:  11/04/24            Pt will increase LE strength by 1 MMT grade for all weakened muscle groups for improved ability to perform transfers       Start:  08/07/24    Expected End:  11/04/24            Pt will be independent in [facility] for home maintenance        Start:  08/07/24    Expected End:  11/04/24                "

## 2024-09-10 ENCOUNTER — APPOINTMENT (OUTPATIENT)
Dept: OPHTHALMOLOGY | Facility: CLINIC | Age: 84
End: 2024-09-10
Payer: MEDICARE

## 2024-09-10 DIAGNOSIS — H40.1132 PRIMARY OPEN ANGLE GLAUCOMA (POAG) OF BOTH EYES, MODERATE STAGE: Primary | ICD-10-CM

## 2024-09-10 PROCEDURE — 99214 OFFICE O/P EST MOD 30 MIN: CPT | Performed by: OPHTHALMOLOGY

## 2024-09-10 PROCEDURE — 92133 CPTRZD OPH DX IMG PST SGM ON: CPT | Performed by: OPHTHALMOLOGY

## 2024-09-10 RX ORDER — LATANOPROST 50 UG/ML
1 SOLUTION/ DROPS OPHTHALMIC DAILY
Qty: 7.5 ML | Refills: 3 | Status: SHIPPED | OUTPATIENT
Start: 2024-09-10 | End: 2024-12-09

## 2024-09-10 ASSESSMENT — VISUAL ACUITY
OS_CC: 20/25-1
METHOD: SNELLEN - LINEAR
CORRECTION_TYPE: GLASSES
OD_CC: 20/20-2

## 2024-09-10 ASSESSMENT — EXTERNAL EXAM - RIGHT EYE: OD_EXAM: NORMAL

## 2024-09-10 ASSESSMENT — EXTERNAL EXAM - LEFT EYE: OS_EXAM: NORMAL

## 2024-09-10 ASSESSMENT — TONOMETRY
OD_IOP_MMHG: 16
OS_IOP_MMHG: 16
IOP_METHOD: GOLDMANN APPLANATION

## 2024-09-10 ASSESSMENT — CUP TO DISC RATIO
OS_RATIO: .6
OD_RATIO: .6

## 2024-09-10 ASSESSMENT — GONIOSCOPY
OS_SUPERIOR: 3/360
OD_SUPERIOR: 3/360

## 2024-09-10 ASSESSMENT — PACHYMETRY
OS_CT(UM): 486
OD_CT(UM): 502

## 2024-09-11 ENCOUNTER — PATIENT OUTREACH (OUTPATIENT)
Dept: PRIMARY CARE | Facility: CLINIC | Age: 84
End: 2024-09-11
Payer: MEDICARE

## 2024-09-11 DIAGNOSIS — N18.2 DIABETES MELLITUS DUE TO UNDERLYING CONDITION WITH STAGE 2 CHRONIC KIDNEY DISEASE, WITHOUT LONG-TERM CURRENT USE OF INSULIN (MULTI): ICD-10-CM

## 2024-09-11 DIAGNOSIS — E08.22 DIABETES MELLITUS DUE TO UNDERLYING CONDITION WITH STAGE 2 CHRONIC KIDNEY DISEASE, WITHOUT LONG-TERM CURRENT USE OF INSULIN (MULTI): ICD-10-CM

## 2024-09-11 DIAGNOSIS — I10 PRIMARY HYPERTENSION: ICD-10-CM

## 2024-09-11 NOTE — PROGRESS NOTES
Chronic care management outreach complete. Ms. Rodas is doing well. She does to PT once a week for her right knee. She says that she often suffers from pain in her right knee, lower back and arthritis in her left hand. Today her pain is a 5/10. She does not take her bp or blood sugar often but she says that she has taken them both two weeks ago. Her Blood sugar was 90 and her last bp was 119/75. She says that she only takes her blood sugars if she were to feel any high or low symptoms. Up coming appointments reviewed. No need for refills. No questions or concerns at this time. I gave her my contact information for any additional questions or concerns.

## 2024-09-12 ENCOUNTER — APPOINTMENT (OUTPATIENT)
Dept: OPHTHALMOLOGY | Facility: CLINIC | Age: 84
End: 2024-09-12
Payer: MEDICARE

## 2024-09-16 ENCOUNTER — TREATMENT (OUTPATIENT)
Dept: PHYSICAL THERAPY | Facility: CLINIC | Age: 84
End: 2024-09-16
Payer: MEDICARE

## 2024-09-16 DIAGNOSIS — M25.561 ACUTE PAIN OF RIGHT KNEE: ICD-10-CM

## 2024-09-16 DIAGNOSIS — M25.661 KNEE STIFFNESS, RIGHT: ICD-10-CM

## 2024-09-16 PROCEDURE — 97110 THERAPEUTIC EXERCISES: CPT | Mod: GP,CQ

## 2024-09-16 NOTE — PROGRESS NOTES
"Physical Therapy    Physical Therapy Treatment      Patient Name: Louisa Rodas  MRN: [id number]  Today's Date: 9/16/2024    Time Entry:   Time Calculation  Start Time: 0200  Stop Time: 0300  Time Calculation (min): 60 min     PT Therapeutic Procedures Time Entry  Therapeutic Exercise Time Entry: 45       Visit: 7  Insurance: Medicare   Auth: No  Cert dates: 8/6/24-11/4/24    Assessment:  Good demo of ex's, ROM, as well as strength progression.    Plan:  R knee strength    Current Problem:   Problem List Items Addressed This Visit             ICD-10-CM    Acute pain of right knee M25.561    Knee stiffness, right M25.661           Subjective    \"R knee is stiff, not painful.  It feels weak.\"       Objective     R knee AROM initially 127`-3` initially        TREATMENT  Therapeutic exercise:  Quad sets x 20, knee/ankle  SAQ x 20, #1.5  C/R for R knee extension x 5 min  LAQ x 20, #1.5 x 20  Sit to stand 1 foam x 20  Slant board calf stretch 10x10\"  Nu Step x 5 min  Black band TKE's x 20  HP to LB EOS       Goals:  Active       PT Problem       Pt will increase R knee AROM to 0-120 deg for improved ability to perform transfers       Start:  08/07/24    Expected End:  11/04/24            Pt will report 50% reduction in R knee pain for improved ability to ambulate longer distances        Start:  08/07/24    Expected End:  11/04/24            Pt will increase LE strength by 1 MMT grade for all weakened muscle groups for improved ability to perform transfers       Start:  08/07/24    Expected End:  11/04/24            Pt will be independent in [facility] for home maintenance        Start:  08/07/24    Expected End:  11/04/24                "

## 2024-09-23 ENCOUNTER — TREATMENT (OUTPATIENT)
Dept: PHYSICAL THERAPY | Facility: CLINIC | Age: 84
End: 2024-09-23
Payer: MEDICARE

## 2024-09-23 DIAGNOSIS — M25.661 KNEE STIFFNESS, RIGHT: ICD-10-CM

## 2024-09-23 DIAGNOSIS — M25.561 ACUTE PAIN OF RIGHT KNEE: ICD-10-CM

## 2024-09-23 PROCEDURE — 97110 THERAPEUTIC EXERCISES: CPT | Mod: GP,CQ

## 2024-09-23 NOTE — PROGRESS NOTES
"Physical Therapy    Physical Therapy Treatment      Patient Name: Louisa Rodas  MRN: [id number]  Today's Date: 9/23/2024    Time Entry:   Time Calculation  Start Time: 0200  Stop Time: 0255  Time Calculation (min): 55 min     PT Therapeutic Procedures Time Entry  Therapeutic Exercise Time Entry: 40       Visit: 8  Insurance: Medicare   Auth: No  Cert dates: 8/6/24-11/4/24    Assessment:  Focus on strength w/ decreased reps 2` fatigue    Plan:  R knee strength    Current Problem:   Problem List Items Addressed This Visit             ICD-10-CM    Acute pain of right knee M25.561    Knee stiffness, right M25.661           Subjective    \"I am tired today.\"       Objective   R knee AROM not measured today.       TREATMENT  Therapeutic exercise:  Bridges x 10  SLR x 10  SAQ x 10, #3  LAQ x 20, #3 x 10  Sit to stand x 10  4\" step over w/o UE x 5  6\" Step over x 5, w/o UE  Slant board calf stretch 10x10\"  YTB lat amb x 2  HR x 20  SLS 5x10\" ea  Fwd/Lat steps over hurdles x 2 ea  Nu Step x 5 min  HP to LB EOS, supine       Goals:  Active       PT Problem       Pt will increase R knee AROM to 0-120 deg for improved ability to perform transfers       Start:  08/07/24    Expected End:  11/04/24            Pt will report 50% reduction in R knee pain for improved ability to ambulate longer distances        Start:  08/07/24    Expected End:  11/04/24            Pt will increase LE strength by 1 MMT grade for all weakened muscle groups for improved ability to perform transfers       Start:  08/07/24    Expected End:  11/04/24            Pt will be independent in [facility] for home maintenance        Start:  08/07/24    Expected End:  11/04/24                "

## 2024-09-30 ENCOUNTER — TREATMENT (OUTPATIENT)
Dept: PHYSICAL THERAPY | Facility: CLINIC | Age: 84
End: 2024-09-30
Payer: MEDICARE

## 2024-09-30 DIAGNOSIS — M25.661 KNEE STIFFNESS, RIGHT: ICD-10-CM

## 2024-09-30 DIAGNOSIS — M25.561 ACUTE PAIN OF RIGHT KNEE: Primary | ICD-10-CM

## 2024-09-30 PROCEDURE — 97110 THERAPEUTIC EXERCISES: CPT | Mod: GP,CQ

## 2024-09-30 NOTE — PROGRESS NOTES
"Physical Therapy    Physical Therapy Treatment      Patient Name: Louisa Rodas  MRN: [id number]  Today's Date: 9/30/2024    Time Entry:   Time Calculation  Start Time: 0200  Stop Time: 0315  Time Calculation (min): 75 min     PT Therapeutic Procedures Time Entry  Therapeutic Exercise Time Entry: 55       Visit: 8  Insurance: Medicare   Auth: No  Cert dates: 8/6/24-11/4/24    Assessment:  Decreased tolerance to strengthening today.      Plan:  R knee strength    Current Problem:   Problem List Items Addressed This Visit             ICD-10-CM    Acute pain of right knee - Primary M25.561    Knee stiffness, right M25.661             Subjective    \"R knee feels stronger than L..\"       Objective   R knee AROM not measured today.       TREATMENT  Therapeutic exercise:  SLR x 10 ea, #2  SAQ x 20 ea, #2  LSLR #2 x 10 ea  Sit to stand x 15  Slant board calf stretch 10x10\"  SLS 5x10\" ea, foam pad  6\" Step ups w/o UE x 10 ea  6\" lateral step overs w/o UE as able x 10  LP #60 3x10  HSC #22 3x10  KE #11 3x10  Nu Step x 5 min, 2.0  HP to LB EOS, supine       Goals:  Active       PT Problem       Pt will increase R knee AROM to 0-120 deg for improved ability to perform transfers       Start:  08/07/24    Expected End:  11/04/24            Pt will report 50% reduction in R knee pain for improved ability to ambulate longer distances        Start:  08/07/24    Expected End:  11/04/24            Pt will increase LE strength by 1 MMT grade for all weakened muscle groups for improved ability to perform transfers       Start:  08/07/24    Expected End:  11/04/24            Pt will be independent in [facility] for home maintenance        Start:  08/07/24    Expected End:  11/04/24                "

## 2024-10-07 DIAGNOSIS — K74.60 CIRRHOSIS OF LIVER WITHOUT ASCITES, UNSPECIFIED HEPATIC CIRRHOSIS TYPE (MULTI): ICD-10-CM

## 2024-10-07 RX ORDER — PROPRANOLOL HYDROCHLORIDE 10 MG/1
10 TABLET ORAL DAILY
Qty: 90 TABLET | Refills: 2 | Status: SHIPPED | OUTPATIENT
Start: 2024-10-07

## 2024-10-10 DIAGNOSIS — B99.9 RECURRENT INFECTIONS: Primary | ICD-10-CM

## 2024-10-11 ENCOUNTER — DOCUMENTATION (OUTPATIENT)
Dept: PRIMARY CARE | Facility: CLINIC | Age: 84
End: 2024-10-11
Payer: MEDICARE

## 2024-10-11 NOTE — PROGRESS NOTES
Called Ms. Rodas for Chronic care management services. Unable to reach. Left voicemail. Chart reviewed.

## 2024-10-14 ENCOUNTER — EVALUATION (OUTPATIENT)
Dept: PHYSICAL THERAPY | Facility: CLINIC | Age: 84
End: 2024-10-14
Payer: MEDICARE

## 2024-10-14 DIAGNOSIS — G89.29 CHRONIC LOW BACK PAIN: ICD-10-CM

## 2024-10-14 DIAGNOSIS — M54.50 CHRONIC LOW BACK PAIN: ICD-10-CM

## 2024-10-14 DIAGNOSIS — G89.29 CHRONIC LEFT-SIDED LOW BACK PAIN, UNSPECIFIED WHETHER SCIATICA PRESENT: Primary | ICD-10-CM

## 2024-10-14 DIAGNOSIS — M54.50 CHRONIC LEFT-SIDED LOW BACK PAIN, UNSPECIFIED WHETHER SCIATICA PRESENT: Primary | ICD-10-CM

## 2024-10-14 PROCEDURE — 97110 THERAPEUTIC EXERCISES: CPT | Mod: GP | Performed by: PHYSICAL THERAPIST

## 2024-10-14 PROCEDURE — 97161 PT EVAL LOW COMPLEX 20 MIN: CPT | Mod: GP | Performed by: PHYSICAL THERAPIST

## 2024-10-14 ASSESSMENT — ENCOUNTER SYMPTOMS
LOSS OF SENSATION IN FEET: 0
DEPRESSION: 0
OCCASIONAL FEELINGS OF UNSTEADINESS: 1

## 2024-10-14 NOTE — PROGRESS NOTES
Physical Therapy    Physical Therapy Evaluation and Treatment      Patient Name: Louisa Rodas  MRN: [id number]  Today's Date: 10/14/2024    Time Entry:   Time Calculation  Start Time: 1410  Stop Time: 1515  Time Calculation (min): 65 min  PT Evaluation Time Entry  PT Evaluation (Low) Time Entry: 25  PT Therapeutic Procedures Time Entry  Therapeutic Exercise Time Entry: 25        Visit: 1   Insurance: Medicare  Auth: No   Cert dates: 10/14/24- 1/12/25    Assessment:  Pt presents to clinic with chief complaint of low back pain and L sided glute pain that is acute on chronic. Pt has difficulty standing, walking, performing daily tasks d/t pain symptoms. She does have recent R TKA that was completed in May. Pt demonstrates mild postural impairments, reduced LE strength, and mild decrease in lumbar mobility. She will benefit from skilled therapy to address current deficits for improved ability to return to regular activity with reduced pain    Plan:  OP PT Plan  Treatment/Interventions: Cryotherapy, Education/ Instruction, Hot pack, Gait training, Manual therapy, Neuromuscular re-education, Self care/ home management, Therapeutic activities, Therapeutic exercises  PT Plan: Skilled PT  PT Frequency: 1 time per week  Duration: 4-6 weeks  Onset Date: 09/15/24  Certification Period Start Date: 10/14/24  Certification Period End Date: 01/12/25  Rehab Potential: Good  Plan of Care Agreement: Patient    Current Problem:   1. Chronic left-sided low back pain, unspecified whether sciatica present        2. Chronic low back pain  Referral to Physical Therapy    Follow Up In Physical Therapy          Subjective    General:  General  Reason for Referral: Low back pain, buttock pain  Referred By: Dr. Ramon  Preferred Learning Style: verbal, visual, written  Precautions:  Precautions  STEADI Fall Risk Score (The score of 4 or more indicates an increased risk of falling): 2  Precautions Comment: None    Chief complaints:  Pt presents to  "clinic with chief complaint of low back pain and L glute pain that is acute on chronic    Onset/Surgery Date: acute on chronic  Mechanism of Injury: no specific   Previous History: Yes     Pain: 3/10      Location: Low back, L glute     Type: \"sharp\" type pain    Aggravators: standing long periods of time, walking long periods of time    Alleviators: sitting, rest, heat, topicals      Function:    Prior Level: fully functional with use of SPC     Current limitations: standing, walking long periods of time     Condition: Unchanged over the last couple of months       Home Situation:    Two full flights of steps   Full bath, bedroom on second floor      Sleep:     Disturbed: No     Preferred position(s):       Goals for Therapy:    Reduce pain   Be able to stand and walk longer periods of time        Objective      Observation/Posture:     Increased thoracic kyphosis   Rounded shoulder posturing   Mild decrease in lumbar lordosis in standing       ROM/Flexibility:    Lumbar  Flexion: 80 deg, HS limited       Extension: 5 deg       Sidebend R / L: 10 deg pain / 10 deg, pain       Rotation R / L: 50% / 50%, pain L SIJ      Hip R / L Flexion: WNL bilat      Ext Rot: WNL bilat      Int Rot: mild limitation bilat      Strength R / L:     Hip Flexion:     5 / 5    Hip Abduction:    5 / 5    Hip Adduction:    5 / 5    Hip ER:       4- / 4+    Hip IR:       4+ / 5      Knee Extension:   5 / 5    Knee Flexion:       4+ / 4+      Ankle DF:             5 / 5    Ankle PF:              5 / 5     Neurological: Sensation is intact and symmetrical in BLEs.      Gait:    Decreased chadd, decreased step length bilat   Pt ambulating with SPC      Special Tests:     Slump R / L : - / -     SLR R / L : - / -     Long sit: WNL     Outcome Measure:    JOSE: 15 / 50 = 30 % impaired         TREATMENT  Therapeutic exercise:  PPT x 10 with 5 hold  SKTC x 10 ea   Lumbar rot x 10  Seated lumbar flexion stretch x 10   NuStep x 5 min    "     Goals:  Active       PT Problem       Pt will report centralization of pain into low back for improved ability to stand for longer periods of time        Start:  10/14/24    Expected End:  01/12/25            Pt will demonstrate full and pain-free lumbar rot for improved ability to perform transfers       Start:  10/14/24    Expected End:  01/12/25            Pt will increase bilateral LE strength by 1 MMT grade for all weakened muscle groups for improved lumbar and LE stability        Start:  10/14/24    Expected End:  01/12/25            Pt will be independent in [facility] for home maintenance        Start:  10/14/24    Expected End:  01/12/25

## 2024-10-16 DIAGNOSIS — I10 PRIMARY HYPERTENSION: ICD-10-CM

## 2024-10-16 DIAGNOSIS — E03.9 HYPOTHYROIDISM, UNSPECIFIED TYPE: ICD-10-CM

## 2024-10-16 RX ORDER — LEVOTHYROXINE SODIUM 100 UG/1
TABLET ORAL
Qty: 66 TABLET | Refills: 2 | Status: SHIPPED | OUTPATIENT
Start: 2024-10-16

## 2024-10-16 RX ORDER — LISINOPRIL 20 MG/1
20 TABLET ORAL DAILY
Qty: 90 TABLET | Refills: 2 | Status: SHIPPED | OUTPATIENT
Start: 2024-10-16

## 2024-10-16 RX ORDER — AMLODIPINE BESYLATE 2.5 MG/1
2.5 TABLET ORAL DAILY
Qty: 90 TABLET | Refills: 2 | Status: SHIPPED | OUTPATIENT
Start: 2024-10-16

## 2024-10-17 ENCOUNTER — LAB (OUTPATIENT)
Dept: LAB | Facility: LAB | Age: 84
End: 2024-10-17
Payer: COMMERCIAL

## 2024-10-17 ENCOUNTER — PATIENT OUTREACH (OUTPATIENT)
Dept: PRIMARY CARE | Facility: CLINIC | Age: 84
End: 2024-10-17

## 2024-10-17 ENCOUNTER — DOCUMENTATION (OUTPATIENT)
Dept: PRIMARY CARE | Facility: CLINIC | Age: 84
End: 2024-10-17

## 2024-10-17 DIAGNOSIS — N18.2 DIABETES MELLITUS DUE TO UNDERLYING CONDITION WITH STAGE 2 CHRONIC KIDNEY DISEASE, WITHOUT LONG-TERM CURRENT USE OF INSULIN (MULTI): ICD-10-CM

## 2024-10-17 DIAGNOSIS — E08.22 DIABETES MELLITUS DUE TO UNDERLYING CONDITION WITH STAGE 2 CHRONIC KIDNEY DISEASE, WITHOUT LONG-TERM CURRENT USE OF INSULIN (MULTI): ICD-10-CM

## 2024-10-17 DIAGNOSIS — I10 PRIMARY HYPERTENSION: ICD-10-CM

## 2024-10-17 DIAGNOSIS — B99.9 RECURRENT INFECTIONS: ICD-10-CM

## 2024-10-17 PROCEDURE — 82784 ASSAY IGA/IGD/IGG/IGM EACH: CPT

## 2024-10-17 NOTE — PROGRESS NOTES
Chronic care management outreach complete. Ms. Rodas is doing well. She continues to work with pt for her right knee pain. She says that it can get really stiff especially now with the weather getting colder. I recommended a warm compact and topical cream. She says that she alternated between the warm compact and a topical cream. Her blood sugar was 110 mg/dL but she does not often monitor her bp. Her medications her refilled by pcp yesterday. Up coming appointments reviewed. No other questions or concerns at this time.

## 2024-10-18 LAB
IGA SERPL-MCNC: 152 MG/DL (ref 70–400)
IGG SERPL-MCNC: 833 MG/DL (ref 700–1600)
IGG SERPL-MCNC: 833 MG/DL (ref 700–1600)
IGG1 SER-MCNC: 719 MG/DL (ref 490–1140)
IGG2 SER-MCNC: 106 MG/DL (ref 150–640)
IGG3 SER-MCNC: 12 MG/DL (ref 11–85)
IGG4 SER-MCNC: 18 MG/DL (ref 3–200)
IGM SERPL-MCNC: 19 MG/DL (ref 40–230)

## 2024-10-20 LAB
S PN DA SERO 19F IGG SER-MCNC: 1.94 UG/ML
S PNEUM DA 1 IGG SER-MCNC: 0.31 UG/ML
S PNEUM DA 10A IGG SER-MCNC: 0.16 UG/ML
S PNEUM DA 11A IGG SER-MCNC: 0.19 UG/ML
S PNEUM DA 12F IGG SER-MCNC: 0.2 UG/ML
S PNEUM DA 14 IGG SER-MCNC: 0.98 UG/ML
S PNEUM DA 15B IGG SER-MCNC: 1 UG/ML
S PNEUM DA 17F IGG SER-MCNC: <0.04 UG/ML
S PNEUM DA 18C IGG SER-MCNC: 0.25 UG/ML
S PNEUM DA 19A IGG SER-MCNC: 0.92 UG/ML
S PNEUM DA 2 IGG SER-MCNC: <0.09 UG/ML
S PNEUM DA 20A IGG SER-MCNC: 0.04 UG/ML
S PNEUM DA 22F IGG SER-MCNC: 1.31 UG/ML
S PNEUM DA 23F IGG SER-MCNC: 0.32 UG/ML
S PNEUM DA 3 IGG SER-MCNC: 0.21 UG/ML
S PNEUM DA 33F IGG SER-MCNC: 2.71 UG/ML
S PNEUM DA 4 IGG SER-MCNC: 0.15 UG/ML
S PNEUM DA 5 IGG SER-MCNC: 0.52 UG/ML
S PNEUM DA 6B IGG SER-MCNC: 0.09 UG/ML
S PNEUM DA 7F IGG SER-MCNC: 0.4 UG/ML
S PNEUM DA 8 IGG SER-MCNC: 0.39 UG/ML
S PNEUM DA 9N IGG SER-MCNC: 0.14 UG/ML
S PNEUM DA 9V IGG SER-MCNC: 0.4 UG/ML
S PNEUM SEROTYPE IGG SER-IMP: NORMAL

## 2024-10-21 ENCOUNTER — TREATMENT (OUTPATIENT)
Dept: PHYSICAL THERAPY | Facility: CLINIC | Age: 84
End: 2024-10-21
Payer: MEDICARE

## 2024-10-21 DIAGNOSIS — G89.29 CHRONIC LOW BACK PAIN: ICD-10-CM

## 2024-10-21 DIAGNOSIS — M54.50 CHRONIC LOW BACK PAIN: ICD-10-CM

## 2024-10-21 PROCEDURE — 97110 THERAPEUTIC EXERCISES: CPT | Mod: GP | Performed by: PHYSICAL THERAPIST

## 2024-10-21 NOTE — PROGRESS NOTES
"Physical Therapy    Physical Therapy Treatment      Patient Name: Louisa Rodas  MRN: [id number]  Today's Date: 10/21/2024    Time Entry:   Time Calculation  Start Time: 1110  Stop Time: 1215  Time Calculation (min): 65 min     PT Therapeutic Procedures Time Entry  Therapeutic Exercise Time Entry: 45        Visit: 2  Insurance: Medicare  Auth: No   Cert dates: 10/14/24- 1/12/25    Assessment:  Pt tolerated standing strength well. Had difficulty with heel slides d/t posterior knee pain today.   Will continue to progress with strength as tolerated     Plan:  Step ups  Sit to stand  Lateral walks   HS stretching     Current Problem:     Problem List Items Addressed This Visit             ICD-10-CM    Chronic low back pain M54.50, G89.29         Subjective    \"My back is doing alright today I'm wearing a brace and I put some of that cream on\"          Objective   Antalgic gait pattern  Amb without SPC today       TREATMENT  Therapeutic exercise:  PPT x 15 with 5 hold  SKTC x 10 ea   Heel slides x 20   Lumbar rot x 10  Hip abd (black) x 20  Bridges 2 x 10   LAQ x 20   Heel raises x 20   Gastroc stretch on tilt board 10 x 10\" hold   Sit to stand x 10   NuStep x 5 min   Seated lumbar flexion stretch x 10     MHP x 15 min EOS        Goals:  Active       PT Problem       Pt will report centralization of pain into low back for improved ability to stand for longer periods of time        Start:  10/14/24    Expected End:  01/12/25            Pt will demonstrate full and pain-free lumbar rot for improved ability to perform transfers       Start:  10/14/24    Expected End:  01/12/25            Pt will increase bilateral LE strength by 1 MMT grade for all weakened muscle groups for improved lumbar and LE stability        Start:  10/14/24    Expected End:  01/12/25            Pt will be independent in [facility] for home maintenance        Start:  10/14/24    Expected End:  01/12/25                      "

## 2024-10-22 ENCOUNTER — APPOINTMENT (OUTPATIENT)
Dept: OPHTHALMOLOGY | Facility: CLINIC | Age: 84
End: 2024-10-22
Payer: MEDICARE

## 2024-10-22 DIAGNOSIS — H57.89 THYROID EYE DISEASE: ICD-10-CM

## 2024-10-22 DIAGNOSIS — H50.22 HYPERTROPIA OF LEFT EYE: Primary | ICD-10-CM

## 2024-10-22 DIAGNOSIS — E07.9 THYROID EYE DISEASE: ICD-10-CM

## 2024-10-22 PROCEDURE — 99213 OFFICE O/P EST LOW 20 MIN: CPT | Performed by: PSYCHIATRY & NEUROLOGY

## 2024-10-22 PROCEDURE — 92060 SENSORIMOTOR EXAMINATION: CPT | Performed by: PSYCHIATRY & NEUROLOGY

## 2024-10-22 ASSESSMENT — VISUAL ACUITY
CORRECTION_TYPE: GLASSES
METHOD: SNELLEN - LINEAR
OD_CC: 20/20
OD_CC+: -3
OS_CC: 20/25
OS_CC+: -2

## 2024-10-22 ASSESSMENT — REFRACTION_WEARINGRX
OS_CYLINDER: -0.75
OD_CYLINDER: -1.75
OD_AXIS: 110
OS_AXIS: 115
OS_SPHERE: +3.25
SPECS_TYPE: PAL
OD_SPHERE: +4.50
OD_ADD: +2.75
OS_ADD: +2.75

## 2024-10-22 ASSESSMENT — CONF VISUAL FIELD
METHOD: COUNTING FINGERS
OD_INFERIOR_TEMPORAL_RESTRICTION: 0
OS_SUPERIOR_NASAL_RESTRICTION: 0
OS_INFERIOR_NASAL_RESTRICTION: 0
OD_NORMAL: 1
OD_SUPERIOR_TEMPORAL_RESTRICTION: 0
OS_SUPERIOR_TEMPORAL_RESTRICTION: 0
OS_NORMAL: 1
OD_SUPERIOR_NASAL_RESTRICTION: 0
OD_INFERIOR_NASAL_RESTRICTION: 0
OS_INFERIOR_TEMPORAL_RESTRICTION: 0

## 2024-10-22 ASSESSMENT — TONOMETRY
OS_IOP_MMHG: 12
IOP_METHOD: GOLDMANN APPLANATION
OD_IOP_MMHG: 10

## 2024-10-22 ASSESSMENT — ENCOUNTER SYMPTOMS
ALLERGIC/IMMUNOLOGIC NEGATIVE: 0
EYES NEGATIVE: 1
MUSCULOSKELETAL NEGATIVE: 0
NEUROLOGICAL NEGATIVE: 0
RESPIRATORY NEGATIVE: 0
ENDOCRINE NEGATIVE: 0
CARDIOVASCULAR NEGATIVE: 0
HEMATOLOGIC/LYMPHATIC NEGATIVE: 0
CONSTITUTIONAL NEGATIVE: 0
GASTROINTESTINAL NEGATIVE: 0
PSYCHIATRIC NEGATIVE: 0

## 2024-10-22 ASSESSMENT — EXTERNAL EXAM - RIGHT EYE: OD_EXAM: NORMAL

## 2024-10-22 ASSESSMENT — EXTERNAL EXAM - LEFT EYE: OS_EXAM: NORMAL

## 2024-10-22 ASSESSMENT — CUP TO DISC RATIO
OS_RATIO: .6
OD_RATIO: .6

## 2024-10-22 ASSESSMENT — PACHYMETRY
OD_CT(UM): 502
OS_CT(UM): 486

## 2024-10-22 NOTE — PROGRESS NOTES
Assessment and Plan    10/22/2024 Vickie OD 22 & OS 22 at base 95.  12/07/2023 Vickie OD 21 & OS 22 at base 95.  11/28/2022 Vickie OD 23 & OS 24 at base 95.  06/20/2019 Vickie OD 22 & OS 23 at base 95.    10/16/2017 double [place] right excyclotorsion & left incyclotorsion    05/04/2023 MRI brain without contrast, which I personally reviewed, shows volume loss with enlarged lateral ventricles and primarily periventricular white matter FLAIR hyperintensities. 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    06/28/2021 MRI brain with contrast & MRA head & neck, which I personally reviewed previously, shows large ventricles with periventricular white matter FLAIR changes and partially empty sella.  Interval head imaging.  10/26/2017 MRI IAC with contrast, which I personally reviewed previously, shows no brainstem lesions. There is again prominence of the lateral ventricles.  07/17/2017 MRI brain without contrast, which I personally reviewed previously, shows enlargement of the lateral & 3rd ventricles along with along with bilateral primarily frontal periventricular white matter hyperintensity.    12/28/2023 ESR 3. CRP 0.15 mg/dL.  06/12/2023 ESR 1. CRP 0.13 mg/dL. TSH wnl (high one reading 2023)  06/24/2022 ESR 2. CRP 0.12 mg/dL.  04/11/2022 ESR 3. CRP <0.10 mg/dL.  12/14/2021 ESR 3. CRP 0.1 mg/dL.  01/13/2021 ESR 12. CRP 1.78 mg/dL.  12/04/2020 ESR 4. CRP 0.12 mg/dL.  07/09/2020 ESR 5. CRP 0.3 mg/dL.  05/28/2019 ESR 6. CRP 0.251 mg/dL.  05/22/2018 ESR 4. CRP 0.098 mg/dL. B12 1468.  07/11/2017 acetylcholine receptor binding, blocking & modulating antibodies negative. TSH 0.46 (low). ESR 2. CRP 0.119 mg/dL. B12 1141. REECE 1:80, anti-centromere negative & mitochondrial ab negative.    09/10/2024 OCT RNFL OD 87 & OS 87.  11/01/2022 OCT RNFL OD 86 & OS 88.  03/22/2022 OCT RNFL OD 86 & OS 87.  09/14/2020 OCT RNFL OD 90 & OS 87.  09/16/2020 OCT RNFL OD 90 & OS 88.  04/26/2016 OCT RNFL OD 91 &  OS 88.  01/30/2015 OCT RNFL OD 93 & OS 88.    12/05/2023 HVF 24-2 OD scatter MD -2.61 & OS scatter MD -2.73.  Interval [place]o visual field (HVF) testing.  11/01/2022 HVF 24-2 OD fovea 15, wnl MD +0.03 & OS fovea 38, wnl MD -0.44.  06/01/2021 HVF 24-2 OD fovea 34, wnl MD -0.81 & OS fovea 31, scatter MD -2.39.  09/16/2019 HVF 24-2 OD fovea 31, wnl MD -1.60 & OS fovea 35, scatter MD -2.36.  10/27/2015 HVF 24-2 OD wnl MD -0.35 & OS wnl MD -0.72.    This 83 year-old woman with a history of thyroid eye disease, POAG, NPDR, type IIIC polyglandular autoimmune syndrome, DCIS breast cancer, floppy eyelid syndrome, DM, HLD, asthma, depression presents for follow up evaluation of diplopia with thyroid eye disease.    She has stable left hypertropia in left and superior gazes as she did last visit. These findings are consistent with residual misalignment from thyroid eye disease, but without progression. We discussed stability and options including teprotumumab, but with head turning and monitoring as the favored path now.    Dry eye remains quite symptomatic. The problem is multifactorial with thyroid eye disease as a contributor along with her other autoimmune disease. Continue with Dr. Bernabe.    Plan    Continue with Esperanza Bernabe and Pepper. Consider punctal plug re-placement.  Continue observation & autoimmune disease treament.  Head turning and patching as needed.    Follow up in 1 year with stereo plates.

## 2024-10-23 ENCOUNTER — OFFICE VISIT (OUTPATIENT)
Dept: RHEUMATOLOGY | Facility: CLINIC | Age: 84
End: 2024-10-23
Payer: MEDICARE

## 2024-10-23 ENCOUNTER — LAB (OUTPATIENT)
Dept: LAB | Facility: LAB | Age: 84
End: 2024-10-23
Payer: MEDICARE

## 2024-10-23 VITALS
OXYGEN SATURATION: 96 % | HEART RATE: 77 BPM | HEIGHT: 63 IN | DIASTOLIC BLOOD PRESSURE: 75 MMHG | BODY MASS INDEX: 28.53 KG/M2 | WEIGHT: 161 LBS | SYSTOLIC BLOOD PRESSURE: 124 MMHG

## 2024-10-23 DIAGNOSIS — N18.31 STAGE 3A CHRONIC KIDNEY DISEASE (MULTI): ICD-10-CM

## 2024-10-23 DIAGNOSIS — M79.7 FIBROMYALGIA SYNDROME: ICD-10-CM

## 2024-10-23 DIAGNOSIS — M35.01 SICCA SYNDROME WITH KERATOCONJUNCTIVITIS (MULTI): Primary | ICD-10-CM

## 2024-10-23 DIAGNOSIS — N18.31 CHRONIC KIDNEY DISEASE, STAGE 3A (MULTI): ICD-10-CM

## 2024-10-23 DIAGNOSIS — M35.01 SICCA SYNDROME WITH KERATOCONJUNCTIVITIS (MULTI): ICD-10-CM

## 2024-10-23 DIAGNOSIS — D80.1 HYPOGAMMAGLOBULINEMIA (MULTI): ICD-10-CM

## 2024-10-23 PROBLEM — N18.4 CHRONIC KIDNEY DISEASE, STAGE IV (SEVERE) (MULTI): Status: ACTIVE | Noted: 2024-10-23

## 2024-10-23 LAB
ALBUMIN SERPL BCP-MCNC: 4.2 G/DL (ref 3.4–5)
ALP SERPL-CCNC: 84 U/L (ref 33–136)
ALT SERPL W P-5'-P-CCNC: 17 U/L (ref 7–45)
ANION GAP SERPL CALC-SCNC: 13 MMOL/L (ref 10–20)
APPEARANCE UR: CLEAR
AST SERPL W P-5'-P-CCNC: 26 U/L (ref 9–39)
BILIRUB SERPL-MCNC: 0.3 MG/DL (ref 0–1.2)
BILIRUB UR STRIP.AUTO-MCNC: NEGATIVE MG/DL
BUN SERPL-MCNC: 23 MG/DL (ref 6–23)
CALCIUM SERPL-MCNC: 8.9 MG/DL (ref 8.6–10.6)
CHLORIDE SERPL-SCNC: 107 MMOL/L (ref 98–107)
CO2 SERPL-SCNC: 26 MMOL/L (ref 21–32)
COLOR UR: NORMAL
CREAT SERPL-MCNC: 1.44 MG/DL (ref 0.5–1.05)
CREAT UR-MCNC: 173.8 MG/DL (ref 20–320)
CREAT UR-MCNC: 173.8 MG/DL (ref 20–320)
EGFRCR SERPLBLD CKD-EPI 2021: 37 ML/MIN/1.73M*2
ERYTHROCYTE [DISTWIDTH] IN BLOOD BY AUTOMATED COUNT: 13.6 % (ref 11.5–14.5)
GLUCOSE SERPL-MCNC: 87 MG/DL (ref 74–99)
GLUCOSE UR STRIP.AUTO-MCNC: NORMAL MG/DL
HCT VFR BLD AUTO: 35.8 % (ref 36–46)
HGB BLD-MCNC: 11.6 G/DL (ref 12–16)
KETONES UR STRIP.AUTO-MCNC: NEGATIVE MG/DL
LEUKOCYTE ESTERASE UR QL STRIP.AUTO: NEGATIVE
MCH RBC QN AUTO: 30.8 PG (ref 26–34)
MCHC RBC AUTO-ENTMCNC: 32.4 G/DL (ref 32–36)
MCV RBC AUTO: 95 FL (ref 80–100)
MICROALBUMIN UR-MCNC: 7.1 MG/L
MICROALBUMIN/CREAT UR: 4.1 UG/MG CREAT
MUCOUS THREADS #/AREA URNS AUTO: NORMAL /LPF
NITRITE UR QL STRIP.AUTO: NEGATIVE
NRBC BLD-RTO: 0 /100 WBCS (ref 0–0)
PH UR STRIP.AUTO: 6 [PH]
PHOSPHATE SERPL-MCNC: 3.7 MG/DL (ref 2.5–4.9)
PLATELET # BLD AUTO: 162 X10*3/UL (ref 150–450)
POTASSIUM SERPL-SCNC: 4.1 MMOL/L (ref 3.5–5.3)
PROT SERPL-MCNC: 6.2 G/DL (ref 6.4–8.2)
PROT UR STRIP.AUTO-MCNC: NORMAL MG/DL
PROT UR-ACNC: 9 MG/DL (ref 5–24)
PROT/CREAT UR: 0.05 MG/MG CREAT (ref 0–0.17)
RBC # BLD AUTO: 3.77 X10*6/UL (ref 4–5.2)
RBC # UR STRIP.AUTO: NEGATIVE /UL
RBC #/AREA URNS AUTO: NORMAL /HPF
SODIUM SERPL-SCNC: 142 MMOL/L (ref 136–145)
SP GR UR STRIP.AUTO: 1.02
SQUAMOUS #/AREA URNS AUTO: NORMAL /HPF
UROBILINOGEN UR STRIP.AUTO-MCNC: NORMAL MG/DL
WBC # BLD AUTO: 4.1 X10*3/UL (ref 4.4–11.3)
WBC #/AREA URNS AUTO: NORMAL /HPF

## 2024-10-23 PROCEDURE — 99214 OFFICE O/P EST MOD 30 MIN: CPT | Performed by: INTERNAL MEDICINE

## 2024-10-23 PROCEDURE — 3078F DIAST BP <80 MM HG: CPT | Performed by: INTERNAL MEDICINE

## 2024-10-23 PROCEDURE — 82570 ASSAY OF URINE CREATININE: CPT

## 2024-10-23 PROCEDURE — 1125F AMNT PAIN NOTED PAIN PRSNT: CPT | Performed by: INTERNAL MEDICINE

## 2024-10-23 PROCEDURE — 1160F RVW MEDS BY RX/DR IN RCRD: CPT | Performed by: INTERNAL MEDICINE

## 2024-10-23 PROCEDURE — 80053 COMPREHEN METABOLIC PANEL: CPT

## 2024-10-23 PROCEDURE — 36415 COLL VENOUS BLD VENIPUNCTURE: CPT

## 2024-10-23 PROCEDURE — 84100 ASSAY OF PHOSPHORUS: CPT

## 2024-10-23 PROCEDURE — 1036F TOBACCO NON-USER: CPT | Performed by: INTERNAL MEDICINE

## 2024-10-23 PROCEDURE — 84156 ASSAY OF PROTEIN URINE: CPT

## 2024-10-23 PROCEDURE — 82043 UR ALBUMIN QUANTITATIVE: CPT

## 2024-10-23 PROCEDURE — 3074F SYST BP LT 130 MM HG: CPT | Performed by: INTERNAL MEDICINE

## 2024-10-23 PROCEDURE — 1159F MED LIST DOCD IN RCRD: CPT | Performed by: INTERNAL MEDICINE

## 2024-10-23 PROCEDURE — 85027 COMPLETE CBC AUTOMATED: CPT

## 2024-10-23 PROCEDURE — 81001 URINALYSIS AUTO W/SCOPE: CPT

## 2024-10-23 RX ORDER — PILOCARPINE HYDROCHLORIDE 5 MG/1
5 TABLET, FILM COATED ORAL 2 TIMES DAILY
Qty: 180 TABLET | Refills: 3 | Status: SHIPPED | OUTPATIENT
Start: 2024-10-23 | End: 2025-01-21

## 2024-10-23 ASSESSMENT — ENCOUNTER SYMPTOMS
LOSS OF SENSATION IN FEET: 0
COUGH: 0
APNEA: 0
ENDOCRINE NEGATIVE: 1
NAUSEA: 0
DIARRHEA: 0
SEIZURES: 0
HEMATURIA: 0
ARTHRALGIAS: 1
HEMATOLOGIC/LYMPHATIC NEGATIVE: 1
RESPIRATORY NEGATIVE: 1
NUMBNESS: 0
GASTROINTESTINAL NEGATIVE: 1
CONFUSION: 0
PSYCHIATRIC NEGATIVE: 1
NECK STIFFNESS: 1
ANAL BLEEDING: 0
DEPRESSION: 0
NECK PAIN: 1
WEAKNESS: 0
ADENOPATHY: 0
NERVOUS/ANXIOUS: 0
CONSTIPATION: 0
FREQUENCY: 1
PHOTOPHOBIA: 1
BLOOD IN STOOL: 0
HEADACHES: 0
LIGHT-HEADEDNESS: 0
DYSURIA: 0
SHORTNESS OF BREATH: 0
OCCASIONAL FEELINGS OF UNSTEADINESS: 1
DIFFICULTY URINATING: 0
CARDIOVASCULAR NEGATIVE: 1
EYE REDNESS: 1
DIZZINESS: 0
BACK PAIN: 1
ABDOMINAL PAIN: 0
EYE PAIN: 1
FATIGUE: 1

## 2024-10-23 ASSESSMENT — ROUTINE ASSESSMENT OF PATIENT INDEX DATA (RAPID3)
LIFT_CUP_TO_MOUTH: WITHOUT ANY DIFFICULTY
FEELINGS_DEPRESSION: WITHOUT ANY DIFFICULTY
WASH_DRY_BODY: WITHOUT ANY DIFFICULTY
DRESS_YOURSELF: WITHOUT ANY DIFFICULTY
IN_OUT_BED: WITHOUT ANY DIFFICULTY
FEELINGS_ANXIETY_NERVOUS: WITHOUT ANY DIFFICULTY
WALK_FLAT_GROUND: WITHOUT ANY DIFFICULTY
SEVERITY_SCORE: HIGH SEVERITY (HS)
FN_SCORE: 2.7
GOOD_NIGHTS_SLEEP: WITH SOME DIFFICULTY
IN_OUT_TRANSPORT: WITH MUCH DIFFICULTY
WEIGHTED_TOTAL_SCORE: 6.4
TURN_FAUCETS_OFF: WITHOUT ANY DIFFICULTY
ON A SCALE OF ONE TO TEN, HOW MUCH PAIN HAVE YOU HAD BECAUSE OF YOUR CONDITION OVER THE PAST WEEK?: 9.5
ON A SCALE OF ONE TO TEN, CONSIDERING ALL THE WAYS IN WHICH ILLNESS AND HEALTH CONDITIONS MAY AFFECT YOU AT THIS TIME, PLEASE INDICATE BELOW HOW YOU ARE DOING:: 7
SEVERITY_SCORE: 0
ON A SCALE OF ONE TO TEN, CONSIDERING ALL THE WAYS IN WHICH ILLNESS AND HEALTH CONDITIONS MAY AFFECT YOU AT THIS TIME, PLEASE INDICATE BELOW HOW YOU ARE DOING:: 7
WALK_KILOMETERS: WITH MUCH DIFFICULTY
PARTIPATE_RECREATIONAL_ACTIVITIES: UNABLE TO DO
ON A SCALE OF ONE TO TEN, HOW MUCH PAIN HAVE YOU HAD BECAUSE OF YOUR CONDITION OVER THE PAST WEEK?: 9.5
SUM OF QUESTIONS A TO J: 8
TOTAL RAPID3 SCORE: 19.2
PICK_CLOTHES_OFF_FLOOR: WITH SOME DIFFICULTY

## 2024-10-23 ASSESSMENT — PAIN SCALES - GENERAL: PAINLEVEL_OUTOF10: 9

## 2024-10-23 NOTE — PROGRESS NOTES
"Chief Complaint:  This 83 y.o. female presents with the chief complaint of Sjogren syndrome (SS).     Subjective:   HPI   Problem:  1: SD       The patient returns for ongoing follow-up and management of SD.        Today, the patient offers no specific complaints regarding SD. In fact, the patient states that use of PILOCARPINE 5 mg BID has provided \"really good\" results, especially improved eye wetness. And, she denies any adverse effects.          The patient also mentions that the mouth had improved salivation as well. She continues oral rinses--ACT or BIOTINE-- on a semi-regular basis with good benefit as well.         The patient offers no other concerns with the exception that the punctal plugs fell out and will be replaced. The plugs, in addition to PILOCARPINE, significantly enhance ocular wetness.          The patient denies headaches, oropharyngeal pain, peripheral neuropathic pain, or dysautonomic symptoms (e.g., POTS).             Review of Systems   Constitutional:  Positive for fatigue.   HENT:  Positive for hearing loss. Negative for dental problem, drooling, mouth sores and tinnitus.    Eyes:  Positive for photophobia, pain and redness.   Respiratory: Negative.  Negative for apnea, cough and shortness of breath.    Cardiovascular: Negative.  Negative for chest pain and leg swelling.   Gastrointestinal: Negative.  Negative for abdominal pain, anal bleeding, blood in stool, constipation, diarrhea and nausea.   Endocrine: Negative.    Genitourinary:  Positive for frequency. Negative for difficulty urinating, dysuria, hematuria and urgency.   Musculoskeletal:  Positive for arthralgias, back pain, gait problem, neck pain and neck stiffness.   Skin: Negative.  Negative for rash.   Neurological:  Negative for dizziness, seizures, weakness, light-headedness, numbness and headaches.   Hematological: Negative.  Negative for adenopathy.   Psychiatric/Behavioral: Negative.  Negative for confusion. The patient is " "not nervous/anxious.      Objective:   /75   Pulse 77   Ht 1.6 m (5' 3\")   Wt 73 kg (161 lb)   SpO2 96%   BMI 28.52 kg/m²      Physical Exam  Vitals and nursing note reviewed.   Constitutional:       Appearance: She is obese.   HENT:      Head: Normocephalic.   Eyes:      Extraocular Movements: Extraocular movements intact.      Conjunctiva/sclera: Conjunctivae normal.      Pupils: Pupils are equal, round, and reactive to light.   Neck:      Vascular: No carotid bruit.   Cardiovascular:      Rate and Rhythm: Normal rate and regular rhythm.      Pulses: Normal pulses.      Heart sounds: Normal heart sounds. No murmur heard.  Pulmonary:      Effort: Pulmonary effort is normal. No respiratory distress.      Breath sounds: Normal breath sounds. No wheezing, rhonchi or rales.   Abdominal:      General: Bowel sounds are normal.      Comments: Obese   Musculoskeletal:         General: Tenderness present. No swelling, deformity or signs of injury.      Cervical back: Normal range of motion and neck supple. No rigidity or tenderness.      Right lower leg: No edema.      Left lower leg: No edema.      Comments: Right total knee replacement   Lymphadenopathy:      Cervical: No cervical adenopathy.   Skin:     General: Skin is warm.      Capillary Refill: Capillary refill takes less than 2 seconds.      Coloration: Skin is pale.      Findings: No bruising, erythema or rash.   Neurological:      Mental Status: She is alert and oriented to person, place, and time.   Psychiatric:         Behavior: Behavior normal.       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   Fibromyalgia syndrome - M79.7    Plan:    Renal insufficiency, Sjogren disease- urinalysis, CMP   Follow-up in six months.         Rakesh Verma MD   "

## 2024-11-04 ENCOUNTER — APPOINTMENT (OUTPATIENT)
Dept: NEPHROLOGY | Facility: CLINIC | Age: 84
End: 2024-11-04
Payer: MEDICARE

## 2024-11-04 VITALS
HEART RATE: 73 BPM | BODY MASS INDEX: 28.53 KG/M2 | HEIGHT: 63 IN | SYSTOLIC BLOOD PRESSURE: 133 MMHG | DIASTOLIC BLOOD PRESSURE: 77 MMHG | WEIGHT: 161 LBS

## 2024-11-04 DIAGNOSIS — I10 HYPERTENSION, UNSPECIFIED TYPE: Primary | ICD-10-CM

## 2024-11-04 DIAGNOSIS — N18.32 STAGE 3B CHRONIC KIDNEY DISEASE (MULTI): ICD-10-CM

## 2024-11-04 PROCEDURE — 3078F DIAST BP <80 MM HG: CPT | Performed by: INTERNAL MEDICINE

## 2024-11-04 PROCEDURE — 1125F AMNT PAIN NOTED PAIN PRSNT: CPT | Performed by: INTERNAL MEDICINE

## 2024-11-04 PROCEDURE — 3075F SYST BP GE 130 - 139MM HG: CPT | Performed by: INTERNAL MEDICINE

## 2024-11-04 PROCEDURE — 99213 OFFICE O/P EST LOW 20 MIN: CPT | Performed by: INTERNAL MEDICINE

## 2024-11-04 PROCEDURE — 1159F MED LIST DOCD IN RCRD: CPT | Performed by: INTERNAL MEDICINE

## 2024-11-04 ASSESSMENT — PAIN SCALES - GENERAL: PAINLEVEL_OUTOF10: 3

## 2024-11-04 NOTE — PROGRESS NOTES
Subjective   Patient ID: Louisa Rodas is a 84 y.o. female who presents for Follow-up (Stage 3a CKD).  HPI  Pt with pmhx of CKD 3b, HTN here today for follow up.   Overall doing well with no acute complaints. Prior UA was negative for UTI, she takes cranberry supplements and bactrim for uti ppx.     BP well controlled at home. Urinary incontinence has improved.     Review of Systems      There were no vitals taken for this visit.    Objective   Physical Exam  Constitutional:       Appearance: Normal appearance.   Cardiovascular:      Rate and Rhythm: Normal rate.   Pulmonary:      Effort: Pulmonary effort is normal.   Musculoskeletal:         General: No swelling.   Neurological:      General: No focal deficit present.      Mental Status: She is alert and oriented to person, place, and time.         Assessment/Plan   Problem List Items Addressed This Visit          Cardiac and Vasculature    HTN (hypertension) - Primary     Well controlled, continue current regimen.          Relevant Orders    Albumin-Creatinine Ratio, Urine Random    CBC    Creatinine, Urine Random    Protein, Urine Random    Renal Function Panel    Urinalysis with Reflex Microscopic       Genitourinary and Reproductive    Stage 3b chronic kidney disease (Multi)     Stable renal function with minimal proteinuria  Continue lisinopril  Given hx of UTI will defer starting sglt2i at this time         Relevant Orders    Albumin-Creatinine Ratio, Urine Random    CBC    Creatinine, Urine Random    Protein, Urine Random    Renal Function Panel    Urinalysis with Reflex Microscopic     Follow up in 6 months

## 2024-11-04 NOTE — ASSESSMENT & PLAN NOTE
Stable renal function with minimal proteinuria  Continue lisinopril  Given hx of UTI will defer starting sglt2i at this time

## 2024-11-06 ENCOUNTER — APPOINTMENT (OUTPATIENT)
Dept: PHYSICAL THERAPY | Facility: CLINIC | Age: 84
End: 2024-11-06
Payer: MEDICARE

## 2024-11-07 ENCOUNTER — APPOINTMENT (OUTPATIENT)
Dept: OPHTHALMOLOGY | Facility: CLINIC | Age: 84
End: 2024-11-07
Payer: MEDICARE

## 2024-11-07 DIAGNOSIS — H04.123 DRY EYE SYNDROME OF BOTH LACRIMAL GLANDS: ICD-10-CM

## 2024-11-07 DIAGNOSIS — H02.88B MEIBOMIAN GLAND DYSFUNCTION (MGD) OF UPPER AND LOWER LIDS OF BOTH EYES: ICD-10-CM

## 2024-11-07 DIAGNOSIS — M35.01 SJOGREN'S SYNDROME WITH KERATOCONJUNCTIVITIS SICCA (MULTI): ICD-10-CM

## 2024-11-07 DIAGNOSIS — H04.123 DRY EYE SYNDROME OF LACRIMAL GLAND, BILATERAL: Primary | ICD-10-CM

## 2024-11-07 DIAGNOSIS — H02.88A MEIBOMIAN GLAND DYSFUNCTION (MGD) OF UPPER AND LOWER LIDS OF BOTH EYES: ICD-10-CM

## 2024-11-07 PROCEDURE — 68761 CLOSE TEAR DUCT OPENING: CPT | Performed by: STUDENT IN AN ORGANIZED HEALTH CARE EDUCATION/TRAINING PROGRAM

## 2024-11-07 RX ORDER — PERFLUOROHEXYLOCTANE 1 MG/MG
1 SOLUTION OPHTHALMIC 4 TIMES DAILY
Qty: 3 ML | Refills: 2 | Status: SHIPPED | OUTPATIENT
Start: 2024-11-07 | End: 2024-12-07

## 2024-11-07 ASSESSMENT — ENCOUNTER SYMPTOMS
CONSTITUTIONAL NEGATIVE: 0
GASTROINTESTINAL NEGATIVE: 0
RESPIRATORY NEGATIVE: 0
EYES NEGATIVE: 0
NEUROLOGICAL NEGATIVE: 0
PSYCHIATRIC NEGATIVE: 0
ALLERGIC/IMMUNOLOGIC NEGATIVE: 1
CARDIOVASCULAR NEGATIVE: 1
HEMATOLOGIC/LYMPHATIC NEGATIVE: 0
ENDOCRINE NEGATIVE: 0
MUSCULOSKELETAL NEGATIVE: 0

## 2024-11-07 ASSESSMENT — VISUAL ACUITY
OD_CC: 20/20
OS_CC+: -1
METHOD: SNELLEN - LINEAR
OD_CC+: -1
OS_CC: 20/25
CORRECTION_TYPE: GLASSES

## 2024-11-07 ASSESSMENT — PACHYMETRY
OS_CT(UM): 486
OD_CT(UM): 502

## 2024-11-07 NOTE — PROGRESS NOTES
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 Garcia for Thyroid eye disease and Dr. Pabon for Glaucoma  -current TXT for ocular surface: Systane PF AT's QID, Refresh pm abel at bedtime, and warm compresses, and punctal plugs bilateral lower lids  -Rx'd Miebo but patient never received; ?whether it was denied with insurance  -(+)CPAP  -Hx of being prescribed Xiidra but medication was too expensive to obtain  -Patient is also taking Glaucoma medications (latanoprost) and with concurrent glaucoma would like to try and stay away from a steroid pulse  -H/o of being given samples of steroid pulses to control symptoms    -Patient previously had Eagle punctal plugs placed but they have fallen out  Plugs replaced today 11/7/24:  Eagle Plugs bilateral lower lids OD .6mm LOT [id number] EXP 6/28/29 OS .7mm LOT [id number] EXP 5/28/29    -TXT plan: Continue with Systane PF AT's QID, Refresh PM abel at bedtime, and warm compresses; Eagle punctal plugs today. Miebo QID OU.    Recommend starting Miebo (perfluorohexyloctane) qid OU for evaporative dry eye. The patient has tried and failed alternative treatments of artificial tears (lipid based) and warm compresses. This prescription is being prescribed for an FDA approved reason.   Rx sent to [facility] ([facility]).     Discussed that we could add an amniotic membrane in the future if there is continued irritation.     RTC with Dr. Garcia and Dr. Pabon as directed  RTC [facility] dry eye check 4/2025 with MAC OCT

## 2024-11-07 NOTE — PROGRESS NOTES
I saw and evaluated the patient. I personally obtained the key and critical portions of the history and physical exam or was physically present for key and critical portions performed by the resident/fellow. I reviewed the resident/fellow's documentation and discussed the patient with the resident/fellow. I agree with the resident/fellow's medical decision making as documented in the note.    Christiane Cooper MD

## 2024-11-08 ASSESSMENT — EXTERNAL EXAM - RIGHT EYE: OD_EXAM: NORMAL

## 2024-11-08 ASSESSMENT — EXTERNAL EXAM - LEFT EYE: OS_EXAM: NORMAL

## 2024-11-11 ENCOUNTER — APPOINTMENT (OUTPATIENT)
Dept: OBSTETRICS AND GYNECOLOGY | Facility: CLINIC | Age: 84
End: 2024-11-11
Payer: MEDICARE

## 2024-11-13 ENCOUNTER — TREATMENT (OUTPATIENT)
Dept: PHYSICAL THERAPY | Facility: CLINIC | Age: 84
End: 2024-11-13
Payer: MEDICARE

## 2024-11-13 DIAGNOSIS — G89.29 CHRONIC LOW BACK PAIN: ICD-10-CM

## 2024-11-13 DIAGNOSIS — M54.50 CHRONIC LOW BACK PAIN: ICD-10-CM

## 2024-11-13 PROCEDURE — 97110 THERAPEUTIC EXERCISES: CPT | Mod: GP,CQ

## 2024-11-13 NOTE — PROGRESS NOTES
"Physical Therapy    Physical Therapy Treatment      Patient Name: Louisa Rodas  MRN: [id number]  Today's Date: 11/13/2024    Time Entry:   Time Calculation  Start Time: 0201  Stop Time: 0304  Time Calculation (min): 63 min     PT Therapeutic Procedures Time Entry  Therapeutic Exercise Time Entry: 45        Visit: 3  Insurance: Medicare  Auth: No   Cert dates: 10/14/24- 1/12/25    Assessment:  Pt was able to gently progress w/ decreased sciatic pain EOS    Plan:  Lateral walks   HS stretching     Current Problem:     Problem List Items Addressed This Visit             ICD-10-CM    Chronic low back pain M54.50, G89.29         Subjective    \"My back isn't really hurting today.\"          Objective   Antalgic gait pattern  Amb without SPC today       TREATMENT  Therapeutic exercise:  PPT x 20 with 5 hold  SKTC x 10 ea   Heel slides on Pball x 20   Lumbar rot x 20  Hip abd (black) x 20  Bridges 2 x 10   Supine march w/ PPT x 10  LAQ x 20   Heel raises x 20   4\" Step up x 10 ea  Gastroc stretch on tilt board 10 x 10\" hold   NuStep x 5 min   Seated lumbar flexion stretch x 10     MHP x 15 min EOS        Goals:  TKA Problems       TKA Problems (Active)       PT Problem       Pt will increase R knee AROM to 0-120 deg for improved ability to perform transfers       Start:  08/07/24    Expected End:  11/04/24            Pt will report 50% reduction in R knee pain for improved ability to ambulate longer distances        Start:  08/07/24    Expected End:  11/04/24            Pt will increase LE strength by 1 MMT grade for all weakened muscle groups for improved ability to perform transfers       Start:  08/07/24    Expected End:  11/04/24            Pt will be independent in [facility] for home maintenance        Start:  08/07/24    Expected End:  11/04/24                 low back pain Problems       low back pain Problems (Active)       PT Problem       Pt will report centralization of pain into low back for improved ability to stand for " longer periods of time        Start:  10/14/24    Expected End:  01/12/25            Pt will demonstrate full and pain-free lumbar rot for improved ability to perform transfers       Start:  10/14/24    Expected End:  01/12/25            Pt will increase bilateral LE strength by 1 MMT grade for all weakened muscle groups for improved lumbar and LE stability        Start:  10/14/24    Expected End:  01/12/25            Pt will be independent in [facility] for home maintenance        Start:  10/14/24    Expected End:  01/12/25

## 2024-11-15 ENCOUNTER — DOCUMENTATION (OUTPATIENT)
Dept: PRIMARY CARE | Facility: CLINIC | Age: 84
End: 2024-11-15
Payer: MEDICARE

## 2024-11-18 ENCOUNTER — PATIENT OUTREACH (OUTPATIENT)
Dept: PRIMARY CARE | Facility: CLINIC | Age: 84
End: 2024-11-18
Payer: MEDICARE

## 2024-11-18 ENCOUNTER — TREATMENT (OUTPATIENT)
Dept: PHYSICAL THERAPY | Facility: CLINIC | Age: 84
End: 2024-11-18
Payer: MEDICARE

## 2024-11-18 DIAGNOSIS — E08.22 DIABETES MELLITUS DUE TO UNDERLYING CONDITION WITH STAGE 2 CHRONIC KIDNEY DISEASE, WITHOUT LONG-TERM CURRENT USE OF INSULIN (MULTI): ICD-10-CM

## 2024-11-18 DIAGNOSIS — I10 PRIMARY HYPERTENSION: ICD-10-CM

## 2024-11-18 DIAGNOSIS — G89.29 CHRONIC LOW BACK PAIN: ICD-10-CM

## 2024-11-18 DIAGNOSIS — N18.2 DIABETES MELLITUS DUE TO UNDERLYING CONDITION WITH STAGE 2 CHRONIC KIDNEY DISEASE, WITHOUT LONG-TERM CURRENT USE OF INSULIN (MULTI): ICD-10-CM

## 2024-11-18 DIAGNOSIS — M54.50 CHRONIC LOW BACK PAIN: ICD-10-CM

## 2024-11-18 PROCEDURE — 97110 THERAPEUTIC EXERCISES: CPT | Mod: GP | Performed by: PHYSICAL THERAPIST

## 2024-11-18 NOTE — PROGRESS NOTES
"Physical Therapy    Physical Therapy Treatment      Patient Name: Louisa Rodas  MRN: [id number]  Today's Date: 11/18/2024    Time Entry:   Time Calculation  Start Time: 1355  Stop Time: 1500  Time Calculation (min): 65 min     PT Therapeutic Procedures Time Entry  Therapeutic Exercise Time Entry: 40        Visit: 4  Insurance: Medicare  Auth: No   Cert dates: 10/14/24- 1/12/25    Assessment:  Difficulty with gastroc stretch today secondary to increased L foot pain.   No increase in knee or LBP end of session    Plan:  Lateral walks   HS stretching     Current Problem:     Problem List Items Addressed This Visit             ICD-10-CM    Chronic low back pain M54.50, G89.29         Subjective    \"My knee gave out on me, the one we've been working on\"       Objective   Antalgic gait pattern initially   Amb with SPC today       TREATMENT  Therapeutic exercise:  PPT x 20 with 5 hold  SKTC x 10 ea   Heel slides on Pball x 20   Lumbar rot x 20  Hip abd (black) x 20  Bridges 2 x 10   Supine march w/ PPT x 10  LAQ x 20   Heel raises x 20   6\" Step up x 20  Gastroc stretch on tilt board 10 x 10\" hold   NuStep x 5 min   Seated lumbar flexion stretch x 10     MHP x 15 min EOS        Goals:  Active       PT Problem       Pt will report centralization of pain into low back for improved ability to stand for longer periods of time        Start:  10/14/24    Expected End:  01/12/25            Pt will demonstrate full and pain-free lumbar rot for improved ability to perform transfers       Start:  10/14/24    Expected End:  01/12/25            Pt will increase bilateral LE strength by 1 MMT grade for all weakened muscle groups for improved lumbar and LE stability        Start:  10/14/24    Expected End:  01/12/25            Pt will be independent in [facility] for home maintenance        Start:  10/14/24    Expected End:  01/12/25                "

## 2024-11-18 NOTE — PROGRESS NOTES
Chronic care management outreach complete. Ms. Roads returned call for [facility] outreach. She says that she is doing well besides her back pain and knee pain. Back pain 6/10 and knee pain 7/10. She uses topical cream on both back and her knees, which brings her pain to a 1/10. Blood sugar 138 this morning. Did not have time to take bp this morning. We reviewed past appointments, no questions or concerns. Medications reviewed. She is not sure if she needs refills at the moment, but will call back if she needs refills. Up coming appointments reviewed. No other questions or concerns at this time. She has my contact information for questions or concerns.

## 2024-11-25 ENCOUNTER — TREATMENT (OUTPATIENT)
Dept: PHYSICAL THERAPY | Facility: CLINIC | Age: 84
End: 2024-11-25
Payer: MEDICARE

## 2024-11-25 DIAGNOSIS — M54.50 CHRONIC LOW BACK PAIN: ICD-10-CM

## 2024-11-25 DIAGNOSIS — G89.29 CHRONIC LOW BACK PAIN: ICD-10-CM

## 2024-11-25 PROCEDURE — 97110 THERAPEUTIC EXERCISES: CPT | Mod: GP,CQ

## 2024-11-25 NOTE — PROGRESS NOTES
"Physical Therapy    Physical Therapy Treatment      Patient Name: Louisa Rodas  MRN: [id number]  Today's Date: 11/25/2024    Time Entry:   Time Calculation  Start Time: 0158  Stop Time: 0310  Time Calculation (min): 72 min     PT Therapeutic Procedures Time Entry  Therapeutic Exercise Time Entry: 55        Visit: 5  Insurance: Medicare  Auth: No   Cert dates: 10/14/24- 1/12/25    Assessment:  Pt was able to manage sciatic sx's w/ PPT.  Did some post LE stretching which did not aggravate L heel or LB sx's    Plan:  Lateral walks    Current Problem:     Problem List Items Addressed This Visit             ICD-10-CM    Chronic low back pain M54.50, G89.29         Subjective    \"I have a new pain in my L foot.  I got an injection.  No LB/R knee pain, R knee is just stiff.\"       Objective   Antalgic gait pattern initially   Amb with SPC today       TREATMENT  Therapeutic exercise:  PPT x 20 with 5 hold  SKTC x 10 ea   Supine march w/ PPT x 20  Lumbar rot x 20  Ball squeeze/Hip abd (black) x 20 ea  Supine HS strap stretch 2x30\" ea  HS x 20, w/ strap  SLR w/ PPT x 10 ea   Heel raises x 20   6\" Step up x 20 ea  6\" knee flex stretch 2x30\"  Gastroc stretch on tilt board 2 x 30\" hold   Seated lumbar flexion stretch x 10     MHP supine to LB EOS       Goals:  Active       PT Problem       Pt will report centralization of pain into low back for improved ability to stand for longer periods of time        Start:  10/14/24    Expected End:  01/12/25            Pt will demonstrate full and pain-free lumbar rot for improved ability to perform transfers       Start:  10/14/24    Expected End:  01/12/25            Pt will increase bilateral LE strength by 1 MMT grade for all weakened muscle groups for improved lumbar and LE stability        Start:  10/14/24    Expected End:  01/12/25            Pt will be independent in [facility] for home maintenance        Start:  10/14/24    Expected End:  01/12/25                "

## 2024-12-03 ENCOUNTER — APPOINTMENT (OUTPATIENT)
Dept: PHYSICAL THERAPY | Facility: CLINIC | Age: 84
End: 2024-12-03
Payer: MEDICARE

## 2024-12-11 ENCOUNTER — TREATMENT (OUTPATIENT)
Dept: PHYSICAL THERAPY | Facility: CLINIC | Age: 84
End: 2024-12-11
Payer: MEDICARE

## 2024-12-11 DIAGNOSIS — M54.50 CHRONIC LOW BACK PAIN: ICD-10-CM

## 2024-12-11 DIAGNOSIS — G89.29 CHRONIC LOW BACK PAIN: ICD-10-CM

## 2024-12-11 PROCEDURE — 97110 THERAPEUTIC EXERCISES: CPT | Mod: GP,CQ

## 2024-12-11 NOTE — PROGRESS NOTES
"Physical Therapy    Physical Therapy Treatment      Patient Name: Louisa Rodas  MRN: [id number]  Today's Date: 12/11/2024    Time Entry:   Time Calculation  Start Time: 0227  Stop Time: 0330  Time Calculation (min): 63 min     PT Therapeutic Procedures Time Entry  Therapeutic Exercise Time Entry: 50        Visit: 6  Insurance: Medicare  Auth: No   Cert dates: 10/14/24- 1/12/25    Assessment:  Posterior knee tightness on R, as well as sciatic sx's limiting progress today    Plan:  Lateral walks    Current Problem:     Problem List Items Addressed This Visit             ICD-10-CM    Chronic low back pain M54.50, G89.29         Subjective    \"R knee stiff, no pain today, but yesterday it was bad.\"       Objective   Decreased chadd      TREATMENT  Therapeutic exercise:  PPT x 20 with 5 hold  SKTC x 10 ea   HS w/ strap and R foot on Pball x 20  LTR x 20  Supine march w/ PPT x 20  Ball squeeze/Hip abd (black) x 20 ea  Supine HS strap stretch 3x30\" ea  SLR w/ PPT x 20 ea   SAQ #2 x 20, R  6\" Step up x 20 ea  Gastroc stretch on tilt board 2 x 30\" hold   Nu Step x 5 min    MHP supine to LB EOS       Goals:  Active       PT Problem       Pt will report centralization of pain into low back for improved ability to stand for longer periods of time        Start:  10/14/24    Expected End:  01/12/25            Pt will demonstrate full and pain-free lumbar rot for improved ability to perform transfers       Start:  10/14/24    Expected End:  01/12/25            Pt will increase bilateral LE strength by 1 MMT grade for all weakened muscle groups for improved lumbar and LE stability        Start:  10/14/24    Expected End:  01/12/25            Pt will be independent in [facility] for home maintenance        Start:  10/14/24    Expected End:  01/12/25                "

## 2024-12-16 ENCOUNTER — TREATMENT (OUTPATIENT)
Dept: PHYSICAL THERAPY | Facility: CLINIC | Age: 84
End: 2024-12-16
Payer: MEDICARE

## 2024-12-16 DIAGNOSIS — M54.50 CHRONIC LOW BACK PAIN: ICD-10-CM

## 2024-12-16 DIAGNOSIS — G89.29 CHRONIC LOW BACK PAIN: ICD-10-CM

## 2024-12-16 PROCEDURE — 97110 THERAPEUTIC EXERCISES: CPT | Mod: GP,CQ

## 2024-12-16 NOTE — PROGRESS NOTES
"Physical Therapy    Physical Therapy Treatment      Patient Name: Louisa Rodas  MRN: [id number]  Today's Date: 12/16/2024    Time Entry:   Time Calculation  Start Time: 0246  Stop Time: 0400  Time Calculation (min): 74 min     PT Therapeutic Procedures Time Entry  Therapeutic Exercise Time Entry: 55        Visit: 7  Insurance: Medicare  Auth: No   Cert dates: 10/14/24- 1/12/25    Assessment:  No sciatic sx's until EOS    Plan:  Lateral walks    Current Problem:     Problem List Items Addressed This Visit             ICD-10-CM    Chronic low back pain M54.50, G89.29         Subjective    \"R knee stiff, I feel better than last time, I liked the stretch we did last time.\"       Objective   Pt enters clinic w/o A.D.      TREATMENT  Therapeutic exercise:  PPT x 20 with 5 hold  SKTC x 10 ea   HS w/ strap and R foot on Pball x 10  LTR x 10  Supine march w/ PPT x 20  Ball squeeze/Hip abd (black) x 20 ea  Supine HS strap stretch 3x30\" ea  SLR w/ PPT 2x10 ea   SAQ #2 x 20, R  6\" Step up x 20, R  Gastroc stretch on tilt board 5x20\" hold   Nu Step x 5 min    MHP supine to LB EOS       Goals:  Active       PT Problem       Pt will report centralization of pain into low back for improved ability to stand for longer periods of time        Start:  10/14/24    Expected End:  01/12/25            Pt will demonstrate full and pain-free lumbar rot for improved ability to perform transfers       Start:  10/14/24    Expected End:  01/12/25            Pt will increase bilateral LE strength by 1 MMT grade for all weakened muscle groups for improved lumbar and LE stability        Start:  10/14/24    Expected End:  01/12/25            Pt will be independent in [facility] for home maintenance        Start:  10/14/24    Expected End:  01/12/25                "

## 2024-12-19 ENCOUNTER — TREATMENT (OUTPATIENT)
Dept: PHYSICAL THERAPY | Facility: CLINIC | Age: 84
End: 2024-12-19
Payer: MEDICARE

## 2024-12-19 ENCOUNTER — PATIENT OUTREACH (OUTPATIENT)
Dept: PRIMARY CARE | Facility: CLINIC | Age: 84
End: 2024-12-19
Payer: MEDICARE

## 2024-12-19 ENCOUNTER — TELEPHONE (OUTPATIENT)
Dept: PRIMARY CARE | Facility: CLINIC | Age: 84
End: 2024-12-19

## 2024-12-19 DIAGNOSIS — E11.22 TYPE 2 DIABETES MELLITUS WITH STAGE 3B CHRONIC KIDNEY DISEASE, WITHOUT LONG-TERM CURRENT USE OF INSULIN (MULTI): ICD-10-CM

## 2024-12-19 DIAGNOSIS — M54.50 CHRONIC LOW BACK PAIN: ICD-10-CM

## 2024-12-19 DIAGNOSIS — N18.32 TYPE 2 DIABETES MELLITUS WITH STAGE 3B CHRONIC KIDNEY DISEASE, WITHOUT LONG-TERM CURRENT USE OF INSULIN (MULTI): ICD-10-CM

## 2024-12-19 DIAGNOSIS — I10 PRIMARY HYPERTENSION: ICD-10-CM

## 2024-12-19 DIAGNOSIS — G89.29 CHRONIC LOW BACK PAIN: ICD-10-CM

## 2024-12-19 PROCEDURE — 97110 THERAPEUTIC EXERCISES: CPT | Mod: GP,CQ

## 2024-12-19 NOTE — PROGRESS NOTES
Patient left a message on the voicemail stating she checked her blood pressure and it was 116/56 and her blood sugar was 129.

## 2024-12-19 NOTE — PROGRESS NOTES
Chart reviewed prior to monthly [facility] outreach. Spoke with Patient and she states she is doing well but still has some right knee and back pain. Patient continues with physical therapy and states her right knee feels stiff. States she will use a cane periodically. Denies any falls and falls precautions reviewed and she expresses understanding. Pt. with occasional trouble falling asleep. Appetite is fair and states she is maintaining her weight. Patient has not checked her blood pressure or blood sugar consistently. Patient was agreeable to try to check blood sugar and blood pressure twice a week to start and keep a log. No need for refills at this time. Appointments reviewed and she is scheduled to see physical therapy today, pain management on 12/27/24 and surg./onc 02/11/25. Patient to call with any questions or concerns.

## 2024-12-19 NOTE — PROGRESS NOTES
"Physical Therapy    Physical Therapy Treatment      Patient Name: Louisa Rodas  MRN: [id number]  Today's Date: 12/19/2024    Time Entry:   Time Calculation  Start Time: 0201  Stop Time: 0250  Time Calculation (min): 49 min     PT Therapeutic Procedures Time Entry  Therapeutic Exercise Time Entry: 35        Visit: 8  Insurance: Medicare  Auth: No   Cert dates: 10/14/24- 1/12/25    Assessment:  Increased sciatic sx's into L glute today, so rx was limited to ex's that didn't flare sciatic sx's. Pt reports decreased pain levels EOS    Plan:  Resume rx as able .  2 more appointments before 1/12/25    Current Problem:     Problem List Items Addressed This Visit             ICD-10-CM    Chronic low back pain M54.50, G89.29         Subjective    \"LBP is more today, I didn't sleep well last night.\"       Objective   Pain 5/10 L       TREATMENT  Therapeutic exercise:  PPT, not today  SKTC x 10 ea   HS w/ strap and R foot on Pball x 10  LTR x 10  Supine march w/ PPT x 20  Ball squeeze/Hip abd (black) x 20 ea  Supine neural glide x 10, 5 pumps  SLR w/ PPT 2x10 ea , not today  SAQ #3 x 20, R  7\" Step up x 20, R  Gastroc stretch on tilt board 10x10\" hold   Lat amb x 3  Nu Step x 5 min    MHP supine to LB EOS       Goals:  Active       PT Problem       Pt will report centralization of pain into low back for improved ability to stand for longer periods of time        Start:  10/14/24    Expected End:  01/12/25            Pt will demonstrate full and pain-free lumbar rot for improved ability to perform transfers       Start:  10/14/24    Expected End:  01/12/25            Pt will increase bilateral LE strength by 1 MMT grade for all weakened muscle groups for improved lumbar and LE stability        Start:  10/14/24    Expected End:  01/12/25            Pt will be independent in [facility] for home maintenance        Start:  10/14/24    Expected End:  01/12/25                "

## 2024-12-23 ENCOUNTER — TREATMENT (OUTPATIENT)
Dept: PHYSICAL THERAPY | Facility: CLINIC | Age: 84
End: 2024-12-23
Payer: MEDICARE

## 2024-12-23 DIAGNOSIS — G89.29 CHRONIC LOW BACK PAIN: ICD-10-CM

## 2024-12-23 DIAGNOSIS — M54.50 CHRONIC LOW BACK PAIN: ICD-10-CM

## 2024-12-23 PROCEDURE — 97110 THERAPEUTIC EXERCISES: CPT | Mod: GP,CQ

## 2024-12-23 NOTE — PROGRESS NOTES
"Physical Therapy    Physical Therapy Treatment      Patient Name: Louisa Rodas  MRN: [id number]  Today's Date: 12/23/2024    Time Entry:   Time Calculation  Start Time: 1119  Stop Time: 1225  Time Calculation (min): 66 min     PT Therapeutic Procedures Time Entry  Therapeutic Exercise Time Entry: 50        Visit: 9  Insurance: Medicare  Auth: No   Cert dates: 10/14/24- 1/12/25    Assessment:  Increased sciatic sx's into L glute today, so rx was limited to ex's that didn't flare sciatic sx's. Pt reports decreased pain levels EOS    Plan:  Resume rx as able .  1 more appointments before 1/12/25 for Re-eval    Current Problem:     Problem List Items Addressed This Visit             ICD-10-CM    Chronic low back pain M54.50, G89.29         Subjective    \"Balance isn't good, Knee gets stiff after I have been sitting for a little while, Shoulders and wrists hurt.  I can't move the way I want to. \"       Objective   Pain 6/10 B feet      TREATMENT  Supine HP to LB initially, LE's on stool  Therapeutic exercise:  PPT, not today  SKTC x 10 ea   BKFO x 10 ea  HS w/ strap and R foot on Pball x 10  Bridge  w/ TA x 10  LTR x 10  Supine march w/ PPT x 20  Ball squeeze/Hip abd (black) x 20 ea  Supine neural glide x 10, 5 pumps  SLR w/ PPT 2x10 ea , not today  LAQ #3 x 20, R  6\" Step up x 20, R  Nu Step x 5 min       Goals:  Active       PT Problem       Pt will report centralization of pain into low back for improved ability to stand for longer periods of time        Start:  10/14/24    Expected End:  01/12/25            Pt will demonstrate full and pain-free lumbar rot for improved ability to perform transfers       Start:  10/14/24    Expected End:  01/12/25            Pt will increase bilateral LE strength by 1 MMT grade for all weakened muscle groups for improved lumbar and LE stability        Start:  10/14/24    Expected End:  01/12/25            Pt will be independent in [facility] for home maintenance        Start:  10/14/24    " Expected End:  01/12/25

## 2024-12-27 ENCOUNTER — APPOINTMENT (OUTPATIENT)
Dept: PAIN MEDICINE | Facility: CLINIC | Age: 84
End: 2024-12-27
Payer: MEDICARE

## 2024-12-27 DIAGNOSIS — M47.812 CERVICAL SPONDYLOSIS WITHOUT MYELOPATHY: Primary | ICD-10-CM

## 2024-12-27 PROCEDURE — 1036F TOBACCO NON-USER: CPT | Performed by: PAIN MEDICINE

## 2024-12-27 PROCEDURE — 99213 OFFICE O/P EST LOW 20 MIN: CPT | Performed by: PAIN MEDICINE

## 2024-12-27 ASSESSMENT — PAIN - FUNCTIONAL ASSESSMENT: PAIN_FUNCTIONAL_ASSESSMENT: 0-10

## 2024-12-27 ASSESSMENT — PAIN SCALES - GENERAL
PAINLEVEL_OUTOF10: 0-NO PAIN
PAINLEVEL_OUTOF10: 0 - NO PAIN

## 2024-12-27 NOTE — PROGRESS NOTES
Subjective   Patient ID: Louisa Rodas is a 84 y.o. female with a past medical history of HLD, HTN, stage 3 chronic kidney disease, who presents with neck pain      HPI:   84-year-old female who presents as follow-up for neck pain.  Patient states that her neck pain and posterior neck can go up to posterior aspect of her skull. Denies radiation of pain down the arms. Pain worse with turning and sometimes flexing. Patient states that pain was mainly affecting nighttime as she is sleeping.  Today patient states that the pain is controlled and has not had the pain for several weeks.  Currently just taking Tylenol as needed and uses medical marijuana and CBD oil.  Discussed cervical spondylosis as a cause of her pain.  Encourage patient to continue home exercise therapy and stretching.      Review of Systems   13-point ROS done and negative except for HPI.     Current Outpatient Medications   Medication Instructions    acetaminophen (TYLENOL EXTRA STRENGTH) 1,000 mg, oral, Every 6 hours PRN    acyclovir (ZOVIRAX) 400 mg, oral, Daily    allantoin gel As needed    amLODIPine (NORVASC) 2.5 mg, oral, Daily    atorvastatin (LIPITOR) 20 mg, oral, 3 times weekly    azelastine (Astelin) 137 mcg (0.1 %) nasal spray 2 sprays, 2 times daily PRN    blood pressure test kit-large kit Use to check blood pressure once daily.    budesonide-formoteroL (Symbicort) 160-4.5 mcg/actuation inhaler 2 puffs, inhalation, Daily, RINSE MOUTH AFTER USE.    butenafine HCl (MENTAX TOP) 1 tablet, Daily    cholecalciferol (VITAMIN D-3) 50 mcg, Daily    cyanocobalamin, vitamin B-12, (Vitamin B-12) 2,500 mcg tablet, sublingual SL tablet 1 tablet    fluocinolone and shower cap 0.01 % oil Apply to scalp  leave on overnight and wash off in the morning. Use daily    hydroxychloroquine (Plaquenil) 200 mg tablet TAKE 1 TABLET BY MOUTH WITH FOOD OR MILK ONCE A DAY    ketoconazole (NIZOral) 2 % shampoo No dose, route, or frequency recorded.    lancets misc Uses  one touch verio test strips    levothyroxine (Synthroid, Levoxyl) 100 mcg tablet TAKE 1 TABLET BY MOUTH 5 TIMES A WEEK.    lisinopril 20 mg, oral, Daily    medical cannabis 1 each, As needed    montelukast (Singulair) 10 mg tablet 1 tablet, Daily    OneTouch Verio test strips strip 1 each, Topical, Daily PRN    peg 400-propylene glycol, PF, (Systane, PF,) 0.4-0.3 % dropperette Administer into affected eye(s).    pilocarpine (SALAGEN) 5 mg, oral, 2 times daily    povidone, PF, (iVizia, PF,) 0.5 % drops 1 drop, 3 times daily PRN    propranolol (INDERAL) 10 mg, oral, Daily    sulfamethoxazole-trimethoprim (Bactrim DS) 800-160 mg tablet 1 tablet, Daily    travoprost (Travatan Z) 0.004 % drops ophthalmic solution 1 drop, Nightly    VITAMIN B COMPLEX ORAL Every 24 hours    zinc gluconate 50 mg tablet Every 24 hours       Past Medical History:   Diagnosis Date    Aspiration of food 02/16/2023    Asthma     Cervical spondylosis without myelopathy     Chronic kidney disease, stage 3a (Multi)     3.21.24: creat 1.43, GFR 37    DM (diabetes mellitus), type 2 (Multi)     2/20/2024 A1C 7.2%    Ductal carcinoma in situ (DCIS) of left breast     s/p lumpectomy 3/27/24    Essential tremor     Fatigue     Fibromyalgia     H/O echocardiogram 05/25/2023    CONCLUSIONS: 1. Left ventricular systolic function is hyperdynamic with a 70-75% estimated EF. 2. Spectral Doppler shows an impaired relaxation pattern of left ventricular diastolic filling. 3. Mild aortic valve regurgitation. 4. Left ventricular cavity size is decreased. 5. There is a mid cavity left ventricular obstruction noted w/ the Valsalva maneuver. The provoked gradient is 28 mmHg.    Hepatic cirrhosis (Multi) 02/16/2023    Hyperlipidemia     Hypermetropia, bilateral     Hyperopia of both eyes with astigmatism and presbyopia    Hypertension     Hypogammaglobulinemia (Multi)     Hypothyroidism     Ischemic optic neuropathy, bilateral     Ischemic optic neuropathy, bilateral     Meibomian gland dysfunction right eye, upper and lower eyelids     Meibomian gland dysfunction (MGD) of upper and lower lids of both eyes    Near syncope     NPH (normal pressure hydrocephalus) (Multi)     MILD TO MODERATE, brain MRI 5/4/23:mild enlargement of ventricles, mild diffuse cerebral atrophy    Obesity     Obstructive sleep apnea on CPAP     Osteoarthritis     Peripheral motor neuropathy     Polycystic liver disease     follows with hepatology yearly    PONV (postoperative nausea and vomiting)     Primary open-angle glaucoma, bilateral, indeterminate stage     Sciatica     Sjogren syndrome, unspecified (Multi)     Vocal cord dysfunction         Past Surgical History:   Procedure Laterality Date    BLEPHAROPLASTY      BREAST LUMPECTOMY Left 03/27/2024    CATARACT EXTRACTION, BILATERAL      COLONOSCOPY  04/05/2018    tubular adenoma    ESOPHAGOGASTRODUODENOSCOPY  06/2019    HAND SURGERY Bilateral     bone removed right wrist, cyst removed left wrist    IR ABLATION NERVE      KNEE ARTHROSCOPY W/ DEBRIDEMENT Right     MR HEAD ANGIO WO IV CONTRAST  06/28/2021    MR HEAD ANGIO WO IV CONTRAST 6/28/2021 [facility]    MR NECK ANGIO WO IV CONTRAST  06/28/2021    MR NECK ANGIO WO IV CONTRAST 6/28/2021 [facility]    ROTATOR CUFF REPAIR      TONSILLECTOMY      TOTAL KNEE ARTHROPLASTY Left         Family History   Problem Relation Name Age of Onset    Alzheimer's disease Mother      Arthritis Mother      Cataracts Mother      Glaucoma Mother      Hypertension Mother      Cancer Father          pancreatic    Other (diabetes mellitus) Father      Cancer Brother          pancreatic    Other (diabetes mellitus) Brother      Other (diabetes mellitus) Son      Other (diabetes mellitus) Sibling          Allergies   Allergen Reactions    Penicillins Hives and Itching    Primidone Other     Stuttering     Tizanidine Other     Stuttering         Objective     There were no vitals filed for this visit.      Physical Exam  General: NAD, well groomed, well nourished  Eyes: Non-icteric sclera, EOMI  Ears, Nose, Mouth, and Throat: External ears and nose appear to be without deformity or rash. No lesions or masses noted. Hearing is grossly intact.   Neck: Trachea midline  Respiratory: Nonlabored breathing   Cardiovascular: no peripheral edema   Skin: No rashes or open lesions/ulcers identified on skin.    Neurologic:   Cranial nerves grossly intact.   Strength 5/5 and symmetric plantar/dorsiflexion   Sensation: Normal to light touch throughout  Day: absent  Negative Spurling's bilaterally        Assessment/Plan   89-year-old-year-old male who presents as follow-up for neck pain.  Pain is worse with certain movements and at night.  Denies any radicular symptoms down the arms.  Pain can sometimes radiate up to the posterior aspect of the skull denies any radiation past that.  Pain likely secondary to cervical spondylosis.  Discussed with patient importance of continuing home exercise program and stretching.    Plan:  -Continue home exercise program instruction  - Continue medications as prescribed  - Follow-up as needed    The patient was invited to contact us back anytime with any questions or concerns and follow-up with us in the office as needed.     Diagnoses and all orders for this visit:  Cervical spondylosis without myelopathy      This note was generated with the aid of dictation software, there may be typos despite my attempts at proofreading.     Patient seen and plan of care discussed with Dr. Kavon Marks MD  Pain Fellow

## 2025-01-15 ENCOUNTER — PATIENT OUTREACH (OUTPATIENT)
Dept: PRIMARY CARE | Facility: CLINIC | Age: 85
End: 2025-01-15
Payer: MEDICARE

## 2025-01-15 DIAGNOSIS — N18.32 TYPE 2 DIABETES MELLITUS WITH STAGE 3B CHRONIC KIDNEY DISEASE, WITHOUT LONG-TERM CURRENT USE OF INSULIN (MULTI): ICD-10-CM

## 2025-01-15 DIAGNOSIS — E11.22 TYPE 2 DIABETES MELLITUS WITH STAGE 3B CHRONIC KIDNEY DISEASE, WITHOUT LONG-TERM CURRENT USE OF INSULIN (MULTI): ICD-10-CM

## 2025-01-15 DIAGNOSIS — I10 PRIMARY HYPERTENSION: ICD-10-CM

## 2025-01-15 NOTE — PROGRESS NOTES
Chart reviewed prior to monthly outreach. Patient states she will return my call in a few minutes. Patient states she just got her allergy shot.    Patient returned call and states she is doing okay. Denies any falls and states her right leg and back pain has improved. Patient now puts a pillow between her legs while sleeping. She is able to move without a lot of pain. She uses the topical creams for pain and it helps. Patient uses a cane to ambulate when she is out of the home. States she is sleeping well lately and her appetite is good. Pt. Eats two meals a day because she is afraid of gaining weight. Discussed  eating a well balanced diet and write down what she eats. Pt's blood sugar this morning before breakfast was 142 and yesterday it was 121 before breakfast. Blood pressure today was 130/74 and HR - 77 and blood pressure yesterday was 135/78 and HR - 75. Encouraged Pt. to continue to monitor blood pressure and blood sugar and keep a log. Appointments reviewed and she has a PT follow up on 01/22/25 and mammogram and surg/onc visit on 02/22/25. Patient taking medications as prescribed and will call if she needs anything.

## 2025-01-22 ENCOUNTER — TREATMENT (OUTPATIENT)
Dept: PHYSICAL THERAPY | Facility: CLINIC | Age: 85
End: 2025-01-22
Payer: MEDICARE

## 2025-01-22 DIAGNOSIS — G89.29 CHRONIC LOW BACK PAIN: ICD-10-CM

## 2025-01-22 DIAGNOSIS — M54.50 CHRONIC LOW BACK PAIN: ICD-10-CM

## 2025-01-22 PROCEDURE — 97110 THERAPEUTIC EXERCISES: CPT | Mod: GP | Performed by: PHYSICAL THERAPIST

## 2025-01-22 NOTE — PROGRESS NOTES
"Physical Therapy    Physical Therapy Treatment AND DISCHARGE     Patient Name: Louisa Rodas  MRN: [id number]  Today's Date: 1/22/2025    Time Entry:   Time Calculation  Start Time: 1415  Stop Time: 1515  Time Calculation (min): 60 min     PT Therapeutic Procedures Time Entry  Therapeutic Exercise Time Entry: 40        Visit: 10  Insurance: Medicare  Auth: No   Cert dates: 10/14/24- 1/12/25 1/22/25-4/22/25    Assessment:  At this time, progress has plateaued. Pt feeling good to continue on her own at home. Will monitor HEP    Plan:  Discharge to HEP   Will call if symptoms change or she needs to return     Current Problem:     Problem List Items Addressed This Visit             ICD-10-CM    Chronic low back pain M54.50, G89.29         Subjective    \"I felt really good after I left here last time\"  Pt reports continued stiffness into R knee but low back has been okay        Objective   Pain: 1/10 R knee       TREATMENT  Supine HP to LB initially, LE's on stool    Therapeutic exercise:  SKTC x 10 ea   Physio ball DKTC x 20   Bridge  w/ TA x 10  Ball squeeze/Hip abd (black) x 20 ea  Supine march w/ PPT x 20  Supine neural glide x 10, 5 pumps  LAQ #3 x 20, R  Gastroc stretch 10 x 10\" hold   Nu Step x 5 min       Goals:  Resolved       PT Problem       Pt will report centralization of pain into low back for improved ability to stand for longer periods of time  (Adequate for Discharge)       Start:  10/14/24    Expected End:  01/12/25    Resolved:  01/22/25         Pt will demonstrate full and pain-free lumbar rot for improved ability to perform transfers (Adequate for Discharge)       Start:  10/14/24    Expected End:  01/12/25    Resolved:  01/22/25         Pt will increase bilateral LE strength by 1 MMT grade for all weakened muscle groups for improved lumbar and LE stability  (Adequate for Discharge)       Start:  10/14/24    Expected End:  01/12/25    Resolved:  01/22/25         Pt will be independent in HEP for home " maintenance  (Met)       Start:  10/14/24    Expected End:  01/12/25    Resolved:  01/22/25

## 2025-01-23 ENCOUNTER — TELEPHONE (OUTPATIENT)
Dept: PRIMARY CARE | Facility: CLINIC | Age: 85
End: 2025-01-23
Payer: MEDICARE

## 2025-01-23 DIAGNOSIS — J45.20 MILD INTERMITTENT ASTHMA WITHOUT COMPLICATION (HHS-HCC): ICD-10-CM

## 2025-01-23 RX ORDER — BUDESONIDE AND FORMOTEROL FUMARATE DIHYDRATE 160; 4.5 UG/1; UG/1
2 AEROSOL RESPIRATORY (INHALATION) DAILY
Qty: 10.2 G | Refills: 1 | Status: SHIPPED | OUTPATIENT
Start: 2025-01-23

## 2025-01-23 NOTE — PROGRESS NOTES
Patient requesting to have a refill of Budesonide-formoterol (Symbicort)160-4.5 mcg inhaler sent to [facility] in [place]. Patient states she was never diagnosed with lupus but read about it and she has several symptoms associated with the diagnosis. Would like to know what is suggested to determine if she has lupus.

## 2025-02-11 ENCOUNTER — PATIENT OUTREACH (OUTPATIENT)
Dept: PRIMARY CARE | Facility: CLINIC | Age: 85
End: 2025-02-11
Payer: COMMERCIAL

## 2025-02-11 ENCOUNTER — APPOINTMENT (OUTPATIENT)
Dept: RADIOLOGY | Facility: HOSPITAL | Age: 85
End: 2025-02-11
Payer: MEDICARE

## 2025-02-11 ENCOUNTER — APPOINTMENT (OUTPATIENT)
Dept: SURGICAL ONCOLOGY | Facility: HOSPITAL | Age: 85
End: 2025-02-11
Payer: MEDICARE

## 2025-02-11 DIAGNOSIS — N18.32 TYPE 2 DIABETES MELLITUS WITH STAGE 3B CHRONIC KIDNEY DISEASE, WITHOUT LONG-TERM CURRENT USE OF INSULIN (MULTI): ICD-10-CM

## 2025-02-11 DIAGNOSIS — E11.22 TYPE 2 DIABETES MELLITUS WITH STAGE 3B CHRONIC KIDNEY DISEASE, WITHOUT LONG-TERM CURRENT USE OF INSULIN (MULTI): ICD-10-CM

## 2025-02-11 DIAGNOSIS — I10 PRIMARY HYPERTENSION: ICD-10-CM

## 2025-02-11 NOTE — PROGRESS NOTES
Chart reviewed prior to monthly outreach. Spoke with Patient and she states her right knee get stiff at times. She continues to use the topical cream for her knee pain and it helps. Denies any falls and uses her can to ambulate. She sleeps well and is eating well. States she has a sweet tooth and it is hard to resist some of the sweets. Discussed removing unhealthy food items from the home and making healthier food choices. Blood sugar yesterday was 143 and blood pressure on 02/05/25 was 133/75 Pulse 73. Encouraged to continue to keep a log of her blood pressure and blood sugars. Pt. States she has a dermatology appointment today and will discuss the hair shedding with the dermatologist. Pt. Was discharged from physical therapy and is thinking about resuming in the spring when the weather gets better. Appointments reviewed and she has an appt. for a mammogram and surg/onc. on February 14, 2025. Pt. to call for any questions or concerns.

## 2025-02-14 ENCOUNTER — HOSPITAL ENCOUNTER (OUTPATIENT)
Dept: RADIOLOGY | Facility: HOSPITAL | Age: 85
Discharge: HOME | End: 2025-02-14
Payer: COMMERCIAL

## 2025-02-14 ENCOUNTER — OFFICE VISIT (OUTPATIENT)
Dept: SURGICAL ONCOLOGY | Facility: HOSPITAL | Age: 85
End: 2025-02-14
Payer: COMMERCIAL

## 2025-02-14 VITALS
RESPIRATION RATE: 20 BRPM | HEIGHT: 63 IN | WEIGHT: 165.79 LBS | DIASTOLIC BLOOD PRESSURE: 74 MMHG | SYSTOLIC BLOOD PRESSURE: 138 MMHG | OXYGEN SATURATION: 98 % | TEMPERATURE: 96.8 F | HEART RATE: 68 BPM | BODY MASS INDEX: 29.38 KG/M2

## 2025-02-14 DIAGNOSIS — D05.12 DUCTAL CARCINOMA IN SITU (DCIS) OF LEFT BREAST: Primary | ICD-10-CM

## 2025-02-14 DIAGNOSIS — Z85.3 PERSONAL HISTORY OF BREAST CANCER: ICD-10-CM

## 2025-02-14 DIAGNOSIS — R92.8 ABNORMAL FINDING ON BREAST IMAGING: ICD-10-CM

## 2025-02-14 DIAGNOSIS — Z85.3 PERSONAL HISTORY OF MALIGNANT NEOPLASM OF BREAST: ICD-10-CM

## 2025-02-14 PROCEDURE — 3078F DIAST BP <80 MM HG: CPT | Performed by: SURGERY

## 2025-02-14 PROCEDURE — 99214 OFFICE O/P EST MOD 30 MIN: CPT | Performed by: SURGERY

## 2025-02-14 PROCEDURE — 3075F SYST BP GE 130 - 139MM HG: CPT | Performed by: SURGERY

## 2025-02-14 PROCEDURE — 1036F TOBACCO NON-USER: CPT | Performed by: SURGERY

## 2025-02-14 PROCEDURE — 76642 ULTRASOUND BREAST LIMITED: CPT | Mod: LT

## 2025-02-14 PROCEDURE — 77066 DX MAMMO INCL CAD BI: CPT

## 2025-02-14 PROCEDURE — 1126F AMNT PAIN NOTED NONE PRSNT: CPT | Performed by: SURGERY

## 2025-02-14 ASSESSMENT — PAIN SCALES - GENERAL: PAINLEVEL_OUTOF10: 0-NO PAIN

## 2025-02-18 ENCOUNTER — TELEPHONE (OUTPATIENT)
Dept: PRIMARY CARE | Facility: CLINIC | Age: 85
End: 2025-02-18
Payer: COMMERCIAL

## 2025-02-18 DIAGNOSIS — E03.9 HYPOTHYROIDISM, UNSPECIFIED TYPE: Primary | ICD-10-CM

## 2025-02-18 NOTE — PROGRESS NOTES
Patient states she noticed her hair was shedding and saw her dermatologist Dr. Faina Nova [phone] on Feb 11. Patient states she had labs drawn and her thyroid medication may need adjusting. Per Pt she had labs drawn on Friday Feb 14 at [facility].     February 20, 2025. 0950 AM: Patient states she didn't understand the voicemail that was left. Note per Dr. Moctezuma reviewed with Patient and she is aware the order for TSH was placed and hold the biotin containing vitamins 4 days prior to lab draw. Patient verbalizes understanding.

## 2025-02-26 LAB — TSH SERPL-ACNC: 7.37 MIU/L (ref 0.4–4.5)

## 2025-03-04 ENCOUNTER — TELEPHONE (OUTPATIENT)
Dept: PRIMARY CARE | Facility: CLINIC | Age: 85
End: 2025-03-04

## 2025-03-04 ENCOUNTER — PATIENT OUTREACH (OUTPATIENT)
Dept: PRIMARY CARE | Facility: CLINIC | Age: 85
End: 2025-03-04
Payer: COMMERCIAL

## 2025-03-04 DIAGNOSIS — N18.32 TYPE 2 DIABETES MELLITUS WITH STAGE 3B CHRONIC KIDNEY DISEASE, WITHOUT LONG-TERM CURRENT USE OF INSULIN (MULTI): ICD-10-CM

## 2025-03-04 DIAGNOSIS — E78.5 HYPERLIPIDEMIA, UNSPECIFIED HYPERLIPIDEMIA TYPE: ICD-10-CM

## 2025-03-04 DIAGNOSIS — I10 HYPERTENSION, UNSPECIFIED TYPE: ICD-10-CM

## 2025-03-04 DIAGNOSIS — E11.22 TYPE 2 DIABETES MELLITUS WITH STAGE 3B CHRONIC KIDNEY DISEASE, WITHOUT LONG-TERM CURRENT USE OF INSULIN (MULTI): ICD-10-CM

## 2025-03-04 RX ORDER — ATORVASTATIN CALCIUM 20 MG/1
20 TABLET, FILM COATED ORAL 3 TIMES WEEKLY
Qty: 45 TABLET | Refills: 1 | Status: SHIPPED | OUTPATIENT
Start: 2025-03-05

## 2025-03-04 NOTE — PROGRESS NOTES
Patient requesting to have a refill of Atorvastatin 20 mg [facility] in ([place]). She also had a TSH level drawn last week and would like to discuss results and know if her levothyroxine should be adjusted.

## 2025-03-04 NOTE — PROGRESS NOTES
Chart reviewed prior to monthly outreach. Spoke with Patient and she is doing okay but states her furnace went out. States she is going to call for a repair. Patient states she is going to start physical therapy again when the weather improves. She is using a cane to ambulate and denies any falls. She also states her hair shedding has improved a little and she has a follow up dermatology appointment on August 12. Blood pressure has been good and she also states she watches her salt intake. Patient's blood sugar yesterday was 140 yesterday early in th day and last night it was 92 before she ate her dinner. advised to continue to monitor her blood sugar and blood pressure and she is aware of signs and symptoms of high and low blood sugars. Patient states she had her blood drawn (TSH) last week and calling for results. I will send a message to Dr. Moctezuma. Patient has an appointment with Dr. Man (gastro) 03/19/2025. Patient to call with any questions or concerns.

## 2025-03-19 ENCOUNTER — OFFICE VISIT (OUTPATIENT)
Dept: GASTROENTEROLOGY | Facility: HOSPITAL | Age: 85
End: 2025-03-19
Payer: COMMERCIAL

## 2025-03-19 VITALS
RESPIRATION RATE: 18 BRPM | TEMPERATURE: 97.2 F | DIASTOLIC BLOOD PRESSURE: 98 MMHG | WEIGHT: 166 LBS | BODY MASS INDEX: 29.41 KG/M2 | HEART RATE: 71 BPM | SYSTOLIC BLOOD PRESSURE: 134 MMHG | OXYGEN SATURATION: 99 %

## 2025-03-19 DIAGNOSIS — Q44.6 POLYCYSTIC LIVER DISEASE: ICD-10-CM

## 2025-03-19 LAB
ALBUMIN SERPL-MCNC: 4.2 G/DL (ref 3.6–5.1)
ALP SERPL-CCNC: 72 U/L (ref 37–153)
ALT SERPL-CCNC: 12 U/L (ref 6–29)
ANION GAP SERPL CALCULATED.4IONS-SCNC: 9 MMOL/L (CALC) (ref 7–17)
AST SERPL-CCNC: 20 U/L (ref 10–35)
BILIRUB DIRECT SERPL-MCNC: 0.1 MG/DL
BILIRUB SERPL-MCNC: 0.4 MG/DL (ref 0.2–1.2)
BUN SERPL-MCNC: 26 MG/DL (ref 7–25)
CALCIUM SERPL-MCNC: 8.9 MG/DL (ref 8.6–10.4)
CHLORIDE SERPL-SCNC: 107 MMOL/L (ref 98–110)
CO2 SERPL-SCNC: 24 MMOL/L (ref 20–32)
CREAT SERPL-MCNC: 1.52 MG/DL (ref 0.6–0.95)
EGFRCR SERPLBLD CKD-EPI 2021: 34 ML/MIN/1.73M2
GLUCOSE SERPL-MCNC: 149 MG/DL (ref 65–99)
INR PPP: 1.1
POTASSIUM SERPL-SCNC: 3.8 MMOL/L (ref 3.5–5.3)
PROT SERPL-MCNC: 6.1 G/DL (ref 6.1–8.1)
PROTHROMBIN TIME: 11.2 SEC (ref 9–11.5)
SODIUM SERPL-SCNC: 140 MMOL/L (ref 135–146)

## 2025-03-19 PROCEDURE — 1126F AMNT PAIN NOTED NONE PRSNT: CPT | Performed by: INTERNAL MEDICINE

## 2025-03-19 PROCEDURE — 99214 OFFICE O/P EST MOD 30 MIN: CPT | Performed by: INTERNAL MEDICINE

## 2025-03-19 PROCEDURE — 3080F DIAST BP >= 90 MM HG: CPT | Performed by: INTERNAL MEDICINE

## 2025-03-19 PROCEDURE — 3075F SYST BP GE 130 - 139MM HG: CPT | Performed by: INTERNAL MEDICINE

## 2025-03-19 PROCEDURE — 1159F MED LIST DOCD IN RCRD: CPT | Performed by: INTERNAL MEDICINE

## 2025-03-19 ASSESSMENT — PAIN SCALES - GENERAL: PAINLEVEL_OUTOF10: 0-NO PAIN

## 2025-03-19 NOTE — PROGRESS NOTES
[facility]  [facility] [place]  Clinic Note      Patient Information  Louisa MCCAIN Rodas                                                                 Subjective:     Chief Complaint   Patient presents with    Follow-up       HPI:  85 yo female with polycystic liver disease, Sjogren's, type 2 diabetes mellitus, hypertension, possible seizure disorder, osteopenia, lumbar spinal canal stenosis, presenting today for regular follow-up. Last U/S in 2023 with multiple liver cyst with largest cyst <3 cm.     In office today, patient reports being asymptomatic. NO abdominal pain, chronic nausea, vomiting or early satiety.     Past Medical History:   Past Medical History:   Diagnosis Date    Aspiration of food 02/16/2023    Asthma     Cervical spondylosis without myelopathy     Chronic kidney disease, stage 3a (Multi)     3.21.24: creat 1.43, GFR 37    DM (diabetes mellitus), type 2 (Multi)     2/20/2024 A1C 7.2%    Ductal carcinoma in situ (DCIS) of left breast     s/p lumpectomy 3/27/24    Essential tremor     Fatigue     Fibromyalgia     H/O echocardiogram 05/25/2023    CONCLUSIONS: 1. Left ventricular systolic function is hyperdynamic with a 70-75% estimated EF. 2. Spectral Doppler shows an impaired relaxation pattern of left ventricular diastolic filling. 3. Mild aortic valve regurgitation. 4. Left ventricular cavity size is decreased. 5. There is a mid cavity left ventricular obstruction noted w/ the Valsalva maneuver. The provoked gradient is 28 mmHg.    Hepatic cirrhosis (Multi) 02/16/2023    Hyperlipidemia     Hypermetropia, bilateral     Hyperopia of both eyes with astigmatism and presbyopia    Hypertension     Hypogammaglobulinemia (Multi)     Hypothyroidism     Ischemic optic neuropathy, bilateral     Ischemic optic neuropathy, bilateral    Meibomian gland dysfunction right eye, upper and lower eyelids     Meibomian gland dysfunction (MGD) of upper and lower lids of both eyes     Near syncope     NPH (normal pressure hydrocephalus) (Multi)     MILD TO MODERATE, brain MRI 5/4/23:mild enlargement of ventricles, mild diffuse cerebral atrophy    Obesity     Obstructive sleep apnea on CPAP     Osteoarthritis     Peripheral motor neuropathy     Personal history of irradiation  2024    Polycystic liver disease     follows with hepatology yearly    PONV (postoperative nausea and vomiting)     Primary open-angle glaucoma, bilateral, indeterminate stage     Sciatica     Sjogren syndrome, unspecified (Multi)     Vocal cord dysfunction        Past Surgical History:   Past Surgical History:   Procedure Laterality Date    BLEPHAROPLASTY      BREAST BIOPSY  2024    BREAST LUMPECTOMY Left 03/27/2024    CATARACT EXTRACTION, BILATERAL      COLONOSCOPY  04/05/2018    tubular adenoma    ESOPHAGOGASTRODUODENOSCOPY  06/2019    HAND SURGERY Bilateral     bone removed right wrist, cyst removed left wrist    IR ABLATION NERVE      KNEE ARTHROSCOPY W/ DEBRIDEMENT Right     MR HEAD ANGIO WO IV CONTRAST  06/28/2021    MR HEAD ANGIO WO IV CONTRAST 6/28/2021 [facility]    MR NECK ANGIO WO IV CONTRAST  06/28/2021    MR NECK ANGIO WO IV CONTRAST 6/28/2021 [facility]    ROTATOR CUFF REPAIR      TONSILLECTOMY      TOTAL KNEE ARTHROPLASTY Left        Past Family History:   Family History   Problem Relation Name Age of Onset    Alzheimer's disease Mother      Arthritis Mother      Cataracts Mother      Glaucoma Mother      Hypertension Mother      Cancer Father          pancreatic    Other (diabetes mellitus) Father      Cancer Brother          pancreatic    Other (diabetes mellitus) Brother      Other (diabetes mellitus) Son      Other (diabetes mellitus) Sibling         Social History:   Social History     Tobacco Use   Smoking Status Former    Current packs/day: 0.00    Average packs/day: 0.3 packs/day for 1 year (0.3 ttl pk-yrs)    Types: Cigarettes    Start date: 1/1/1966    Quit date: 1/1/1967     Years since quittin.2    Passive exposure: Past   Smokeless Tobacco Never   Tobacco Comments    Quit in 1961     Social History     Substance and Sexual Activity   Alcohol Use Not Currently     Social History     Substance and Sexual Activity   Drug Use Never       Allergies:   Allergies   Allergen Reactions    Penicillins Hives and Itching    Primidone Other     Stuttering     Tizanidine Other     Stuttering        MEDS:  Current Outpatient Medications   Medication Instructions    acetaminophen (TYLENOL EXTRA STRENGTH) 1,000 mg, oral, Every 6 hours PRN    acyclovir (ZOVIRAX) 400 mg, oral, Daily    allantoin gel As needed    amLODIPine (NORVASC) 2.5 mg, oral, Daily    atorvastatin (LIPITOR) 20 mg, oral, 3 times weekly    azelastine (Astelin) 137 mcg (0.1 %) nasal spray 2 sprays, 2 times daily PRN    blood pressure test kit-large kit Use to check blood pressure once daily.    budesonide-formoteroL (Symbicort) 160-4.5 mcg/actuation inhaler 2 puffs, inhalation, Daily, RINSE MOUTH AFTER USE.    butenafine HCl (MENTAX TOP) 1 tablet, Daily    cholecalciferol (VITAMIN D-3) 50 mcg, Daily    cyanocobalamin, vitamin B-12, (Vitamin B-12) 2,500 mcg tablet, sublingual SL tablet 1 tablet    fluocinolone and shower cap 0.01 % oil Apply to scalp  leave on overnight and wash off in the morning. Use daily    hydroxychloroquine (Plaquenil) 200 mg tablet TAKE 1 TABLET BY MOUTH WITH FOOD OR MILK ONCE A DAY    ketoconazole (NIZOral) 2 % shampoo No dose, route, or frequency recorded.    lancets misc Uses one touch verio test strips    levothyroxine (Synthroid, Levoxyl) 100 mcg tablet TAKE 1 TABLET BY MOUTH 5 TIMES A WEEK.    lisinopril 20 mg, oral, Daily    medical cannabis 1 each, As needed    montelukast (Singulair) 10 mg tablet 1 tablet, Daily    OneTouch Verio test strips strip 1 each, Topical, Daily PRN    peg 400-propylene glycol, PF, (Systane, PF,) 0.4-0.3 % dropperette Administer into affected eye(s).    povidone, PF,  (iVizia, PF,) 0.5 % drops 1 drop, 3 times daily PRN    propranolol (INDERAL) 10 mg, oral, Daily    sulfamethoxazole-trimethoprim (Bactrim DS) 800-160 mg tablet 1 tablet, Daily    travoprost (Travatan Z) 0.004 % drops ophthalmic solution 1 drop, Nightly    VITAMIN B COMPLEX ORAL Every 24 hours    zinc gluconate 50 mg tablet Every 24 hours        ROS:   General: no chills, no fevers  Cardiovascular: no chest pain, no palpitations  Others in 12 systems ROS were discussed and negative. Positive pertinent systems are listed in HPI.                                                                 Physical Exam:     BP (!) 134/98 (BP Location: Right arm, Patient Position: Sitting, BP Cuff Size: Large adult)   Pulse 71   Temp 36.2 °C (97.2 °F) (Temporal)   Resp 18   Wt 75.3 kg (166 lb)   SpO2 99%   BMI 29.41 kg/m²     Physical Exam   Alert and oriented  CTAB  RRR  Abd soft NT ND  Skin warm and dry                                                                    Labs:     Lab Results   Component Value Date    WBC 4.1 (L) 10/23/2024    WBC 4.1 (L) 07/22/2024    WBC 8.4 05/30/2024    HGB 11.6 (L) 10/23/2024    HGB 11.8 (L) 07/22/2024    HGB 11.8 (L) 05/30/2024    MCV 95 10/23/2024    MCV 95 07/22/2024    MCV 93 05/30/2024     10/23/2024     07/22/2024     (L) 05/30/2024       Lab Results   Component Value Date    GLUCOSE 149 (H) 03/18/2025    CALCIUM 8.9 03/18/2025     03/18/2025    K 3.8 03/18/2025    CO2 24 03/18/2025     03/18/2025    BUN 26 (H) 03/18/2025    CREATININE 1.52 (H) 03/18/2025       Lab Results   Component Value Date    ALT 12 03/18/2025    ALT 17 10/23/2024    ALT 20 05/09/2024    AST 20 03/18/2025    AST 26 10/23/2024    AST 24 05/09/2024    ALKPHOS 72 03/18/2025    ALKPHOS 84 10/23/2024    ALKPHOS 89 05/09/2024    BILITOT 0.4 03/18/2025    BILITOT 0.3 10/23/2024    BILITOT 0.4 05/09/2024                                                                                   Imaging           === 07/02/24 ===    US RENAL COMPLETE    - Impression -  No hydronephrosis bilaterally.    Left renal upper pole 8 mm ovoid hyperechoic cortical focus likely is  benign, possibly representing insinuating fat or a small  angiomyolipoma.    Urinary bladder suboptimally distended for evaluation. No focal  urinary bladder abnormality demonstrated.    MACRO:  None.    Signed by: Willy Haney 7/3/2024 2:59 PM  Dictation workstation:   [id number]  === 05/06/24 ===    RAD ONC CT SIM IMAGES ONLY                                                                     GI Procedures:                                                                    Assessment & Plan:     Assessment/Plan:   81 yo female with polycystic liver disease presenting for 6 mo follow-up. Cysts have been stable as of last ultrasound 11/2023 with largest cyst measuring ~3.1 cm. Most recent LFT's normal in 03/2025. Can continue to follow-up with PCP and obtain liver ultrasound annually and LFT/AFP/INR q6 months. If concern for enlarging cyst or patient starting to get symptomatic with mass effect of the cyst, she can be referred back to hepatology.      Discharge from hepatology clinic.     Detailed Counseling:     Assessed the patients understanding of their medications and discussed their treatment plan, assessed patient's response to medications and barriers to adherence    Mary Kate Pastor MD   PGY6, Gastroenterology Fellow

## 2025-04-07 ENCOUNTER — PATIENT OUTREACH (OUTPATIENT)
Dept: PRIMARY CARE | Facility: CLINIC | Age: 85
End: 2025-04-07
Payer: COMMERCIAL

## 2025-04-07 DIAGNOSIS — E11.22 TYPE 2 DIABETES MELLITUS WITH STAGE 3B CHRONIC KIDNEY DISEASE, WITHOUT LONG-TERM CURRENT USE OF INSULIN (MULTI): ICD-10-CM

## 2025-04-07 DIAGNOSIS — E78.5 HYPERLIPIDEMIA, UNSPECIFIED HYPERLIPIDEMIA TYPE: ICD-10-CM

## 2025-04-07 DIAGNOSIS — N18.32 TYPE 2 DIABETES MELLITUS WITH STAGE 3B CHRONIC KIDNEY DISEASE, WITHOUT LONG-TERM CURRENT USE OF INSULIN (MULTI): ICD-10-CM

## 2025-04-07 NOTE — PROGRESS NOTES
Chart reviewed prior to outreach. Spoke with Patient and she states her blood sugars have been running higher lately in the morning. Blood sugar was 140 on 04/07/25 before breakfast and the night before she ate chicken and three peanut butter crackers at 7:30 pm for dinner. Blood sugar was 155 after lunch and went down to 113 around 8:00 pm. Patient states she does like sweets and we discussed eating a well balanced diet and to limit the sugary foods. She states her blood pressure has been pretty good. Patient is sleeping well and denies any falls. Patient states her joints feel better and she uses her cane occasionally for her right knee discomfort. Appointments reviewed and blood pressure and blood sugar logs mailed to Patient to help keep track of reading due to she writes to on random pieces of paper. Patient would like a referral to restart physical therapy for balance and strengthening for her right knee. She would also like a referral to a Nutritionist or diabetic educator. Note routed to Dr. Moctezuma.

## 2025-04-16 ENCOUNTER — APPOINTMENT (OUTPATIENT)
Dept: GASTROENTEROLOGY | Facility: HOSPITAL | Age: 85
End: 2025-04-16
Payer: COMMERCIAL

## 2025-04-23 ENCOUNTER — APPOINTMENT (OUTPATIENT)
Dept: OPHTHALMOLOGY | Facility: CLINIC | Age: 85
End: 2025-04-23
Payer: MEDICARE

## 2025-04-23 DIAGNOSIS — M35.01 KERATOCONJUNCTIVITIS SICCA, IN SJOGREN'S SYNDROME: ICD-10-CM

## 2025-04-23 DIAGNOSIS — H35.3131 EARLY DRY STAGE NONEXUDATIVE AGE-RELATED MACULAR DEGENERATION OF BOTH EYES: Primary | ICD-10-CM

## 2025-04-23 DIAGNOSIS — H02.88A: ICD-10-CM

## 2025-04-23 DIAGNOSIS — Z79.899 LONG-TERM USE OF PLAQUENIL: ICD-10-CM

## 2025-04-23 DIAGNOSIS — H57.89 THYROID EYE DISEASE: ICD-10-CM

## 2025-04-23 DIAGNOSIS — E07.9 THYROID EYE DISEASE: ICD-10-CM

## 2025-04-23 PROCEDURE — 99213 OFFICE O/P EST LOW 20 MIN: CPT | Performed by: STUDENT IN AN ORGANIZED HEALTH CARE EDUCATION/TRAINING PROGRAM

## 2025-04-23 PROCEDURE — 92134 CPTRZ OPH DX IMG PST SGM RTA: CPT | Performed by: STUDENT IN AN ORGANIZED HEALTH CARE EDUCATION/TRAINING PROGRAM

## 2025-04-23 PROCEDURE — 68761 CLOSE TEAR DUCT OPENING: CPT | Performed by: STUDENT IN AN ORGANIZED HEALTH CARE EDUCATION/TRAINING PROGRAM

## 2025-04-23 RX ORDER — CYCLOSPORINE 0.5 MG/ML
1 EMULSION OPHTHALMIC 2 TIMES DAILY
Qty: 60 EACH | Refills: 6 | Status: SHIPPED | OUTPATIENT
Start: 2025-04-23 | End: 2025-05-23

## 2025-04-23 ASSESSMENT — ENCOUNTER SYMPTOMS
RESPIRATORY NEGATIVE: 0
GASTROINTESTINAL NEGATIVE: 0
ENDOCRINE NEGATIVE: 0
ALLERGIC/IMMUNOLOGIC NEGATIVE: 0
EYES NEGATIVE: 1
MUSCULOSKELETAL NEGATIVE: 0
PSYCHIATRIC NEGATIVE: 0
HEMATOLOGIC/LYMPHATIC NEGATIVE: 0
CARDIOVASCULAR NEGATIVE: 0
NEUROLOGICAL NEGATIVE: 0
CONSTITUTIONAL NEGATIVE: 0

## 2025-04-23 ASSESSMENT — EXTERNAL EXAM - LEFT EYE: OS_EXAM: NORMAL

## 2025-04-23 ASSESSMENT — CUP TO DISC RATIO
OD_RATIO: .6
OS_RATIO: .6

## 2025-04-23 ASSESSMENT — TONOMETRY
OD_IOP_MMHG: 14
IOP_METHOD: GOLDMANN APPLANATION
OS_IOP_MMHG: 15

## 2025-04-23 ASSESSMENT — CONF VISUAL FIELD
OD_INFERIOR_TEMPORAL_RESTRICTION: 0
OD_SUPERIOR_NASAL_RESTRICTION: 0
OS_SUPERIOR_NASAL_RESTRICTION: 0
OD_INFERIOR_NASAL_RESTRICTION: 0
OD_NORMAL: 1
OS_SUPERIOR_TEMPORAL_RESTRICTION: 0
OD_SUPERIOR_TEMPORAL_RESTRICTION: 0
OS_INFERIOR_NASAL_RESTRICTION: 0
OS_NORMAL: 1
OS_INFERIOR_TEMPORAL_RESTRICTION: 0

## 2025-04-23 ASSESSMENT — VISUAL ACUITY
CORRECTION_TYPE: GLASSES
OD_CC: 20/20
OS_CC: 20/25
METHOD: SNELLEN - LINEAR

## 2025-04-23 ASSESSMENT — PACHYMETRY
OD_CT(UM): 502
OS_CT(UM): 486

## 2025-04-23 ASSESSMENT — EXTERNAL EXAM - RIGHT EYE: OD_EXAM: NORMAL

## 2025-04-23 NOTE — PROGRESS NOTES
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 Garcia for Thyroid eye disease and Dr. Pabon for Glaucoma  -current TXT for ocular surface: Systane PF AT's QID, Refresh pm abel at bedtime, and warm compresses, and punctal plugs bilateral lower lids  -(+)CPAP  -Hx of being prescribed Xiidra and Miebo but medication was too expensive to obtain  -Patient is also taking Glaucoma medications (latanoprost) and with concurrent glaucoma would like to try and stay away from a steroid pulse  -H/o of being given samples of steroid pulses to control symptoms    Eagle plugs fell out from appt 11/7/24:  Re-inserted today 4/23/25  Eagle Plugs bilateral lower lids OD .6mm LOT [id number] OS .6mm LOT [id number]    -TXT plan: Continue with Systane PF AT's QID, Refresh PM abel at bedtime, and warm compresses; Eagle punctal plugs today. Start Restasis OU.    Discussed if Restasis is too expensive we can try Vevye in its place.     Long-term use of Plaquenil  -patient currently taking 200mg Qday for Lupus; unknown duration  -Studies reveal that the risk of maculopathy when taking Plaquenil is 0% (0-5 years), 1% (over 5 years), 2% (over 10 years), and 20% cumulative, or 4% annual incidence (over 20 years) respectively.  Annual testing with 10-2 threshold visual field perimetry, as well as spectral domain optical coherence tomography of the macula is recommended.   -OCT MAC today does not show signs of toxicity  -HVF 10-2 7/2024 normal    Early Dry Macular Degeneration  -noted on uDFE and MAC OCT today  -continue to monitor with annual exams  -discussed good diet/exercise/multivitamin/monitoring [facility]/no smoking/sunglasses    RTC with Dr. Garcia and Dr. Pabon as directed  RTC Srinivasa 7/2025 for plaquenil exam with  MRX, OCT MAC, HVF 10-2 (also monitoring AMD and dry eye)

## 2025-04-28 ENCOUNTER — APPOINTMENT (OUTPATIENT)
Dept: RHEUMATOLOGY | Facility: CLINIC | Age: 85
End: 2025-04-28
Payer: MEDICARE

## 2025-04-30 ENCOUNTER — APPOINTMENT (OUTPATIENT)
Dept: OBSTETRICS AND GYNECOLOGY | Facility: CLINIC | Age: 85
End: 2025-04-30
Payer: MEDICARE

## 2025-04-30 VITALS
HEART RATE: 83 BPM | HEIGHT: 63 IN | SYSTOLIC BLOOD PRESSURE: 110 MMHG | BODY MASS INDEX: 29.52 KG/M2 | DIASTOLIC BLOOD PRESSURE: 66 MMHG | WEIGHT: 166.6 LBS

## 2025-04-30 DIAGNOSIS — N39.0 RECURRENT UTI: ICD-10-CM

## 2025-04-30 LAB
POC APPEARANCE, URINE: CLEAR
POC BILIRUBIN, URINE: NEGATIVE
POC BLOOD, URINE: NEGATIVE
POC COLOR, URINE: ABNORMAL
POC GLUCOSE, URINE: NEGATIVE MG/DL
POC KETONES, URINE: NEGATIVE MG/DL
POC LEUKOCYTES, URINE: ABNORMAL
POC NITRITE,URINE: NEGATIVE
POC PH, URINE: 6 PH
POC PROTEIN, URINE: NEGATIVE MG/DL
POC SPECIFIC GRAVITY, URINE: >=1.03
POC UROBILINOGEN, URINE: 0.2 EU/DL

## 2025-04-30 PROCEDURE — 1159F MED LIST DOCD IN RCRD: CPT | Performed by: NURSE PRACTITIONER

## 2025-04-30 PROCEDURE — 99213 OFFICE O/P EST LOW 20 MIN: CPT | Performed by: NURSE PRACTITIONER

## 2025-04-30 PROCEDURE — 1126F AMNT PAIN NOTED NONE PRSNT: CPT | Performed by: NURSE PRACTITIONER

## 2025-04-30 PROCEDURE — 81003 URINALYSIS AUTO W/O SCOPE: CPT | Performed by: NURSE PRACTITIONER

## 2025-04-30 PROCEDURE — 3078F DIAST BP <80 MM HG: CPT | Performed by: NURSE PRACTITIONER

## 2025-04-30 PROCEDURE — 3074F SYST BP LT 130 MM HG: CPT | Performed by: NURSE PRACTITIONER

## 2025-04-30 PROCEDURE — 1036F TOBACCO NON-USER: CPT | Performed by: NURSE PRACTITIONER

## 2025-04-30 ASSESSMENT — ENCOUNTER SYMPTOMS
PSYCHIATRIC NEGATIVE: 1
GASTROINTESTINAL NEGATIVE: 1
ALLERGIC/IMMUNOLOGIC NEGATIVE: 1
NEUROLOGICAL NEGATIVE: 1
CARDIOVASCULAR NEGATIVE: 1
MUSCULOSKELETAL NEGATIVE: 1
HEMATOLOGIC/LYMPHATIC NEGATIVE: 1
EYES NEGATIVE: 1
RESPIRATORY NEGATIVE: 1
ENDOCRINE NEGATIVE: 1
CONSTITUTIONAL NEGATIVE: 1

## 2025-04-30 ASSESSMENT — PAIN SCALES - GENERAL: PAINLEVEL_OUTOF10: 0-NO PAIN

## 2025-04-30 NOTE — PROGRESS NOTES
Subjective   Patient ID: Louisa Rodas is a 84 y.o. female who presents for FUV.    Records Review  Last seen in 06/2024 she was here for Joe.  Estrogen cream was not covered and was too expensive so she was taking some cranberry supplements.    Lab Results:  UA negative    Urinary Symptoms:  - Patient reports no recent urinary tract infections since the last visit in July 2024.  - She has continued to take the cranberry supplements after intimacy and Bactrim daily which she tolerates well with food and yogurt.    OBGYN history/symptoms:  - Reports that she has had breast tenderness since having a partial mastectomy.  She finds the surgical bra to be uncomfortable.  - The mastectomy was in May of 2024.  - Last seen by oncology in February 2025 and a breast exam was done.        Review of Systems   Constitutional: Negative.    HENT: Negative.     Eyes: Negative.    Respiratory: Negative.     Cardiovascular: Negative.    Gastrointestinal: Negative.    Endocrine: Negative.    Genitourinary: Negative.    Musculoskeletal: Negative.    Skin: Negative.    Allergic/Immunologic: Negative.    Neurological: Negative.    Hematological: Negative.    Psychiatric/Behavioral: Negative.         Objective   Physical Exam  Constitutional:       General: She is not in acute distress.     Appearance: Normal appearance.   HENT:      Head: Normocephalic and atraumatic.   Genitourinary:     General: Normal vulva.      Exam position: Lithotomy position.      Vagina: Normal.      Cervix: Normal.      Uterus: Normal.       Adnexa: Right adnexa normal and left adnexa normal.      Rectum: Normal.   Neurological:      General: No focal deficit present.      Mental Status: She is alert and oriented to person, place, and time.   Psychiatric:         Mood and Affect: Mood normal.         Behavior: Behavior normal.         Thought Content: Thought content normal.         Judgment: Judgment normal.         Assessment/Plan   84 y.o. female being  assessed for Joe. Hx of  breast DCIS ER +  Now s/p left PM, pTis, margins neg in 05/2024.    Diagnoses  #1 Joe    Plan   Joe  - Patient is overall doing well and had not had any new UTI's since our last visit.  We will continue with the cranberry supplement and Bactrim -160 mg daily as she is tolerating it well.  - Discussed her small umbilical hernia which is asymptomatic which we will continue to monitor for changes.    Follow up in 1 year.    I, Betty Lira, am scribing for virtually, and in the presence of MATT Navas on 04/30/25 8:05 PM.     SPEKE audio duration: 18 minutes    I spent a total of *** minutes in face to face and non face to face time.     MATT Bazzi 04/30/25 1:45 PM

## 2025-05-01 ENCOUNTER — TELEPHONE (OUTPATIENT)
Dept: PRIMARY CARE | Facility: CLINIC | Age: 85
End: 2025-05-01
Payer: MEDICARE

## 2025-05-01 DIAGNOSIS — G47.33 OSA ON CPAP: ICD-10-CM

## 2025-05-01 DIAGNOSIS — R26.89 IMBALANCE: ICD-10-CM

## 2025-05-01 DIAGNOSIS — M17.10 ARTHRITIS OF KNEE: Primary | ICD-10-CM

## 2025-05-01 NOTE — PROGRESS NOTES
Patient requesting a referral for physical therapy for her right knee and back pain. States she would like to return to PT at the [facility] for balance and strengthening.

## 2025-05-02 ENCOUNTER — PATIENT OUTREACH (OUTPATIENT)
Dept: PRIMARY CARE | Facility: CLINIC | Age: 85
End: 2025-05-02
Payer: MEDICARE

## 2025-05-02 DIAGNOSIS — I10 HYPERTENSION, UNSPECIFIED TYPE: ICD-10-CM

## 2025-05-02 DIAGNOSIS — N18.32 TYPE 2 DIABETES MELLITUS WITH STAGE 3B CHRONIC KIDNEY DISEASE, WITHOUT LONG-TERM CURRENT USE OF INSULIN (MULTI): ICD-10-CM

## 2025-05-02 DIAGNOSIS — E11.22 TYPE 2 DIABETES MELLITUS WITH STAGE 3B CHRONIC KIDNEY DISEASE, WITHOUT LONG-TERM CURRENT USE OF INSULIN (MULTI): ICD-10-CM

## 2025-05-02 NOTE — PROGRESS NOTES
Care Management Monthly Outreach  Chart review completed  Confirmation of at least 2 patient identifiers: Name &   Change in insurance? No    Has patient been to ER/Urgent Care since last outreach? No    Last Office Visit with PCP: 2024   Next Office Visit with PCP: 2025   APC Collaboration: n/a    Chronic Conditions and Outreach Summary:   Hypertension, unspecified type    Type 2 diabetes mellitus with stage 3b chronic kidney disease, without long-term current use of insulin (Multi)    Spoke with Patient and monthly outreach completed. Patient states she has balance issues and I informed her the referral for Physical therapy was put in by her PCP. States she is sleeping well and her appetite is good. Patient continues to check her blood sugar and blood pressure but did not have the readings available at this time. Discussed limiting sugary foods and eating a well balanced diet. Patient states her recent blood pressure was good but the blood sugars fluctuate. Patient states when she takes her bra off she feels a little tug in her breast. She states it feels more comfortable to sleep in her surgical bra. Appointments reviewed and she has an appointment with her PCP on 25 and will address. Pt. Is aware she has an upcoming appointment with rheumatology.    Medications:   Are there medication changes since last visit? No  Refills needed? No    Social Drivers of Health: Complete  Care Gaps Addressed? Complete  Care Plan addressed: Yes    Upcoming Appointments:   Future Appointments       Date / Time Provider Department Dept Phone    2025 9:40 AM Brandon Allen MD [facility] [phone]    2025 10:00 AM Aureliano Pabon MD [facility] [phone]    2025 1:00 PM (Arrive by 12:45 PM) Nadira Moctezuma DO  [address] [facility] [phone]    2025 1:00 PM (Arrive by 12:45 PM) Yolette Carlos PA-C  [facility] [phone]    2025  1:00 PM Christiane Cooper MD [facility] [place] [phone]    7/30/2025 1:15 PM Hu Beranbe OD [facility] [phone]    8/15/2025 2:00 PM (Arrive by 1:45 PM) [facility] MAMMO 4 [facility] [phone]    8/15/2025 3:00 PM Rhina Fields MD [facility] [phone]    5/5/2026 1:30 PM Margarita Man, APRN-CNP [facility] [phone]          Blood Pressures Reviewed  BP Readings from Last 3 Encounters:   04/30/25 110/66   03/19/25 (!) 134/98   02/14/25 138/74     Labs Reviewed:  Lab Results   Component Value Date    CREATININE 1.52 (H) 03/18/2025    GLUCOSE 149 (H) 03/18/2025    ALKPHOS 72 03/18/2025    K 3.8 03/18/2025    PROT 6.1 03/18/2025     03/18/2025    CALCIUM 8.9 03/18/2025    AST 20 03/18/2025    ALT 12 03/18/2025    BUN 26 (H) 03/18/2025    MG 1.86 05/24/2023    PHOS 3.7 10/23/2024    GFRF 46 (A) 06/19/2023     Lab Results   Component Value Date    TRIG 91 11/13/2023    CHOL 139 11/13/2023    LDLCALC 66 11/13/2023    HDL 55.2 11/13/2023     Lab Results   Component Value Date    HGBA1C 7.0 (H) 05/09/2024    HGBA1C 7.2 (H) 02/20/2024    HGBA1C 6.9 (H) 11/13/2023     Lab Results   Component Value Date    WBC 4.1 (L) 10/23/2024    RBC 3.77 (L) 10/23/2024    HGB 11.6 (L) 10/23/2024     10/23/2024   No other concerns at this time.  Agreeable to continue monthly outreaches.  Encouraged to call if questions or concerns arise.    Elsy Irving RN

## 2025-05-05 ENCOUNTER — TELEPHONE (OUTPATIENT)
Dept: PRIMARY CARE | Facility: CLINIC | Age: 85
End: 2025-05-05
Payer: MEDICARE

## 2025-05-05 NOTE — PROGRESS NOTES
Patient states she received a letter her Pulmonary doctor (Dr. Arian Lambert) is retiring and she needs a referral for another Pulmonologist to address her asthma and sleep study needs.

## 2025-05-06 ENCOUNTER — APPOINTMENT (OUTPATIENT)
Dept: RHEUMATOLOGY | Facility: CLINIC | Age: 85
End: 2025-05-06
Payer: MEDICARE

## 2025-05-06 VITALS
WEIGHT: 169.2 LBS | HEART RATE: 67 BPM | DIASTOLIC BLOOD PRESSURE: 77 MMHG | HEIGHT: 63 IN | BODY MASS INDEX: 29.98 KG/M2 | OXYGEN SATURATION: 96 % | SYSTOLIC BLOOD PRESSURE: 133 MMHG | TEMPERATURE: 98 F

## 2025-05-06 DIAGNOSIS — H16.229 SICCA SYNDROME WITH KERATOCONJUNCTIVITIS: Primary | ICD-10-CM

## 2025-05-06 DIAGNOSIS — M32.9 SYSTEMIC LUPUS ERYTHEMATOSUS, UNSPECIFIED SLE TYPE, UNSPECIFIED ORGAN INVOLVEMENT STATUS (MULTI): ICD-10-CM

## 2025-05-06 PROCEDURE — 3078F DIAST BP <80 MM HG: CPT | Performed by: INTERNAL MEDICINE

## 2025-05-06 PROCEDURE — 99214 OFFICE O/P EST MOD 30 MIN: CPT | Performed by: INTERNAL MEDICINE

## 2025-05-06 PROCEDURE — 1036F TOBACCO NON-USER: CPT | Performed by: INTERNAL MEDICINE

## 2025-05-06 PROCEDURE — 1126F AMNT PAIN NOTED NONE PRSNT: CPT | Performed by: INTERNAL MEDICINE

## 2025-05-06 PROCEDURE — 3075F SYST BP GE 130 - 139MM HG: CPT | Performed by: INTERNAL MEDICINE

## 2025-05-06 RX ORDER — HYDROXYCHLOROQUINE SULFATE 200 MG/1
200 TABLET, FILM COATED ORAL DAILY
Qty: 90 TABLET | Refills: 1 | Status: SHIPPED | OUTPATIENT
Start: 2025-05-06 | End: 2026-05-06

## 2025-05-06 ASSESSMENT — PAIN SCALES - GENERAL: PAINLEVEL_OUTOF10: 0-NO PAIN

## 2025-05-06 NOTE — PROGRESS NOTES
PP: 84 year-old female with generalized osteoarthritis, obstructive sleep apnea, Sjogren syndrome.   CC: pain in right foot and lower back.   [place]: She was last seen 10/23/2024 following up with Dr. Verma and with orthopedics regarding the pain involving various joints. She notes the pain in the lower back that exacerbated by activity. She has pain in her feet, right more than left with radiographs of her feet consistent with osteoarthritis. She has been taking hydroxychloroquine for Sjögren syndrome symptoms. She has mouth dryness.  She uses moisturizing eyedrops for dryness.  She had been prescribed Restasis eyedrops but did not use the Restasis eyedrops because they were too expensive.  She has not noted any rashes, mucosal ulcerations other than that related to biting of her buccal mucosa, uveitis symptoms, digital ulcers. 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 She has diplopia on right and left lateral gaze. Her diplopia is thought to be related to her history of hyperthyroidism. She has wears wide toed shoes to minimize the pain in the right first toe.  She has some improvement in the right knee pain following right total knee arthroplasty in 2024.  There is some residual pain in the anterior aspect of the right knee with numbness along the lateral aspect of the right knee.  PH: Allergies: Penicillin (hives); illnesses: Asthma, depression, primary open angle glaucoma, ischemic optic neuritis, obstructive sleep apnea on C Pap, hypothyroidism secondary to radioactive iodine treatment of hyperthyroidism, mild renal insufficiency, Sjogren syndrome, osteoarthritis, carpal tunnel syndrome, polycystic liver disease, ptosis of bilateral eyelids; surgeries: Left total knee arthroplasty (8/25/2016), right knee arthroscopy, right total knee arthroplasty (2024) right rotator cuff repair, right first CMC joint repair (4/26/2018), left  carpal tunnel surgery (2014), left cataract surgery (2016), left breast lumpectomy and radiation therapy for carcinoma in situ left breast ().   SH: She quit tobacco smoking and drinks alcohol occasionally. She has a past history of smoking marijuana on one occasion around . She had 3 miscarriages within the first 6 weeks of gestation.   FH: Her father had diabetes mellitus and pancreatic  cancer. Her mother has dementia, hypertension, arthritis, diabetes mellitus. She has a brother who is  and had diabetes mellitus and pancreatic cancer. She has a brother alive with history of prostate cancer. She has another brother who is healthy. She has a son with diabetes mellitus. She has no sisters or daughters.   ROS: She has left-sided sciatica symptoms that improved with using topical CBD gel.  PX: She is a well-developed, well-nourished, Black female. The lungs, heart, abdomen, and extremities are benign. The musculoskeletal examination is remarkable for tenderness in the right first MTP joint and the mid dorsal aspect of the left wrist.. The straight leg raise test is normal bilaterally in the seated position. There is mild pain in the right hip with hip flexion against resistance.  There is a well-healed surgical scar in the anterior aspect of both knees.  The knees are not swollen.  The neurological examination shows normal muscle strength in the lower extremities and slight weakness of the opponens test bilaterally.  The Romberg test is normal.  There is slight apraxia on tandem gait.  DEXA bone density (2024) (9/15/2020) (2018) (2024)  L1-L4............................... 0.8............... 2.1.................. 0.6.................. 0.5  Left total femur......... -0.8..............-0.8................. -1.2................ -1.0  Left femoral neck...... -1.7............ -1.6................. -1.6................ -1.0  X-ray left wrist (2024) advanced osteoarthritis left  wrist particularly at the first CMC joint.  No fractures.  Laboratory (3/18/2025) BUN 26, creatinine 1.52, glucose 149, calcium 8.9, albumin 4.2, alk phosphatase 72, AST 20, ALT 12, TSH 7.37, (7/22/2024) parathyroid hormone intact 115.6, serum protein electrophoresis normal.  Head CT scan (5/20/2023) scattered periventricular and deep white matter hypodensities.  Chronic left external capsule infarct (new since 2016).  Bilateral cataract surgeries.  MRI brain (5/20/2023) ventricular prominence disproportionate to sulcal prominence which may be seen in central predominant volume loss versus normal pressure hydrocephalus.  Mild nonspecific subcortical and periventricular T2 and FLAIR hyperintense signal compatible with microangiopathy.  Focal encephalomalacia and gliosis in the left subinsular region compatible with old infarct. Trace nonspecific fluid within the left mastoid air cells.  Susceptibility artifact in the left subinsular region encephalomalacia and gliosis compatible with old blood products.  Periventricular prominence out of proportion to sulci prominence.  Chest CT scan (1/23/2018): Lungs are clear. There are multiple hypodensities in the liver and suggestion of colonic diverticulosis.   MRI abdomen (3/20/2018): Multiple hepatic cysts, left renal cysts, gallbladder adenomyomatosis   VQ scan (1/24/2018): Normal.   Impression: 84 year-old black female with history of osteoarthritis, obstructive sleep apnea, hypothyroidism, Sjogren syndrome, carpal tunnel syndrome, status postsurgery on the right first CMC joint and bilateral total knee arthroplasty presenting with complaints of pain in the lower back and the lower legs and feet more so than the left foot with examination not showing any signs of inflammatory arthritis. Symptoms are most consistent with generalized osteoarthritis.  She was suspected in the past of possibly having normal pressure hydrocephalus.  Plan: She is to continue hydroxychloroquine  200 mg daily.  She is to have laboratory for REECE panel, CRP, C3, C4.  She is to return in 6 months for evaluation.

## 2025-05-09 LAB
ANA PAT SER IF-IMP: ABNORMAL
ANA SER QL IF: POSITIVE
ANA TITR SER IF: ABNORMAL TITER
C3 SERPL-MCNC: 135 MG/DL
C4 SERPL-MCNC: 27 MG/DL
CRP SERPL-MCNC: <3 MG/L
DSDNA AB SER-ACNC: <1 IU/ML
ENA JO1 AB SER IA-ACNC: ABNORMAL AI
ENA RNP AB SER-ACNC: ABNORMAL AI
ENA SCL70 AB SER IA-ACNC: ABNORMAL AI
ENA SM AB SER IA-ACNC: ABNORMAL AI
ENA SM+RNP AB SER IA-ACNC: ABNORMAL AI
ENA SS-A AB SER IA-ACNC: ABNORMAL AI
ENA SS-B AB SER IA-ACNC: ABNORMAL AI
LABORATORY COMMENT REPORT: ABNORMAL
NUCLEOSOME AB SER IA-ACNC: ABNORMAL AI

## 2025-05-12 ENCOUNTER — APPOINTMENT (OUTPATIENT)
Dept: OPHTHALMOLOGY | Facility: CLINIC | Age: 85
End: 2025-05-12
Payer: MEDICARE

## 2025-05-12 DIAGNOSIS — H40.10X3 OPEN-ANGLE GLAUCOMA OF BOTH EYES, SEVERE STAGE, UNSPECIFIED OPEN-ANGLE GLAUCOMA TYPE: Primary | ICD-10-CM

## 2025-05-12 DIAGNOSIS — H04.123 BILATERAL DRY EYES: ICD-10-CM

## 2025-05-12 PROCEDURE — 99214 OFFICE O/P EST MOD 30 MIN: CPT | Performed by: OPHTHALMOLOGY

## 2025-05-12 RX ORDER — LATANOPROST 50 UG/ML
1 SOLUTION/ DROPS OPHTHALMIC DAILY
Qty: 7.5 ML | Refills: 3 | Status: SHIPPED | OUTPATIENT
Start: 2025-05-12 | End: 2025-08-10

## 2025-05-12 ASSESSMENT — TONOMETRY
OS_IOP_MMHG: 11
OD_IOP_MMHG: 11
IOP_METHOD: GOLDMANN APPLANATION

## 2025-05-12 ASSESSMENT — PACHYMETRY
OS_CT(UM): 486
OD_CT(UM): 502

## 2025-05-12 ASSESSMENT — CUP TO DISC RATIO
OD_RATIO: .6
OS_RATIO: .6

## 2025-05-12 ASSESSMENT — GONIOSCOPY
OD_SUPERIOR: 3/360
OS_SUPERIOR: 3/360

## 2025-05-12 ASSESSMENT — EXTERNAL EXAM - RIGHT EYE: OD_EXAM: NORMAL

## 2025-05-12 ASSESSMENT — VISUAL ACUITY
CORRECTION_TYPE: GLASSES
OD_CC+: -1
OS_CC+: -2
OS_CC: 20/25
METHOD: SNELLEN - LINEAR
OD_CC: 20/20

## 2025-05-12 ASSESSMENT — ENCOUNTER SYMPTOMS: EYES NEGATIVE: 1

## 2025-05-12 ASSESSMENT — EXTERNAL EXAM - LEFT EYE: OS_EXAM: NORMAL

## 2025-05-12 NOTE — PROGRESS NOTES
1-oag stable on meds  2-intraocular lens (IOL) ou  3-glenn tears ou  4-diabetes mellitus (DM) no bdr    Rto 6mos hvf

## 2025-05-14 ENCOUNTER — APPOINTMENT (OUTPATIENT)
Dept: PRIMARY CARE | Facility: CLINIC | Age: 85
End: 2025-05-14
Payer: MEDICARE

## 2025-05-14 VITALS
SYSTOLIC BLOOD PRESSURE: 122 MMHG | OXYGEN SATURATION: 94 % | HEIGHT: 64 IN | BODY MASS INDEX: 28.68 KG/M2 | WEIGHT: 168 LBS | HEART RATE: 90 BPM | DIASTOLIC BLOOD PRESSURE: 64 MMHG

## 2025-05-14 DIAGNOSIS — E11.22 TYPE 2 DIABETES MELLITUS WITH STAGE 3B CHRONIC KIDNEY DISEASE, WITHOUT LONG-TERM CURRENT USE OF INSULIN (MULTI): ICD-10-CM

## 2025-05-14 DIAGNOSIS — N18.31 STAGE 3A CHRONIC KIDNEY DISEASE (MULTI): ICD-10-CM

## 2025-05-14 DIAGNOSIS — Z78.0 MENOPAUSE: ICD-10-CM

## 2025-05-14 DIAGNOSIS — Q44.6 POLYCYSTIC LIVER DISEASE: ICD-10-CM

## 2025-05-14 DIAGNOSIS — M35.00 SJOGREN SYNDROME, UNSPECIFIED (MULTI): ICD-10-CM

## 2025-05-14 DIAGNOSIS — E78.5 HYPERLIPIDEMIA, UNSPECIFIED HYPERLIPIDEMIA TYPE: ICD-10-CM

## 2025-05-14 DIAGNOSIS — N18.32 TYPE 2 DIABETES MELLITUS WITH STAGE 3B CHRONIC KIDNEY DISEASE, WITHOUT LONG-TERM CURRENT USE OF INSULIN (MULTI): ICD-10-CM

## 2025-05-14 DIAGNOSIS — Z00.00 ENCOUNTER FOR MEDICARE ANNUAL WELLNESS EXAM: Primary | ICD-10-CM

## 2025-05-14 DIAGNOSIS — E03.9 HYPOTHYROIDISM, UNSPECIFIED TYPE: ICD-10-CM

## 2025-05-14 DIAGNOSIS — D05.12 DUCTAL CARCINOMA IN SITU (DCIS) OF LEFT BREAST: ICD-10-CM

## 2025-05-14 DIAGNOSIS — I10 PRIMARY HYPERTENSION: ICD-10-CM

## 2025-05-14 DIAGNOSIS — G47.33 OSA ON CPAP: ICD-10-CM

## 2025-05-14 PROCEDURE — 1125F AMNT PAIN NOTED PAIN PRSNT: CPT | Performed by: SPECIALIST

## 2025-05-14 PROCEDURE — 99397 PER PM REEVAL EST PAT 65+ YR: CPT | Performed by: SPECIALIST

## 2025-05-14 PROCEDURE — 1159F MED LIST DOCD IN RCRD: CPT | Performed by: SPECIALIST

## 2025-05-14 PROCEDURE — G0439 PPPS, SUBSEQ VISIT: HCPCS | Performed by: SPECIALIST

## 2025-05-14 PROCEDURE — 3074F SYST BP LT 130 MM HG: CPT | Performed by: SPECIALIST

## 2025-05-14 PROCEDURE — 1160F RVW MEDS BY RX/DR IN RCRD: CPT | Performed by: SPECIALIST

## 2025-05-14 PROCEDURE — 1170F FXNL STATUS ASSESSED: CPT | Performed by: SPECIALIST

## 2025-05-14 PROCEDURE — 1123F ACP DISCUSS/DSCN MKR DOCD: CPT | Performed by: SPECIALIST

## 2025-05-14 PROCEDURE — 3078F DIAST BP <80 MM HG: CPT | Performed by: SPECIALIST

## 2025-05-14 PROCEDURE — 1158F ADVNC CARE PLAN TLK DOCD: CPT | Performed by: SPECIALIST

## 2025-05-14 ASSESSMENT — PATIENT HEALTH QUESTIONNAIRE - PHQ9
2. FEELING DOWN, DEPRESSED OR HOPELESS: NOT AT ALL
SUM OF ALL RESPONSES TO PHQ9 QUESTIONS 1 AND 2: 1
1. LITTLE INTEREST OR PLEASURE IN DOING THINGS: NOT AT ALL
1. LITTLE INTEREST OR PLEASURE IN DOING THINGS: SEVERAL DAYS
SUM OF ALL RESPONSES TO PHQ9 QUESTIONS 1 AND 2: 0
2. FEELING DOWN, DEPRESSED OR HOPELESS: NOT AT ALL
10. IF YOU CHECKED OFF ANY PROBLEMS, HOW DIFFICULT HAVE THESE PROBLEMS MADE IT FOR YOU TO DO YOUR WORK, TAKE CARE OF THINGS AT HOME, OR GET ALONG WITH OTHER PEOPLE: NOT DIFFICULT AT ALL

## 2025-05-14 ASSESSMENT — ACTIVITIES OF DAILY LIVING (ADL)
TAKING_MEDICATION: INDEPENDENT
DRESSING: INDEPENDENT
GROCERY_SHOPPING: INDEPENDENT
DOING_HOUSEWORK: NEEDS ASSISTANCE
TAKING_MEDICATION: INDEPENDENT
DOING_HOUSEWORK: NEEDS ASSISTANCE
GROCERY_SHOPPING: INDEPENDENT
MANAGING_FINANCES: INDEPENDENT
BATHING: INDEPENDENT
DRESSING: INDEPENDENT
BATHING: INDEPENDENT

## 2025-05-14 ASSESSMENT — ENCOUNTER SYMPTOMS
LOSS OF SENSATION IN FEET: 0
OCCASIONAL FEELINGS OF UNSTEADINESS: 1
DEPRESSION: 0

## 2025-05-14 ASSESSMENT — PAIN SCALES - GENERAL: PAINLEVEL_OUTOF10: 8

## 2025-05-14 NOTE — PROGRESS NOTES
Subjective   Patient ID: Louisa Rodas is a 84 y.o. female who presents for No chief complaint on file..  HPI    83 yo female Pmhx sig DCIS Breast Cancer, Htn, Hyperlipidemia, CKD, Hypothyroidism, MRI Brain 5/4/2023 (mild enlargement of ventricles, mild diffuse cerebral atrophy) (Possible NPH in 2021) Glaucoma, MENDY on CPAP, Polycystic Liver Disease, ?Cirrhosis, Sjogrens Syndrome (Dr. Sparks), OA (Dr. Verduzco), Mod Persistent Asthma (Dr. Ramos), MENDY on CPAP, Nonfamilial hypogammaglobulinemia (sees Dr. Monique), Tremors, Recurrent UTI, and Cervical Facet Arthropathy (C3-5) s/p facet medial branch radiofrequency ablation 1/2023 and R Total Knee for MAW    Uses tylenol rarely for pain  Using CBD gummies for pain  Takes symbicort prn  Takes levo 5 days per week and 2 days per week  Said she is not taking loteprednol  She stopped singulair due to side effects   On bactrim with Allergy/Immunology Dr. Monique    Has living will and health care POA   Dharmesh Rodas and Grand daughter  Anjelica Painter are decision makers if she is unable    Goals of Care:  Treat treatable conditions, has not considered whether or not she would want extraordinary measures that might prolong the act of dying if no hope for recovery, but consider what she would want done      Allergies[1]   Current Outpatient Medications   Medication Instructions    acetaminophen (TYLENOL EXTRA STRENGTH) 1,000 mg, oral, Every 6 hours PRN    acyclovir (ZOVIRAX) 400 mg, oral, Daily    allantoin gel As needed    amLODIPine (NORVASC) 2.5 mg, oral, Daily    atorvastatin (LIPITOR) 20 mg, oral, 3 times weekly    azelastine (Astelin) 137 mcg (0.1 %) nasal spray 2 sprays, 2 times daily PRN    blood pressure test kit-large kit Use to check blood pressure once daily.    budesonide-formoteroL (Symbicort) 160-4.5 mcg/actuation inhaler 2 puffs, inhalation, Daily, RINSE MOUTH AFTER USE.    butenafine HCl (MENTAX TOP) 1 tablet, Daily    cholecalciferol (VITAMIN D-3) 50  mcg, Daily    cyanocobalamin, vitamin B-12, (Vitamin B-12) 2,500 mcg tablet, sublingual SL tablet 1 tablet    fluocinolone and shower cap 0.01 % oil Apply to scalp  leave on overnight and wash off in the morning. Use daily    hydroxychloroquine (PLAQUENIL) 200 mg, oral, Daily    ketoconazole (NIZOral) 2 % shampoo No dose, route, or frequency recorded.    lancets misc Uses one touch verio test strips    latanoprost (Xalatan) 0.005 % ophthalmic solution 1 drop, Both Eyes, Daily    levothyroxine (Synthroid, Levoxyl) 100 mcg tablet TAKE 1 TABLET BY MOUTH 5 TIMES A WEEK.    lisinopril 20 mg, oral, Daily    loteprednol etabonate 0.25 % drops,suspension 1 drop, ophthalmic (eye), 4 times daily    medical cannabis 1 each, As needed    montelukast (Singulair) 10 mg tablet 1 tablet, Daily    OneTouch Verio test strips strip 1 each, Topical, Daily PRN    peg 400-propylene glycol, PF, (Systane, PF,) 0.4-0.3 % dropperette Administer into affected eye(s).    povidone, PF, (iVizia, PF,) 0.5 % drops 1 drop, 3 times daily PRN    propranolol (INDERAL) 10 mg, oral, Daily    Restasis 0.05 % ophthalmic emulsion 1 drop, Both Eyes, 2 times daily    sulfamethoxazole-trimethoprim (Bactrim DS) 800-160 mg tablet 1 tablet, Daily    travoprost (Travatan Z) 0.004 % drops ophthalmic solution 1 drop, Nightly    VITAMIN B COMPLEX ORAL Every 24 hours    zinc gluconate 50 mg tablet Every 24 hours        Review of Systems  Constitutional:  Positive fatigue, no fevers, no chills, felt cold, no unintentional weight loss,   HEENT:  No headaches, no dizziness but unsteady if turns quickly, sometimes blurred vision some times some times vertical double vision but adjust and resolve and said due to eye muscles, sees optho, slight hearing loss  Cardiovascular:  No chest pain, no palpitations, Positive shortness of breath with exertion (one flight of stairs),   Respiratory:  Positive occasional cough, no hemoptysis, no wheezing, Rarely shortness of breath at  "rest  Breast pain left since surgery, now if not wears bra feels pulling so has been wearing bra, feels tender, no skin changes no nipple discharge no abnormalities except is tender at incision  GI:  No dysphagia, no odynophagia, Positive reflux, no abdominal pain, a little nausea today no vomiting, no changes in bowel, no bright red blood per rectum, no melena  :  No urinary frequency, no dysuria, no urine incontinence  MSK:  No falls, everywhere joint pain including hands, no joint swelling, uses cane   Neuro:  Positive tremors, hand weakness extremity weakness, no changes in sensation    Physical Exam  /63 (BP Location: Left arm, Patient Position: Sitting, BP Cuff Size: Adult)   Pulse 90   Ht 1.62 m (5' 3.78\")   Wt 76.2 kg (168 lb)   SpO2 94%   BMI 29.04 kg/m²   General:    Well-appearing  F in no acute distress, well nourished, well hydrated  Head:  Normocephalic, atraumatic  Skin:          Warm dry,   Eyes:  Anicteric sclera, pupils equal,   Ears:        TMs intact  Oral:      Not examined due to pandemic  Neck:   Supple, no cervical/supraclavicular adenopathy, no thyromegaly or nodules appreciated on exam  Cor:      Regular rate, normal S1, S2, no murmurs appreciated, no S3, no S4   Lungs:   Clear to auscultation b/l, no wheezes, no rhonchi, no crackles, no accessory respiratory muscle use  Abd:          Soft, nontender, no guarding, no rebound, no hepatosplenomegaly appreciated   Ext:            No lower extremity edema, no palpable cords  Pulses:      Pedal pulses intact  Neuro:   CN2-12 grossly intact (except funduscopic exam not performed and visual fields not examined)  Breasts:     Declined Chaperone, No Axillary adenopathy, no discrete palpable breast nodules, TENDER AT SITE OF LEFT BX LEFT LATERAL LOWER DENSE TISSUEno overlying skin changes, no nipple discharge      Assessment & Plan  Stage 3a chronic kidney disease (Multi)    Orders:    Comprehensive Metabolic Panel; Future    CBC; " Future    Lipid Panel; Future    Encounter for Medicare annual wellness exam         Primary hypertension         Hyperlipidemia, unspecified hyperlipidemia type         Hypothyroidism, unspecified type    Orders:    Thyroid Stimulating Hormone; Future    MENDY on CPAP         Menopause    Orders:    XR DEXA bone density; Future    Encounter for screening mammogram for malignant neoplasm of breast    Orders:    BI mammo right screening tomosynthesis; Future    Type 2 diabetes mellitus with stage 3b chronic kidney disease, without long-term current use of insulin (Multi)    Orders:    Hemoglobin A1C; Future    Albumin-Creatinine Ratio, Urine Random; Future       Recommewnd she see breast surgery has upcoming appointment in August for imaging and visit with surgeon she will try to be seen sooner    TAKING CALTRATE DOESN'T KNOW DOSE ADD MED LIST    Knee pain artifical hurts going up steps   DM - check Hba1C ordered had donut   Diet management  Maw  3/18/2025 cmp with Hepatology and Bili direct  Personal history of bresat cancer  CKD 3 - sees dr Smith    TSH 7.37 2/25/2025  Will recheck TSH and takes 100 mcgs 5 days per week, 6 days per week, recheck 6 weeks  Missed dose today   Recheck tsh 4-6 wks aftrer dose change    Sle mgmt per dr yung  Was seeing dr barlow who retired for [employer]    Pulm left dr huggins had PFTs with him    MAW  DXA ORDER  DX L MAMMO 2/14/2024   LAST SCREE B/L 2/5/2024 DUE FOR SCREENING MAMMOGRAM BUT HAS ORDER EX LEFT ON 2/14 SO WILL ORDER SCREEN RIGHT  Colon 2018 4/5 was to repeat in 5 yrs  Plans follow-up with hepatology said dr barlow retired -- will put in referral  Has rsv vaccinew in 2023  Both shingrix  Gets flu and covid vaccine 10/2024, recommend repeat 6 mos  Prevnar 13 5/2021 and Prevnar 20 11/2023  Recommend Tdap     Nadira Moctezuma DO          [1]   Allergies  Allergen Reactions    Penicillins Hives and Itching    Primidone Other     Stuttering     Tizanidine Other     Stuttering

## 2025-05-23 ENCOUNTER — TELEPHONE (OUTPATIENT)
Dept: PRIMARY CARE | Facility: CLINIC | Age: 85
End: 2025-05-23

## 2025-05-23 ENCOUNTER — TELEPHONE (OUTPATIENT)
Dept: PRIMARY CARE | Facility: CLINIC | Age: 85
End: 2025-05-23
Payer: MEDICARE

## 2025-05-23 DIAGNOSIS — E08.22 DIABETES MELLITUS DUE TO UNDERLYING CONDITION WITH STAGE 2 CHRONIC KIDNEY DISEASE, WITHOUT LONG-TERM CURRENT USE OF INSULIN (MULTI): ICD-10-CM

## 2025-05-23 DIAGNOSIS — R26.9 GAIT ABNORMALITY: Primary | ICD-10-CM

## 2025-05-23 DIAGNOSIS — N18.31 STAGE 3A CHRONIC KIDNEY DISEASE (MULTI): ICD-10-CM

## 2025-05-23 DIAGNOSIS — N18.2 DIABETES MELLITUS DUE TO UNDERLYING CONDITION WITH STAGE 2 CHRONIC KIDNEY DISEASE, WITHOUT LONG-TERM CURRENT USE OF INSULIN (MULTI): ICD-10-CM

## 2025-05-23 NOTE — PROGRESS NOTES
Patient requesting referrals for a five year handicap placard due to the previous one has . She is also requesting a referral for a Nutritionist regarding what she should and should not eat in regards to arthritis.

## 2025-05-26 PROBLEM — G91.2 NPH (NORMAL PRESSURE HYDROCEPHALUS) (MULTI): Status: RESOLVED | Noted: 2023-10-29 | Resolved: 2025-05-26

## 2025-05-26 PROBLEM — N18.4 CHRONIC KIDNEY DISEASE, STAGE IV (SEVERE) (MULTI): Status: RESOLVED | Noted: 2024-10-23 | Resolved: 2025-05-26

## 2025-05-27 NOTE — ASSESSMENT & PLAN NOTE
Polycystic liver disease/chronic liver disease  Had CMP and direct bilirubin 3/18/2025 with Hepatology   Was seeing Dr. Man who retired  Will place referral  Orders:    Referral to Hepatology; Future

## 2025-05-27 NOTE — ASSESSMENT & PLAN NOTE
Completed XRT   Declined Tamoxifen  Has order for diagnostic Left mammogram in 6 months  Tenderness on exam, recommend she see breast surgery (has upcoming appointment in August for imaging and visit with surgeon she will try to be seen sooner)  Management per Surgery

## 2025-05-29 ENCOUNTER — TELEPHONE (OUTPATIENT)
Dept: RHEUMATOLOGY | Facility: CLINIC | Age: 85
End: 2025-05-29

## 2025-05-29 ENCOUNTER — HOSPITAL ENCOUNTER (OUTPATIENT)
Dept: RADIOLOGY | Facility: CLINIC | Age: 85
Discharge: HOME | End: 2025-05-29
Payer: MEDICARE

## 2025-05-29 ENCOUNTER — OFFICE VISIT (OUTPATIENT)
Dept: ORTHOPEDIC SURGERY | Facility: CLINIC | Age: 85
End: 2025-05-29
Payer: MEDICARE

## 2025-05-29 DIAGNOSIS — Z96.651 S/P TOTAL KNEE ARTHROPLASTY, RIGHT: ICD-10-CM

## 2025-05-29 PROCEDURE — 1159F MED LIST DOCD IN RCRD: CPT | Performed by: PHYSICIAN ASSISTANT

## 2025-05-29 PROCEDURE — 99214 OFFICE O/P EST MOD 30 MIN: CPT | Performed by: PHYSICIAN ASSISTANT

## 2025-05-29 PROCEDURE — 73562 X-RAY EXAM OF KNEE 3: CPT | Mod: RT

## 2025-05-29 PROCEDURE — 73562 X-RAY EXAM OF KNEE 3: CPT | Mod: RIGHT SIDE | Performed by: STUDENT IN AN ORGANIZED HEALTH CARE EDUCATION/TRAINING PROGRAM

## 2025-05-29 NOTE — PROGRESS NOTES
BAKARI Cervantes, PADocC, ATC  Adult Reconstruction and Joint Replacement Surgery  Phone: [phone]     Fax:[phone]            Chief Complaint   Patient presents with    Right Knee - Follow-up     F/U Rt. TKA With Xrays SR24       HPI:  Louisa Rodas is a pleasant 84 y.o. year-old female here for follow-up of their side: right total knee arthroplasty by Dr. Bhagat.  The patient is approximately 1 year(s) postop.The patient has no mechanical symptoms.  The patient has no swelling and pain.   The patients wound has healed uneventfully.  The patient has been doing HEP and/or outpatient PT.  No complications postoperatively.  The patient is very happy with the result of the operation.  She has some tenderness on her patella itself.    Review of Systems  Medical History[1]  Problem List[2]  Medication Documentation Review Audit       Reviewed by Anastasia Arceo LPN (Licensed Nurse) on 25 at 1253      Medication Order Taking? Sig Documenting Provider Last Dose Status   acetaminophen (Tylenol Extra Strength) 500 mg tablet [id number] Yes Take 2 tablets (1,000 mg) by mouth every 6 hours if needed for mild pain (1 - 3). Chanda Alves PA-C Taking Active   acyclovir (Zovirax) 400 mg tablet [id number] Yes Take 1 tablet (400 mg) by mouth once daily. JEAN MARIE Bazzi-CNP Taking Active   allantoin gel [id number] Yes Apply topically if needed for wound care. Contains cannibus Historical Provider, MD Taking Active   amLODIPine (Norvasc) 2.5 mg tablet [id number] Yes Take 1 tablet (2.5 mg) by mouth once daily. Nadira Moctezuma, DO  Active   atorvastatin (Lipitor) 20 mg tablet [id number] Yes Take 1 tablet (20 mg) by mouth 3 (three) times a week. Nadira Moctezuma, DO  Active   azelastine (Astelin) 137 mcg (0.1 %) nasal spray [id number] Yes Administer 2 sprays into each nostril 2 times a day as needed for allergies. Use in each nostril as directed Historical Provider, MD Taking Active   blood pressure  test kit-large kit [id number] Yes Use to check blood pressure once daily. Christiane Cooper MD Taking Active   budesonide-formoteroL (Symbicort) 160-4.5 mcg/actuation inhaler [id number] Yes Inhale 2 puffs once daily. RINSE MOUTH AFTER USE. Nadira Moctezuma DO  Active   butenafine HCl (MENTAX TOP) [id number] Yes Take 1 tablet by mouth once daily. Historical Provider, MD Taking Active   calcium carbonate/vitamin D3 (CALTRATE WITH VITAMIN D3 ORAL) [id number] Yes Take by mouth. Takes dose unknown Historical Provider, MD  Active   cholecalciferol (Vitamin D-3) 50 MCG (2000 UT) tablet [id number] Yes Take 1 tablet (50 mcg) by mouth once daily. Historical Provider, MD Taking Active   cyanocobalamin, vitamin B-12, (Vitamin B-12) 2,500 mcg tablet, sublingual SL tablet [id number] Yes Place 1 tablet (2,500 mcg) under the tongue once daily as needed. Historical Provider, MD Taking Active   fluocinolone and shower cap 0.01 % oil [id number] Yes Apply to scalp  leave on overnight and wash off in the morning. Use daily Historical Provider, MD Taking Active   hydroxychloroquine (Plaquenil) 200 mg tablet [id number] Yes Take 1 tablet (200 mg) by mouth once daily. Brandon Allen MD  Active   ketoconazole (NIZOral) 2 % shampoo [id number] Yes  Historical Provider, MD Taking Active   lancets misc [id number] Yes Uses one touch verio test strips Nadira Moctezuma DO Taking Active   latanoprost (Xalatan) 0.005 % ophthalmic solution [id number] Yes Administer 1 drop into both eyes once daily. Aureliano Pabon MD  Active   levothyroxine (Synthroid, Levoxyl) 100 mcg tablet [id number] Yes TAKE 1 TABLET BY MOUTH 5 TIMES A WEEK. Nadira Moctezuma DO  Active   lisinopril 20 mg tablet [id number] Yes Take 1 tablet (20 mg) by mouth once daily. Nadira Moctezuma DO  Active     Discontinued 05/29/25 1253   medical cannabis [id number] Yes Take 1 each by mouth if needed. Historical Provider, MD Taking Active   montelukast (Singulair) 10 mg tablet [id number] Yes Take 1 tablet (47  mg) by mouth once daily. Historical Provider, MD Taking Active   OneTouch Verio test strips strip [id number] Yes Apply 1 each topically once daily as needed (low blood sugar symptoms). Nadira Moctezuma DO Taking Active   peg 400-propylene glycol, PF, (Systane, PF,) 0.4-0.3 % dropperette [id number] Yes Administer into affected eye(s). Historical Provider, MD Taking Active   povidone, PF, (iVizia, PF,) 0.5 % drops [id number] Yes Administer 1 drop into affected eye(s) 3 times a day as needed. Historical Provider, MD Not Taking Active   propranolol (Inderal) 10 mg tablet [id number] Yes Take 1 tablet (10 mg) by mouth once daily. Nadira Moctezuma DO  Active   sulfamethoxazole-trimethoprim (Bactrim DS) 800-160 mg tablet [id number] Yes Take 1 tablet by mouth once daily. Takes daily not bid Historical Provider, MD Taking Active   travoprost (Travatan Z) 0.004 % drops ophthalmic solution  Yes Administer 1 drop into both eyes once daily at bedtime. Historical Provider, MD Taking Active   VITAMIN B COMPLEX ORAL  Yes Take by mouth every 24 (twenty four) hours if needed. Historical Provider, MD Taking Active   zinc gluconate 50 mg tablet  Yes Take by mouth every 24 (twenty four) hours if needed. Historical Provider, MD Taking Active                  RX Allergies[3]  Social History     Socioeconomic History    Marital status:      Spouse name: Emerson    Number of children: 3    Years of education: Not on file    Highest education level: Some college, no degree   Occupational History    Occupation: retired   Tobacco Use    Smoking status: Former     Current packs/day: 0.00     Average packs/day: 0.3 packs/day for 1 year (0.3 ttl pk-yrs)     Types: Cigarettes     Start date: 1966     Quit date: 1967     Years since quittin.4     Passive exposure: Past    Smokeless tobacco: Never    Tobacco comments:     Quit in    Vaping Use    Vaping status: Never Used   Substance and Sexual Activity     Alcohol use: Not Currently    Drug use: Never    Sexual activity: Defer   Other Topics Concern    Not on file   Social History Narrative    Not on file     Social Drivers of Health     Financial Resource Strain: Low Risk  (5/29/2024)    Overall Financial Resource Strain (CARDIA)     Difficulty of Paying Living Expenses: Not hard at all   Food Insecurity: No Food Insecurity (10/11/2023)    Hunger Vital Sign     Worried About Running Out of Food in the Last Year: Never true     Ran Out of Food in the Last Year: Never true   Transportation Needs: No Transportation Needs (5/29/2024)    PRAPARE - Transportation     Lack of Transportation (Medical): No     Lack of Transportation (Non-Medical): No   Physical Activity: Sufficiently Active (5/6/2024)    Exercise Vital Sign     Days of Exercise per Week: 3 days     Minutes of Exercise per Session: 60 min   Stress: No Stress Concern Present (5/6/2024)    [place] [employer] - Occupational Stress Questionnaire     Feeling of Stress : Not at all   Social Connections: Unknown (5/6/2024)    Social Connection and Isolation Panel [NHANES]     Frequency of Communication with Friends and Family: More than three times a week     Frequency of Social Gatherings with Friends and Family: More than three times a week     Attends Zoroastrianism Services: Patient unable to answer     Active Member of Clubs or Organizations: Patient unable to answer     Attends Club or Organization Meetings: More than 4 times per year     Marital Status:    Intimate Partner Violence: Not At Risk (5/6/2024)    Humiliation, Afraid, Rape, and Kick questionnaire     Fear of Current or Ex-Partner: No     Emotionally Abused: No     Physically Abused: No     Sexually Abused: No   Housing Stability: Low Risk  (5/29/2024)    Housing Stability Vital Sign     Unable to Pay for Housing in the Last Year: No     Number of Places Lived in the Last Year: 1     Unstable Housing in the Last Year: No      Surgical History[4]    Physical Exam  side: right Knee  There were no vitals filed for this visit.  AxO x 3 in NAD.   Assistive Device: no device. Coordination and balance intact.  Normal bilateral upper and lower extremities.  No erythema, ecchymosis, temperature changes. No popliteal lymphadenopathy,  No overlying lesion  Mood: euthymic  Respirations non-labored  The incision is midline healed with no signs of surrounding infection, dehiscence or drainage.   Neurovascular exam is at baseline.  No instability varus or valgus stressing the knee at 0, 30 or 60 degrees.  No instability in the AP plane at 90 degrees.  Range of motion: 0 degrees extension, 120 degrees flexion  5/5 hip flexion/knee extension/DF/PF/EHL  SILT in meredith/saph/ per/tib distribution   Extremities warm and well perfused.  No lower extremity calf tenderness, warmth or swelling. Lower extremity well perfused  2+ Femoral/DP/PT pulses bilaterally    Imaging:    I personally reviewed multiple views of the knee today in clinic. Status post side: right Total Knee Arthroplasty. The implant is well fixed, well aligned.  No evidence of kirt-implant fracture, lucency or dislocation.    Impression/Plan:  Louisa Rodas is doing well post-operatively and happy with the results of the operation.     S/P side: right Total Knee Arthroplasty  I talked with patient at length about activity precautions and progression of activities. The patient understands their permanent precautions.  I discussed with her that her knee itself looks great and her x-rays look well aligned.  She has some tenderness to the patella itself.  She can continue to ice and use CBD and Salonpas.    3. Discussed the importance of dental prophylactic dental antibiotics lifelong. Patient may request medication refill through MyChart,       Pharmacy or surgeons office.    All questions answered.    Follow-up 5 years with x-rays at next visit.    Yolette Carlos, MPAS, PA-C, ATC  Orthopedic  Physician Assisant  Adult Reconstruction and Total Joint Replacement  General Orthopedics  Department of Orthopaedic Surgery  [place] [address]  [employer] messaging preferred         [1]   Past Medical History:  Diagnosis Date    Aspiration of food 02/16/2023    Asthma     Cervical spondylosis without myelopathy     Chronic kidney disease, stage 3a (Multi)     3.21.24: creat 1.43, GFR 37    DM (diabetes mellitus), type 2 (Multi)     2/20/2024 A1C 7.2%    Ductal carcinoma in situ (DCIS) of left breast     s/p lumpectomy 3/27/24    Essential tremor     Fatigue     Fibromyalgia     H/O echocardiogram 05/25/2023    CONCLUSIONS: 1. Left ventricular systolic function is hyperdynamic with a 70-75% estimated EF. 2. Spectral Doppler shows an impaired relaxation pattern of left ventricular diastolic filling. 3. Mild aortic valve regurgitation. 4. Left ventricular cavity size is decreased. 5. There is a mid cavity left ventricular obstruction noted w/ the Valsalva maneuver. The provoked gradient is 28 mmHg.    Hepatic cirrhosis (Multi) 02/16/2023    Hyperlipidemia     Hypermetropia, bilateral     Hyperopia of both eyes with astigmatism and presbyopia    Hypertension     Hypogammaglobulinemia (Multi)     Hypothyroidism     Ischemic optic neuropathy, bilateral     Ischemic optic neuropathy, bilateral    Meibomian gland dysfunction right eye, upper and lower eyelids     Meibomian gland dysfunction (MGD) of upper and lower lids of both eyes    Near syncope     NPH (normal pressure hydrocephalus) (Multi)     MILD TO MODERATE, brain MRI 5/4/23:mild enlargement of ventricles, mild diffuse cerebral atrophy    Obesity     Obstructive sleep apnea on CPAP     Osteoarthritis     Peripheral motor neuropathy     Personal history of irradiation  2024    Polycystic liver disease     follows with hepatology yearly    PONV (postoperative nausea and vomiting)     Primary  open-angle glaucoma, bilateral, indeterminate stage     Sciatica     Sjogren syndrome, unspecified (Multi)     Vocal cord dysfunction    [2]   Patient Active Problem List  Diagnosis    Abnormal EEG    Abnormal liver ultrasound    Arthritis of hand, right, degenerative    Arthritis of knee    Arthritis, degenerative, localized, primary, hand    Benign neoplasm of soft tissue of upper extremity    Carpal tunnel syndrome    Cervical disc disease    Cervical spondylosis without myelopathy    Chills (without fever)    Chronic pain of both knees    Degenerative arthritis of knee, bilateral    Vertical diplopia    Drusen of left macula    Drusen of right macula    Dysphagia    Dysphonia    Foot pain, bilateral    Glaucoma of both eyes    Hallux rigidus of right foot    Headache, cervicogenic    Abdominal pain    Generalized abdominal pain    Hip pain    Incomplete rotator cuff tear    Left shoulder pain    Liver cyst    Liver lesion    Lower back pain    Lumbar radiculopathy    Myalgia, other site    Neck pain    MENDY on CPAP    Osteoarthritis of right ankle and foot    Localized osteoarthrosis of left ankle and foot    Degenerative arthritis of hip    Pain, wrist joint    Pelvic pain in female    POAG (primary open-angle glaucoma)    Radiculitis, cervical    Scapholunate dissociation    Schwannoma    Segmental and somatic dysfunction of cervical region    Segmental and somatic dysfunction of lumbar region    Segmental and somatic dysfunction of pelvic region    Segmental and somatic dysfunction of thoracic region    Shortness of breath at rest    Shortness of breath on exertion    Skew deviation    Thumb pain, right    Thyroid eye disease    Vaginal atrophy    Vocal cord dysfunction    Weakness of both lower extremities    Polycystic liver disease    Joint pain, knee    Acid reflux    Esophageal dysmotility    Muscle tension dysphonia    Stage 3b chronic kidney disease (Multi)    Hypothyroidism, unspecified    Imbalance     Hyperlipidemia    Near syncope    Urine frequency    Hypertensive heart disease without heart failure    Abnormal brain MRI    Bilateral presbyopia    Type 2 diabetes mellitus with stage 3b chronic kidney disease, without long-term current use of insulin (Multi)    Dry eye syndrome of both lacrimal glands    Glaucoma    Herpes genitalis in women    Hypogammaglobulinemia (Multi)    Insomnia due to medical condition    Keratoconjunctivitis sicca, in Sjogren's syndrome    OAB (overactive bladder)    Other fatigue    Peripheral motor neuropathy    Recurrent UTI    Vitiligo    Localized, secondary osteoarthritis of the hand    Right foot pain    Arthralgia of multiple sites    Hyperopia of both eyes    Regular astigmatism of both eyes    Other low back pain    Fibromyalgia    HTN (hypertension)    Lymphopenia    Enthesopathy    Weakness    Encounter for Medicare annual wellness exam    Osteoarthritis    Menopause    Encounter for screening mammogram for malignant neoplasm of breast    Chronic pain of right knee    Moderate persistent asthma without complication (HHS-HCC)    Sjogren syndrome, unspecified (Multi)    Osteopenia    Obesity, Class I, BMI 30-34.9    Ductal carcinoma in situ (DCIS) of left breast    PONV (postoperative nausea and vomiting)    Unilateral primary osteoarthritis, right knee    Meibomian gland disease    Long-term use of Plaquenil    Encounter for follow-up surveillance of breast cancer    Stage 3a chronic kidney disease (Multi)    Mild intermittent asthma without complication (HHS-HCC)    Skin lesion    Acquired ptosis of eyelid    Dry eyes    Dermatochalasis of both upper eyelids    Floppy eyelid syndrome    History of depression    Ischemic optic neuropathy of both eyes    Low bone density    Macular degeneration    Nonproliferative diabetic retinopathy    Subconjunctival hemorrhage    Tear film insufficiency    Hospital discharge follow-up    Acute pain of right knee    Knee stiffness, right     History of ductal carcinoma in situ (DCIS) of breast    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   [3]   Allergies  Allergen Reactions    Penicillins Hives and Itching    Primidone Other     Stuttering     Tizanidine Other     Stuttering    [4]   Past Surgical History:  Procedure Laterality Date    BLEPHAROPLASTY      BREAST BIOPSY  2024    BREAST LUMPECTOMY Left 03/27/2024    CATARACT EXTRACTION, BILATERAL      COLONOSCOPY  04/05/2018    tubular adenoma    ESOPHAGOGASTRODUODENOSCOPY  06/2019    HAND SURGERY Bilateral     bone removed right wrist, cyst removed left wrist    IR ABLATION NERVE      KNEE ARTHROSCOPY W/ DEBRIDEMENT Right     MR HEAD ANGIO WO IV CONTRAST  06/28/2021    MR HEAD ANGIO WO IV CONTRAST 6/28/2021 [facility]    MR NECK ANGIO WO IV CONTRAST  06/28/2021    MR NECK ANGIO WO IV CONTRAST 6/28/2021 [facility]    ROTATOR CUFF REPAIR      TONSILLECTOMY      TOTAL KNEE ARTHROPLASTY Left

## 2025-05-30 NOTE — TELEPHONE ENCOUNTER
Call placed to patient per request and this nurse discussed results of recently resulted labs after discussing with provider, Dr. Allen. All questions answered and patient was thankful for return call placed. Nothing additional to address at this time.

## 2025-06-02 ENCOUNTER — PATIENT OUTREACH (OUTPATIENT)
Dept: PRIMARY CARE | Facility: CLINIC | Age: 85
End: 2025-06-02
Payer: MEDICARE

## 2025-06-02 DIAGNOSIS — E11.22 TYPE 2 DIABETES MELLITUS WITH STAGE 3B CHRONIC KIDNEY DISEASE, WITHOUT LONG-TERM CURRENT USE OF INSULIN (MULTI): ICD-10-CM

## 2025-06-02 DIAGNOSIS — I10 PRIMARY HYPERTENSION: ICD-10-CM

## 2025-06-02 DIAGNOSIS — N18.32 TYPE 2 DIABETES MELLITUS WITH STAGE 3B CHRONIC KIDNEY DISEASE, WITHOUT LONG-TERM CURRENT USE OF INSULIN (MULTI): ICD-10-CM

## 2025-06-02 NOTE — PROGRESS NOTES
Care Management Monthly Outreach  Chart review completed  Confirmation of at least 2 patient identifiers:Name and   Change in insurance? No    Has patient been to ER/Urgent Care since last outreach? No    Last Office Visit with PCP: 2025   Next Office Visit with PCP: 2026  APC Collaboration: n/a    Chronic Conditions and Outreach Summary:   Primary hypertension    Type 2 diabetes mellitus with stage 3b chronic kidney disease, without long-term current use of insulin (Multi)    Spoke with Patient and monthly outreach completed. Patient states she saw ortho last week regarding her knee. Patient has not had any falls and is using a cane to ambulate. Patient is using Bengay or CBD oil for the knee pain. Patient states she is breathing well and is scheduled to see the new pulmonologist on 25. She is sleeping okay and has occasional episodes when she has trouble sleeping. Patient has not been checking her blood sugar or blood pressure on a regular basis. She states her appetite is good and she is drinking more water. Encouraged to check blood sugar and blood pressure and keep a log. Appointments reviewed and she is aware of upcoming appts.     Medications:   Are there medication changes since last visit? No  Refills needed? No    Social Drivers of Health: Deferred  Care Gaps Addressed? Deferred  Care Plan addressed: Yes    Upcoming Appointments:   Future Appointments       Date / Time Provider Department Dept Phone    2025 1:00 PM Christiane Cooper MD [facility] [phone]    2025 11:15 AM (Arrive by 11:00 AM) Alan Abdi, PT [facility] [phone]    2025 1:40 PM Alton Salamanca, APRN-CNP [facility] [phone]    2025 1:15 PM Hu Bernabe OD [facility] [phone]    2025 1:40 PM Azael Dexter MD [facility] [phone]    8/15/2025 2:00 PM (Arrive  by 1:45 PM) [facility] [phone]    8/15/2025 3:00 PM Rhina Fields MD  [facility] [phone]    8/19/2025 11:30 AM (Arrive by 11:15 AM) Donta Higginbotham MD [facility] [phone]    11/6/2025 10:30 AM Aureliano Pabon MD [facility] [phone]    11/25/2025 1:20 PM Brandon Allen MD [facility] [phone]    4/15/2026 1:00 PM (Arrive by 12:45 PM) [facility] [address] [facility] [phone]    5/5/2026 1:30 PM Margarita Man, APRN-CNP [facility] [phone]    5/12/2026 1:00 PM Brandon Allen MD [facility] [phone]    5/20/2026 2:30 PM (Arrive by 2:15 PM) Nadira Moctezuma DO [facility] [phone]          Blood Pressures Reviewed  BP Readings from Last 3 Encounters:   05/14/25 122/64   05/06/25 133/77   04/30/25 110/66     No other concerns at this time.  Agreeable to continue monthly outreaches.  Encouraged to call if questions or concerns arise.    Elsy Irving RN

## 2025-06-09 ENCOUNTER — APPOINTMENT (OUTPATIENT)
Dept: NEPHROLOGY | Facility: CLINIC | Age: 85
End: 2025-06-09
Payer: MEDICARE

## 2025-06-09 VITALS
WEIGHT: 165 LBS | HEART RATE: 69 BPM | DIASTOLIC BLOOD PRESSURE: 60 MMHG | BODY MASS INDEX: 29.23 KG/M2 | HEIGHT: 63 IN | SYSTOLIC BLOOD PRESSURE: 114 MMHG

## 2025-06-09 DIAGNOSIS — N18.32 STAGE 3B CHRONIC KIDNEY DISEASE (MULTI): Primary | ICD-10-CM

## 2025-06-09 PROCEDURE — 3074F SYST BP LT 130 MM HG: CPT | Performed by: INTERNAL MEDICINE

## 2025-06-09 PROCEDURE — 99213 OFFICE O/P EST LOW 20 MIN: CPT | Performed by: INTERNAL MEDICINE

## 2025-06-09 PROCEDURE — 1159F MED LIST DOCD IN RCRD: CPT | Performed by: INTERNAL MEDICINE

## 2025-06-09 PROCEDURE — 3078F DIAST BP <80 MM HG: CPT | Performed by: INTERNAL MEDICINE

## 2025-06-09 RX ORDER — LIFITEGRAST 50 MG/ML
1 SOLUTION/ DROPS OPHTHALMIC
COMMUNITY

## 2025-06-09 NOTE — PROGRESS NOTES
"Chief Complaint: Follow up CKD    No specific complaints  Healing from joint repair planning more rehap  Denies nausea, vomiting, chest pain, dyspnea  No urinary symptoms  Joint aches, following with miranda KRISHNA  Sclera AI s inj  MMM, no sores  Deferred secondary to COVID  No edema  No tremor  No rash    CKD stage 3b   HTN wel lcontrolled  Proteinuria not recently checked  \"Too many side effects\" with SGLT2; costly    Labs today  No medication changes  Follow up in 6 months      "

## 2025-06-10 LAB
25(OH)D3+25(OH)D2 SERPL-MCNC: 55 NG/ML (ref 30–100)
ALBUMIN SERPL-MCNC: 4.1 G/DL (ref 3.6–5.1)
ALBUMIN/CREAT UR: 4 MG/G CREAT
APPEARANCE UR: ABNORMAL
BACTERIA #/AREA URNS HPF: ABNORMAL /HPF
BILIRUB UR QL STRIP: NEGATIVE
BUN SERPL-MCNC: 27 MG/DL (ref 7–25)
BUN/CREAT SERPL: 18 (CALC) (ref 6–22)
CALCIUM SERPL-MCNC: 9.2 MG/DL (ref 8.6–10.4)
CHLORIDE SERPL-SCNC: 107 MMOL/L (ref 98–110)
CO2 SERPL-SCNC: 26 MMOL/L (ref 20–32)
COLOR UR: ABNORMAL
CREAT SERPL-MCNC: 1.5 MG/DL (ref 0.6–0.95)
CREAT UR-MCNC: 273 MG/DL (ref 20–275)
CREAT UR-MCNC: 273 MG/DL (ref 20–275)
EGFRCR SERPLBLD CKD-EPI 2021: 35 ML/MIN/1.73M2
ERYTHROCYTE [DISTWIDTH] IN BLOOD BY AUTOMATED COUNT: 12.7 % (ref 11–15)
GLUCOSE SERPL-MCNC: 121 MG/DL (ref 65–99)
GLUCOSE UR QL STRIP: NEGATIVE
HCT VFR BLD AUTO: 36.9 % (ref 35–45)
HGB BLD-MCNC: 12.1 G/DL (ref 11.7–15.5)
HGB UR QL STRIP: NEGATIVE
HYALINE CASTS #/AREA URNS LPF: ABNORMAL /LPF
KETONES UR QL STRIP: ABNORMAL
LEUKOCYTE ESTERASE UR QL STRIP: ABNORMAL
MCH RBC QN AUTO: 30.9 PG (ref 27–33)
MCHC RBC AUTO-ENTMCNC: 32.8 G/DL (ref 32–36)
MCV RBC AUTO: 94.4 FL (ref 80–100)
MICROALBUMIN UR-MCNC: 1.1 MG/DL
NITRITE UR QL STRIP: NEGATIVE
PH UR STRIP: ABNORMAL [PH] (ref 5–8)
PHOSPHATE SERPL-MCNC: 3.5 MG/DL (ref 2.1–4.3)
PLATELET # BLD AUTO: 152 THOUSAND/UL (ref 140–400)
PMV BLD REES-ECKER: 10.1 FL (ref 7.5–12.5)
POTASSIUM SERPL-SCNC: 4.2 MMOL/L (ref 3.5–5.3)
PROT UR QL STRIP: NEGATIVE
PROT UR-MCNC: 15 MG/DL (ref 5–24)
PROT/CREAT UR: 0.06 MG/MG CREAT (ref 0.02–0.18)
PROT/CREAT UR: 55 MG/G CREAT (ref 24–184)
PTH-INTACT SERPL-MCNC: 118 PG/ML (ref 16–77)
RBC # BLD AUTO: 3.91 MILLION/UL (ref 3.8–5.1)
RBC #/AREA URNS HPF: ABNORMAL /HPF
SERVICE CMNT-IMP: ABNORMAL
SODIUM SERPL-SCNC: 141 MMOL/L (ref 135–146)
SP GR UR STRIP: 1.03 (ref 1–1.03)
SQUAMOUS #/AREA URNS HPF: ABNORMAL /HPF
WBC # BLD AUTO: 3.7 THOUSAND/UL (ref 3.8–10.8)
WBC #/AREA URNS HPF: ABNORMAL /HPF

## 2025-06-10 NOTE — PROGRESS NOTES
"Physical Therapy    Physical Therapy Evaluation and Treatment      Patient Name: Louisa Rodas  MRN: [id number]  Today's Date: 6/11/25  Visit 1  Time Entry:                             Assessment:        Plan:       Current Problem:   No diagnosis found.    Subjective      Precautions:          Pain:     Home Living:     Prior Level of Function:       Objective   Cognition:               Treatments:      EDUCATION:     extensive education regarding mechanism of injury, relevant functional anatomy, treatment program rational, self management, HEP, and POC     Goals:    Insurance Authorization Information  Date of Evaluation: ***    Onset Date: No onset impairment date on file.    Referring Physician: Nadira Moctezuma     Surgery in the Last 3 months:  {yes no:959162}    CPT Codes: {CPTCODES:51573}    Diagnosis:   Problem List Items Addressed This Visit    None         Functional Outcome:  {PT Outcome Measures:96071}    OT / PT Evaluation complexity:  {low/mod/high:20433::\"high\"}    Which of the following best describes the primary reason of therapy: {Primary purpose of therapy:87834}    Visits Requested: {NUMBERS; 1-20:76439}    Date Range: 90 days    Select all conditions that apply: {Mitigating Factors:96706}         Alan Abdi, PT             "

## 2025-06-11 ENCOUNTER — APPOINTMENT (OUTPATIENT)
Dept: PHYSICAL THERAPY | Facility: CLINIC | Age: 85
End: 2025-06-11
Payer: MEDICARE

## 2025-06-12 ENCOUNTER — OFFICE VISIT (OUTPATIENT)
Dept: PULMONOLOGY | Facility: HOSPITAL | Age: 85
End: 2025-06-12
Payer: MEDICARE

## 2025-06-12 VITALS
OXYGEN SATURATION: 93 % | SYSTOLIC BLOOD PRESSURE: 127 MMHG | HEART RATE: 68 BPM | WEIGHT: 167 LBS | DIASTOLIC BLOOD PRESSURE: 80 MMHG | TEMPERATURE: 97.6 F | BODY MASS INDEX: 29.58 KG/M2

## 2025-06-12 DIAGNOSIS — G47.33 OSA ON CPAP: ICD-10-CM

## 2025-06-12 DIAGNOSIS — J45.40 MODERATE PERSISTENT ASTHMA WITHOUT COMPLICATION (HHS-HCC): Primary | ICD-10-CM

## 2025-06-12 PROCEDURE — 1036F TOBACCO NON-USER: CPT | Performed by: NURSE PRACTITIONER

## 2025-06-12 PROCEDURE — 1126F AMNT PAIN NOTED NONE PRSNT: CPT | Performed by: NURSE PRACTITIONER

## 2025-06-12 PROCEDURE — 99213 OFFICE O/P EST LOW 20 MIN: CPT | Performed by: NURSE PRACTITIONER

## 2025-06-12 PROCEDURE — 99204 OFFICE O/P NEW MOD 45 MIN: CPT | Performed by: NURSE PRACTITIONER

## 2025-06-12 PROCEDURE — 1159F MED LIST DOCD IN RCRD: CPT | Performed by: NURSE PRACTITIONER

## 2025-06-12 PROCEDURE — 1160F RVW MEDS BY RX/DR IN RCRD: CPT | Performed by: NURSE PRACTITIONER

## 2025-06-12 PROCEDURE — 3079F DIAST BP 80-89 MM HG: CPT | Performed by: NURSE PRACTITIONER

## 2025-06-12 PROCEDURE — 3074F SYST BP LT 130 MM HG: CPT | Performed by: NURSE PRACTITIONER

## 2025-06-12 RX ORDER — ALBUTEROL SULFATE 90 UG/1
2 INHALANT RESPIRATORY (INHALATION) EVERY 4 HOURS PRN
COMMUNITY

## 2025-06-12 ASSESSMENT — LIFESTYLE VARIABLES
HOW OFTEN DO YOU HAVE SIX OR MORE DRINKS ON ONE OCCASION: NEVER
HOW MANY STANDARD DRINKS CONTAINING ALCOHOL DO YOU HAVE ON A TYPICAL DAY: PATIENT DOES NOT DRINK
AUDIT-C TOTAL SCORE: 0
HOW OFTEN DO YOU HAVE A DRINK CONTAINING ALCOHOL: NEVER
SKIP TO QUESTIONS 9-10: 1

## 2025-06-12 ASSESSMENT — PAIN SCALES - GENERAL: PAINLEVEL_OUTOF10: 0-NO PAIN

## 2025-06-12 ASSESSMENT — ENCOUNTER SYMPTOMS
ABDOMINAL PAIN: 0
NUMBNESS: 1
BACK PAIN: 1
CHEST TIGHTNESS: 0
MYALGIAS: 0
JOINT SWELLING: 0
ARTHRALGIAS: 1
VOICE CHANGE: 0
SLEEP DISTURBANCE: 0
STRIDOR: 0
BRUISES/BLEEDS EASILY: 0
TROUBLE SWALLOWING: 0
WHEEZING: 0
FATIGUE: 0
ROS GI COMMENTS: DENIES ACID REFLUX
VOMITING: 0
HEADACHES: 0
NERVOUS/ANXIOUS: 0
DIZZINESS: 0
AGITATION: 0
SHORTNESS OF BREATH: 0
COUGH: 0
TREMORS: 0
UNEXPECTED WEIGHT CHANGE: 0
SINUS PRESSURE: 0
CHILLS: 0
APPETITE CHANGE: 0
SINUS PAIN: 0
DIARRHEA: 0
EYE REDNESS: 0
EYE ITCHING: 0
SORE THROAT: 0
RHINORRHEA: 0
ACTIVITY CHANGE: 0
WEAKNESS: 0
FEVER: 0
PALPITATIONS: 0
NAUSEA: 0

## 2025-06-12 NOTE — PATIENT INSTRUCTIONS
"Today we discussed your pulmonary history, symptoms and plan of care moving forward.    -Continue Symbicort 160 (budesonide + formoterol); 2 inhalations twice a day. Rinse mouth after use to avoid thrush. This is your long acting daily maintenance inhaler. Please contact my nurse if you have any difficulty getting this prescription.  -Continue Albuterol Inhaler; 2 puffs every 4-6 hours as needed for shortness of breath. You can also take this 10-15 minutes prior to exertional activity to help \"prime\" your lungs.  -Continue montelukast daily  -Continue following with all of your other specialists  -It looks like you will be establishing with sleep medicine specialist in August; they will be able to take care of all of your CPAP/sleep apnea needs.    Thank you for visiting the pulmonary clinic today! It was a pleasure to participate in your care.  Please return to clinic 1 year or sooner if needed.    Alton Salamanca, CNP  My Office Number: [phone]   CT Scheduling: [phone]  PFT (Pulmonary Function Test) Scheduling: [phone]  Follow Up Visit Scheduling: [phone]  My Nurse: Lucia Elizalde RN & Yolette Toribio, RN    To reach the nurse, Lucia Elizalde RN, please call ([phone]). If unable to reach Lucia, please contact Yolette Toribio RN at ([phone]). Both nurses have secure voicemail's if needing to leave a message.    **For urgent needs such as medication issues/refills, active sick symptoms or medical concerns please call the office directly and speak to the pulmonary nurse. For nonurgent messages please use [employer]. Thank you.**      "

## 2025-06-12 NOTE — PROGRESS NOTES
Patient: Louisa Rodas    MRN: [id number]  : 1940 -- AGE: 84 y.o.    Provider: MATT Acuna     Location [facility]   Service Date: 2025       Department of Medicine  Division of Pulmonary, Critical Care, and Sleep Medicine       [facility]  New Visit Note    HISTORY OF PRESENT ILLNESS     The patient's referring provider is: Nadira Moctezuma DO    PCP: Dr. Nadira Moctezuma   Neph: Dr. Christiane Cooper   Rheum: Dr. Verduzco   GI: Dr. Mary Kate Pastor   Breast Surg: Dr. Rhina Fields   Opthalmology: Dr. Aureliano Pabon   Allergy/Immunology: Dr. Adkins  Pulm: Dr. Lambert (prior pulm/sleep)      HISTORY OF PRESENT ILLNESS   Louisa Rodas is a 84 y.o. female who presents to [facility] for an evaluation with concerns of No chief complaint on file.. I have independently interviewed and examined the patient in the office and reviewed available records.    Current History  Patient presents to pulmonary clinic today for new patient establishment; concern of abnormal PFT; referred by the PCP.  Patient completed a PFT on 5/15/2023 in which the report reads baseline spirometry was normal with no bronchodilator response, normal lung volumes and normal diffusion capacity.  Additionally, there was a 6-minute walk test performed which was also normal.  No other PFT on record that I can find.    On today's visit, the patient states she has a history of asthma. Was diagnosed with asthma in adulthood. States she has issues with her immune system; follows with immunology and takes daily prophylactic Bactrim due to this. Has Symbicort 160; takes 2 puffs once a day (states this seems to help her; has not taken it BID as advised by prior pulm). Has albuterol HFA as well; seldom ever needs it. States she also has a hx of vocal cord spasms as well; not currently following with ENT. States her allergist gave her some tricks to use  when she thinks her vocal cord spasms are occurring.    Currently, patient reports symptoms of:   []None  []SOB at Rest               [x]COTTO; if moving at a hurried pace              [x]Cough: Dry; worse when laying down from PND          []Wheezing               []Chest Tightness  []Orthopnea   []Lower Leg Edema   []Chest Pain  []Palpitations   [x]GERD; states she takes a CBD supplement PRN to treat this   [x]Rhinitis; intermittent  [x]Throat Clearing  []Voice Hoarseness    Prior Pulmonary History:   [x]None  []Born Premature  []Pulmonary Issues in Childhood  []Pulmonary Focused Hospitalizations  []Hx of Home Oxygen Use    Prior/Current Inhaler History:   []None                         []ICS:        [x]SETH: Albuterol HFA   [x]ICS/LABA: Symbicort      []SETH/EMILIE:   []ICS/LABA/LAMA:   []LABA:    []Other:   []LAMA:   []LABA/LAMA:     Sleep History:  []None     []Witnessed Apneic Events/Abnormal Breathing Patterns []Dozing Off Easily  [x]Completed a Sleep Study in the Past []Waking Up Choking/Gasping    []Daytime Napping  [x]Has Been Diagnosed with MENDY  []Morning Headaches     [x]Wears CPAP/BiPAP  []Snoring     []Excessive Daytime Fatigue     []Wears Nocturnal Oxygen  Will be seeing sleep medicine for establishment on 8/19/25. Formerly followed by Dr. Lambert for sleep/pulm needs.    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   HENT:  Positive for postnasal drip. Negative for congestion, rhinorrhea, sinus pressure, sinus pain, sneezing, sore throat, trouble swallowing and voice change.         Denies throat  clearing   Eyes:  Positive for visual disturbance. Negative for redness and itching.   Respiratory:  Negative for cough, chest tightness, shortness of breath, wheezing and stridor.    Cardiovascular:  Negative for chest pain, palpitations and leg swelling.        Denies orthopnea   Gastrointestinal:  Negative for abdominal pain, diarrhea, nausea and vomiting.        Denies acid reflux   Musculoskeletal:  Positive for arthralgias and back pain. Negative for joint swelling and myalgias.   Skin:  Negative for rash.   Allergic/Immunologic: Negative for immunocompromised state.   Neurological:  Positive for numbness. Negative for dizziness, tremors, weakness and headaches.   Hematological:  Does not bruise/bleed easily.   Psychiatric/Behavioral:  Negative for agitation and sleep disturbance. The patient is not nervous/anxious.         Denies depression   All other systems reviewed and are negative.      ALLERGIES & MEDICATIONS     ALLERGIES  Allergies[1]    MEDICATIONS  Current Medications[2]    PAST HISTORY     PAST MEDICAL HISTORY  She  has a past medical history of Aspiration of food (02/16/2023), Asthma, Cervical spondylosis without myelopathy, Chronic kidney disease, stage 3a (Multi), DM (diabetes mellitus), type 2 (Multi), Ductal carcinoma in situ (DCIS) of left breast, Essential tremor, Fatigue, Fibromyalgia, H/O echocardiogram (05/25/2023), Hepatic cirrhosis (Multi) (02/16/2023), Hyperlipidemia, Hypermetropia, bilateral, Hypertension, Hypogammaglobulinemia (Multi), Hypothyroidism, Ischemic optic neuropathy, bilateral, Meibomian gland dysfunction right eye, upper and lower eyelids, Near syncope, NPH (normal pressure hydrocephalus) (Multi), Obesity, Obstructive sleep apnea on CPAP, Osteoarthritis, Peripheral motor neuropathy, Personal history of irradiation ( 2024), Polycystic liver disease, PONV (postoperative nausea and vomiting), Primary open-angle glaucoma, bilateral, indeterminate stage, Sciatica, Sjogren  syndrome, unspecified (Multi), and Vocal cord dysfunction.    PAST SURGICAL HISTORY  Surgical History[3]    IMMUNIZATION HISTORY  Immunization History   Administered Date(s) Administered    Flu vaccine, quadrivalent, high-dose, preservative free, age 65y+ (FLUZONE) 10/15/2020, 09/08/2021    Flu vaccine, trivalent, preservative free, HIGH-DOSE, age 65y+ (Fluzone) 10/22/2017, 11/05/2019    Influenza, Seasonal, Quadrivalent, Adjuvanted 10/12/2021, 10/09/2023    Influenza, seasonal, injectable 09/29/2015, 10/20/2016, 10/15/2018, 10/09/2020, 10/04/2022    Influenza, trivalent, adjuvanted 10/11/2018, 10/15/2019    Pfizer COVID-19 vaccine, 12 years and older, (30mcg/0.3mL) (Comirnaty) 10/09/2023, 10/17/2024    Pfizer Purple Cap SARS-CoV-2 03/04/2021, 04/01/2021, 06/23/2021, 10/20/2021, 07/11/2022, 12/06/2022    Pneumococcal conjugate vaccine, 13-valent (PREVNAR 13) 04/10/2020, 05/24/2021    Pneumococcal conjugate vaccine, 20-valent (PREVNAR 20) 11/01/2023    Pneumococcal polysaccharide vaccine, 23-valent, age 2 years and older (PNEUMOVAX 23) 10/15/2019    RESPIRATORY SYNCYTIAL VIRUS (RSV), ELIGIBLE PREGNANT PTS, 0.5 ML (ABRYSVO) 12/23/2023    Zoster vaccine, recombinant, adult (SHINGRIX) 03/09/2019, 05/01/2019, 05/21/2019    Zoster, Unspecified 03/09/2019, 05/01/2019, 05/21/2019       SOCIAL HISTORY  She  reports that she quit smoking about 58 years ago. Her smoking use included cigarettes. She started smoking about 59 years ago. She has a 0.3 pack-year smoking history. She has been exposed to tobacco smoke. She has never used smokeless tobacco. She reports that she does not currently use alcohol. She reports that she does not use drugs.     OCCUPATIONAL/ENVIRONMENTAL HISTORY  Previously worked as: radiology  Currently works as: retired  Exposure Hx:  [x]None  []Asbestos  []Silica  []Beryllium/Inhaled Metals  []Birds  []Exotic Animals  []Other      FAMILY HISTORY  Family History[4]    PHYSICAL EXAM     VITAL SIGNS: There  were no vitals taken for this visit.     PREVIOUS WEIGHTS:  Wt Readings from Last 3 Encounters:   06/09/25 74.8 kg (165 lb)   05/14/25 76.2 kg (168 lb)   05/06/25 76.7 kg (169 lb 3.2 oz)       Physical Exam  Vitals reviewed.   Constitutional:       General: She is not in acute distress.     Appearance: Normal appearance. She is not ill-appearing or toxic-appearing.   HENT:      Head: Normocephalic.      Nose: No rhinorrhea.   Cardiovascular:      Rate and Rhythm: Normal rate and regular rhythm.      Heart sounds: Normal heart sounds.   Pulmonary:      Effort: Pulmonary effort is normal. No respiratory distress.      Breath sounds: Normal breath sounds. No stridor. No wheezing, rhonchi or rales.   Abdominal:      General: Abdomen is flat.   Musculoskeletal:         General: Normal range of motion.      Right lower leg: No edema.      Left lower leg: No edema.   Skin:     General: Skin is warm and dry.      Nails: There is no clubbing.   Neurological:      General: No focal deficit present.      Mental Status: She is alert and oriented to person, place, and time.   Psychiatric:         Mood and Affect: Mood normal.         Behavior: Behavior normal.         Judgment: Judgment normal.         RESULTS/DATA     Pulmonary Function Test Results   PFT   5/15/23:          Chest Radiograph   CXR   5/24/23: No evidence of acute cardiopulmonary process.   1/8/21: IMPRESSION: 1.  Left basilar airspace disease may represent atelectasis or pneumonia in the proper clinical setting. Radiographic follow-up to resolution is advised     Chest CT Scan     No results found for this or any previous visit from the past 365 days.      Echocardiogram & Cardiac Studies   Echo   5/25/23: CONCLUSIONS:   1. Left ventricular systolic function is hyperdynamic with a 70-75% estimated ejection fraction.   2. Spectral Doppler shows an impaired relaxation pattern of left ventricular diastolic filling.   3. Mild aortic valve regurgitation.   4. Left  "ventricular cavity size is decreased.   5. There is a mid cavity left ventricular obstruction noted with the Valsalva maneuver. The provoked gradient is 28 mmHg.      Labwork & Pathology   Complete Blood Count  Lab Results   Component Value Date    WBC 3.7 (L) 06/09/2025    HGB 12.1 06/09/2025    HCT 36.9 06/09/2025    MCV 94.4 06/09/2025     06/09/2025     Peripheral Eosinophil Count:   Eosinophils Absolute   Date Value   07/22/2024 0.20 x10*3/uL   05/09/2024 0.31 x10*3/uL   03/21/2024 0.29 x10*3/uL   12/28/2023 0.30 x10*3/uL   08/17/2023 0.21 x10E9/L   06/12/2023 0.23 x10E9/L   05/25/2023 0.09 x10E9/L   05/24/2023 0.00 x10E9/L   05/24/2023 CANCELED   06/24/2022 0.17 x10E9/L   04/11/2022 0.18 x10E9/L     Immunoglobulin IgE  No results found for: \"IGE\"    Bronchoscopy & Pathology/Cultures       ASSESSMENT/PLAN     Ms. Rodas is a 84 y.o. female; was referred to the [facility] for evaluation of No chief complaint on file.    Problem List and Orders  Diagnoses and all orders for this visit:  Moderate persistent asthma without complication ([facility])  -     Referral to Pulmonology  MENDY on CPAP      Assessment and Plan / Recommendations:  Asthma: Diagnosed in adulthood by her current allergist/immunologist.  Was started on Symbicort 160.  She currently takes it as 2 puffs once a day and feels stable even with this decreased dosing.  Seldom requires her albuterol inhaler.  Has not required any recent courses of prednisone for her asthma.  - Continue Symbicort 160; 2 puffs twice daily (currently only takes it 2 puffs once a day but remains well-controlled)  - Continue albuterol HFA as needed  - Continue montelukast daily  - No need for updated PFT testing at this time  - Currently well-controlled and stable    2. MENDY:  - Will be establishing with sleep medicine specialist on 8/19/2025  - Formerly followed by Dr. Ramos for sleep/pulm needs  - Currently has a CPAP    RTC 12 " joanna Salamanca, CNP  Associate Pulmonary Nurse Practitioner    *This note was dictated using DRAGON speech recognition software and was corrected for spelling or grammatical errors, but despite proofreading several typographical errors might be present that might affect the meaning of the content.*          [1]   Allergies  Allergen Reactions    Penicillins Hives and Itching    Primidone Other     Stuttering     Tizanidine Other     Stuttering    [2]   Current Outpatient Medications   Medication Sig Dispense Refill    acetaminophen (Tylenol Extra Strength) 500 mg tablet Take 2 tablets (1,000 mg) by mouth every 6 hours if needed for mild pain (1 - 3).      acyclovir (Zovirax) 400 mg tablet Take 1 tablet (400 mg) by mouth once daily. 30 tablet 3    allantoin gel Apply topically if needed for wound care. Contains cannibus      amLODIPine (Norvasc) 2.5 mg tablet Take 1 tablet (2.5 mg) by mouth once daily. 90 tablet 2    atorvastatin (Lipitor) 20 mg tablet Take 1 tablet (20 mg) by mouth 3 (three) times a week. 45 tablet 1    azelastine (Astelin) 137 mcg (0.1 %) nasal spray Administer 2 sprays into each nostril 2 times a day as needed for allergies. Use in each nostril as directed      budesonide-formoteroL (Symbicort) 160-4.5 mcg/actuation inhaler Inhale 2 puffs once daily. RINSE MOUTH AFTER USE. 10.2 g 1    butenafine HCl (MENTAX TOP) Take 1 tablet by mouth once daily.      calcium carbonate/vitamin D3 (CALTRATE WITH VITAMIN D3 ORAL) Take by mouth. Takes dose unknown      cholecalciferol (Vitamin D-3) 50 MCG (2000 UT) tablet Take 1 tablet (50 mcg) by mouth once daily. (Patient not taking: Reported on 6/9/2025)      cyanocobalamin, vitamin B-12, (Vitamin B-12) 2,500 mcg tablet, sublingual SL tablet Place 1 tablet (2,500 mcg) under the tongue once daily as needed.      fluocinolone and shower cap 0.01 % oil Apply to scalp  leave on overnight and wash off in the morning. Use daily      hydroxychloroquine  (Plaquenil) 200 mg tablet Take 1 tablet (200 mg) by mouth once daily. (Patient taking differently: Take 1 tablet (200 mg) by mouth if needed.) 90 tablet 1    ketoconazole (NIZOral) 2 % shampoo       lancets misc Uses one touch verio test strips 100 each 0    latanoprost (Xalatan) 0.005 % ophthalmic solution Administer 1 drop into both eyes once daily. (Patient taking differently: Administer 1 drop into both eyes once daily. 3 time a week) 7.5 mL 3    levothyroxine (Synthroid, Levoxyl) 100 mcg tablet TAKE 1 TABLET BY MOUTH 5 TIMES A WEEK. 66 tablet 2    lifitegrast (Xiidra) 5 % dropperette Administer 1 drop into affected eye(s).      lisinopril 20 mg tablet Take 1 tablet (20 mg) by mouth once daily. 90 tablet 2    medical cannabis Take 1 each by mouth if needed.      montelukast (Singulair) 10 mg tablet Take 1 tablet (10 mg) by mouth once daily. (Patient taking differently: Take 1 tablet (10 mg) by mouth once daily. Taking as needed)      OneTouch Verio test strips strip Apply 1 each topically once daily as needed (low blood sugar symptoms). 30 each 3    peg 400-propylene glycol, PF, (Systane, PF,) 0.4-0.3 % dropperette Administer into affected eye(s).      povidone, PF, (iVizia, PF,) 0.5 % drops Administer 1 drop into affected eye(s) 3 times a day as needed. (Patient not taking: Reported on 6/9/2025)      propranolol (Inderal) 10 mg tablet Take 1 tablet (10 mg) by mouth once daily. 90 tablet 2    sulfamethoxazole-trimethoprim (Bactrim DS) 800-160 mg tablet Take 1 tablet by mouth once daily. Takes daily not bid      travoprost (Travatan Z) 0.004 % drops ophthalmic solution Administer 1 drop into both eyes once daily at bedtime.      VITAMIN B COMPLEX ORAL Take by mouth every 24 (twenty four) hours if needed.      zinc gluconate 50 mg tablet Take by mouth every 24 (twenty four) hours if needed.       No current facility-administered medications for this visit.   [3]   Past Surgical History:  Procedure Laterality Date     BLEPHAROPLASTY      BREAST BIOPSY  2024    BREAST LUMPECTOMY Left 03/27/2024    CATARACT EXTRACTION, BILATERAL      COLONOSCOPY  04/05/2018    tubular adenoma    ESOPHAGOGASTRODUODENOSCOPY  06/2019    HAND SURGERY Bilateral     bone removed right wrist, cyst removed left wrist    IR ABLATION NERVE      KNEE ARTHROSCOPY W/ DEBRIDEMENT Right     MR HEAD ANGIO WO IV CONTRAST  06/28/2021    MR HEAD ANGIO WO IV CONTRAST 6/28/2021 [facility]    MR NECK ANGIO WO IV CONTRAST  06/28/2021    MR NECK ANGIO WO IV CONTRAST 6/28/2021 [facility]    ROTATOR CUFF REPAIR      TONSILLECTOMY      TOTAL KNEE ARTHROPLASTY Left    [4]   Family History  Problem Relation Name Age of Onset    Alzheimer's disease Mother      Arthritis Mother      Cataracts Mother      Glaucoma Mother      Hypertension Mother      Pancreatic cancer Father          pancreatic    Other (diabetes mellitus) Father      Pancreatic cancer Brother          pancreatic    Other (diabetes mellitus) Brother      Other (diabetes mellitus) Sibling      Other (diabetes mellitus) Son

## 2025-06-17 DIAGNOSIS — N18.32 STAGE 3B CHRONIC KIDNEY DISEASE (MULTI): Primary | ICD-10-CM

## 2025-06-23 ENCOUNTER — DOCUMENTATION (OUTPATIENT)
Dept: PRIMARY CARE | Facility: CLINIC | Age: 85
End: 2025-06-23
Payer: MEDICARE

## 2025-06-23 NOTE — PROGRESS NOTES
Patient called and states she received a letter that her PCP Dr. Moctezuma is leaving and she has to pick another PCP. Patient states she was given a list of some other Providers and will make a decision. Informed Patient I also cover some Providers in [id number] as well as [address]. Patient is aware if she chooses a Provider outside those practices she can check to see if they have a Chronic Care Manager and she verbalizes understanding.

## 2025-06-23 NOTE — PROGRESS NOTES
[facility]  Louisa Rodas female   1940 84 y.o.  [id number]      Chief Complaint  Left breast cancer surveillance     History Of Present Illness  Louisa Rodas is a pleasant 84 y.o. 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 On 3/27/2024 Dr. Rhina Fields performed a left magseed localized partial mastectomy, final pathology 1.7 cm intermediate grade DCIS with negative margins. Completed radiation therapy. Declined tamoxifen. She states she is experiencing pain along her incision site- describes it as a pain that comes and goes quickly but has not experienced the pain for 1 week now. States this has been going on a few times a week for the past few months. She has been wearing a supportive bra more frequently recently. Denies any skin changes.   pTis      Breast Imagin2025 bilateral diagnostic mammogram and left breast ultrasound BI-RADS Category 3.  Probably benign focal asymmetry in the central outer left breast  without sonographic correlate, possibly representing postsurgical changes. Recommend follow-up in 6 months with diagnostic mammography.          REPRODUCTIVE HISTORY:  menarche age 15, , first birth age 23, did not breastfeed, OCP's > 20 years, natural menopause age 56, HRT vaginally for 6 months, entirely fatty tissue    FAMILY CANCER HISTORY:   Father: Prostate Cancer  Brother x 3: Prostate Cancer    Review of Systems  Constitutional:  Negative for appetite change, fatigue, fever and unexpected weight change.   HENT:  Negative for ear pain, hearing loss, nosebleeds, sore throat and trouble swallowing.    Eyes:  Negative for discharge, itching and visual disturbance.   Breast: As stated in HPI.  Respiratory:  Negative for cough, chest tightness and shortness of breath.    Cardiovascular:  Negative for chest pain, palpitations and leg swelling.   Gastrointestinal:  Negative for abdominal pain, constipation, diarrhea and nausea.   Endocrine:  Negative for cold intolerance and heat intolerance.   Genitourinary:  Negative for dysuria, frequency, hematuria, pelvic pain and vaginal bleeding.   Musculoskeletal:  Negative for arthralgias, back pain, gait problem, joint swelling and myalgias.   Skin:  Negative for color change and rash.   Allergic/Immunologic: Negative for environmental allergies and food allergies.   Neurological:  Negative for dizziness, tremors, speech difficulty, weakness, numbness and headaches.   Hematological:  Does not bruise/bleed easily.   Psychiatric/Behavioral:  Negative for agitation, dysphoric mood and sleep disturbance. The patient is not nervous/anxious.       Past Medical History  She has a past medical history of Aspiration of food (02/16/2023), Asthma, Cervical spondylosis without myelopathy, Chronic kidney disease, stage 3a (Multi), DM (diabetes mellitus), type 2 (Multi), Ductal carcinoma in situ (DCIS) of left breast, Essential tremor, Fatigue, Fibromyalgia, H/O echocardiogram (05/25/2023), Hepatic cirrhosis (Multi) (02/16/2023), Hyperlipidemia, Hypermetropia, bilateral, Hypertension, Hypogammaglobulinemia (Multi), Hypothyroidism, Ischemic optic neuropathy, bilateral, Meibomian gland dysfunction right eye, upper and lower eyelids, Near syncope, NPH (normal pressure hydrocephalus) (Multi), Obesity, Obstructive sleep apnea on CPAP, Osteoarthritis, Peripheral motor neuropathy, Personal history of irradiation ( 2024), Polycystic liver disease, PONV (postoperative nausea and vomiting), Primary open-angle glaucoma, bilateral, indeterminate stage, Sciatica, Sjogren syndrome, unspecified (Multi), and Vocal cord dysfunction.    Surgical History  She has a past surgical history that includes Rotator cuff repair; Knee arthroscopy w/ debridement (Right); MR angio head wo IV contrast (06/28/2021); MR angio neck wo IV contrast (06/28/2021); Cataract extraction, bilateral; IR ablation nerve; Esophagogastroduodenoscopy (06/2019);  Colonoscopy (04/05/2018); Tonsillectomy; Total knee arthroplasty (Left); Hand surgery (Bilateral); Breast lumpectomy (Left, 03/27/2024); Blepharoplasty; and Breast biopsy (2024).    Family History  Cancer-related family history includes Pancreatic cancer in her brother and father.     Social History  She reports that she quit smoking about 58 years ago. Her smoking use included cigarettes. She started smoking about 59 years ago. She has a 0.3 pack-year smoking history. She has been exposed to tobacco smoke. She has never used smokeless tobacco. She reports that she does not currently use alcohol. She reports that she does not use drugs.    Allergies  Penicillins, Primidone, and Tizanidine    Medications  Current Outpatient Medications   Medication Instructions    acetaminophen (TYLENOL EXTRA STRENGTH) 1,000 mg, oral, Every 6 hours PRN    acyclovir (ZOVIRAX) 400 mg, oral, Daily    albuterol (ProAir HFA) 90 mcg/actuation inhaler 2 puffs, Every 4 hours PRN    allantoin gel As needed    amLODIPine (NORVASC) 2.5 mg, oral, Daily    atorvastatin (LIPITOR) 20 mg, oral, 3 times weekly    azelastine (Astelin) 137 mcg (0.1 %) nasal spray 2 sprays, 2 times daily PRN    budesonide-formoteroL (Symbicort) 160-4.5 mcg/actuation inhaler 2 puffs, inhalation, Daily, RINSE MOUTH AFTER USE.    butenafine HCl (MENTAX TOP) 1 tablet, Daily    calcium carbonate/vitamin D3 (CALTRATE WITH VITAMIN D3 ORAL) Take by mouth. Takes dose unknown    cholecalciferol (VITAMIN D-3) 50 mcg, Daily    cyanocobalamin, vitamin B-12, (Vitamin B-12) 2,500 mcg tablet, sublingual SL tablet 1 tablet, Daily PRN    fluocinolone and shower cap 0.01 % oil Apply to scalp  leave on overnight and wash off in the morning. Use daily    hydroxychloroquine (PLAQUENIL) 200 mg, oral, Daily    ketoconazole (NIZOral) 2 % shampoo No dose, route, or frequency recorded.    lancets misc Uses one touch verio test strips    latanoprost (Xalatan) 0.005 % ophthalmic solution 1 drop,  Both Eyes, Daily    levothyroxine (Synthroid, Levoxyl) 100 mcg tablet TAKE 1 TABLET BY MOUTH 5 TIMES A WEEK.    lifitegrast (Xiidra) 5 % dropperette 1 drop    lisinopril 20 mg, oral, Daily    medical cannabis 1 each, As needed    montelukast (Singulair) 10 mg tablet 1 tablet, Daily    OneTouch Verio test strips strip 1 each, Topical, Daily PRN    peg 400-propylene glycol, PF, (Systane, PF,) 0.4-0.3 % dropperette Administer into affected eye(s).    povidone, PF, (iVizia, PF,) 0.5 % drops 1 drop, 3 times daily PRN    propranolol (INDERAL) 10 mg, oral, Daily    sulfamethoxazole-trimethoprim (Bactrim DS) 800-160 mg tablet 1 tablet, Daily    travoprost (Travatan Z) 0.004 % drops ophthalmic solution 1 drop, Nightly    VITAMIN B COMPLEX ORAL Every 24 hours PRN    zinc gluconate 50 mg tablet Every 24 hours PRN       Last Recorded Vitals  Vitals:    06/26/25 1148   BP: 118/66   Pulse: 77   Resp: 20   Temp: 35.7 °C (96.3 °F)   SpO2: 95%         Physical Exam  Chest:            Patient is alert and oriented x3 and in a relaxed and appropriate mood. Her gait is steady and hand grasps are equal. Sclera is clear. The breasts are nearly symmetrical. Left breast has a well healed partial mastectomy incision. The tissue is soft without palpable abnormalities, discrete nodules or masses. The skin and nipples appear normal. There is no cervical, supraclavicular or axillary lymphadenopathy.      Visit Diagnosis  1. Abnormal finding on breast imaging  Clinic Appointment Request Follow Up          Assessment/Plan  Breast cancer surveillance, normal clinical exam, history left breast cancer, s/p left partial mastectomy, no family history of breast cancer, entirely fatty tissue    Return August 2025 for left diagnostic mammogram and office visit    Patient Discussion/Summary  Your clinical examination is normal. Please return in August 2025 for left diagnotic mammogram and office visit or sooner if you have any problems or concerns.      IMPORTANT INFORMATION REGARDING YOUR RESULTS    If you receive medical information from My [facility] Personal Health Record (online chart) your results will be released into your chart. This means you may view or see results of your biopsy or procedure before I contact you directly. If this occurs, please call the office and we will discuss your results over the phone.    You can see your health information, review clinical summaries from office visits & test results online when you follow your health with MY  Chart, a personal health record. To sign up go to [email or web address]. If you need assistance with signing up or trouble getting into your account call [employer] Patient Line 24/7 at [phone].    My office phone number is [phone] if you need to get in touch with me or -have additional questions or concerns. Thank you for choosing [facility] and trusting me as your healthcare provider. I look forward to seeing you again at your next office visit. I am honored to be a provider on your health care team and I remain dedicated to helping you achieve your health goals.       Ashleigh Donis, APRN-CNP

## 2025-06-24 NOTE — PROGRESS NOTES
Subjective   Patient ID: Louisa Rodas is a 84 y.o. female who presents for SGLT2/CKD    Referring Provider: (Kenneth)    CESAR  HPI: CKD stage 3a. last EGFR 51 sCr 1.09    SGLT2- does have a history of recurrent UTI taken bactrim DS 1 tab daily.   HTN: controlled  /66  Medications  Propranolol 10 mg daily  Amlodipine 2.5 mg daily  Lisinopril 20 mg daily     DM:  A1C 7 due for new one  Medications  none    HLD:  LDL 66  On statin atorvastatin 20 mg daily    Medications reviewed for appropriate dosing in setting of CKD  Recommended changes:  No adjustments needed at this time    -  Review of Systems        Objective     There were no vitals taken for this visit.     Labs  Lab Results   Component Value Date    BILITOT 0.4 03/18/2025    CALCIUM 9.2 06/09/2025    CO2 26 06/09/2025     06/09/2025    CREATININE 1.50 (H) 06/09/2025    GLUCOSE 121 (H) 06/09/2025    ALKPHOS 72 03/18/2025    K 4.2 06/09/2025    PROT 6.1 03/18/2025     06/09/2025    AST 20 03/18/2025    ALT 12 03/18/2025    BUN 27 (H) 06/09/2025    ANIONGAP 9 03/18/2025    MG 1.86 05/24/2023    PHOS 3.5 06/09/2025    ALBUMIN 4.1 06/09/2025    GFRF 46 (A) 06/19/2023     Lab Results   Component Value Date    TRIG 91 11/13/2023    CHOL 139 11/13/2023    LDLCALC 66 11/13/2023    HDL 55.2 11/13/2023     Lab Results   Component Value Date    HGBA1C 7.0 (H) 05/09/2024       Current Outpatient Medications on File Prior to Visit   Medication Sig Dispense Refill    acetaminophen (Tylenol Extra Strength) 500 mg tablet Take 2 tablets (1,000 mg) by mouth every 6 hours if needed for mild pain (1 - 3).      acyclovir (Zovirax) 400 mg tablet Take 1 tablet (400 mg) by mouth once daily. 30 tablet 3    albuterol (ProAir HFA) 90 mcg/actuation inhaler Inhale 2 puffs every 4 hours if needed for wheezing or shortness of breath.      allantoin gel Apply topically if needed for wound care. Contains cannibus      amLODIPine (Norvasc) 2.5 mg tablet Take 1 tablet (2.5 mg)  by mouth once daily. 90 tablet 2    atorvastatin (Lipitor) 20 mg tablet Take 1 tablet (20 mg) by mouth 3 (three) times a week. 45 tablet 1    azelastine (Astelin) 137 mcg (0.1 %) nasal spray Administer 2 sprays into each nostril 2 times a day as needed for allergies. Use in each nostril as directed      budesonide-formoteroL (Symbicort) 160-4.5 mcg/actuation inhaler Inhale 2 puffs once daily. RINSE MOUTH AFTER USE. 10.2 g 1    butenafine HCl (MENTAX TOP) Take 1 tablet by mouth once daily.      calcium carbonate/vitamin D3 (CALTRATE WITH VITAMIN D3 ORAL) Take by mouth. Takes dose unknown      cholecalciferol (Vitamin D-3) 50 MCG (2000 UT) tablet Take 1 tablet (50 mcg) by mouth once daily.      cyanocobalamin, vitamin B-12, (Vitamin B-12) 2,500 mcg tablet, sublingual SL tablet Place 1 tablet (2,500 mcg) under the tongue once daily as needed.      fluocinolone and shower cap 0.01 % oil Apply to scalp  leave on overnight and wash off in the morning. Use daily      hydroxychloroquine (Plaquenil) 200 mg tablet Take 1 tablet (200 mg) by mouth once daily. 90 tablet 1    ketoconazole (NIZOral) 2 % shampoo       lancets misc Uses one touch verio test strips 100 each 0    latanoprost (Xalatan) 0.005 % ophthalmic solution Administer 1 drop into both eyes once daily. 7.5 mL 3    levothyroxine (Synthroid, Levoxyl) 100 mcg tablet TAKE 1 TABLET BY MOUTH 5 TIMES A WEEK. 66 tablet 2    lifitegrast (Xiidra) 5 % dropperette Administer 1 drop into affected eye(s).      lisinopril 20 mg tablet Take 1 tablet (20 mg) by mouth once daily. 90 tablet 2    medical cannabis Take 1 each by mouth if needed.      montelukast (Singulair) 10 mg tablet Take 1 tablet (10 mg) by mouth once daily.      OneTouch Verio test strips strip Apply 1 each topically once daily as needed (low blood sugar symptoms). 30 each 3    peg 400-propylene glycol, PF, (Systane, PF,) 0.4-0.3 % dropperette Administer into affected eye(s).      povidone PF, (iVizia, PF,) 0.5  % drops Administer 1 drop into affected eye(s) 3 times a day as needed. (Patient not taking: Reported on 6/9/2025)      propranolol (Inderal) 10 mg tablet Take 1 tablet (10 mg) by mouth once daily. 90 tablet 2    sulfamethoxazole-trimethoprim (Bactrim DS) 800-160 mg tablet Take 1 tablet by mouth once daily. Takes daily not bid      travoprost (Travatan Z) 0.004 % drops ophthalmic solution Administer 1 drop into both eyes once daily at bedtime.      VITAMIN B COMPLEX ORAL Take by mouth every 24 (twenty four) hours if needed.      zinc gluconate 50 mg tablet Take by mouth every 24 (twenty four) hours if needed.       No current facility-administered medications on file prior to visit.        Assessment/Plan   PATIENT EDUCATION/DISCUSSION:    Farxiga Education:    - Counseled patient on Farxiga MOA, expectations, side effects, duration of therapy, administration, and monitoring parameters.  - Reviewed the benefits of SGLT-2i therapy, such as glycemic control and kidney and CV protection.  - Advised patient to practice proper  hygiene to reduce risk of UTIs or yeast infections.  - Advised patient to maintain adequate fluid intake to remain hydrated while on SGLT2i therapy.  - Answered all patient questions and concerns.   _______________________________________________________________________  PLAN  1. Still with recurrent uti history not starting sglt2 at this time.      Vance Cisneros PharmD BCPS    Continue all meds under the continuation of care with the referring provider and clinical pharmacy team.

## 2025-06-25 ENCOUNTER — APPOINTMENT (OUTPATIENT)
Dept: CARDIOLOGY | Facility: CLINIC | Age: 85
End: 2025-06-25
Payer: MEDICARE

## 2025-06-25 ENCOUNTER — HOSPITAL ENCOUNTER (OUTPATIENT)
Dept: RADIOLOGY | Facility: CLINIC | Age: 85
Discharge: HOME | End: 2025-06-25
Payer: MEDICARE

## 2025-06-25 DIAGNOSIS — Q44.6 POLYCYSTIC LIVER DISEASE: ICD-10-CM

## 2025-06-25 PROCEDURE — 76705 ECHO EXAM OF ABDOMEN: CPT | Performed by: RADIOLOGY

## 2025-06-25 PROCEDURE — 76705 ECHO EXAM OF ABDOMEN: CPT

## 2025-06-26 ENCOUNTER — OFFICE VISIT (OUTPATIENT)
Dept: SURGICAL ONCOLOGY | Facility: HOSPITAL | Age: 85
End: 2025-06-26
Payer: MEDICARE

## 2025-06-26 ENCOUNTER — HOSPITAL ENCOUNTER (OUTPATIENT)
Dept: RADIOLOGY | Facility: HOSPITAL | Age: 85
Discharge: HOME | End: 2025-06-26
Payer: MEDICARE

## 2025-06-26 VITALS
BODY MASS INDEX: 28.96 KG/M2 | DIASTOLIC BLOOD PRESSURE: 66 MMHG | WEIGHT: 163.5 LBS | SYSTOLIC BLOOD PRESSURE: 118 MMHG | HEART RATE: 77 BPM | OXYGEN SATURATION: 95 % | TEMPERATURE: 96.3 F | RESPIRATION RATE: 20 BRPM

## 2025-06-26 DIAGNOSIS — D05.12 DUCTAL CARCINOMA IN SITU (DCIS) OF LEFT BREAST: ICD-10-CM

## 2025-06-26 DIAGNOSIS — R92.8 ABNORMAL FINDING ON BREAST IMAGING: ICD-10-CM

## 2025-06-26 DIAGNOSIS — Z08 ENCOUNTER FOR FOLLOW-UP SURVEILLANCE OF BREAST CANCER: ICD-10-CM

## 2025-06-26 DIAGNOSIS — R92.8 ABNORMAL FINDING ON BREAST IMAGING: Primary | ICD-10-CM

## 2025-06-26 DIAGNOSIS — Z85.3 PERSONAL HISTORY OF BREAST CANCER: ICD-10-CM

## 2025-06-26 DIAGNOSIS — Z85.3 ENCOUNTER FOR FOLLOW-UP SURVEILLANCE OF BREAST CANCER: ICD-10-CM

## 2025-06-26 PROCEDURE — 1036F TOBACCO NON-USER: CPT

## 2025-06-26 PROCEDURE — 99213 OFFICE O/P EST LOW 20 MIN: CPT

## 2025-06-26 PROCEDURE — 3078F DIAST BP <80 MM HG: CPT

## 2025-06-26 PROCEDURE — 1159F MED LIST DOCD IN RCRD: CPT

## 2025-06-26 PROCEDURE — 3074F SYST BP LT 130 MM HG: CPT

## 2025-06-26 NOTE — PATIENT INSTRUCTIONS
Your clinical examination is normal. Please return in August 2025 for left diagnotic mammogram and office visit or sooner if you have any problems or concerns.     IMPORTANT INFORMATION REGARDING YOUR RESULTS    If you receive medical information from My Chart Personal Health Record (online chart) your results will be released into your chart. This means you may view or see results of your biopsy or procedure before I contact you directly. If this occurs, please call the office and we will discuss your results over the phone.    You can see your health information, review clinical summaries from office visits & test results online when you follow your health with MY  Chart, a personal health record. To sign up go to [email or web address]. If you need assistance with signing up or trouble getting into your account call [employer] Patient Line 24/7 at [phone].    My office phone number is [phone] if you need to get in touch with me or -have additional questions or concerns. Thank you for choosing [facility] and trusting me as your healthcare provider. I look forward to seeing you again at your next office visit. I am honored to be a provider on your health care team and I remain dedicated to helping you achieve your health goals.

## 2025-06-27 ENCOUNTER — APPOINTMENT (OUTPATIENT)
Dept: PHARMACY | Facility: HOSPITAL | Age: 85
End: 2025-06-27
Payer: MEDICARE

## 2025-06-27 DIAGNOSIS — N18.32 STAGE 3B CHRONIC KIDNEY DISEASE (MULTI): ICD-10-CM

## 2025-07-01 ENCOUNTER — PATIENT OUTREACH (OUTPATIENT)
Dept: PRIMARY CARE | Facility: CLINIC | Age: 85
End: 2025-07-01
Payer: MEDICARE

## 2025-07-01 DIAGNOSIS — I10 PRIMARY HYPERTENSION: ICD-10-CM

## 2025-07-01 DIAGNOSIS — N18.32 TYPE 2 DIABETES MELLITUS WITH STAGE 3B CHRONIC KIDNEY DISEASE, WITHOUT LONG-TERM CURRENT USE OF INSULIN (MULTI): ICD-10-CM

## 2025-07-01 DIAGNOSIS — E11.22 TYPE 2 DIABETES MELLITUS WITH STAGE 3B CHRONIC KIDNEY DISEASE, WITHOUT LONG-TERM CURRENT USE OF INSULIN (MULTI): ICD-10-CM

## 2025-07-01 NOTE — PROGRESS NOTES
Care Management Monthly Outreach  Chart review completed  Confirmation of at least 2 patient identifiers  Change in insurance? No    Has patient been to ER/Urgent Care since last outreach? No    Last Office Visit with PCP: 5/14/2025   Next Office Visit with PCP: Visit date not found   APC Collaboration: n/a    Chronic Conditions and Outreach Summary:   Primary hypertension    Type 2 diabetes mellitus with stage 3b chronic kidney disease, without long-term current use of insulin (Multi)    Patient states she is having some right knee pain and she is using CBD oil which helps some. Patient denies any falls and uses a cane to ambulate. She states she will hold on to her  if she doesn't have her cane. Patient is scheduled for a PT evaluation on July 08. Patient states she was feeling bloated and she took some Miralax which helped. Patient has occasional episodes of reflux every now and then. She is scheduled to speak with the Nutritionist on July 14 and get additional information on foods that cause reflux. Appetite is good and she is trying to watch the sodium in her diet. Patient encouraged to drink water to avoid dehydration in the hot weather. Pt. states she hasn't been checking her blood sugar lately but will try to check more often. Blood pressure has been good per Pt. Appointments reviewed.    Medications:   Are there medication changes since last visit? No  Refills needed? No    Social Drivers of Health: Deferred  Care Gaps Addressed? Deferred  Care Plan addressed: Yes    Upcoming Appointments:   Future Appointments       Date / Time Provider Department Dept Phone    7/8/2025 1:45 PM (Arrive by 1:30 PM) Mason Mackay, PT [facility] [phone]    7/14/2025 2:00 PM Fernanda Gonzalez, MICHAEL, LD [facility] [phone]    7/30/2025 1:15 PM Hu Bernabe, OD [facility] [phone]    7/31/2025 2:10 PM (Arrive by 1:55 PM) Wendy Escalera,  APRN-CNP [facility] [phone]    8/7/2025 1:40 PM Azael Dexter MD [facility] [phone]    8/19/2025 11:30 AM (Arrive by 11:15 AM) Donta Higginbotham MD [facility] [phone]    8/21/2025 11:30 AM (Arrive by 11:15 AM) [facility] [phone]    8/21/2025 12:30 PM Ashleigh Donis APRN-CNP [facility] [place] [phone]    11/6/2025 10:30 AM Aureliano Pabon MD [facility] [phone]    11/25/2025 1:20 PM Brandon Allen MD [facility] [phone]    12/15/2025 2:00 PM Christiane Cooper MD [facility] [phone]    4/15/2026 1:00 PM (Arrive by 12:45 PM) [facility] [phone]    5/5/2026 1:30 PM Margarita Man APRN-CNP [facility] [phone]    5/12/2026 1:00 PM Brandon Allen MD [facility] [phone]    5/20/2026 2:30 PM (Arrive by 2:15 PM) Nadira Moctezuma DO [facility] [phone]    6/18/2026 1:40 PM Alton Salamanca APRN-CNP [facility] [phone]          Blood Pressures Reviewed  BP Readings from Last 3 Encounters:   06/26/25 118/66   06/12/25 127/80   06/09/25 114/60     Labs Reviewed:  Lab Results   Component Value Date    CREATININE 1.50 (H) 06/09/2025    GLUCOSE 121 (H) 06/09/2025    ALKPHOS 72 03/18/2025    K 4.2 06/09/2025    PROT 6.1 03/18/2025     06/09/2025    CALCIUM 9.2 06/09/2025    AST 20 03/18/2025    ALT 12 03/18/2025    BUN 27 (H) 06/09/2025    MG 1.86 05/24/2023    PHOS 3.5 06/09/2025    GFRF 46 (A) 06/19/2023     Lab Results   Component Value Date    TRIG 91 11/13/2023    CHOL 139 11/13/2023    LDLCALC 66 11/13/2023    HDL 55.2 11/13/2023     Lab Results   Component Value Date    HGBA1C 7.0 (H) 05/09/2024    HGBA1C 7.2 (H) 02/20/2024    HGBA1C 6.9 (H) 11/13/2023     Lab Results    Component Value Date    WBC 3.7 (L) 06/09/2025    RBC 3.91 06/09/2025    HGB 12.1 06/09/2025     06/09/2025   No other concerns at this time.  Agreeable to continue monthly outreaches.  Encouraged to call if questions or concerns arise.    Elsy Irving RN

## 2025-07-07 DIAGNOSIS — K74.60 CIRRHOSIS OF LIVER WITHOUT ASCITES, UNSPECIFIED HEPATIC CIRRHOSIS TYPE (MULTI): ICD-10-CM

## 2025-07-08 ENCOUNTER — EVALUATION (OUTPATIENT)
Dept: PHYSICAL THERAPY | Facility: CLINIC | Age: 85
End: 2025-07-08
Payer: MEDICARE

## 2025-07-08 DIAGNOSIS — G89.29 CHRONIC PAIN OF RIGHT KNEE: ICD-10-CM

## 2025-07-08 DIAGNOSIS — M25.561 CHRONIC PAIN OF RIGHT KNEE: ICD-10-CM

## 2025-07-08 DIAGNOSIS — R26.89 IMBALANCE: ICD-10-CM

## 2025-07-08 PROCEDURE — 97110 THERAPEUTIC EXERCISES: CPT | Mod: GP | Performed by: PHYSICAL THERAPIST

## 2025-07-08 PROCEDURE — 97161 PT EVAL LOW COMPLEX 20 MIN: CPT | Mod: GP | Performed by: PHYSICAL THERAPIST

## 2025-07-08 RX ORDER — PROPRANOLOL HYDROCHLORIDE 10 MG/1
10 TABLET ORAL DAILY
Qty: 90 TABLET | Refills: 0 | Status: SHIPPED | OUTPATIENT
Start: 2025-07-08

## 2025-07-08 NOTE — PROGRESS NOTES
Physical Therapy    Physical Therapy Evaluation and Treatment      Patient Name: Louisa Rodas  MRN: [id number]  Today's Date: 7/10/2025    Time Entry:   Time Calculation  Start Time: 1350  Stop Time: 1500  Time Calculation (min): 70 min  PT Evaluation Time Entry  PT Evaluation (Low) Time Entry: 25  PT Therapeutic Procedures Time Entry  Therapeutic Exercise Time Entry: 15        Visit: 1  Insurance: [employer]  Auth: Required     Assessment:  Pt presents to clinic with chief complaint of R knee pain and reduced mobility over the last few months. Knee pain is acute on chronic since R TKA about a year ago. She demonstrates mild reduction in knee AROM, mild gait deficits, reduced LE strength, and reduced core strength. Pt will benefit from skilled therapy to address current impairments for improved ability to return to regular activity with reduced pain/stiffness     Plan:  OP PT Plan  Treatment/Interventions: Cryotherapy, Education/ Instruction, Hot pack, Gait training, Manual therapy, Neuromuscular re-education, Self care/ home management, Therapeutic activities, Therapeutic exercises  PT Plan: Skilled PT  PT Frequency: 1 time per week  Duration: 6 weeks  Onset Date: 05/14/25  Certification Period Start Date: 07/08/25  Certification Period End Date: 10/06/25  Rehab Potential: Good  Plan of Care Agreement: Patient    Current Problem:   1. Chronic pain of right knee  Referral to Physical Therapy    Follow Up In Physical Therapy      2. Imbalance  Referral to Physical Therapy    Follow Up In Physical Therapy          Subjective    General:  General  Reason for Referral: R knee pain, balance  Referred By: Dr. Nadira Moctezuma  Preferred Learning Style: verbal, visual, written  Precautions:  Precautions  STEADI Fall Risk Score (The score of 4 or more indicates an increased risk of falling): 5  Precautions Comment: Fall risk    Chief complaints:   Pt presents to clinic with chief complaint of R knee pain and  "decreased balance/mobility   Onset/Surgery Date: acute on chronic   Mechanism of Injury: no specific   Previous History: Yes; previously seen in therapy post op R TKA    Pain: 2/10      Location: R anterior knee     Type: achy, stiffness     Aggravators: walking distance, transfers     Alleviators: heat, CBD topical      Function:    Prior Level: fully functional     Current limitations: walking distance, transfers    Condition: Unchanged      Home Situation:     Multiple level home      Sleep:     Disturbed: No     Preferred position(s):     Side lying       Goals for Therapy:    \"I want a miracle\"    Pt reports she would like reduced knee pain, improved mobility and balance        Objective      Observation:     No tenderness to palpation bilat knees    No tenderness med/lat joint line       ROM/Flexibility:    Knee Flexion R / L :      120 deg / 125 deg    Knee Extension R / L :  0 deg / 0 deg         Pain end range R knee       Patellar mobility WNL R    Limited        Strength R / L :    Hip Flexion:     5 / 5    Hip Extension:     5 / 5     Hip Abduction:    5 / 5    Hip Adduction:    5 / 5    Hip ER:      4+ / 5    Hip IR:       5 / 5      Knee Extension:   5 / 5    Knee Flexion:       4+ / 4+      Ankle DF:             5 / 5    Ankle PF:              5 / 5     Gait: decreased chadd, mild unsteadiness with ambulation   Carrying cane      Balance:    Tandem stance: 30 seconds ea bilat  Narrow MICHELLE: 30 sec  R SLS: 10 seconds   L SLS: 7 seconds      Special Tests R / L :     Anterior Drawer:    - / -    Lachman:               - / -     Posterior Drawer:   - / -     Posterior Sag:         - / -    Varus @ 0` :            - / -    Varus @ 30` :          - / -    Valgus @ 0` :          - / -    Valgus @ 30` :        - / -     Louis:             - / -      Outcome Measure:    LEFS : 33 / 80       TREATMENT  Therapeutic exercise:   Hip abd (blue) x 20   Clamshells (blue) x 10 ea   Bridges x 10 with 3 hold "     Heat to low back x 10 min EOS          Goals:  Active       PT Problem       Pt will report 25% reduction in R knee stiffness for improved ability to walk without SPC       Start:  07/10/25    Expected End:  10/06/25            Pt will increase bilateral LE strength by 1 MMT grade for all weakened muscle groups for improved LE stability        Start:  07/10/25    Expected End:  10/06/25            Pt will increase L SLS balance to 10 seconds for improved stability with ambulation and steps       Start:  07/10/25    Expected End:  10/06/25            Pt will be independent in HEP for home maintenance        Start:  07/10/25    Expected End:  10/06/25                    Insurance Authorization Information  Date of Evaluation: 25    Onset Date: 2025    Referring Physician: Nadira Moctezuma     Surgery in the Last 3 months:  no    CPT Codes: Therapeutic Exercise: 66424 Therapeutic Activity: 10129 Neuromuscular Re-Education: 53504 Manual Therapy: 48285 Gait Trainin Self-Care/Home Management Trainin Mechanical Traction: 47096 Electric Stimulation (Attended): 03395 Electric Stimulation (Unattended): 48266 Vasopneumatic Device: 60622 PT Re-Evaluation: 50704     Diagnosis:   Problem List Items Addressed This Visit           ICD-10-CM    Arthritis of knee M17.10    Imbalance R26.89    Relevant Orders    Follow Up In Physical Therapy          Functional Outcome:  Other Measures  Lower Extremity Funtional Score (LEFS): 33    OT / PT Evaluation complexity:  low    Which of the following best describes the primary reason of therapy: Improving, restoring, or adapting functional mobility or skills    Visits Requested: 10    Date Range: 90 days    Select all conditions that apply: Arthritis Conditions         Mason Mackay, PT

## 2025-07-09 ENCOUNTER — PATIENT OUTREACH (OUTPATIENT)
Dept: PRIMARY CARE | Facility: CLINIC | Age: 85
End: 2025-07-09
Payer: MEDICARE

## 2025-07-09 ASSESSMENT — ENCOUNTER SYMPTOMS
OCCASIONAL FEELINGS OF UNSTEADINESS: 1
LOSS OF SENSATION IN FEET: 1
DEPRESSION: 0

## 2025-07-09 NOTE — PROGRESS NOTES
Returned Patients call and message left on the voicemail to call me back, my contact number was left.    July 10, 2025 2:00 pm, Patient returned call and states she was having some issues with gas and bloating after eating pizza. Patient thinks the cheese was the issue. States she took some Miralax and did have a bowel movement. Patient states she is also scheduled to see Nutrition and will have a discussion about foods that could possibly cause gas and bloating. Patient to call with any questions or concerns.

## 2025-07-14 ENCOUNTER — NUTRITION (OUTPATIENT)
Dept: NUTRITION | Facility: HOSPITAL | Age: 85
End: 2025-07-14
Payer: MEDICARE

## 2025-07-14 DIAGNOSIS — N18.31 STAGE 3A CHRONIC KIDNEY DISEASE (CKD) (MULTI): ICD-10-CM

## 2025-07-14 DIAGNOSIS — E08.22 DIABETES MELLITUS DUE TO UNDERLYING CONDITION WITH STAGE 2 CHRONIC KIDNEY DISEASE, WITHOUT LONG-TERM CURRENT USE OF INSULIN (MULTI): Primary | ICD-10-CM

## 2025-07-14 DIAGNOSIS — N18.2 DIABETES MELLITUS DUE TO UNDERLYING CONDITION WITH STAGE 2 CHRONIC KIDNEY DISEASE, WITHOUT LONG-TERM CURRENT USE OF INSULIN (MULTI): Primary | ICD-10-CM

## 2025-07-14 DIAGNOSIS — N18.31 STAGE 3A CHRONIC KIDNEY DISEASE (MULTI): ICD-10-CM

## 2025-07-14 PROCEDURE — 97802 MEDICAL NUTRITION INDIV IN: CPT

## 2025-07-14 NOTE — PROGRESS NOTES
"Nutrition Initial Assessment:     Patient Louisa Rodas is a 84 y.o. female being seen at [facility] who was referred by Dr. Nadira Moctezuma on 6/6/25 for   1. 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 Stage 3a chronic kidney disease (Multi)  Referral to Nutrition Services          Nutrition Assessment    Problem List[1]      Nutrition History:  Food & Nutrition History: Louisa Rodas presents in office for nutrition counseling. She would like guidance on her diet. She has never had prior nutrition education for DM and her other conditions.     Allergies: None  Intolerance: None   Appetite: Good  GI Symptoms : bloating, gassiness, and constipation the last 2 weeks which improved with Miralax   Swallowing Difficulty: No problems with swallowing  Dentition : own  Food Preparation: Patient  Cooking Skills/Barriers: None reported  Grocery Shopping: Patient  Supplements: Multivitamin, Vitamin D, and Calcium daily  Receives vitamin B12 injections once monthly   Food Insecurity: Denies     24 Hour Diet Recall  Meal 1: tellez, fried egg, small pastry, grits, water, milk   Meal 2 @ 3-4 pm: turkey with gravy, cabbage, cornbread   Peanut butter crackers or yogurt or popcorn     Beverages: water  Eating out: frequently   Alcohol Intake: none  Self-Identified Challenges: sweet tooth     Physical Activity:   Started back in PT (will be going once weekly)   Knee pain is a barrier     Diabetes Nutrition History:  Diagnosed >10 years ago   Prior Nutrition Education: No   SMBG: meter, checking infrequently   Hypoglycemia: Infrequent, aware of symptoms   Current DM Medications/Insulin Regimen: None     Anthropometrics:  Ht Readings from Last 1 Encounters:   06/09/25 1.6 m (5' 3\")     BMI Readings from Last 1 Encounters:   06/26/25 28.96 kg/m²     Weight History:   Daily Weight  06/26/25 : 74.2 kg (163 lb 8 " oz)  06/12/25 : 75.8 kg (167 lb)  06/09/25 : 74.8 kg (165 lb)  05/14/25 : 76.2 kg (168 lb)  05/06/25 : 76.7 kg (169 lb 3.2 oz)  04/30/25 : 75.6 kg (166 lb 9.6 oz)  03/19/25 : 75.3 kg (166 lb)  02/14/25 : 75.2 kg (165 lb 12.6 oz)  11/04/24 : 73 kg (161 lb)  10/23/24 : 73 kg (161 lb)    Weight Change %:  Weight History / % Weight Change: Wt loss of 6 lbs (4%) x 6 weeks from 5/6/25 to 6/26/25  Significant Weight Loss: No    Nutrition Focused Physical Exam Findings:  Subcutaneous Fat Loss:   Defer Subcutaneous Fat Loss Assessment: Defer all  Defer All Reason: Visually appears well nourished with no signs of noticeable wasting    Muscle Wasting:  Defer Muscle Wasting Assessment: Defer all  Defer All Reason: Visually appears well nourished with no signs of noticeable wasting      Nutrition Significant Labs:  CMP Trend:    Recent Labs     06/09/25  1353 03/18/25  1502 10/23/24  1401 07/22/24  1525 05/30/24  0642 05/29/24  0629 05/09/24  1110   GLUCOSE 121* 149* 87  --  150*   < > 105*    140 142  --  136   < > 141   K 4.2 3.8 4.1  --  3.8   < > 4.4    107 107  --  101   < > 108*   CO2 26 24 26  --  26   < > 25   ANIONGAP  --  9 13  --  13   < > 12   BUN 27* 26* 23  --  14   < > 23   CREATININE 1.50* 1.52* 1.44*  --  1.09*   < > 1.30*   EGFR 35* 34* 37*  --  51*   < > 41*   CALCIUM 9.2 8.9 8.9  --  9.3   < > 9.3   ALBUMIN 4.1 4.2 4.2  --   --   --  4.6   ALKPHOS  --  72 84  --   --   --  89   PROT  --  6.1 6.2* 6.5  --   --  6.1*   AST  --  20 26  --   --   --  24   BILITOT  --  0.4 0.3  --   --   --  0.4   ALT  --  12 17  --   --   --  20    < > = values in this interval not displayed.     DM Specific Labs Trend (Includes HgbA1C, antibodies & fasting insulin):   Recent Labs     05/09/24  1110 02/20/24  1032 11/13/23  1034   HGBA1C 7.0* 7.2* 6.9*     Lipid Panel Trend:    Recent Labs     11/13/23  1034 04/11/23  1108   CHOL 139 165   HDL 55.2 69.7   LDLCALC 66  --    LDLF  --  76   VLDL 18 20   TRIG 91 98      Vitamin D:   Lab Results   Component Value Date    VITD25 55 06/09/2025        Medications:  Current Outpatient Medications   Medication Instructions    acetaminophen (TYLENOL EXTRA STRENGTH) 1,000 mg, oral, Every 6 hours PRN    acyclovir (ZOVIRAX) 400 mg, oral, Daily    albuterol (ProAir HFA) 90 mcg/actuation inhaler 2 puffs, Every 4 hours PRN    allantoin gel As needed    amLODIPine (NORVASC) 2.5 mg, oral, Daily    atorvastatin (LIPITOR) 20 mg, oral, 3 times weekly    azelastine (Astelin) 137 mcg (0.1 %) nasal spray 2 sprays, 2 times daily PRN    budesonide-formoteroL (Symbicort) 160-4.5 mcg/actuation inhaler 2 puffs, inhalation, Daily, RINSE MOUTH AFTER USE.    butenafine HCl (MENTAX TOP) 1 tablet, Daily    calcium carbonate/vitamin D3 (CALTRATE WITH VITAMIN D3 ORAL) Take by mouth. Takes dose unknown    cholecalciferol (VITAMIN D-3) 50 mcg, Daily    cyanocobalamin, vitamin B-12, (Vitamin B-12) 2,500 mcg tablet, sublingual SL tablet 1 tablet, Daily PRN    fluocinolone and shower cap 0.01 % oil Apply to scalp  leave on overnight and wash off in the morning. Use daily    hydroxychloroquine (PLAQUENIL) 200 mg, oral, Daily    ketoconazole (NIZOral) 2 % shampoo No dose, route, or frequency recorded.    lancets misc Uses one touch verio test strips    latanoprost (Xalatan) 0.005 % ophthalmic solution 1 drop, Both Eyes, Daily    levothyroxine (Synthroid, Levoxyl) 100 mcg tablet TAKE 1 TABLET BY MOUTH 5 TIMES A WEEK.    lifitegrast (Xiidra) 5 % dropperette 1 drop    lisinopril 20 mg, oral, Daily    medical cannabis 1 each, As needed    montelukast (Singulair) 10 mg tablet 1 tablet, Daily    OneTouch Verio test strips strip 1 each, Topical, Daily PRN    peg 400-propylene glycol, PF, (Systane, PF,) 0.4-0.3 % dropperette Administer into affected eye(s).    povidone, PF, (iVizia, PF,) 0.5 % drops 1 drop, 3 times daily PRN    propranolol (INDERAL) 10 mg, oral, Daily    sulfamethoxazole-trimethoprim (Bactrim DS) 800-160 mg  tablet 1 tablet, Daily    travoprost (Travatan Z) 0.004 % drops ophthalmic solution 1 drop, Nightly    VITAMIN B COMPLEX ORAL Every 24 hours PRN    zinc gluconate 50 mg tablet Every 24 hours PRN        Estimated Needs: Did not discuss today    ;     ;     ;     ;                    Nutrition Diagnosis        Nutrition Diagnosis  Patient has Nutrition Diagnosis: Yes  Diagnosis Status (1): New  Nutrition Diagnosis 1: Food and nutrition related knowledge deficit  Related to (1): lack of adequate prior exposure to information  As Evidenced by (1): patient reports no prior nutrition education for T2DM and CKD and demonstrates incomplete knowledge       Nutrition Interventions/Recommendations   Nutrition Prescription: Oral nutrition increased carbohydrate awareness and reduced intake of concentrated sweets for management of DM and low sodium diet for CKD    Nutrition Interventions:   Food and Nutrient Delivery: Meals & Snacks: Carbohydrate-modified diet, Mineral-modified diet  Goals: 1) 30-45 grams total carbs per meal, 15 grams total carbs per snack  2) Reduce intake of concentrated sweets  3) Reduce sodium intake to 2000 mg daily or less     Coordination of Care: Goals: N/A     Nutrition Education:   Content related nutrition education  Provided education on what happens in the body when prediabetes and diabetes develop. Explained why providing consistent amounts of carbohydrates in the diet is helpful for regulating blood sugar. Encouraged choosing foods that contain minimally processed complex carbohydrates, such as high fiber whole grains, beans, starchy vegetables, whole fruits. Simple sugars from fruit juice, sugar-sweetened beverages, and foods with added sugar should be limited in the diet. Also discussed how foods like protein, some fat, and non-starchy vegetables impact blood sugar regulation.  Provided education on Plate Method as a tool for preparing balanced meals. Discussed the following: Fill 1/2 plate  with non-starchy vegetables (broccoli, carrots, cauliflower, salad greens, cucumbers, tomatoes). These foods contribute very few carbohydrates, and they add fiber to the meal. Fill 1/4 plate with lean protein (meat, turkey, fish, seafood, eggs, nuts, cheese, cottage cheese, nut butter, tofu, edamame). Fill 1/4 plate with carbohydrates/starches (grains, fruits, starchy vegetables, beans, yogurt, milk). Aim for at least half of daily grains as whole grains.   Discussed the following: Foods and beverages that contribute carbohydrates (fruits, grains, legumes, milk, yogurt, starchy vegetables, sugar added to foods, sugar-sweetened beverages); Foods that contribute no or minimal carbohydrates (protein, fats, non-starchy vegetables).   Reviewed recommendations for low sodium diet.   Reviewed glycemic targets.   Morning fasting (after 8 hour of fasting):  mg/dL   2 hours after meals: less than 180 mg/dL   Discussed starting chair exercises as able.     Educational Handouts Provided: ADA Plate Method, Example Meal Plan      Nutrition Counseling:   Nutrition Counseling Strategies : Nutrition counseling based on motivational interviewing strategy, Nutrition counseling based on goal setting strategy    Patient Goals:    Patient will monitor blood glucose more frequently.   Patient will switch to sugar free creamer for her coffee.   Patient will look into chair exercises on Youtube.   Patient will limit tellez to weekends instead of daily.     Readiness to Change : Good  Level of Understanding : Good  Anticipated Compliant : Good         Nutrition Monitoring and Evaluation   Food and Nutrient Intake  Monitoring and Evaluation Plan: Meal/snack pattern  Meal/Snack Pattern: Estimated meal and snack pattern  Criteria: Monitor patient's progress towards meal and snack goal(s)              Biochemical Data, Medical Tests and Procedures  Monitoring and Evaluation Plan: Glucose/endocrine profile, Electrolyte/renal panel  Criteria:  Monitor renal electrolytes  Glucose/Endocrine Profile: Hemoglobin A1c (HgbA1c)  Criteria: <7-8% given age and CKD         Goal Status: Goal Status: New goal identified         Follow Up: 6-8 weeks              [1]   Patient Active Problem List  Diagnosis    Abnormal EEG    Abnormal liver ultrasound    Arthritis of hand, right, degenerative    Arthritis of knee    Arthritis, degenerative, localized, primary, hand    Benign neoplasm of soft tissue of upper extremity    Carpal tunnel syndrome    Cervical disc disease    Cervical spondylosis without myelopathy    Chills (without fever)    Chronic pain of both knees    Degenerative arthritis of knee, bilateral    Vertical diplopia    Drusen of left macula    Drusen of right macula    Dysphagia    Dysphonia    Foot pain, bilateral    Glaucoma of both eyes    Hallux rigidus of right foot    Headache, cervicogenic    Abdominal pain    Generalized abdominal pain    Hip pain    Incomplete rotator cuff tear    Left shoulder pain    Liver cyst    Liver lesion    Lower back pain    Lumbar radiculopathy    Myalgia, other site    Neck pain    MENDY on CPAP    Osteoarthritis of right ankle and foot    Localized osteoarthrosis of left ankle and foot    Degenerative arthritis of hip    Pain, wrist joint    Pelvic pain in female    POAG (primary open-angle glaucoma)    Radiculitis, cervical    Scapholunate dissociation    Schwannoma    Segmental and somatic dysfunction of cervical region    Segmental and somatic dysfunction of lumbar region    Segmental and somatic dysfunction of pelvic region    Segmental and somatic dysfunction of thoracic region    Shortness of breath at rest    Shortness of breath on exertion    Skew deviation    Thumb pain, right    Thyroid eye disease    Vaginal atrophy    Vocal cord dysfunction    Weakness of both lower extremities    Polycystic liver disease    Joint pain, knee    Acid reflux    Esophageal dysmotility    Muscle tension dysphonia    Stage 3b  chronic kidney disease (Multi)    Hypothyroidism, unspecified    Imbalance    Hyperlipidemia    Near syncope    Urine frequency    Hypertensive heart disease without heart failure    Abnormal brain MRI    Bilateral presbyopia    Type 2 diabetes mellitus with stage 3b chronic kidney disease, without long-term current use of insulin (Multi)    Dry eye syndrome of both lacrimal glands    Glaucoma    Herpes genitalis in women    Hypogammaglobulinemia (Multi)    Insomnia due to medical condition    Keratoconjunctivitis sicca, in Sjogren's syndrome    OAB (overactive bladder)    Other fatigue    Peripheral motor neuropathy    Recurrent UTI    Vitiligo    Localized, secondary osteoarthritis of the hand    Right foot pain    Arthralgia of multiple sites    Hyperopia of both eyes    Regular astigmatism of both eyes    Other low back pain    Fibromyalgia    HTN (hypertension)    Lymphopenia    Enthesopathy    Weakness    Encounter for Medicare annual wellness exam    Osteoarthritis    Menopause    Encounter for screening mammogram for malignant neoplasm of breast    Chronic pain of right knee    Moderate persistent asthma without complication (HHS-HCC)    Sjogren syndrome, unspecified (Multi)    Osteopenia    Obesity, Class I, BMI 30-34.9    Ductal carcinoma in situ (DCIS) of left breast    PONV (postoperative nausea and vomiting)    Unilateral primary osteoarthritis, right knee    Meibomian gland disease    Long-term use of Plaquenil    Encounter for follow-up surveillance of breast cancer    Stage 3a chronic kidney disease (Multi)    Mild intermittent asthma without complication (HHS-HCC)    Skin lesion    Acquired ptosis of eyelid    Dry eyes    Dermatochalasis of both upper eyelids    Floppy eyelid syndrome    History of depression    Ischemic optic neuropathy of both eyes    Low bone density    Macular degeneration    Nonproliferative diabetic retinopathy    Subconjunctival hemorrhage    Tear film insufficiency     Hospital discharge follow-up    Acute pain of right knee    Knee stiffness, right    History of ductal carcinoma in situ (DCIS) of breast    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

## 2025-07-14 NOTE — PATIENT INSTRUCTIONS
Blood glucose targets:  Morning fasting (after 8 hour of fasting):  mg/dL   2 hours after meals: less than 180 mg/dL     Look for sugar free creamer for your coffee    Look on Youtube for chair exercises (one program is called [employer]). Start with small amount of time and work your way up.     Limit tellez to the weekends instead of having it daily.

## 2025-07-24 ENCOUNTER — TREATMENT (OUTPATIENT)
Dept: PHYSICAL THERAPY | Facility: CLINIC | Age: 85
End: 2025-07-24
Payer: MEDICARE

## 2025-07-24 DIAGNOSIS — M25.561 CHRONIC PAIN OF RIGHT KNEE: ICD-10-CM

## 2025-07-24 DIAGNOSIS — R26.89 IMBALANCE: ICD-10-CM

## 2025-07-24 DIAGNOSIS — G89.29 CHRONIC PAIN OF RIGHT KNEE: ICD-10-CM

## 2025-07-24 PROCEDURE — 97110 THERAPEUTIC EXERCISES: CPT | Mod: GP | Performed by: PHYSICAL THERAPIST

## 2025-07-24 NOTE — PROGRESS NOTES
Physical Therapy    Physical Therapy Treatment      Patient Name: Louisa Rodas  MRN: [id number]  Today's Date: 7/24/2025    Time Entry:   Time Calculation  Start Time: 1550  Stop Time: 1645  Time Calculation (min): 55 min     PT Therapeutic Procedures Time Entry  Therapeutic Exercise Time Entry: 40        Visit: 2  Insurance: [employer] Medicare  Auth: Required   6 total visits 7/8/25-9/2/25    Assessment:  Pt did well with additional strengthening work. Mild knee pain with squats, improved with cuing for correct form     Plan:  NuStep  Step ups   Lateral walks       Current Problem:   1. Chronic pain of right knee  Follow Up In Physical Therapy      2. Imbalance  Follow Up In Physical Therapy          Subjective    Pt reports no pain today, just feeling a little bit off       Objective        Strength R / L :    Hip Flexion:     5 / 5    Hip Extension:     5 / 5     Hip Abduction:    5 / 5    Hip Adduction:    5 / 5    Hip ER:      4+ / 5    Hip IR:       5 / 5      Knee Extension:   5 / 5    Knee Flexion:       4+ / 4+      Ankle DF:             5 / 5    Ankle PF:              5 / 5        TREATMENT  Therapeutic exercise:   Lumbar rot x 10   Hip abd (blue) x 20   Clamshells (black) x 10 ea   Bridges 2 x 10 with 3 hold   HS ball curls x20  HS curls w/ 2x10 ea   SLR 2x10 ea   LAQ 2x10 ea  DL calf raises x20  Mini squats x10  Calf stretch on board 5x10 sec    Heat to low back x 10 min EOS          Goals:  Active       PT Problem       Pt will report 25% reduction in R knee stiffness for improved ability to walk without SPC       Start:  07/10/25    Expected End:  10/06/25            Pt will increase bilateral LE strength by 1 MMT grade for all weakened muscle groups for improved LE stability        Start:  07/10/25    Expected End:  10/06/25            Pt will increase L SLS balance to 10 seconds for improved stability with ambulation and steps       Start:  07/10/25    Expected End:  10/06/25            Pt will  be independent in HEP for home maintenance        Start:  07/10/25    Expected End:  10/06/25

## 2025-07-30 ENCOUNTER — APPOINTMENT (OUTPATIENT)
Dept: OPHTHALMOLOGY | Facility: CLINIC | Age: 85
End: 2025-07-30
Payer: MEDICARE

## 2025-07-30 DIAGNOSIS — H04.123 DRY EYE SYNDROME OF LACRIMAL GLAND, BILATERAL: ICD-10-CM

## 2025-07-30 DIAGNOSIS — H52.223 REGULAR ASTIGMATISM OF BOTH EYES: ICD-10-CM

## 2025-07-30 DIAGNOSIS — Z79.899 LONG-TERM USE OF PLAQUENIL: ICD-10-CM

## 2025-07-30 DIAGNOSIS — H52.03 HYPEROPIA OF BOTH EYES: Primary | ICD-10-CM

## 2025-07-30 PROCEDURE — 99214 OFFICE O/P EST MOD 30 MIN: CPT | Performed by: STUDENT IN AN ORGANIZED HEALTH CARE EDUCATION/TRAINING PROGRAM

## 2025-07-30 PROCEDURE — 68761 CLOSE TEAR DUCT OPENING: CPT | Performed by: STUDENT IN AN ORGANIZED HEALTH CARE EDUCATION/TRAINING PROGRAM

## 2025-07-30 PROCEDURE — 92083 EXTENDED VISUAL FIELD XM: CPT | Performed by: STUDENT IN AN ORGANIZED HEALTH CARE EDUCATION/TRAINING PROGRAM

## 2025-07-30 PROCEDURE — 92134 CPTRZ OPH DX IMG PST SGM RTA: CPT | Performed by: STUDENT IN AN ORGANIZED HEALTH CARE EDUCATION/TRAINING PROGRAM

## 2025-07-30 PROCEDURE — 92015 DETERMINE REFRACTIVE STATE: CPT | Performed by: STUDENT IN AN ORGANIZED HEALTH CARE EDUCATION/TRAINING PROGRAM

## 2025-07-30 ASSESSMENT — CUP TO DISC RATIO
OS_RATIO: .6
OD_RATIO: .6

## 2025-07-30 ASSESSMENT — REFRACTION_MANIFEST
OD_AXIS: 095
OD_CYLINDER: -1.00
OD_AXIS: 100
OS_ADD: +2.75
OD_SPHERE: +1.25
OS_SPHERE: +2.50
OS_AXIS: 120
OD_SPHERE: +1.50
OS_CYLINDER: -1.75
OD_ADD: +2.75
OS_CYLINDER: -2.00
OS_AXIS: 100
METHOD_AUTOREFRACTION: 1
OD_CYLINDER: -1.25
OS_SPHERE: +2.50

## 2025-07-30 ASSESSMENT — ENCOUNTER SYMPTOMS
ALLERGIC/IMMUNOLOGIC NEGATIVE: 0
MUSCULOSKELETAL NEGATIVE: 0
NEUROLOGICAL NEGATIVE: 0
PSYCHIATRIC NEGATIVE: 0
EYES NEGATIVE: 0
HEMATOLOGIC/LYMPHATIC NEGATIVE: 0
CONSTITUTIONAL NEGATIVE: 0
CARDIOVASCULAR NEGATIVE: 0
ENDOCRINE NEGATIVE: 0
GASTROINTESTINAL NEGATIVE: 0
RESPIRATORY NEGATIVE: 0

## 2025-07-30 ASSESSMENT — EXTERNAL EXAM - RIGHT EYE: OD_EXAM: NORMAL

## 2025-07-30 ASSESSMENT — VISUAL ACUITY
OS_CC: 20/25
METHOD: SNELLEN - LINEAR
OD_CC: 20/20

## 2025-07-30 ASSESSMENT — REFRACTION_WEARINGRX
OD_CYLINDER: -1.50
OS_ADD: +2.75
SPECS_TYPE: PAL
OS_AXIS: 115
OS_CYLINDER: -1.50
OD_SPHERE: +1.50
OS_SPHERE: +2.25
OD_ADD: +2.75
OD_AXIS: 100

## 2025-07-30 ASSESSMENT — TONOMETRY
IOP_METHOD: GOLDMANN APPLANATION
OD_IOP_MMHG: 13
OS_IOP_MMHG: 15

## 2025-07-30 ASSESSMENT — PACHYMETRY
OS_CT(UM): 486
OD_CT(UM): 502

## 2025-07-30 ASSESSMENT — EXTERNAL EXAM - LEFT EYE: OS_EXAM: NORMAL

## 2025-07-30 NOTE — PROGRESS NOTES
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 Garcia for Thyroid eye disease and Dr. Pabon for Glaucoma  -current TXT for ocular surface: Systane PF AT's QID, Refresh pm abel at bedtime, and warm compresses  -Rx'd restasis at last exam but pt unable to afford   -Discussed price of Vevye or other options-pt reports she would not be able to afford $79  -(+)CPAP  -Hx of being prescribed Xiidra and Miebo but medication was too expensive to obtain  -Patient is also taking Glaucoma medications (latanoprost) and with concurrent glaucoma would like to try and stay away from a steroid pulse  -H/o of being given samples of steroid pulses to control symptoms    [place] plugs fell out from appt 11/7/24:  [place] plugs fell out from 4/23/25 (.6mm)     -TXT plan: Continue with Systane PF AT's QID, Refresh PM abel at bedtime, and warm compresses; Lacrifill today.     Lacrifill inserted 1 unit for each eye bilateral lower lid punctum.     Long-term use of Plaquenil  -patient currently taking 200mg Qday for Lupus; unknown duration  -Studies reveal that the risk of maculopathy when taking Plaquenil is 0% (0-5 years), 1% (over 5 years), 2% (over 10 years), and 20% cumulative, or 4% annual incidence (over 20 years) respectively.  Annual testing with 10-2 threshold visual field perimetry, as well as spectral domain optical coherence tomography of the macula is recommended.   -OCT MAC today does not show signs of toxicity  -HVF 10-2 today without signs of toxicity     Early Dry Macular Degeneration  -noted on DFE and MAC OCT today  -continue to monitor with annual exams  -discussed good diet/exercise/multivitamin/monitoring [facility]/no smoking/sunglasses    Hyperopia Regular Astigmatism Presbyopia  -New Rx  for glasses given per patient request. Patient's signature obtained to acknowledge and confirm that a paper copy of glasses Rx was given to patient in compliance with [employer] Eyeglass Rule. Electronic copy of Rx will also be available via [employer].       RTC with Dr. Garcia and Dr. Pabon as directed  RTC Srinivasa 6 months for dry eye f/u  RTC retina for monitoring AMD

## 2025-07-31 ENCOUNTER — APPOINTMENT (OUTPATIENT)
Dept: PRIMARY CARE | Facility: CLINIC | Age: 85
End: 2025-07-31
Payer: MEDICARE

## 2025-07-31 VITALS
HEART RATE: 67 BPM | BODY MASS INDEX: 28.9 KG/M2 | OXYGEN SATURATION: 96 % | RESPIRATION RATE: 16 BRPM | HEIGHT: 63 IN | SYSTOLIC BLOOD PRESSURE: 118 MMHG | TEMPERATURE: 97.3 F | WEIGHT: 163.1 LBS | DIASTOLIC BLOOD PRESSURE: 60 MMHG

## 2025-07-31 DIAGNOSIS — E11.22 TYPE 2 DIABETES MELLITUS WITH STAGE 3 CHRONIC KIDNEY DISEASE, WITHOUT LONG-TERM CURRENT USE OF INSULIN, UNSPECIFIED WHETHER STAGE 3A OR 3B CKD (MULTI): Primary | ICD-10-CM

## 2025-07-31 DIAGNOSIS — N18.30 TYPE 2 DIABETES MELLITUS WITH STAGE 3 CHRONIC KIDNEY DISEASE, WITHOUT LONG-TERM CURRENT USE OF INSULIN, UNSPECIFIED WHETHER STAGE 3A OR 3B CKD (MULTI): Primary | ICD-10-CM

## 2025-07-31 DIAGNOSIS — E78.5 HYPERLIPIDEMIA, UNSPECIFIED HYPERLIPIDEMIA TYPE: ICD-10-CM

## 2025-07-31 PROCEDURE — 99213 OFFICE O/P EST LOW 20 MIN: CPT

## 2025-07-31 PROCEDURE — 3074F SYST BP LT 130 MM HG: CPT

## 2025-07-31 PROCEDURE — 3078F DIAST BP <80 MM HG: CPT

## 2025-07-31 PROCEDURE — 1159F MED LIST DOCD IN RCRD: CPT

## 2025-07-31 RX ORDER — DEXTROSE 4 G
TABLET,CHEWABLE ORAL
Qty: 1 EACH | Refills: 0 | Status: SHIPPED | OUTPATIENT
Start: 2025-07-31

## 2025-07-31 RX ORDER — IBUPROFEN 200 MG
1 CAPSULE ORAL DAILY
Qty: 100 STRIP | Refills: 0 | Status: SHIPPED | OUTPATIENT
Start: 2025-07-31 | End: 2026-01-27

## 2025-07-31 RX ORDER — LANCETS
EACH MISCELLANEOUS
Qty: 100 EACH | Refills: 3 | Status: SHIPPED | OUTPATIENT
Start: 2025-07-31

## 2025-07-31 ASSESSMENT — ANXIETY QUESTIONNAIRES
7. FEELING AFRAID AS IF SOMETHING AWFUL MIGHT HAPPEN: NOT AT ALL
1. FEELING NERVOUS, ANXIOUS, OR ON EDGE: NOT AT ALL
2. NOT BEING ABLE TO STOP OR CONTROL WORRYING: NOT AT ALL
6. BECOMING EASILY ANNOYED OR IRRITABLE: NOT AT ALL
GAD7 TOTAL SCORE: 0
4. TROUBLE RELAXING: NOT AT ALL
5. BEING SO RESTLESS THAT IT IS HARD TO SIT STILL: NOT AT ALL
3. WORRYING TOO MUCH ABOUT DIFFERENT THINGS: NOT AT ALL

## 2025-07-31 ASSESSMENT — PATIENT HEALTH QUESTIONNAIRE - PHQ9
SUM OF ALL RESPONSES TO PHQ9 QUESTIONS 1 AND 2: 0
2. FEELING DOWN, DEPRESSED OR HOPELESS: NOT AT ALL
1. LITTLE INTEREST OR PLEASURE IN DOING THINGS: NOT AT ALL

## 2025-07-31 NOTE — PROGRESS NOTES
Subjective   Patient ID: Louisa Rodas is a 84 y.o. female who presents to establish care.     HPI  Louisa Rodas is a 84 year old female that is here today to establish care. She has a hx of DM II and requests diabetes supplies (meter, strips  and lancets). Her last hemoglobin A1C was 7.0 last year and has not had one since. She states that they were going to start Farxiga for CKD + glycemic control but decided to hold off 2/2 frequent UTI's. Pt states that she has not been getting UTI because she takes the cranberry tablets.  Patient denies any other issues this visit.     General: no fever or night sweats, no change in weight  Eyes: no vision disturbance  ENT: no hearing loss, no hoarseness, no mouth lesions, no sore throat, and no dysphagia  CV: no chest pain, no palpitations, no lower extremity edema  Resp: no shortness of breath, no cough  GI: no abdominal pain, no change in bowel habits  : no urinary problems  MSK: no arthralgias, myalgias, or back pain  Skin; no rashes or new/changed skin lesions  Neuro: no headache, no difficulty walking  Psych: no depression , no anxiety      Visit Vitals  /60 (BP Location: Right arm, Patient Position: Sitting)   Pulse 67   Temp 36.3 °C (97.3 °F)   Resp 16          Objective   Physical Exam  Vitals reviewed.   HENT:      Head: Normocephalic.      Mouth/Throat:      Mouth: Mucous membranes are moist.      Pharynx: Oropharynx is clear. No oropharyngeal exudate or posterior oropharyngeal erythema.     Eyes:      Extraocular Movements: Extraocular movements intact.       Cardiovascular:      Rate and Rhythm: Normal rate and regular rhythm.      Pulses: Normal pulses.   Pulmonary:      Effort: Pulmonary effort is normal.      Breath sounds: Normal breath sounds.     Musculoskeletal:      Cervical back: Normal range of motion.     Skin:     General: Skin is warm.     Neurological:      General: No focal deficit present.      Mental Status: She is alert.     Psychiatric:          Mood and Affect: Mood normal.         Behavior: Behavior normal.         Thought Content: Thought content normal.         Assessment/Plan     Assessment & Plan  Type 2 diabetes mellitus with stage 3 chronic kidney disease, without long-term current use of insulin, unspecified whether stage 3a or 3b CKD (Multi)  Orders:    Comprehensive Metabolic Panel; Future    blood-glucose meter misc; Take blood sugar once per day    blood sugar diagnostic (Blood Glucose Test); 1 strip once daily.    lancets misc; Take blood glucose one time per day    Hemoglobin A1C; Future    Hyperlipidemia, unspecified hyperlipidemia type  Orders:    Lipid Panel; Future           Wendy Escalera, APRN-CNP

## 2025-08-01 ENCOUNTER — PATIENT OUTREACH (OUTPATIENT)
Dept: PRIMARY CARE | Facility: CLINIC | Age: 85
End: 2025-08-01
Payer: MEDICARE

## 2025-08-01 DIAGNOSIS — E11.22 TYPE 2 DIABETES MELLITUS WITH STAGE 3B CHRONIC KIDNEY DISEASE, WITHOUT LONG-TERM CURRENT USE OF INSULIN (MULTI): ICD-10-CM

## 2025-08-01 DIAGNOSIS — N18.32 TYPE 2 DIABETES MELLITUS WITH STAGE 3B CHRONIC KIDNEY DISEASE, WITHOUT LONG-TERM CURRENT USE OF INSULIN (MULTI): ICD-10-CM

## 2025-08-01 DIAGNOSIS — I10 PRIMARY HYPERTENSION: ICD-10-CM

## 2025-08-01 NOTE — PROGRESS NOTES
Care Management Monthly Outreach  Chart review completed  Confirmation of at least 2 patient identifiers  Change in insurance? No    Has patient been to ER/Urgent Care since last outreach? No    Last Office Visit with PCP: 5/14/2025   Next Office Visit with PCP: 10/28/2025  APC Collaboration: n/a    Chronic Conditions and Outreach Summary:   Type 2 diabetes mellitus with stage 3b chronic kidney disease, without long-term current use of insulin (Multi)    Primary hypertension    Spoke with Patient and she is doing well. Patient states she met with her new Provider and it was a great appointment. Patient states she has to  her new glucometer and supplies and was directed to check her blood sugar once daily. Patient states she likes sweets and is trying to cut down on the sweets. Patient has an appointment with Nutrition on 08/25/25. Encouraged to eat a well balanced diet and to monitor her blood sugar. She denies any falls and uses her cane less during the warm weather days. Patient feels like she is moving better and she has an appointment with physical therapy on Monday August 04. Patient's blood pressure was good at her recent visit yesterday, it was 118/60. Appointments reviewed and Patient is aware she has several appointments this month.     Medications:   Are there medication changes since last visit? No  Refills needed? No    Social Drivers of Health: Deferred  Care Gaps Addressed? Deferred  Care Plan addressed: Yes    Upcoming Appointments:   Future Appointments       Date / Time Provider Department Dept Phone    8/4/2025 12:15 PM Mason Mackay, PT [facility] [phone]    8/7/2025 1:40 PM Azael Dexter MD [facility] [phone]    8/19/2025 11:30 AM (Arrive by 11:15 AM) Donta Higginbotham MD [facility] [place] [phone]    8/20/2025 4:15 PM Mason Mackay, PT [facility] [phone]    8/21/2025  11:30 AM (Arrive by 11:15 AM) [facility] [phone]    8/21/2025 12:30 PM Ashleigh Thompsondorcas APRN-CNP  [facility] [phone]    8/25/2025 2:00 PM Fernanda Gonzalez RD, LD [facility] [phone]    10/28/2025 1:30 PM (Arrive by 1:15 PM) Wendy GUTIERRES Jw APRN-CNP  [facility] [phone]    11/6/2025 10:30 AM Aureliano Pabon MD [facility] [phone]    11/12/2025 2:15 PM Alejandro Pace MD [facility] [phone]    11/25/2025 1:20 PM Brandon Allen MD [facility] [phone]    12/15/2025 2:00 PM Christiane Cooper MD [facility] [phone]    1/28/2026 1:30 PM Hu Bernabe OD [facility] [phone]    4/15/2026 1:00 PM (Arrive by 12:45 PM) [facility] 1 [facility] [phone]    5/5/2026 1:30 PM Margarita Man APRN-CNP [facility] [phone]    5/12/2026 1:00 PM Brandon Allen MD [facility] [phone]    6/18/2026 1:40 PM Alton Salamanca APRN-CNP [facility] [phone]          Blood Pressures Reviewed  BP Readings from Last 3 Encounters:   07/31/25 118/60   06/26/25 118/66   06/12/25 127/80     Labs Reviewed:  Lab Results   Component Value Date    CREATININE 1.50 (H) 06/09/2025    GLUCOSE 121 (H) 06/09/2025    ALKPHOS 72 03/18/2025    K 4.2 06/09/2025    PROT 6.1 03/18/2025     06/09/2025    CALCIUM 9.2 06/09/2025    AST 20 03/18/2025    ALT 12 03/18/2025    BUN 27 (H) 06/09/2025    MG 1.86 05/24/2023    PHOS 3.5 06/09/2025    GFRF 46 (A) 06/19/2023     Lab Results   Component Value Date    TRIG 91 11/13/2023    CHOL 139 11/13/2023    LDLCALC 66 11/13/2023    HDL 55.2 11/13/2023     Lab Results   Component Value Date    HGBA1C 7.0 (H) 05/09/2024    HGBA1C 7.2 (H) 02/20/2024    HGBA1C 6.9 (H) 11/13/2023     Lab Results    Component Value Date    WBC 3.7 (L) 06/09/2025    RBC 3.91 06/09/2025    HGB 12.1 06/09/2025     06/09/2025   No other concerns at this time.  Agreeable to continue monthly outreaches.  Encouraged to call if questions or concerns arise.    Elsy Irving RN

## 2025-08-04 ENCOUNTER — DOCUMENTATION (OUTPATIENT)
Dept: PHYSICAL THERAPY | Facility: CLINIC | Age: 85
End: 2025-08-04
Payer: MEDICARE

## 2025-08-04 ENCOUNTER — APPOINTMENT (OUTPATIENT)
Dept: PHYSICAL THERAPY | Facility: CLINIC | Age: 85
End: 2025-08-04
Payer: MEDICARE

## 2025-08-04 NOTE — PROGRESS NOTES
Physical Therapy                 Therapy Communication Note    Patient Name: Louisa MCCAIN Rdoas  MRN: [id number]  Department:   Room: Room/bed info not found  Today's Date: 8/4/2025     Discipline: Physical Therapy    Missed Visit:       Missed Visit Reason:      Missed Time: No Show    Comment:    First no show

## 2025-08-07 ENCOUNTER — OFFICE VISIT (OUTPATIENT)
Dept: GASTROENTEROLOGY | Facility: CLINIC | Age: 85
End: 2025-08-07
Payer: MEDICARE

## 2025-08-07 VITALS
WEIGHT: 161 LBS | SYSTOLIC BLOOD PRESSURE: 135 MMHG | HEART RATE: 68 BPM | HEIGHT: 63 IN | TEMPERATURE: 97.2 F | BODY MASS INDEX: 28.53 KG/M2 | DIASTOLIC BLOOD PRESSURE: 72 MMHG

## 2025-08-07 DIAGNOSIS — Q44.6 POLYCYSTIC LIVER DISEASE: Primary | ICD-10-CM

## 2025-08-07 PROCEDURE — 3075F SYST BP GE 130 - 139MM HG: CPT | Performed by: STUDENT IN AN ORGANIZED HEALTH CARE EDUCATION/TRAINING PROGRAM

## 2025-08-07 PROCEDURE — 99214 OFFICE O/P EST MOD 30 MIN: CPT | Performed by: STUDENT IN AN ORGANIZED HEALTH CARE EDUCATION/TRAINING PROGRAM

## 2025-08-07 PROCEDURE — 3078F DIAST BP <80 MM HG: CPT | Performed by: STUDENT IN AN ORGANIZED HEALTH CARE EDUCATION/TRAINING PROGRAM

## 2025-08-07 PROCEDURE — 1159F MED LIST DOCD IN RCRD: CPT | Performed by: STUDENT IN AN ORGANIZED HEALTH CARE EDUCATION/TRAINING PROGRAM

## 2025-08-07 PROCEDURE — 99212 OFFICE O/P EST SF 10 MIN: CPT

## 2025-08-07 NOTE — PROGRESS NOTES
[facility] HEPATOLOGY CLINIC NOTE     History of Present Illness:   Louisa Rodas is a 84 y.o. female who presents to clinic for follow-up of polycystic liver disease.  She is a former patient of a retired colleague in pathology.    The patient has a long history of multiple liver cyst within the liver.  Most importantly, she had an MRI in 2022 that demonstrated benign liver cyst and multiple areas of liver, the largest of which measured 3 cm.  There was no evidence of any mass effect on the liver.  The indication with MRI was not only cyst but also for cirrhosis however there was a noncirrhotic appearing liver there is some question of nodularity but no definitive evidence of portal hypertension or nodularity.  The patient has an intermittent thrombocytopenia however her most recent platelet count is within normal limits.    At present the patient is feeling well without any abdominal pain, nausea, vomiting, fevers, chills.    She recently got a new primary care doctor as her old one retired.  Other medical history includes hyperlipidemia, hypertension, hypertensive heart disease, type 2 diabetes mellitus and hypothyroidism.  The patient does use occasional alcohol.  She reports having a sweet tooth and eats a lot of desserts.    Review of Systems  Review of systems otherwise negative.     Social History   reports that she quit smoking about 58 years ago. Her smoking use included cigarettes. She started smoking about 59 years ago. She has a 0.3 pack-year smoking history. She has been exposed to tobacco smoke. She has never used smokeless tobacco. She reports that she does not currently use alcohol. She reports that she does not use drugs.     Family History  family history includes Alzheimer's disease in her mother; Arthritis in her mother; Cataracts in her mother; Glaucoma in her mother; Hypertension in her mother; Pancreatic cancer in her brother and father; diabetes mellitus in her brother, father,  sibling, and son.     Allergies  RX Allergies[1]    Medications  Current Outpatient Medications   Medication Instructions    acetaminophen (TYLENOL EXTRA STRENGTH) 1,000 mg, oral, Every 6 hours PRN    acyclovir (ZOVIRAX) 400 mg, oral, Daily    albuterol (ProAir HFA) 90 mcg/actuation inhaler 2 puffs, Every 4 hours PRN    allantoin gel As needed    amLODIPine (NORVASC) 2.5 mg, oral, Daily    atorvastatin (LIPITOR) 20 mg, oral, 3 times weekly    azelastine (Astelin) 137 mcg (0.1 %) nasal spray 2 sprays, 2 times daily PRN    blood sugar diagnostic (Blood Glucose Test) 1 strip, miscellaneous, Daily    blood-glucose meter misc Take blood sugar once per day    budesonide-formoteroL (Symbicort) 160-4.5 mcg/actuation inhaler 2 puffs, inhalation, Daily, RINSE MOUTH AFTER USE.    butenafine HCl (MENTAX TOP) 1 tablet, Daily    calcium carbonate/vitamin D3 (CALTRATE WITH VITAMIN D3 ORAL) Take by mouth. Takes dose unknown    cholecalciferol (VITAMIN D-3) 50 mcg, Daily    cyanocobalamin, vitamin B-12, (Vitamin B-12) 2,500 mcg tablet, sublingual SL tablet 1 tablet, Daily PRN    fluocinolone and shower cap 0.01 % oil Apply to scalp  leave on overnight and wash off in the morning. Use daily    hydroxychloroquine (PLAQUENIL) 200 mg, oral, Daily    ketoconazole (NIZOral) 2 % shampoo No dose, route, or frequency recorded.    lancets misc Take blood glucose one time per day    latanoprost (Xalatan) 0.005 % ophthalmic solution 1 drop, Both Eyes, Daily    levothyroxine (Synthroid, Levoxyl) 100 mcg tablet TAKE 1 TABLET BY MOUTH 5 TIMES A WEEK.    lifitegrast (Xiidra) 5 % dropperette 1 drop    lisinopril 20 mg, oral, Daily    medical cannabis 1 each, As needed    montelukast (Singulair) 10 mg tablet 1 tablet, Daily    OneTouch Verio test strips strip 1 each, Topical, Daily PRN    peg 400-propylene glycol, PF, (Systane, PF,) 0.4-0.3 % dropperette Administer into affected eye(s).    povidone, PF, (iVizia, PF,) 0.5 % drops 1 drop, 3 times daily  PRN    propranolol (INDERAL) 10 mg, oral, Daily    sulfamethoxazole-trimethoprim (Bactrim DS) 800-160 mg tablet 1 tablet, Daily    travoprost (Travatan Z) 0.004 % drops ophthalmic solution 1 drop, Nightly    VITAMIN B COMPLEX ORAL Every 24 hours PRN    zinc gluconate 50 mg tablet Every 24 hours PRN        Visit Vitals  /72   Pulse 68   Temp 36.2 °C (97.2 °F) (Temporal)      Physical Exam  Elderly woman in no acute distress, nontoxic-appearing, well-nourished and developed  BMI is 28.5  Abdomen is soft and nontender to palpation, normal active bowel sounds  No lower extremity edema  Heart lung exam is unremarkable.    Labs    Lab Results   Component Value Date    WBC 3.7 (L) 06/09/2025    HGB 12.1 06/09/2025    HCT 36.9 06/09/2025    MCV 94.4 06/09/2025     06/09/2025     Lab Results   Component Value Date    GLUCOSE 121 (H) 06/09/2025    CALCIUM 9.2 06/09/2025     06/09/2025    K 4.2 06/09/2025    CO2 26 06/09/2025     06/09/2025    BUN 27 (H) 06/09/2025    CREATININE 1.50 (H) 06/09/2025     Lab Results   Component Value Date    ALT 12 03/18/2025    AST 20 03/18/2025    ALKPHOS 72 03/18/2025    BILITOT 0.4 03/18/2025     Lab Results   Component Value Date    INR 1.1 03/18/2025    INR 1.0 05/09/2024    INR 1.0 03/21/2024    PROTIME 11.2 03/18/2025    PROTIME 11.6 05/09/2024    PROTIME 11.7 03/21/2024     ASSESSMENT AND PLAN:    #Mild polycystic liver disease  I reviewed the patient's liver tests and they are entirely normal, her recent imaging is demonstrate stable cystic liver disease without any complications or signs of malignancy.  There was a question of cirrhosis in the past however I do not believe this patient has cirrhosis.    She may continue to have a liver ultrasound with her primary care doctor performed once per year.    She can return to hepatology clinic for follow-up if she has elevated liver tests, if there is a concerning new lesion on ultrasound or progression of liver  disease.      Azael Dexter MD  [facility]                   [1]   Allergies  Allergen Reactions    Penicillins Hives and Itching    Primidone Other     Stuttering     Tizanidine Other     Stuttering

## 2025-08-08 ENCOUNTER — TREATMENT (OUTPATIENT)
Dept: PHYSICAL THERAPY | Facility: CLINIC | Age: 85
End: 2025-08-08
Payer: MEDICARE

## 2025-08-08 DIAGNOSIS — R26.89 IMBALANCE: ICD-10-CM

## 2025-08-08 DIAGNOSIS — M25.561 CHRONIC PAIN OF RIGHT KNEE: ICD-10-CM

## 2025-08-08 DIAGNOSIS — G89.29 CHRONIC PAIN OF RIGHT KNEE: ICD-10-CM

## 2025-08-08 PROCEDURE — 97110 THERAPEUTIC EXERCISES: CPT | Mod: GP | Performed by: PHYSICAL THERAPIST

## 2025-08-08 NOTE — PROGRESS NOTES
"Physical Therapy    Physical Therapy Treatment      Patient Name: Louisa Rodas  MRN: [id number]  Today's Date: 8/8/2025    Time Entry:   Time Calculation  Start Time: 1230  Stop Time: 1330  Time Calculation (min): 60 min     PT Therapeutic Procedures Time Entry  Therapeutic Exercise Time Entry: 45        Visit: 3  Insurance: [employer]  Auth: Required   6 total visits 7/8/25-9/2/25    Assessment:  Some difficulty with squats today; limited ability to maintain 50% Wbing on R LE d/t increased pain in R knee today. No difficulty with step ups  Will continue to progress as tolerated       Plan:  NuStep  Lateral walks   Resisted hip abd       Current Problem:   1. Chronic pain of right knee  Follow Up In Physical Therapy      2. Imbalance  Follow Up In Physical Therapy          Subjective    \"This knee has been giving me trouble\"       Objective        Strength R / L :    Hip Flexion:     5 / 5    Hip Extension:     5 / 5     Hip Abduction:    5 / 5    Hip Adduction:    5 / 5    Hip ER:      4+ / 5    Hip IR:       5 / 5      Knee Extension:   5 / 5    Knee Flexion:       4+ / 4+      Ankle DF:             5 / 5    Ankle PF:              5 / 5        TREATMENT  Therapeutic exercise:   Quad sets x 10 with 3 hold   Hip abd (black) x 20   Clamshells (black) x 10 ea   Glute isometrics x 20 with 3 hold   Lumbar rot x 10   HS ball curls x20  SLR 2x10 ea   LAQ 2x10 ea  DL calf raises x20  Step ups (6 in) x 20 ea   Calf stretch on board 5x10 sec  Sit to stand x 10 from airex, x 5 with focus on 50/50 WB     Heat to R knee x 10 min EOS          Goals:  Active       PT Problem       Pt will report 25% reduction in R knee stiffness for improved ability to walk without SPC       Start:  07/10/25    Expected End:  10/06/25            Pt will increase bilateral LE strength by 1 MMT grade for all weakened muscle groups for improved LE stability        Start:  07/10/25    Expected End:  10/06/25            Pt will increase L " SLS balance to 10 seconds for improved stability with ambulation and steps       Start:  07/10/25    Expected End:  10/06/25            Pt will be independent in HEP for home maintenance        Start:  07/10/25    Expected End:  10/06/25

## 2025-08-14 LAB
ALBUMIN SERPL-MCNC: 4.4 G/DL (ref 3.6–5.1)
ALP SERPL-CCNC: 85 U/L (ref 37–153)
ALT SERPL-CCNC: 15 U/L (ref 6–29)
ANION GAP SERPL CALCULATED.4IONS-SCNC: 9 MMOL/L (CALC) (ref 7–17)
AST SERPL-CCNC: 21 U/L (ref 10–35)
BILIRUB SERPL-MCNC: 0.4 MG/DL (ref 0.2–1.2)
BUN SERPL-MCNC: 22 MG/DL (ref 7–25)
CALCIUM SERPL-MCNC: 9.3 MG/DL (ref 8.6–10.4)
CHLORIDE SERPL-SCNC: 106 MMOL/L (ref 98–110)
CHOLEST SERPL-MCNC: 139 MG/DL
CHOLEST/HDLC SERPL: 2.2 (CALC)
CO2 SERPL-SCNC: 26 MMOL/L (ref 20–32)
CREAT SERPL-MCNC: 0.94 MG/DL (ref 0.6–0.95)
EGFRCR SERPLBLD CKD-EPI 2021: 61 ML/MIN/1.73M2
EST. AVERAGE GLUCOSE BLD GHB EST-MCNC: 146 MG/DL
EST. AVERAGE GLUCOSE BLD GHB EST-SCNC: 8.1 MMOL/L
GLUCOSE SERPL-MCNC: 116 MG/DL (ref 65–99)
HBA1C MFR BLD: 6.7 %
HDLC SERPL-MCNC: 63 MG/DL
LDLC SERPL CALC-MCNC: 59 MG/DL (CALC)
NONHDLC SERPL-MCNC: 76 MG/DL (CALC)
POTASSIUM SERPL-SCNC: 4.2 MMOL/L (ref 3.5–5.3)
PROT SERPL-MCNC: 6.4 G/DL (ref 6.1–8.1)
SODIUM SERPL-SCNC: 141 MMOL/L (ref 135–146)
TRIGL SERPL-MCNC: 88 MG/DL

## 2025-08-15 ENCOUNTER — APPOINTMENT (OUTPATIENT)
Dept: RADIOLOGY | Facility: HOSPITAL | Age: 85
End: 2025-08-15
Payer: MEDICARE

## 2025-08-15 ENCOUNTER — APPOINTMENT (OUTPATIENT)
Dept: SURGICAL ONCOLOGY | Facility: HOSPITAL | Age: 85
End: 2025-08-15
Payer: MEDICARE

## 2025-08-18 ENCOUNTER — TREATMENT (OUTPATIENT)
Dept: PHYSICAL THERAPY | Facility: CLINIC | Age: 85
End: 2025-08-18
Payer: MEDICARE

## 2025-08-18 DIAGNOSIS — R26.89 IMBALANCE: ICD-10-CM

## 2025-08-18 DIAGNOSIS — G89.29 CHRONIC PAIN OF RIGHT KNEE: ICD-10-CM

## 2025-08-18 DIAGNOSIS — M25.561 CHRONIC PAIN OF RIGHT KNEE: ICD-10-CM

## 2025-08-18 PROCEDURE — 97110 THERAPEUTIC EXERCISES: CPT | Mod: GP | Performed by: PHYSICAL THERAPIST

## 2025-08-19 ENCOUNTER — OFFICE VISIT (OUTPATIENT)
Dept: SLEEP MEDICINE | Facility: HOSPITAL | Age: 85
End: 2025-08-19
Payer: MEDICARE

## 2025-08-19 VITALS
SYSTOLIC BLOOD PRESSURE: 132 MMHG | DIASTOLIC BLOOD PRESSURE: 83 MMHG | BODY MASS INDEX: 28.7 KG/M2 | OXYGEN SATURATION: 96 % | WEIGHT: 162 LBS | TEMPERATURE: 98.1 F

## 2025-08-19 DIAGNOSIS — J45.40 MODERATE PERSISTENT ASTHMA WITHOUT COMPLICATION (HHS-HCC): ICD-10-CM

## 2025-08-19 DIAGNOSIS — G47.33 OSA ON CPAP: Primary | ICD-10-CM

## 2025-08-19 PROCEDURE — 3075F SYST BP GE 130 - 139MM HG: CPT | Performed by: HOSPITALIST

## 2025-08-19 PROCEDURE — 1126F AMNT PAIN NOTED NONE PRSNT: CPT | Performed by: HOSPITALIST

## 2025-08-19 PROCEDURE — 99215 OFFICE O/P EST HI 40 MIN: CPT | Performed by: HOSPITALIST

## 2025-08-19 PROCEDURE — 3079F DIAST BP 80-89 MM HG: CPT | Performed by: HOSPITALIST

## 2025-08-19 PROCEDURE — G2211 COMPLEX E/M VISIT ADD ON: HCPCS | Performed by: HOSPITALIST

## 2025-08-19 PROCEDURE — 1159F MED LIST DOCD IN RCRD: CPT | Performed by: HOSPITALIST

## 2025-08-19 ASSESSMENT — ENCOUNTER SYMPTOMS
CHILLS: 0
UNEXPECTED WEIGHT CHANGE: 0
SLEEP DISTURBANCE: 1
FEVER: 0
WHEEZING: 0
COUGH: 0
SHORTNESS OF BREATH: 0

## 2025-08-19 ASSESSMENT — PAIN SCALES - GENERAL: PAINLEVEL_OUTOF10: 0-NO PAIN

## 2025-08-20 ENCOUNTER — APPOINTMENT (OUTPATIENT)
Dept: PHYSICAL THERAPY | Facility: CLINIC | Age: 85
End: 2025-08-20
Payer: MEDICARE

## 2025-08-21 ENCOUNTER — TELEPHONE (OUTPATIENT)
Dept: PRIMARY CARE | Facility: CLINIC | Age: 85
End: 2025-08-21

## 2025-08-21 ENCOUNTER — OFFICE VISIT (OUTPATIENT)
Dept: SURGICAL ONCOLOGY | Facility: HOSPITAL | Age: 85
End: 2025-08-21
Payer: MEDICARE

## 2025-08-21 ENCOUNTER — HOSPITAL ENCOUNTER (OUTPATIENT)
Dept: RADIOLOGY | Facility: HOSPITAL | Age: 85
Discharge: HOME | End: 2025-08-21
Payer: MEDICARE

## 2025-08-21 VITALS
DIASTOLIC BLOOD PRESSURE: 69 MMHG | BODY MASS INDEX: 28.86 KG/M2 | RESPIRATION RATE: 20 BRPM | HEART RATE: 65 BPM | OXYGEN SATURATION: 96 % | TEMPERATURE: 96.6 F | WEIGHT: 162.9 LBS | SYSTOLIC BLOOD PRESSURE: 132 MMHG

## 2025-08-21 DIAGNOSIS — R92.8 ABNORMAL FINDING ON BREAST IMAGING: ICD-10-CM

## 2025-08-21 DIAGNOSIS — Z85.3 PERSONAL HISTORY OF BREAST CANCER: ICD-10-CM

## 2025-08-21 DIAGNOSIS — D05.12 DUCTAL CARCINOMA IN SITU (DCIS) OF LEFT BREAST: Primary | ICD-10-CM

## 2025-08-21 DIAGNOSIS — Z08 ENCOUNTER FOR FOLLOW-UP SURVEILLANCE OF BREAST CANCER: ICD-10-CM

## 2025-08-21 DIAGNOSIS — Z85.3 ENCOUNTER FOR FOLLOW-UP SURVEILLANCE OF BREAST CANCER: ICD-10-CM

## 2025-08-21 PROCEDURE — 3078F DIAST BP <80 MM HG: CPT

## 2025-08-21 PROCEDURE — 77061 BREAST TOMOSYNTHESIS UNI: CPT | Mod: LEFT SIDE | Performed by: STUDENT IN AN ORGANIZED HEALTH CARE EDUCATION/TRAINING PROGRAM

## 2025-08-21 PROCEDURE — 77061 BREAST TOMOSYNTHESIS UNI: CPT | Mod: LT

## 2025-08-21 PROCEDURE — 3075F SYST BP GE 130 - 139MM HG: CPT

## 2025-08-21 PROCEDURE — 99214 OFFICE O/P EST MOD 30 MIN: CPT

## 2025-08-21 PROCEDURE — 1126F AMNT PAIN NOTED NONE PRSNT: CPT

## 2025-08-21 PROCEDURE — 1159F MED LIST DOCD IN RCRD: CPT

## 2025-08-21 PROCEDURE — 77065 DX MAMMO INCL CAD UNI: CPT | Mod: LEFT SIDE | Performed by: STUDENT IN AN ORGANIZED HEALTH CARE EDUCATION/TRAINING PROGRAM

## 2025-08-21 ASSESSMENT — PAIN SCALES - GENERAL: PAINLEVEL_OUTOF10: 0-NO PAIN

## 2025-08-25 ENCOUNTER — NUTRITION (OUTPATIENT)
Dept: NUTRITION | Facility: HOSPITAL | Age: 85
End: 2025-08-25
Payer: MEDICARE

## 2025-08-25 DIAGNOSIS — N18.2 DIABETES MELLITUS DUE TO UNDERLYING CONDITION WITH STAGE 2 CHRONIC KIDNEY DISEASE, WITHOUT LONG-TERM CURRENT USE OF INSULIN (MULTI): Primary | ICD-10-CM

## 2025-08-25 DIAGNOSIS — E08.22 DIABETES MELLITUS DUE TO UNDERLYING CONDITION WITH STAGE 2 CHRONIC KIDNEY DISEASE, WITHOUT LONG-TERM CURRENT USE OF INSULIN (MULTI): Primary | ICD-10-CM

## 2025-08-25 DIAGNOSIS — N18.31 STAGE 3A CHRONIC KIDNEY DISEASE (MULTI): ICD-10-CM

## 2025-08-25 PROCEDURE — 97803 MED NUTRITION INDIV SUBSEQ: CPT

## 2025-08-27 ENCOUNTER — TELEPHONE (OUTPATIENT)
Dept: NEUROLOGY | Facility: CLINIC | Age: 85
End: 2025-08-27
Payer: MEDICARE

## 2025-08-27 ENCOUNTER — TREATMENT (OUTPATIENT)
Dept: PHYSICAL THERAPY | Facility: CLINIC | Age: 85
End: 2025-08-27
Payer: MEDICARE

## 2025-08-27 DIAGNOSIS — G89.29 CHRONIC PAIN OF RIGHT KNEE: ICD-10-CM

## 2025-08-27 DIAGNOSIS — R26.89 IMBALANCE: ICD-10-CM

## 2025-08-27 DIAGNOSIS — M25.561 CHRONIC PAIN OF RIGHT KNEE: ICD-10-CM

## 2025-08-27 PROCEDURE — 97110 THERAPEUTIC EXERCISES: CPT | Mod: GP | Performed by: PHYSICAL THERAPIST

## 2025-08-29 ENCOUNTER — TELEPHONE (OUTPATIENT)
Dept: SLEEP MEDICINE | Facility: HOSPITAL | Age: 85
End: 2025-08-29
Payer: MEDICARE

## 2025-08-29 DIAGNOSIS — G47.33 OSA (OBSTRUCTIVE SLEEP APNEA): ICD-10-CM

## 2025-09-02 ENCOUNTER — TELEPHONE (OUTPATIENT)
Dept: PRIMARY CARE | Facility: CLINIC | Age: 85
End: 2025-09-02
Payer: MEDICARE

## 2025-09-03 ENCOUNTER — PATIENT OUTREACH (OUTPATIENT)
Dept: PRIMARY CARE | Facility: CLINIC | Age: 85
End: 2025-09-03
Payer: MEDICARE

## 2025-09-03 ENCOUNTER — TELEPHONE (OUTPATIENT)
Dept: PRIMARY CARE | Facility: CLINIC | Age: 85
End: 2025-09-03

## 2025-09-03 DIAGNOSIS — N18.32 TYPE 2 DIABETES MELLITUS WITH STAGE 3B CHRONIC KIDNEY DISEASE, WITHOUT LONG-TERM CURRENT USE OF INSULIN (MULTI): ICD-10-CM

## 2025-09-03 DIAGNOSIS — E11.22 TYPE 2 DIABETES MELLITUS WITH STAGE 3B CHRONIC KIDNEY DISEASE, WITHOUT LONG-TERM CURRENT USE OF INSULIN (MULTI): ICD-10-CM

## 2025-09-03 DIAGNOSIS — G47.33 OSA ON CPAP: ICD-10-CM

## 2025-09-03 DIAGNOSIS — E03.9 HYPOTHYROIDISM, UNSPECIFIED TYPE: Primary | ICD-10-CM

## 2025-09-03 DIAGNOSIS — I10 PRIMARY HYPERTENSION: ICD-10-CM

## 2025-09-03 RX ORDER — AMLODIPINE BESYLATE 2.5 MG/1
2.5 TABLET ORAL DAILY
Qty: 90 TABLET | Refills: 2 | Status: SHIPPED | OUTPATIENT
Start: 2025-09-03

## 2025-09-06 LAB — TSH SERPL-ACNC: 0.29 MIU/L (ref 0.4–4.5)

## 2025-10-28 ENCOUNTER — APPOINTMENT (OUTPATIENT)
Dept: PRIMARY CARE | Facility: CLINIC | Age: 85
End: 2025-10-28
Payer: MEDICARE

## 2025-11-06 ENCOUNTER — APPOINTMENT (OUTPATIENT)
Dept: OPHTHALMOLOGY | Facility: CLINIC | Age: 85
End: 2025-11-06
Payer: MEDICARE

## 2025-11-12 ENCOUNTER — APPOINTMENT (OUTPATIENT)
Dept: OPHTHALMOLOGY | Facility: CLINIC | Age: 85
End: 2025-11-12
Payer: MEDICARE

## 2025-11-25 ENCOUNTER — APPOINTMENT (OUTPATIENT)
Dept: RHEUMATOLOGY | Facility: CLINIC | Age: 85
End: 2025-11-25
Payer: MEDICARE

## 2025-12-15 ENCOUNTER — APPOINTMENT (OUTPATIENT)
Dept: NEPHROLOGY | Facility: CLINIC | Age: 85
End: 2025-12-15
Payer: MEDICARE

## 2026-01-28 ENCOUNTER — APPOINTMENT (OUTPATIENT)
Dept: OPHTHALMOLOGY | Facility: CLINIC | Age: 86
End: 2026-01-28
Payer: MEDICARE

## 2026-04-08 ENCOUNTER — APPOINTMENT (OUTPATIENT)
Dept: ENDOCRINOLOGY | Facility: CLINIC | Age: 86
End: 2026-04-08
Payer: MEDICARE

## 2026-05-12 ENCOUNTER — APPOINTMENT (OUTPATIENT)
Dept: RHEUMATOLOGY | Facility: CLINIC | Age: 86
End: 2026-05-12
Payer: MEDICARE

## 2026-05-20 ENCOUNTER — APPOINTMENT (OUTPATIENT)
Dept: PRIMARY CARE | Facility: CLINIC | Age: 86
End: 2026-05-20
Payer: MEDICARE

## 2026-06-18 ENCOUNTER — APPOINTMENT (OUTPATIENT)
Dept: PULMONOLOGY | Facility: HOSPITAL | Age: 86
End: 2026-06-18
Payer: MEDICARE

## (undated) DEVICE — SUTURE, MONOCRYL, 4-0, 18 IN, PS2, UNDYED

## (undated) DEVICE — SOLUTION, IRRIGATION, STERILE WATER, 1000 ML, POUR BOTTLE

## (undated) DEVICE — NEEDLE, SPINAL, QUINCKE, 18 G X 3.5 IN, PINK HUB

## (undated) DEVICE — SUTURE, ETHIBOND, P2, V-37, 30 IN, GREEN

## (undated) DEVICE — BLADE, SAW, DUAL SAGITTAL, 1.9 X 90 X 30

## (undated) DEVICE — DRAPE, IRRIGATION, W/POUCH, ADHESIVE STRIP, STERI DRAPE, 19 X 23 IN, DISPOSABLE, STERILE

## (undated) DEVICE — COVER, MAYO STAND, W/PAD, 23 IN, DISPOSABLE, PLASTIC, LF, STERILE

## (undated) DEVICE — SUTURE, VICRYL, 2-0, 27 IN, SH, UNDYED

## (undated) DEVICE — GLOVE, SURGICAL, PROTEXIS PI ORTHO, 7.0, PF, LF

## (undated) DEVICE — SYRINGE, 50 CC, LUER LOCK

## (undated) DEVICE — SUTURE, MONOCRYL, 3-0, 18 IN, PS2, UNDYED

## (undated) DEVICE — SUTURE, VICRYL, 3-0, 27 IN, SH

## (undated) DEVICE — DRESSING, TRANSPARENT, TEGADERM, 4 X 4-3/4 IN, NO LABEL

## (undated) DEVICE — KIT, MARGINMARKER, 6 INK COLORS, STANDARD

## (undated) DEVICE — Device

## (undated) DEVICE — SUTURE, MONOCRYL, 3-0, 27 IN, PS-2, UNDYED

## (undated) DEVICE — HOOD, SURGICAL, FLYTE SURGICOOL

## (undated) DEVICE — ELECTRODE, ELECTROSURGICAL, BLADE, INSULATED, ENT/IMA, STERILE

## (undated) DEVICE — DRAPE, SHEET, THREE QUARTER, FAN FOLD, 57 X 77 IN

## (undated) DEVICE — HOOD, STERISHIELD T4 SYSTEM

## (undated) DEVICE — STRIP, SKIN CLOSURE, STERI STRIP, REINFORCED, 0.5 X 4 IN

## (undated) DEVICE — DRAINAGE SET, CLOSED WOUND, MED-LG, PVC, 3/16 IN, DAVOL, 15 FR, 400 CC

## (undated) DEVICE — PROBE COVER, INTRAOPERATIVE, 13 X 244CM (5 X 96IN)

## (undated) DEVICE — GLOVE, SURGICAL, PROTEXIS PI ORTHO, 7.5, PF, LF

## (undated) DEVICE — DRESSING, SPONGE, GAUZE, CURITY, 4 X 4 IN, STERILE

## (undated) DEVICE — DRESSING, GAUZE, SUPER, KERLIX, 6 X 6.75 IN, BULK, NS

## (undated) DEVICE — EVACUATOR, WOUND, CLOSED, 3 SPRING, 400 CC, Y CONNECTING TUBE

## (undated) DEVICE — BLADE, SAW, RECIPROCATING, DOUBLE-SIDED, 70 X 12.5 X 1 MM, STAINLESS STEEL

## (undated) DEVICE — SALINE NORMAL (100/PK)

## (undated) DEVICE — HIGH FLOW TIP FOR INTERPULSE HANDPIECE SET

## (undated) DEVICE — SPONGE, GAUZE, 12 PLY, 4 X 4, STERILE, DISP

## (undated) DEVICE — SPONGE, LAP, XRAY DECT, 18IN X 18IN, W/MASTER DMT, STERILE

## (undated) DEVICE — INTERPULSE HANDPIECE SET W/ 10FT SUCTION TUBING

## (undated) DEVICE — GLOVE, SURGICAL, PROTEXIS PI MICRO, 7.5, PF, LF

## (undated) DEVICE — DRESSING, GAUZE, SPONGE, 12 PLY, CURITY, 4 X 4 IN, STERILE

## (undated) DEVICE — SOLUTION, IRRIGATION, SODIUM CHLORIDE 0.9%, 1000 ML, POUR BOTTLE

## (undated) DEVICE — SUTURE, SILK, 2-0, 30 IN, SH, BLACK

## (undated) DEVICE — GLOVE, SURGICAL, PROTEXIS PI BLUE W/NEUTHERA, 6.0, PF, LF

## (undated) DEVICE — CLIP, LIGATING, HORIZON, MEDIUM, TITANIUM

## (undated) DEVICE — ADHESIVE, SKIN, LIQUIBAND EXCEED

## (undated) DEVICE — GLOVE, SURGICAL, PROTEXIS PI , 6.0, PF, LF

## (undated) DEVICE — DRAPE, INCISE, ANTIMICROBIAL, IOBAN 2, STERI DRAPE, 23 X 33 IN, DISPOSABLE, STERILE

## (undated) DEVICE — MIXER, CEMENT, MIXEVAC III HIGH VACUUM KIT, STERILE

## (undated) DEVICE — DRAIN, WOUND, ROUND, W/TROCAR, HOLE PATTERN, 10 IN, MEDIUM/LARGE, 3/16 X 49 IN

## (undated) DEVICE — TOWEL, SURGICAL, NEURO, O/R, 16 X 26, BLUE, STERILE

## (undated) DEVICE — SUTURE, CTD, VICRYL, 2-0, UND, BR, CT-2

## (undated) DEVICE — RETRACTOR, HANDHELD, 250ML, DBL ENDED

## (undated) DEVICE — SOLUTION, IRRIGATION, USP, SODIUM CHLORIDE 0.9%, .9 NACL, 1000 ML, BAG

## (undated) DEVICE — PACK, UNIVERSAL

## (undated) DEVICE — DRESSING, ABDOMINAL PAD, CURITY, 7.5 X 8 IN

## (undated) DEVICE — GLOVE, SURGICAL, PROTEXIS PI ORTHO, 8.0, PF, LF

## (undated) DEVICE — TAPE, SURGICAL, FOAM, MICROFOAM, HYPOALLERGENIC, 3 IN X 5.5 YD

## (undated) DEVICE — SUTURE, VICRYL, 0, 18 IN, CT-1, UNDYED

## (undated) DEVICE — CLIP, LIGATING, W/ADHESIVE PAD, MEDIUM, TITANIUM